# Patient Record
Sex: MALE | Race: AMERICAN INDIAN OR ALASKA NATIVE | NOT HISPANIC OR LATINO | URBAN - METROPOLITAN AREA
[De-identification: names, ages, dates, MRNs, and addresses within clinical notes are randomized per-mention and may not be internally consistent; named-entity substitution may affect disease eponyms.]

---

## 2020-06-28 ENCOUNTER — EMERGENCY (EMERGENCY)
Facility: HOSPITAL | Age: 60
LOS: 0 days | Discharge: HOME | End: 2020-06-28
Attending: EMERGENCY MEDICINE | Admitting: EMERGENCY MEDICINE
Payer: COMMERCIAL

## 2020-06-28 VITALS
TEMPERATURE: 98 F | RESPIRATION RATE: 18 BRPM | OXYGEN SATURATION: 96 % | HEART RATE: 96 BPM | SYSTOLIC BLOOD PRESSURE: 135 MMHG | DIASTOLIC BLOOD PRESSURE: 91 MMHG

## 2020-06-28 DIAGNOSIS — E78.5 HYPERLIPIDEMIA, UNSPECIFIED: ICD-10-CM

## 2020-06-28 DIAGNOSIS — Z88.0 ALLERGY STATUS TO PENICILLIN: ICD-10-CM

## 2020-06-28 DIAGNOSIS — M25.561 PAIN IN RIGHT KNEE: ICD-10-CM
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
## 2020-06-28 DIAGNOSIS — Z79.891 LONG TERM (CURRENT) USE OF OPIATE ANALGESIC: ICD-10-CM
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
## 2020-06-28 DIAGNOSIS — I10 ESSENTIAL (PRIMARY) HYPERTENSION: ICD-10-CM

## 2020-06-28 DIAGNOSIS — M25.511 PAIN IN RIGHT SHOULDER: ICD-10-CM
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
## 2020-06-28 PROCEDURE — 72170 X-RAY EXAM OF PELVIS: CPT | Mod: 26

## 2020-06-28 PROCEDURE — 73030 X-RAY EXAM OF SHOULDER: CPT | Mod: 26,RT

## 2020-06-28 PROCEDURE — 99284 EMERGENCY DEPT VISIT MOD MDM: CPT

## 2020-06-28 PROCEDURE — 73562 X-RAY EXAM OF KNEE 3: CPT | Mod: 26,RT

## 2020-06-28 RX ORDER — ACETAMINOPHEN 500 MG
975 TABLET ORAL ONCE
Refills: 0 | Status: COMPLETED | OUTPATIENT
Start: 2020-06-28 | End: 2020-06-28

## 2020-06-28 RX ORDER — METHOCARBAMOL 500 MG/1
2 TABLET, FILM COATED ORAL
Qty: 12 | Refills: 0
Start: 2020-06-28 | End: 2020-06-30

## 2020-06-28 RX ORDER — METHOCARBAMOL 500 MG/1
1500 TABLET, FILM COATED ORAL ONCE
Refills: 0 | Status: COMPLETED | OUTPATIENT
Start: 2020-06-28 | End: 2020-06-28

## 2020-06-28 RX ADMIN — METHOCARBAMOL 1500 MILLIGRAM(S): 500 TABLET, FILM COATED ORAL at 10:42

## 2020-06-28 RX ADMIN — Medication 975 MILLIGRAM(S): at 10:42

## 2020-06-28 NOTE — ED PROVIDER NOTE - PROGRESS NOTE DETAILS
Dienes- pain improved after meds. Sent rx for Robaxin. Xrs without acute fractures. Encouraged ortho f/u. Given ace wrap. Full DC instructions and precaution signs and symptoms discussed. Proper follow up ensured.  Medications administered and prescribed/OTC home meds discussed.  All questions and concerns from patient addressed.  Understanding of instructions verbalized.

## 2020-06-28 NOTE — ED PROVIDER NOTE - OBJECTIVE STATEMENT
58yo M with PMH of HTN and HLD presenting to ED with muscle pain and R shoulder and R knee pain since fall 2 1/2 weeks ago. Patient was seen by his PCP, had xrays of his feet that were unremarkable. Able to ambulate, has been ace wrapping R knee and taking Ibuprofen 800mg up to 2-3 times daily for muscular pain. States he is having a hard time raising his R arm completely although has good ROM otherwise. Reporting his R hip "clicks". Denies any f/c, HA, vision changes, midline spinal pain, abdominal pain, CP, SOB, paresthesias, or focal weakness. Nonsmoker.

## 2020-06-28 NOTE — ED PROVIDER NOTE - CARE PLAN
Principal Discharge DX:	Shoulder pain, right  Secondary Diagnosis:	Knee pain, right  Secondary Diagnosis:	Muscle pain

## 2020-06-28 NOTE — ED PROVIDER NOTE - NS ED ROS FT
Constitutional:  No fevers or chills.  Eyes:  No visual changes, eye pain, or discharge.  ENT:  No hearing changes. No sore throat.  Neck:  No neck pain or stiffness.  Cardiac:  No CP or edema.  Resp:  No cough or SOB.  GI:  No nausea, vomiting, diarrhea, or abdominal pain.  :  No dysuria, frequency, or hematuria.  MSK:  +R shoulder/R knee pain.  Neuro:  No headache, dizziness, or weakness.  Skin:  No skin rash.

## 2020-06-28 NOTE — ED PROVIDER NOTE - ATTENDING CONTRIBUTION TO CARE
59M PMH HTN HL, p/w R shoulder and R knee pain after trip and fall over dialysis machine 2 weeks ago. achy constant nonradiating worse w movement or lifting RUE, bending R knee. no cp, sob. no fever, cough. no numbness, weakness. no chi or loc. no ha, neck pain, bp. no abd pain, nvdc. seen by dr gama who did xr of feet which was normal and told  needs mri of knee/shoulder and ortho. pt requesting mri in ED and lab work to check for arthritis.     on exam, AFVSS, well bree nad, ncat, eomi, perrla, mmm, lctab, nontender chest, pelvis, no midline c/t/l spine ttp, rrr nl s1s2 no mrg, abd soft ntnd, aaox3, no focal deficits, no le edema or calf ttp, R shoulder and knee diffuse ttp, skin intact, no abrasions or edema, FROM, pain worse w rom, 5/5 motor, silt, 2+ radial pulse,     a/p; traumatic R shoulder/knee pain, will do XR, toradol, robaxin, re-eval.

## 2020-06-28 NOTE — ED PROVIDER NOTE - PATIENT PORTAL LINK FT
You can access the FollowMyHealth Patient Portal offered by [facility] by registering at the following website: [email or web address]. By joining [facility]’s FollowMyHealth portal, you will also be able to view your health information using other applications (apps) compatible with our system.

## 2020-06-28 NOTE — ED PROVIDER NOTE - NSFOLLOWUPINSTRUCTIONS_ED_ALL_ED_FT
Please follow-up with the orthopedic doctor as soon as possible.    Muscle Cramps and Spasms  Muscle cramps and spasms occur when a muscle or muscles tighten and you have no control over this tightening (involuntary muscle contraction). They are a common problem and can develop in any muscle. The most common place is in the calf muscles of the leg. Muscle cramps and muscle spasms are both involuntary muscle contractions, but there are some differences between the two:  Muscle cramps are painful. They come and go and may last for a few seconds or up to 15 minutes. Muscle cramps are often more forceful and last longer than muscle spasms.Muscle spasms may or may not be painful. They may also last just a few seconds or much longer.Certain medical conditions, such as diabetes or Parkinson's disease, can make it more likely to develop cramps or spasms. However, cramps or spasms are usually not caused by a serious underlying problem. Common causes include:  Doing more physical work or exercise than your body is ready for (overexertion).Overuse from repeating certain movements too many times.Remaining in a certain position for a long period of time.Improper preparation, form, or technique while playing a sport or doing an activity.Dehydration.Injury.Side effects of some medicines.Abnormally low levels of the salts and minerals in your blood (electrolytes), especially potassium and calcium. This could happen if you are taking water pills (diuretics) or if you are pregnant.In many cases, the cause of muscle cramps or spasms is not known.  Follow these instructions at home:  Managing pain and stiffness     Try massaging, stretching, and relaxing the affected muscle. Do this for several minutes at a time.If directed, apply heat to tight or tense muscles as often as told by your health care provider. Use the heat source that your health care provider recommends, such as a moist heat pack or a heating pad.  Place a towel between your skin and the heat source.Leave the heat on for 20–30 minutes.Remove the heat if your skin turns bright red. This is especially important if you are unable to feel pain, heat, or cold. You may have a greater risk of getting burned.If directed, put ice on the affected area. This may help if you are sore or have pain after a cramp or spasm.  Put ice in a plastic bag.Place a towel between your skin and the bag.Leave the ice on for 20 minutes, 2–3 times a day.Try taking hot showers or baths to help relax tight muscles.Eating and drinking     Drink enough fluid to keep your urine pale yellow. Staying well hydrated may help prevent cramps or spasms.Eat a healthy diet that includes plenty of nutrients to help your muscles function. A healthy diet includes fruits and vegetables, lean protein, whole grains, and low-fat or nonfat dairy products.General instructions     If you are having frequent cramps, avoid intense exercise for several days.Take over-the-counter and prescription medicines only as told by your health care provider.Pay attention to any changes in your symptoms.Keep all follow-up visits as told by your health care provider. This is important.Contact a health care provider if:  Your cramps or spasms get more severe or happen more often.Your cramps or spasms do not improve over time.Summary  Muscle cramps and spasms occur when a muscle or muscles tighten and you have no control over this tightening (involuntary muscle contraction).The most common place for cramps or spasms to occur is in the calf muscles of the leg.Massaging, stretching, and relaxing the affected muscle may relieve the cramp or spasm.Drink enough fluid to keep your urine pale yellow. Staying well hydrated may help prevent cramps or spasms.This information is not intended to replace advice given to you by your health care provider. Make sure you discuss any questions you have with your health care provider.    Shoulder Pain  Many things can cause shoulder pain, including:  An injury.Moving the shoulder in the same way again and again (overuse).Joint pain (arthritis).Pain can come from:  Swelling and irritation (inflammation) of any part of the shoulder.An injury to the shoulder joint.An injury to:  Tissues that connect muscle to bone (tendons).Tissues that connect bones to each other (ligaments).Bones.Follow these instructions at home:  Watch for changes in your symptoms. Let your doctor know about them. Follow these instructions to help with your pain.  If you have a sling:     Wear the sling as told by your doctor. Remove it only as told by your doctor.Loosen the sling if your fingers:  Tingle. Become numb. Turn cold and blue. Keep the sling clean.If the sling is not waterproof:  Do not let it get wet.Take the sling off when you shower or bathe.Managing pain, stiffness, and swelling        If told, put ice on the painful area:  Put ice in a plastic bag. Place a towel between your skin and the bag. Leave the ice on for 20 minutes, 2–3 times a day. Stop putting ice on if it does not help with the pain.Squeeze a soft ball or a foam pad as much as possible. This prevents swelling in the shoulder. It also helps to strengthen the arm.General instructions     Take over-the-counter and prescription medicines only as told by your doctor.Keep all follow-up visits as told by your doctor. This is important.Contact a doctor if:  Your pain gets worse.Medicine does not help your pain.You have new pain in your arm, hand, or fingers.Get help right away if:  Your arm, hand, or fingers:  Tingle.Are numb.Are swollen.Are painful.Turn white or blue.Summary  Shoulder pain can be caused by many things. These include injury, moving the shoulder in the same away again and again, and joint pain.Watch for changes in your symptoms. Let your doctor know about them.This condition may be treated with a sling, ice, and pain medicine.Contact your doctor if the pain gets worse or you have new pain. Get help right away if your arm, hand, or fingers tingle or get numb, swollen, or painful.Keep all follow-up visits as told by your doctor. This is important.    Knee Pain    WHAT YOU NEED TO KNOW:    What do I need to know about knee pain? Knee pain may start suddenly, or it may be a long-term problem. You may have pain on the side, front, or back of your knee. You may have knee stiffness and swelling. You may hear popping sounds or feel like your knee is giving way or locking up as you walk. You may feel pain when you sit, stand, walk, or climb up and down stairs.    What increases my risk for knee pain?     Obesity      A strain or tear in ligaments or tendons      A leg fracture or knee dislocation      Overuse of your knee      Osteoarthritis, rheumatoid arthritis, or gout      An infection, tumor, or cyst in your knee      Shoes that are not supportive, or training on a hard surface      Sports that involve jumping or pivoting on your knee    How is the cause of knee pain diagnosed? Your healthcare provider will examine your knee and ask about your symptoms. Tell your provider when the pain started and what you were doing at the time. Describe the pain, such as sharp, throbbing, or achy. Tell your provider about any knee injury or surgery you had. You may need any of the following:     MRI, CT, or ultrasound pictures may show an injury, fracture, or tumor.       Blood tests may be used to check the level of inflammation in your blood. The tests may also show signs of infection.      Arthroscopy is a procedure to look inside your knee joint with an arthroscope. An arthroscope is a flexible tube with a light and camera on the end. A knee arthroscopy is usually done to check for disease or damage inside your knee. These problems may be fixed during the procedure.    How is knee pain treated? Treatment will depend on the cause of your pain. You may need any of the following:     NSAIDs help decrease swelling and pain or fever. This medicine is available with or without a doctor's order. NSAIDs can cause stomach bleeding or kidney problems in certain people. If you take blood thinner medicine, always ask your healthcare provider if NSAIDs are safe for you. Always read the medicine label and follow directions.      Acetaminophen decreases pain and fever. It is available without a doctor's order. Ask how much to take and how often to take it. Follow directions. Read the labels of all other medicines you are using to see if they also contain acetaminophen, or ask your doctor or pharmacist. Acetaminophen can cause liver damage if not taken correctly. Do not use more than 4 grams (4,000 milligrams) total of acetaminophen in one day.       Prescription pain medicine may be given. Ask your healthcare provider how to take this medicine safely. Some prescription pain medicines contain acetaminophen. Do not take other medicines that contain acetaminophen without talking to your healthcare provider. Too much acetaminophen may cause liver damage. Prescription pain medicine may cause constipation. Ask your healthcare provider how to prevent or treat constipation.       Steroid injections may be given into your knee. Steroids reduce inflammation and pain.      Surgery may be used for some injuries, such as to repair a torn ACL.    What can I do to manage my symptoms?     Rest your knee so it can heal. Limit activities that increase your pain. Do low-impact exercises, such as walking or swimming.       Apply ice to help reduce swelling and pain. Use an ice pack, or put crushed ice in a plastic bag. Cover it with a towel before you apply it to your knee. Apply ice for 15 to 20 minutes every hour, or as directed.      Apply compression to help reduce swelling. Use a brace or bandage only as directed.      Elevate your knee to help decrease pain and swelling. Elevate your knee while you are sitting or lying down. Prop your leg on pillows to keep your knee above the level of your heart.      Prevent your knee from moving as directed. Your healthcare provider may put on a cast or splint. You may need to wear a leg brace to stabilize your knee. A leg brace can be adjusted to increase your range of motion as your knee heals.Hinged Knee Braces          What can I do to prevent knee pain?     Maintain a healthy weight. Extra weight increases your risk for knee pain. Ask your healthcare provider how much you should weigh. He or she can help you create a safe weight loss plan if you need to lose weight.      Exercise or train properly. Use the correct equipment for sports. Wear shoes that provide good support. Check your posture often as you exercise, play sports, or train for an event. This can help prevent stress and strain on your knees. Rest between sessions so you do not overwork your knees.    When should I seek immediate care?     Your pain is worse, even after treatment.       You cannot bend or straighten your leg completely.       The swelling around your knee does not go down even with treatment.      Your knee is painful and hot to the touch.     When should I contact my healthcare provider?     You have questions or concerns about your condition or care.

## 2020-06-28 NOTE — ED ADULT NURSE NOTE - NSIMPLEMENTINTERV_GEN_ALL_ED
Implemented All Universal Safety Interventions:  [place] to call system. Call bell, personal items and telephone within reach. Instruct patient to call for assistance. Room bathroom lighting operational. Non-slip footwear when patient is off stretcher. Physically safe environment: no spills, clutter or unnecessary equipment. Stretcher in lowest position, wheels locked, appropriate side rails in place.

## 2020-06-28 NOTE — ED PROVIDER NOTE - PHYSICAL EXAMINATION
PHYSICAL EXAM: I have reviewed current vital signs.  GENERAL: NAD, well-nourished; well-developed.  HEAD:  Normocephalic, atraumatic.  EYES: Conjunctiva and sclera clear.  ENT: MMM, no erythema/exudates.  NECK: Supple, no midline TTP, FROM.  CHEST/LUNG: Clear to auscultation bilaterally; no wheezes, rales, or rhonchi.  HEART: Regular rate and rhythm, normal S1 and S2; no murmurs, rubs, or gallops.  ABDOMEN: Soft, nontender, nondistended.  EXTREMITIES:  2+ peripheral pulses; R shoulder- no bony TTP, +mild reproducible muscular TTP, able to raise arm to about 60 degrees. R knee- mild TTP of medial aspect, no joint laxity. All other extremities nontender. No deformities.  MSK: No spinal midline TTP.  NEUROLOGY: A&O x 3. Motor 5/5. No focal neurological deficits.   SKIN: Warm and dry.

## 2020-06-28 NOTE — ED ADULT TRIAGE NOTE - CHIEF COMPLAINT QUOTE
Pt c/o R knee and R shoulder pain worsening x 2 weeks. Pt said he misstepped while walking when he injured his knee and now shoulder is affected.

## 2020-07-01 ENCOUNTER — APPOINTMENT (OUTPATIENT)
Dept: GERIATRICS | Facility: CLINIC | Age: 60
End: 2020-07-01
Payer: COMMERCIAL

## 2020-07-01 ENCOUNTER — OUTPATIENT (OUTPATIENT)
Dept: OUTPATIENT SERVICES | Facility: HOSPITAL | Age: 60
LOS: 1 days | Discharge: HOME | End: 2020-07-01
Payer: COMMERCIAL

## 2020-07-01 ENCOUNTER — OUTPATIENT (OUTPATIENT)
Dept: OUTPATIENT SERVICES | Facility: HOSPITAL | Age: 60
LOS: 1 days | Discharge: HOME | End: 2020-07-01

## 2020-07-01 VITALS
OXYGEN SATURATION: 93 % | TEMPERATURE: 98.5 F | WEIGHT: 184 LBS | BODY MASS INDEX: 24.92 KG/M2 | SYSTOLIC BLOOD PRESSURE: 127 MMHG | DIASTOLIC BLOOD PRESSURE: 81 MMHG | HEART RATE: 104 BPM | HEIGHT: 72 IN

## 2020-07-01 DIAGNOSIS — M25.571 PAIN IN RIGHT ANKLE AND JOINTS OF RIGHT FOOT: ICD-10-CM

## 2020-07-01 DIAGNOSIS — M54.5 LOW BACK PAIN: ICD-10-CM

## 2020-07-01 DIAGNOSIS — Z82.69 FAMILY HISTORY OF OTHER DISEASES OF THE MUSCULOSKELETAL SYSTEM AND CONNECTIVE TISSUE: ICD-10-CM

## 2020-07-01 DIAGNOSIS — Z86.79 PERSONAL HISTORY OF OTHER DISEASES OF THE CIRCULATORY SYSTEM: ICD-10-CM

## 2020-07-01 DIAGNOSIS — Z86.39 PERSONAL HISTORY OF OTHER ENDOCRINE, NUTRITIONAL AND METABOLIC DISEASE: ICD-10-CM

## 2020-07-01 DIAGNOSIS — M54.2 CERVICALGIA: ICD-10-CM

## 2020-07-01 PROCEDURE — 99203 OFFICE O/P NEW LOW 30 MIN: CPT | Mod: GC

## 2020-07-01 PROCEDURE — 73721 MRI JNT OF LWR EXTRE W/O DYE: CPT | Mod: 26,RT

## 2020-07-02 ENCOUNTER — OUTPATIENT (OUTPATIENT)
Dept: OUTPATIENT SERVICES | Facility: HOSPITAL | Age: 60
LOS: 1 days | Discharge: HOME | End: 2020-07-02
Payer: COMMERCIAL

## 2020-07-02 ENCOUNTER — RESULT REVIEW (OUTPATIENT)
Age: 60
End: 2020-07-02

## 2020-07-02 DIAGNOSIS — M54.2 CERVICALGIA: ICD-10-CM

## 2020-07-02 DIAGNOSIS — M54.5 LOW BACK PAIN: ICD-10-CM

## 2020-07-02 PROCEDURE — 72141 MRI NECK SPINE W/O DYE: CPT | Mod: 26

## 2020-07-06 ENCOUNTER — RESULT REVIEW (OUTPATIENT)
Age: 60
End: 2020-07-06

## 2020-07-06 ENCOUNTER — OUTPATIENT (OUTPATIENT)
Dept: OUTPATIENT SERVICES | Facility: HOSPITAL | Age: 60
LOS: 1 days | Discharge: HOME | End: 2020-07-06
Payer: COMMERCIAL

## 2020-07-06 DIAGNOSIS — M54.5 LOW BACK PAIN: ICD-10-CM

## 2020-07-06 PROCEDURE — 72148 MRI LUMBAR SPINE W/O DYE: CPT | Mod: 26

## 2020-07-26 ENCOUNTER — OUTPATIENT (OUTPATIENT)
Dept: OUTPATIENT SERVICES | Facility: HOSPITAL | Age: 60
LOS: 1 days | Discharge: HOME | End: 2020-07-26
Payer: COMMERCIAL

## 2020-07-26 DIAGNOSIS — R10.9 UNSPECIFIED ABDOMINAL PAIN: ICD-10-CM

## 2020-07-26 PROCEDURE — 74177 CT ABD & PELVIS W/CONTRAST: CPT | Mod: 26

## 2020-07-31 ENCOUNTER — APPOINTMENT (OUTPATIENT)
Dept: SURGERY | Facility: CLINIC | Age: 60
End: 2020-07-31
Payer: COMMERCIAL

## 2020-07-31 VITALS
SYSTOLIC BLOOD PRESSURE: 132 MMHG | WEIGHT: 180 LBS | HEART RATE: 89 BPM | DIASTOLIC BLOOD PRESSURE: 84 MMHG | TEMPERATURE: 97.7 F | BODY MASS INDEX: 24.38 KG/M2 | HEIGHT: 72 IN

## 2020-07-31 PROCEDURE — 99214 OFFICE O/P EST MOD 30 MIN: CPT

## 2020-07-31 NOTE — ASSESSMENT
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 Then he developed back and neck pain. On the MRI for the back pain a solid tumor was noticed in the abdomen. He then got a CT scan which confirmed a small bowel tumor. \par he has no symptoms. \par \par impression : GIST of small bowel .\par \par We explained in great detail the pathophysiology of the disease process. We amaris diagrams and discussed the workup for diagnosis and management.\par The various options were explained to the patient. The Risk , benefit and alternatives were discussed. We discussed recovery and possible complications.\par The Post operative care was explained to the patient. He was counselled on diet , exercise and wound care.\par We discussed the pathology and surgery with him.\par \par we will schedule him for lap resection of small bowel . \par \par We discussed multiple aspects of Enhanced Recovery After Surgery Protocols. We discussed we need to take multiple small steps to improve the outcome of the surgery and expedite the recovery. We discussed about preoperative preparation, intraoperative care and post operative recovery.\par \par \par We discussed the importance of close follow up. \par We informed that he needs to follow up in or\par We also informed that he can call us if anything changes or has any questions.\par

## 2020-07-31 NOTE — PHYSICAL EXAM
[JVD] : no jugular venous distention  [Purpura] : no purpura  [Respiratory Effort] : normal respiratory effort [Calm] : calm [Alert] : alert [de-identified] : Normal [de-identified] : Normal [de-identified] : Normal [de-identified] : Normal

## 2020-07-31 NOTE — HISTORY OF PRESENT ILLNESS
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 Then he developed back and neck pain. On the MRI for the back pain a solid tumor was noticed in the abdomen. He then got a CT scan which confirmed a small bowel tumor. \par he has no symptoms.

## 2020-08-14 ENCOUNTER — OUTPATIENT (OUTPATIENT)
Dept: OUTPATIENT SERVICES | Facility: HOSPITAL | Age: 60
LOS: 1 days | Discharge: HOME | End: 2020-08-14
Payer: COMMERCIAL

## 2020-08-14 ENCOUNTER — RESULT REVIEW (OUTPATIENT)
Age: 60
End: 2020-08-14

## 2020-08-14 VITALS
WEIGHT: 179.68 LBS | DIASTOLIC BLOOD PRESSURE: 76 MMHG | HEIGHT: 72 IN | SYSTOLIC BLOOD PRESSURE: 110 MMHG | RESPIRATION RATE: 16 BRPM | OXYGEN SATURATION: 95 % | HEART RATE: 104 BPM | TEMPERATURE: 96 F

## 2020-08-14 DIAGNOSIS — Z98.890 OTHER SPECIFIED POSTPROCEDURAL STATES: Chronic | ICD-10-CM

## 2020-08-14 DIAGNOSIS — Z01.818 ENCOUNTER FOR OTHER PREPROCEDURAL EXAMINATION: ICD-10-CM

## 2020-08-14 DIAGNOSIS — K63.89 OTHER SPECIFIED DISEASES OF INTESTINE: ICD-10-CM

## 2020-08-14 LAB
A1C WITH ESTIMATED AVERAGE GLUCOSE RESULT: 6.6 % — HIGH (ref 4–5.6)
ALBUMIN SERPL ELPH-MCNC: 4.3 G/DL — SIGNIFICANT CHANGE UP (ref 3.5–5.2)
ALP SERPL-CCNC: 133 U/L — HIGH (ref 30–115)
ALT FLD-CCNC: 43 U/L — HIGH (ref 0–41)
ANION GAP SERPL CALC-SCNC: 13 MMOL/L — SIGNIFICANT CHANGE UP (ref 7–14)
APTT BLD: 30.6 SEC — SIGNIFICANT CHANGE UP (ref 27–39.2)
AST SERPL-CCNC: 26 U/L — SIGNIFICANT CHANGE UP (ref 0–41)
BASOPHILS # BLD AUTO: 0.05 K/UL — SIGNIFICANT CHANGE UP (ref 0–0.2)
BASOPHILS NFR BLD AUTO: 0.4 % — SIGNIFICANT CHANGE UP (ref 0–1)
BILIRUB SERPL-MCNC: 0.3 MG/DL — SIGNIFICANT CHANGE UP (ref 0.2–1.2)
BLD GP AB SCN SERPL QL: SIGNIFICANT CHANGE UP
BUN SERPL-MCNC: 19 MG/DL — SIGNIFICANT CHANGE UP (ref 10–20)
CALCIUM SERPL-MCNC: 9.6 MG/DL — SIGNIFICANT CHANGE UP (ref 8.5–10.1)
CHLORIDE SERPL-SCNC: 100 MMOL/L — SIGNIFICANT CHANGE UP (ref 98–110)
CO2 SERPL-SCNC: 26 MMOL/L — SIGNIFICANT CHANGE UP (ref 17–32)
CREAT SERPL-MCNC: 1.1 MG/DL — SIGNIFICANT CHANGE UP (ref 0.7–1.5)
EOSINOPHIL # BLD AUTO: 0.35 K/UL — SIGNIFICANT CHANGE UP (ref 0–0.7)
EOSINOPHIL NFR BLD AUTO: 2.6 % — SIGNIFICANT CHANGE UP (ref 0–8)
ESTIMATED AVERAGE GLUCOSE: 143 MG/DL — HIGH (ref 68–114)
GLUCOSE SERPL-MCNC: 118 MG/DL — HIGH (ref 70–99)
HCT VFR BLD CALC: 45.1 % — SIGNIFICANT CHANGE UP (ref 42–52)
HGB BLD-MCNC: 14.8 G/DL — SIGNIFICANT CHANGE UP (ref 14–18)
IMM GRANULOCYTES NFR BLD AUTO: 0.4 % — HIGH (ref 0.1–0.3)
INR BLD: 1.12 RATIO — SIGNIFICANT CHANGE UP (ref 0.65–1.3)
LYMPHOCYTES # BLD AUTO: 1.77 K/UL — SIGNIFICANT CHANGE UP (ref 1.2–3.4)
LYMPHOCYTES # BLD AUTO: 13.1 % — LOW (ref 20.5–51.1)
MCHC RBC-ENTMCNC: 29.2 PG — SIGNIFICANT CHANGE UP (ref 27–31)
MCHC RBC-ENTMCNC: 32.8 G/DL — SIGNIFICANT CHANGE UP (ref 32–37)
MCV RBC AUTO: 89 FL — SIGNIFICANT CHANGE UP (ref 80–94)
MONOCYTES # BLD AUTO: 1.67 K/UL — HIGH (ref 0.1–0.6)
MONOCYTES NFR BLD AUTO: 12.3 % — HIGH (ref 1.7–9.3)
NEUTROPHILS # BLD AUTO: 9.63 K/UL — HIGH (ref 1.4–6.5)
NEUTROPHILS NFR BLD AUTO: 71.2 % — SIGNIFICANT CHANGE UP (ref 42.2–75.2)
NRBC # BLD: 0 /100 WBCS — SIGNIFICANT CHANGE UP (ref 0–0)
PLATELET # BLD AUTO: 291 K/UL — SIGNIFICANT CHANGE UP (ref 130–400)
POTASSIUM SERPL-MCNC: 4 MMOL/L — SIGNIFICANT CHANGE UP (ref 3.5–5)
POTASSIUM SERPL-SCNC: 4 MMOL/L — SIGNIFICANT CHANGE UP (ref 3.5–5)
PROT SERPL-MCNC: 7.6 G/DL — SIGNIFICANT CHANGE UP (ref 6–8)
PROTHROM AB SERPL-ACNC: 12.9 SEC — HIGH (ref 9.95–12.87)
RBC # BLD: 5.07 M/UL — SIGNIFICANT CHANGE UP (ref 4.7–6.1)
RBC # FLD: 15.4 % — HIGH (ref 11.5–14.5)
SODIUM SERPL-SCNC: 139 MMOL/L — SIGNIFICANT CHANGE UP (ref 135–146)
WBC # BLD: 13.53 K/UL — HIGH (ref 4.8–10.8)
WBC # FLD AUTO: 13.53 K/UL — HIGH (ref 4.8–10.8)

## 2020-08-14 PROCEDURE — 71046 X-RAY EXAM CHEST 2 VIEWS: CPT | Mod: 26

## 2020-08-14 PROCEDURE — 93010 ELECTROCARDIOGRAM REPORT: CPT

## 2020-08-14 NOTE — H&P PST ADULT - NSANTHOSAYNRD_GEN_A_CORE
No. EDVIN screening performed.  STOP BANG Legend: 0-2 = LOW Risk; 3-4 = INTERMEDIATE Risk; 5-8 = HIGH Risk

## 2020-08-14 NOTE — H&P PST ADULT - REASON FOR ADMISSION
Pt here for PAST for Laparoscopic Resectionof Small Bowel Tumor, possible open and any other indicated procedures on 8/21 under [place] by Dr Matute

## 2020-08-14 NOTE — H&P PST ADULT - HISTORY OF PRESENT ILLNESS
Pt states in June 2020 he sustained a right foot back and leg injury while pushing equipment at work. An MRI was done to evaluate his injury and an incidental 7.5cm small bowel tumor was seen. Pt denies any abdominal pain, weight changes or GI complaints. PATIENT CURRENTLY DENIES CHEST PAIN , SHORTNESS OF BREATH,  PALPITATIONS,  DYSURIA, OR UPPER RESPIRATORY INFECTION IN PAST 2 WEEKS. EXERCISE  TOLERANCE  1-2 FLIGHT OF STAIRS  WITHOUT SHORTNESS OF BREATH.  PAtient advised to maintain Quarantine Status from now until day of surgery.  Anesthesia Alert  NO--Difficult Airway  NO--History of neck surgery or radiation  NO--Limited ROM of neck  NO--History of Malignant hyperthermia  NO--Personal or family history of Pseudocholinesterase deficiency  NO--Prior Anesthesia Complication  NO--Latex Allergy  NO--Loose teeth  NO--History of Rheumatoid Arthritis  NO--EDVIN  NO--Other_____

## 2020-08-18 ENCOUNTER — LABORATORY RESULT (OUTPATIENT)
Age: 60
End: 2020-08-18

## 2020-08-18 ENCOUNTER — OUTPATIENT (OUTPATIENT)
Dept: OUTPATIENT SERVICES | Facility: HOSPITAL | Age: 60
LOS: 1 days | Discharge: HOME | End: 2020-08-18

## 2020-08-18 DIAGNOSIS — Z98.890 OTHER SPECIFIED POSTPROCEDURAL STATES: Chronic | ICD-10-CM

## 2020-08-18 DIAGNOSIS — Z11.59 ENCOUNTER FOR SCREENING FOR OTHER VIRAL DISEASES: ICD-10-CM

## 2020-08-18 PROBLEM — I10 ESSENTIAL (PRIMARY) HYPERTENSION: Chronic | Status: ACTIVE | Noted: 2020-06-28

## 2020-08-18 PROBLEM — E78.5 HYPERLIPIDEMIA, UNSPECIFIED: Chronic | Status: ACTIVE | Noted: 2020-08-14

## 2020-08-18 PROBLEM — M54.5 LOW BACK PAIN: Chronic | Status: ACTIVE | Noted: 2020-08-14

## 2020-08-21 ENCOUNTER — RESULT REVIEW (OUTPATIENT)
Age: 60
End: 2020-08-21

## 2020-08-21 ENCOUNTER — APPOINTMENT (OUTPATIENT)
Dept: SURGERY | Facility: HOSPITAL | Age: 60
End: 2020-08-21

## 2020-08-21 ENCOUNTER — INPATIENT (INPATIENT)
Facility: HOSPITAL | Age: 60
LOS: 3 days | Discharge: HOME | End: 2020-08-25
Attending: SURGERY | Admitting: SURGERY
Payer: COMMERCIAL

## 2020-08-21 VITALS
OXYGEN SATURATION: 98 % | HEIGHT: 72 IN | RESPIRATION RATE: 20 BRPM | SYSTOLIC BLOOD PRESSURE: 139 MMHG | TEMPERATURE: 98 F | DIASTOLIC BLOOD PRESSURE: 97 MMHG | HEART RATE: 83 BPM | WEIGHT: 177.03 LBS

## 2020-08-21 DIAGNOSIS — Z98.890 OTHER SPECIFIED POSTPROCEDURAL STATES: Chronic | ICD-10-CM

## 2020-08-21 DIAGNOSIS — K63.89 OTHER SPECIFIED DISEASES OF INTESTINE: ICD-10-CM

## 2020-08-21 LAB
ABO RH CONFIRMATION: SIGNIFICANT CHANGE UP
ANION GAP SERPL CALC-SCNC: 12 MMOL/L — SIGNIFICANT CHANGE UP (ref 7–14)
BASOPHILS # BLD AUTO: 0.01 K/UL — SIGNIFICANT CHANGE UP (ref 0–0.2)
BASOPHILS NFR BLD AUTO: 0.1 % — SIGNIFICANT CHANGE UP (ref 0–1)
BUN SERPL-MCNC: 19 MG/DL — SIGNIFICANT CHANGE UP (ref 10–20)
CALCIUM SERPL-MCNC: 9.2 MG/DL — SIGNIFICANT CHANGE UP (ref 8.5–10.1)
CHLORIDE SERPL-SCNC: 98 MMOL/L — SIGNIFICANT CHANGE UP (ref 98–110)
CO2 SERPL-SCNC: 24 MMOL/L — SIGNIFICANT CHANGE UP (ref 17–32)
CREAT SERPL-MCNC: 0.9 MG/DL — SIGNIFICANT CHANGE UP (ref 0.7–1.5)
EOSINOPHIL # BLD AUTO: 0 K/UL — SIGNIFICANT CHANGE UP (ref 0–0.7)
EOSINOPHIL NFR BLD AUTO: 0 % — SIGNIFICANT CHANGE UP (ref 0–8)
GLUCOSE SERPL-MCNC: 187 MG/DL — HIGH (ref 70–99)
HCT VFR BLD CALC: 41.8 % — LOW (ref 42–52)
HGB BLD-MCNC: 13.8 G/DL — LOW (ref 14–18)
IMM GRANULOCYTES NFR BLD AUTO: 0.4 % — HIGH (ref 0.1–0.3)
LYMPHOCYTES # BLD AUTO: 0.6 K/UL — LOW (ref 1.2–3.4)
LYMPHOCYTES # BLD AUTO: 4.9 % — LOW (ref 20.5–51.1)
MAGNESIUM SERPL-MCNC: 1.8 MG/DL — SIGNIFICANT CHANGE UP (ref 1.8–2.4)
MCHC RBC-ENTMCNC: 29.4 PG — SIGNIFICANT CHANGE UP (ref 27–31)
MCHC RBC-ENTMCNC: 33 G/DL — SIGNIFICANT CHANGE UP (ref 32–37)
MCV RBC AUTO: 88.9 FL — SIGNIFICANT CHANGE UP (ref 80–94)
MONOCYTES # BLD AUTO: 0.27 K/UL — SIGNIFICANT CHANGE UP (ref 0.1–0.6)
MONOCYTES NFR BLD AUTO: 2.2 % — SIGNIFICANT CHANGE UP (ref 1.7–9.3)
NEUTROPHILS # BLD AUTO: 11.28 K/UL — HIGH (ref 1.4–6.5)
NEUTROPHILS NFR BLD AUTO: 92.4 % — HIGH (ref 42.2–75.2)
NRBC # BLD: 0 /100 WBCS — SIGNIFICANT CHANGE UP (ref 0–0)
PHOSPHATE SERPL-MCNC: 4.5 MG/DL — SIGNIFICANT CHANGE UP (ref 2.1–4.9)
PLATELET # BLD AUTO: 308 K/UL — SIGNIFICANT CHANGE UP (ref 130–400)
POTASSIUM SERPL-MCNC: 4.3 MMOL/L — SIGNIFICANT CHANGE UP (ref 3.5–5)
POTASSIUM SERPL-SCNC: 4.3 MMOL/L — SIGNIFICANT CHANGE UP (ref 3.5–5)
RBC # BLD: 4.7 M/UL — SIGNIFICANT CHANGE UP (ref 4.7–6.1)
RBC # FLD: 14.6 % — HIGH (ref 11.5–14.5)
SODIUM SERPL-SCNC: 134 MMOL/L — LOW (ref 135–146)
WBC # BLD: 12.21 K/UL — HIGH (ref 4.8–10.8)
WBC # FLD AUTO: 12.21 K/UL — HIGH (ref 4.8–10.8)

## 2020-08-21 PROCEDURE — 88341 IMHCHEM/IMCYTCHM EA ADD ANTB: CPT | Mod: 26

## 2020-08-21 PROCEDURE — 44202 LAP ENTERECTOMY: CPT

## 2020-08-21 PROCEDURE — 88342 IMHCHEM/IMCYTCHM 1ST ANTB: CPT | Mod: 26

## 2020-08-21 PROCEDURE — 64488 TAP BLOCK BI INJECTION: CPT | Mod: XU,47

## 2020-08-21 PROCEDURE — 88309 TISSUE EXAM BY PATHOLOGIST: CPT | Mod: 26

## 2020-08-21 RX ORDER — SODIUM CHLORIDE 9 MG/ML
1000 INJECTION, SOLUTION INTRAVENOUS
Refills: 0 | Status: DISCONTINUED | OUTPATIENT
Start: 2020-08-21 | End: 2020-08-22

## 2020-08-21 RX ORDER — BUPIVACAINE 13.3 MG/ML
20 INJECTION, SUSPENSION, LIPOSOMAL INFILTRATION ONCE
Refills: 0 | Status: DISCONTINUED | OUTPATIENT
Start: 2020-08-21 | End: 2020-08-21

## 2020-08-21 RX ORDER — KETOROLAC TROMETHAMINE 30 MG/ML
15 SYRINGE (ML) INJECTION EVERY 8 HOURS
Refills: 0 | Status: DISCONTINUED | OUTPATIENT
Start: 2020-08-21 | End: 2020-08-25

## 2020-08-21 RX ORDER — ONDANSETRON 8 MG/1
4 TABLET, FILM COATED ORAL ONCE
Refills: 0 | Status: DISCONTINUED | OUTPATIENT
Start: 2020-08-21 | End: 2020-08-21

## 2020-08-21 RX ORDER — ONDANSETRON 8 MG/1
4 TABLET, FILM COATED ORAL EVERY 6 HOURS
Refills: 0 | Status: DISCONTINUED | OUTPATIENT
Start: 2020-08-21 | End: 2020-08-25

## 2020-08-21 RX ORDER — HYDROMORPHONE HYDROCHLORIDE 2 MG/ML
1 INJECTION INTRAMUSCULAR; INTRAVENOUS; SUBCUTANEOUS
Refills: 0 | Status: DISCONTINUED | OUTPATIENT
Start: 2020-08-21 | End: 2020-08-21

## 2020-08-21 RX ORDER — SODIUM CHLORIDE 9 MG/ML
1000 INJECTION, SOLUTION INTRAVENOUS
Refills: 0 | Status: DISCONTINUED | OUTPATIENT
Start: 2020-08-21 | End: 2020-08-21

## 2020-08-21 RX ORDER — LISINOPRIL 2.5 MG/1
40 TABLET ORAL DAILY
Refills: 0 | Status: DISCONTINUED | OUTPATIENT
Start: 2020-08-22 | End: 2020-08-25

## 2020-08-21 RX ORDER — ATORVASTATIN CALCIUM 80 MG/1
40 TABLET, FILM COATED ORAL AT BEDTIME
Refills: 0 | Status: DISCONTINUED | OUTPATIENT
Start: 2020-08-21 | End: 2020-08-25

## 2020-08-21 RX ORDER — HYDROMORPHONE HYDROCHLORIDE 2 MG/ML
0.25 INJECTION INTRAMUSCULAR; INTRAVENOUS; SUBCUTANEOUS EVERY 4 HOURS
Refills: 0 | Status: DISCONTINUED | OUTPATIENT
Start: 2020-08-21 | End: 2020-08-25

## 2020-08-21 RX ORDER — MEPERIDINE HYDROCHLORIDE 50 MG/ML
12.5 INJECTION INTRAMUSCULAR; INTRAVENOUS; SUBCUTANEOUS
Refills: 0 | Status: DISCONTINUED | OUTPATIENT
Start: 2020-08-21 | End: 2020-08-21

## 2020-08-21 RX ORDER — HYDROMORPHONE HYDROCHLORIDE 2 MG/ML
0.5 INJECTION INTRAMUSCULAR; INTRAVENOUS; SUBCUTANEOUS
Refills: 0 | Status: DISCONTINUED | OUTPATIENT
Start: 2020-08-21 | End: 2020-08-21

## 2020-08-21 RX ORDER — ACETAMINOPHEN 500 MG
1000 TABLET ORAL EVERY 6 HOURS
Refills: 0 | Status: DISCONTINUED | OUTPATIENT
Start: 2020-08-21 | End: 2020-08-25

## 2020-08-21 RX ORDER — HEPARIN SODIUM 5000 [USP'U]/ML
5000 INJECTION INTRAVENOUS; SUBCUTANEOUS EVERY 8 HOURS
Refills: 0 | Status: DISCONTINUED | OUTPATIENT
Start: 2020-08-21 | End: 2020-08-25

## 2020-08-21 RX ADMIN — HEPARIN SODIUM 5000 UNIT(S): 5000 INJECTION INTRAVENOUS; SUBCUTANEOUS at 21:23

## 2020-08-21 RX ADMIN — HYDROMORPHONE HYDROCHLORIDE 0.5 MILLIGRAM(S): 2 INJECTION INTRAMUSCULAR; INTRAVENOUS; SUBCUTANEOUS at 16:48

## 2020-08-21 RX ADMIN — SODIUM CHLORIDE 100 MILLILITER(S): 9 INJECTION, SOLUTION INTRAVENOUS at 16:16

## 2020-08-21 RX ADMIN — ONDANSETRON 4 MILLIGRAM(S): 8 TABLET, FILM COATED ORAL at 20:21

## 2020-08-21 RX ADMIN — HYDROMORPHONE HYDROCHLORIDE 1 MILLIGRAM(S): 2 INJECTION INTRAMUSCULAR; INTRAVENOUS; SUBCUTANEOUS at 16:16

## 2020-08-21 RX ADMIN — HYDROMORPHONE HYDROCHLORIDE 0.5 MILLIGRAM(S): 2 INJECTION INTRAMUSCULAR; INTRAVENOUS; SUBCUTANEOUS at 16:15

## 2020-08-21 RX ADMIN — Medication 15 MILLIGRAM(S): at 16:16

## 2020-08-21 RX ADMIN — HYDROMORPHONE HYDROCHLORIDE 1 MILLIGRAM(S): 2 INJECTION INTRAMUSCULAR; INTRAVENOUS; SUBCUTANEOUS at 15:54

## 2020-08-21 RX ADMIN — Medication 15 MILLIGRAM(S): at 15:55

## 2020-08-21 NOTE — CHART NOTE - NSCHARTNOTEFT_GEN_A_CORE
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, malignant    _x___  Patent Airway    __x__  Full return of protective reflexes    __x__  Full recovery from anesthesia / back to baseline status    Vitals:  T(C): 97.8 F  HR: 80  BP: 127/76  RR: 18  SpO2: 98%     Mental Status:  __x__ Awake   __x___ Alert   _____ Drowsy   _____ Sedated    Nausea/Vomiting:  __x__ NO  ______Yes,   See Post - Op Orders          Pain Scale (0-10):  _____    Treatment: ____ None    __x__ See Post - Op/PCA Orders    Post - Operative Fluids:   ____ Oral   __x__ See Post - Op Orders    Plan: Discharge:   ____Home       __x___Floor     _____Critical Care    _____  Other:_________________    Comments: uneventful anesthesia course no complications. Vitals stable. Pt transferred to PACU. Transfer to floor when criteria is met

## 2020-08-21 NOTE — CHART NOTE - NSCHARTNOTEFT_GEN_A_CORE
Post Operative Check    Patient is post op from a Hand-assisted laparoscopic excision of small bowel (61686)   and is     Vitals    T(C): 35.6 (08-21-20 @ 21:40), Max: 36.7 (08-21-20 @ 10:20)  HR: 95 (08-21-20 @ 21:40) (72 - 95)  BP: 114/71 (08-21-20 @ 21:40) (112/72 - 139/97)  RR: 18 (08-21-20 @ 21:40) (18 - 20)  SpO2: 95% (08-21-20 @ 18:10) (94% - 98%)  Wt(kg): --      08-21 @ 07:01  -  08-21 @ 23:04  --------------------------------------------------------  IN:    lactated ringers.: 300 mL  Total IN: 300 mL    OUT:  Total OUT: 0 mL    Total NET: 300 mL          Physical Exam  General: NAD AAOx3   Cards: RRR S1S2  Resp: CTAB  Abdomen:  :  Ext: NTBL    Labs  Labs:  CAPILLARY BLOOD GLUCOSE                              13.8   12.21 )-----------( 308      ( 21 Aug 2020 20:00 )             41.8       Auto Neutrophil %: 92.4 % (08-21-20 @ 20:00)  Auto Immature Granulocyte %: 0.4 % (08-21-20 @ 20:00)    08-21    134<L>  |  98  |  19  ----------------------------<  187<H>  4.3   |  24  |  0.9      Calcium, Total Serum: 9.2 mg/dL (08-21-20 @ 20:00)      LFTs:         Coags:                    Radiology:       Patient is a 59y old Male s/p   Plan:

## 2020-08-21 NOTE — BRIEF OPERATIVE NOTE - NSICDXBRIEFPREOP_GEN_ALL_CORE_FT
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

## 2020-08-21 NOTE — BRIEF OPERATIVE NOTE - OPERATION/FINDINGS
small bowel tumor. no other abnormalities noted. laparoscopic small bowel resection and side to side anastomosis performed. TAP block done. no apparent complications.

## 2020-08-21 NOTE — BRIEF OPERATIVE NOTE - NSICDXBRIEFPOSTOP_GEN_ALL_CORE_FT
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

## 2020-08-22 LAB
ANION GAP SERPL CALC-SCNC: 8 MMOL/L — SIGNIFICANT CHANGE UP (ref 7–14)
BASOPHILS # BLD AUTO: 0.03 K/UL — SIGNIFICANT CHANGE UP (ref 0–0.2)
BASOPHILS NFR BLD AUTO: 0.3 % — SIGNIFICANT CHANGE UP (ref 0–1)
BUN SERPL-MCNC: 14 MG/DL — SIGNIFICANT CHANGE UP (ref 10–20)
CALCIUM SERPL-MCNC: 9 MG/DL — SIGNIFICANT CHANGE UP (ref 8.5–10.1)
CHLORIDE SERPL-SCNC: 105 MMOL/L — SIGNIFICANT CHANGE UP (ref 98–110)
CO2 SERPL-SCNC: 29 MMOL/L — SIGNIFICANT CHANGE UP (ref 17–32)
CREAT SERPL-MCNC: 1 MG/DL — SIGNIFICANT CHANGE UP (ref 0.7–1.5)
EOSINOPHIL # BLD AUTO: 0.07 K/UL — SIGNIFICANT CHANGE UP (ref 0–0.7)
EOSINOPHIL NFR BLD AUTO: 0.6 % — SIGNIFICANT CHANGE UP (ref 0–8)
GLUCOSE SERPL-MCNC: 111 MG/DL — HIGH (ref 70–99)
HCT VFR BLD CALC: 35.4 % — LOW (ref 42–52)
HGB BLD-MCNC: 11.5 G/DL — LOW (ref 14–18)
IMM GRANULOCYTES NFR BLD AUTO: 0.5 % — HIGH (ref 0.1–0.3)
LYMPHOCYTES # BLD AUTO: 1.88 K/UL — SIGNIFICANT CHANGE UP (ref 1.2–3.4)
LYMPHOCYTES # BLD AUTO: 16.6 % — LOW (ref 20.5–51.1)
MAGNESIUM SERPL-MCNC: 2.1 MG/DL — SIGNIFICANT CHANGE UP (ref 1.8–2.4)
MCHC RBC-ENTMCNC: 29.3 PG — SIGNIFICANT CHANGE UP (ref 27–31)
MCHC RBC-ENTMCNC: 32.5 G/DL — SIGNIFICANT CHANGE UP (ref 32–37)
MCV RBC AUTO: 90.1 FL — SIGNIFICANT CHANGE UP (ref 80–94)
MONOCYTES # BLD AUTO: 1.28 K/UL — HIGH (ref 0.1–0.6)
MONOCYTES NFR BLD AUTO: 11.3 % — HIGH (ref 1.7–9.3)
NEUTROPHILS # BLD AUTO: 7.98 K/UL — HIGH (ref 1.4–6.5)
NEUTROPHILS NFR BLD AUTO: 70.7 % — SIGNIFICANT CHANGE UP (ref 42.2–75.2)
NRBC # BLD: 0 /100 WBCS — SIGNIFICANT CHANGE UP (ref 0–0)
PHOSPHATE SERPL-MCNC: 4 MG/DL — SIGNIFICANT CHANGE UP (ref 2.1–4.9)
PLATELET # BLD AUTO: 323 K/UL — SIGNIFICANT CHANGE UP (ref 130–400)
POTASSIUM SERPL-MCNC: 4 MMOL/L — SIGNIFICANT CHANGE UP (ref 3.5–5)
POTASSIUM SERPL-SCNC: 4 MMOL/L — SIGNIFICANT CHANGE UP (ref 3.5–5)
RBC # BLD: 3.93 M/UL — LOW (ref 4.7–6.1)
RBC # FLD: 14.7 % — HIGH (ref 11.5–14.5)
SODIUM SERPL-SCNC: 142 MMOL/L — SIGNIFICANT CHANGE UP (ref 135–146)
WBC # BLD: 11.3 K/UL — HIGH (ref 4.8–10.8)
WBC # FLD AUTO: 11.3 K/UL — HIGH (ref 4.8–10.8)

## 2020-08-22 PROCEDURE — 99024 POSTOP FOLLOW-UP VISIT: CPT

## 2020-08-22 RX ORDER — SODIUM CHLORIDE 9 MG/ML
1000 INJECTION, SOLUTION INTRAVENOUS
Refills: 0 | Status: DISCONTINUED | OUTPATIENT
Start: 2020-08-22 | End: 2020-08-23

## 2020-08-22 RX ORDER — TAMSULOSIN HYDROCHLORIDE 0.4 MG/1
0.8 CAPSULE ORAL AT BEDTIME
Refills: 0 | Status: DISCONTINUED | OUTPATIENT
Start: 2020-08-22 | End: 2020-08-25

## 2020-08-22 RX ORDER — MAGNESIUM SULFATE 500 MG/ML
2 VIAL (ML) INJECTION ONCE
Refills: 0 | Status: COMPLETED | OUTPATIENT
Start: 2020-08-22 | End: 2020-08-22

## 2020-08-22 RX ADMIN — Medication 50 GRAM(S): at 05:07

## 2020-08-22 RX ADMIN — Medication 15 MILLIGRAM(S): at 20:20

## 2020-08-22 RX ADMIN — SODIUM CHLORIDE 75 MILLILITER(S): 9 INJECTION, SOLUTION INTRAVENOUS at 12:35

## 2020-08-22 RX ADMIN — HYDROMORPHONE HYDROCHLORIDE 0.25 MILLIGRAM(S): 2 INJECTION INTRAMUSCULAR; INTRAVENOUS; SUBCUTANEOUS at 08:15

## 2020-08-22 RX ADMIN — Medication 1000 MILLIGRAM(S): at 06:16

## 2020-08-22 RX ADMIN — HYDROMORPHONE HYDROCHLORIDE 0.25 MILLIGRAM(S): 2 INJECTION INTRAMUSCULAR; INTRAVENOUS; SUBCUTANEOUS at 07:56

## 2020-08-22 RX ADMIN — HYDROMORPHONE HYDROCHLORIDE 0.25 MILLIGRAM(S): 2 INJECTION INTRAMUSCULAR; INTRAVENOUS; SUBCUTANEOUS at 12:36

## 2020-08-22 RX ADMIN — TAMSULOSIN HYDROCHLORIDE 0.8 MILLIGRAM(S): 0.4 CAPSULE ORAL at 22:10

## 2020-08-22 RX ADMIN — Medication 15 MILLIGRAM(S): at 20:35

## 2020-08-22 RX ADMIN — HYDROMORPHONE HYDROCHLORIDE 0.25 MILLIGRAM(S): 2 INJECTION INTRAMUSCULAR; INTRAVENOUS; SUBCUTANEOUS at 12:50

## 2020-08-22 RX ADMIN — LISINOPRIL 40 MILLIGRAM(S): 2.5 TABLET ORAL at 05:08

## 2020-08-22 RX ADMIN — HEPARIN SODIUM 5000 UNIT(S): 5000 INJECTION INTRAVENOUS; SUBCUTANEOUS at 13:30

## 2020-08-22 RX ADMIN — HEPARIN SODIUM 5000 UNIT(S): 5000 INJECTION INTRAVENOUS; SUBCUTANEOUS at 05:07

## 2020-08-22 RX ADMIN — Medication 15 MILLIGRAM(S): at 00:39

## 2020-08-22 NOTE — PHYSICAL THERAPY INITIAL EVALUATION ADULT - GENERAL OBSERVATIONS, REHAB EVAL
pt encountered supine in bed in NAD - agreeable to PT IE - put an abdominal binder on pt with RN Sohail - + IV and winston - pt is doing well ind in ambulation and stairs - DC PT

## 2020-08-22 NOTE — CONSULT NOTE ADULT - SUBJECTIVE AND OBJECTIVE BOX
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  for  smal  bowel  lesion  resiction  sp  pod  1  ptn  referred to acute  rehab  for eval and  tx  ptn  seen  and examed  at  bed  side  nad  aox3  c/o  r le  pain      PTN  REFERRED TO ACUTE  REHAB  FOR  EVAL AND  TX   PAST MEDICAL & SURGICAL HISTORY:  Hyperlipidemia  Lumbago  Hypertension  H/O lymph node biopsy: Neck by Dr Case      [facility] Course:    TODAY'S SUBJECTIVE & REVIEW OF SYMPTOMS:     Constitutional WNL   Cardio WNL   Resp WNL   GI WNL  Heme WNL  Endo WNL  Skin WNL  MSK WNL  Neuro WNL  Cognitive WNL  Psych WNL      MEDICATIONS  (STANDING):  acetaminophen   Tablet .. 1000 milliGRAM(s) Oral every 6 hours  atorvastatin 40 milliGRAM(s) Oral at bedtime  dextrose 5% + sodium chloride 0.45%. 1000 milliLiter(s) (75 mL/Hr) IV Continuous <Continuous>  heparin   Injectable 5000 Unit(s) SubCutaneous every 8 hours  lisinopril 40 milliGRAM(s) Oral daily  tamsulosin 0.8 milliGRAM(s) Oral at bedtime    MEDICATIONS  (PRN):  HYDROmorphone  Injectable 0.25 milliGRAM(s) IV Push every 4 hours PRN Severe Pain (7 - 10)  ketorolac   Injectable 15 milliGRAM(s) IV Push every 8 hours PRN Moderate Pain (4 - 6)  ondansetron Injectable 4 milliGRAM(s) IV Push every 6 hours PRN Nausea      FAMILY HISTORY:  Known health problems: none      Allergies    penicillin (Unknown)  shellfish (Pruritus)    Intolerances        SOCIAL HISTORY:    [  ] Etoh  [  ] Smoking  [  ] Substance abuse     Home Environment:  [  ] Home Alone  [xx  ] Lives with Family  [  ] Home Health Aid    Dwelling:  [  ] Apartment  [ x ] Private House  [  ] Adult Home  [  ] Skilled Nursing Facility      [  ] Short Term  [  ] Long Term  [x  ] Stairs       Elevator [  ]    FUNCTIONAL STATUS PTA: (Check all that apply)  Ambulation: [  x ]Independent    [  ] Dependent     [  ] Non-Ambulatory  Assistive Device: [  ] SA Cane  [  ]  Q Cane  [  ] Walker  [  ]  Wheelchair  ADL : [ x ] Independent  [  ]  Dependent       Vital Signs Last 24 Hrs  T(C): 36.7 (22 Aug 2020 13:25), Max: 36.7 (22 Aug 2020 09:50)  T(F): 98.1 (22 Aug 2020 13:25), Max: 98.1 (22 Aug 2020 13:25)  HR: 75 (22 Aug 2020 13:25) (72 - 95)  BP: 111/76 (22 Aug 2020 13:25) (105/66 - 128/81)  BP(mean): --  RR: 18 (22 Aug 2020 13:25) (18 - 18)  SpO2: 95% (21 Aug 2020 18:10) (94% - 98%)      PHYSICAL EXAM: Alert & Oriented X 3  GENERAL: NAD, well-groomed, well-developed  HEAD:  Atraumatic, Normocephalic  EYES: EOMI, PERRLA, conjunctiva and sclera clear  NECK: Supple, No JVD, Normal thyroid  CHEST/LUNG: Clear to percussion bilaterally; No rales, rhonchi, wheezing, or rubs  HEART: Regular rate and rhythm; No murmurs, rubs, or gallops  ABDOMEN: Soft, Nontender, Nondistended; Bowel sounds present  EXTREMITIES:  2+ Peripheral Pulses, No clubbing, cyanosis, or edema    NERVOUS SYSTEM:  Cranial Nerves 2-12 intact [ x ] Abnormal  [  ]  ROM: WFL all extremities [ x ]  Abnormal [  ]  Motor Strength: WFL all extremities  [ x ]  Abnormal [  ]  Sensation: intact to light touch [  x] Abnormal [  ]  Reflexes: Symmetric [ x ]  Abnormal [  ]    FUNCTIONAL STATUS:  Bed Mobility: Independent [  ]  Supervision [  ]  Needs Assistance [  ]  N/A [ x ]  Transfers: Independent [  ]  Supervision [  ]  Needs Assistance [  ]  N/A [x  ]   Ambulation: Independent [  ]  Supervision [  ]  Needs Assistance [  ]  N/A [x  ]  ADL: Independent [  ] Requires Assistance [  ] N/A [  x]  SEE PT/OT IE NOTES    LABS:                        13.8   12.21 )-----------( 308      ( 21 Aug 2020 20:00 )             41.8     08-21    134<L>  |  98  |  19  ----------------------------<  187<H>  4.3   |  24  |  0.9    Ca    9.2      21 Aug 2020 20:00  Phos  4.5     08-21  Mg     1.8     08-21            RADIOLOGY & ADDITIONAL STUDIES:< from: CT Abdomen and Pelvis w/ Oral Cont and w/ IV Cont (07.26.20 @ 12:09) >  MPRESSION:    A 7.0 x 6.2 x 7.7 cm heterogeneous mass in the mid abdomen contiguous with small bowel loops 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      < end of copied text >      Assesment: No significant past surgical history

## 2020-08-22 NOTE — PHYSICAL THERAPY INITIAL EVALUATION ADULT - PERTINENT HX OF CURRENT PROBLEM, REHAB EVAL
Pt here for PAST for Laparoscopic Resectionof Small Bowel Tumor, possible open and any other indicated procedures on 8/21 under GA by Dr JonesePt here for PAST for Laparoscopic Resectionof Small Bowel Tumor, possible open and any other indicated procedures on 8/21 under GA by Dr Matute

## 2020-08-22 NOTE — CONSULT NOTE ADULT - ASSESSMENT
IMPRESSION: Rehab of 59  y.o m  rehab  for  debility  lbp      PRECAUTIONS: [  ] Cardiac  [  ] Respiratory  [  ] Seizures [  ] Contact Isolation  [  ] Droplet Isolation  [ FALL ] Other    Weight Bearing Status:     RECOMMENDATION:    Out of Bed to Chair     DVT/Decubiti Prophylaxis    REHAB PLAN:     [  xx ] Bedside P/T 3-5 times a week   [   ]   Bedside O/T  2-3 times a week             [   ] No Rehab Therapy Indicated                   [   ]  Speech Therapy   Conditioning/ROM                                    ADL  Bed Mobility                                               Conditioning/ROM  Transfers                                                     Bed Mobility  Sitting /Standing Balance                         Transfers                                        Gait Training                                               Sitting/Standing Balance  Stair Training [   ]Applicable                    Home equipment Eval                                                                        Splinting  [   ] Only      GOALS:   ADL   [  x ]   Independent                    Transfers  [ x  ] Independent                          Ambulation  [  x ] Independent     [    ] With device                            [ x  ]  CG                                                         [ x ]  CG                                                                  [ x  ] CG                            [    ] Min A                                                   [   ] Min A                                                              [   ] Min  A          DISCHARGE PLAN:   [   ]  Good candidate for Intensive Rehabilitation/Hospital based-4A [facility]                                             Will tolerate 3hrs Intensive Rehab Daily                                       [    ]  Short Term Rehab in Skilled Nursing Facility                                       [  xx  ]  Home with Outpatient or VN services                                         [    ]  Possible Candidate for Intensive Hospital based Rehab

## 2020-08-22 NOTE — PROGRESS NOTE ADULT - ASSESSMENT
A/P:  JOSÉ LUIS AREVALO is a 59yMale HD2/POD1  from Hand-assisted laparoscopic excision of small bowel    He is currently doing well resting comfortably, pain controlled    Plan:   - fu pathology  - ADAT  - fu Coleman output   - OOB  - IS  - f/u vitals  - f/u labs & replete if necessary-- mg 1.8 2gm mag sulfate given.  - DVT PPX  - GI PPX  - Speak w/ SW/CM  - Begin dispo planning for D/C  - patient was explained plan, understood, and agreed

## 2020-08-22 NOTE — PROGRESS NOTE ADULT - SUBJECTIVE AND OBJECTIVE BOX
GENERAL SURGERY PROGRESS NOTE     JOSÉ LUIS AREVALO  [address] [place] [address] day :1d  POD:  Procedure: Hand-assisted laparoscopic excision of small bowel    Surgical Attending: Mary Alice Matute      24H events/hpi:  Patient seen & examined at bedside. In NAD & has no complaints. Had some nausea which resolved with zofran admin. Patient was bladder scanned on night shift and had 980 on BS. winston was inserted and 900cc of urine came out.  No other events. Pain is tolerable       T(F): 96.9 (08-22-20 @ 04:25), Max: 98.1 (08-21-20 @ 10:20)  HR: 92 (08-22-20 @ 04:25) (72 - 95)  BP: 105/66 (08-22-20 @ 04:25) (105/66 - 139/97)  ABP: --  ABP(mean): --  RR: 18 (08-22-20 @ 04:25) (18 - 20)  SpO2: 95% (08-21-20 @ 18:10) (94% - 98%)      08-21-20 @ 07:01  -  08-22-20 @ 04:48  --------------------------------------------------------  IN:    lactated ringers.: 300 mL  Total IN: 300 mL    OUT:    Indwelling Catheter - Urethral: 250 mL    Intermittent Catheterization - Urethral: 900 mL  Total OUT: 1150 mL    Total NET: -850 mL        DIET/FLUIDS: lactated ringers. 1000 milliLiter(s) IV Continuous <Continuous>  magnesium sulfate  IVPB 2 Gram(s) IV Intermittent once      I&O    08-21-20 @ 07:01  -  08-22-20 @ 04:48  --------------------------------------------------------  IN: 300 mL / OUT: 1150 mL / NET: -850 mL        URINE:    Indwelling Urethral Catheter:     Connect To:  Straight Drainage/[place]    Indication:  Urinary Retention / Obstruction (08-22-20 @ 02:14)    GI proph:    AC/ proph: heparin   Injectable 5000 Unit(s) SubCutaneous every 8 hours      PHYSICAL EXAM:  GENERAL: NAD, well-appearing.   CHEST/LUNG: Clear to auscultation bilaterally  HEART: Regular rate and rhythm  ABDOMEN: Soft, Nontender, Nondistended; incisions clean dry and intact. 4 laparoscopic incisions with 1 being about 3-4cm where the GIST was taken out .   EXTREMITIES:  No clubbing, cyanosis, or edema      LABS                   13.8   12.21 )-----------( 308      ( 21 Aug 2020 20:00 )             41.8       Auto Neutrophil %: 92.4 % (08-21-20 @ 20:00)  Auto Immature Granulocyte %: 0.4 % (08-21-20 @ 20:00)    08-21    134<L>  |  98  |  19  ----------------------------<  187<H>  4.3   |  24  |  0.9      Calcium, Total Serum: 9.2 mg/dL (08-21-20 @ 20:00)

## 2020-08-22 NOTE — PROGRESS NOTE ADULT - ATTENDING COMMENTS
pt examined  8/22  s/p small bowel resection  abdomen soft  incisional tenderness  vitals and labs reviewed    start clear liquid diet

## 2020-08-23 LAB
ANION GAP SERPL CALC-SCNC: 9 MMOL/L — SIGNIFICANT CHANGE UP (ref 7–14)
BASOPHILS # BLD AUTO: 0.07 K/UL — SIGNIFICANT CHANGE UP (ref 0–0.2)
BASOPHILS NFR BLD AUTO: 0.8 % — SIGNIFICANT CHANGE UP (ref 0–1)
BUN SERPL-MCNC: 10 MG/DL — SIGNIFICANT CHANGE UP (ref 10–20)
CALCIUM SERPL-MCNC: 9.1 MG/DL — SIGNIFICANT CHANGE UP (ref 8.5–10.1)
CHLORIDE SERPL-SCNC: 103 MMOL/L — SIGNIFICANT CHANGE UP (ref 98–110)
CO2 SERPL-SCNC: 29 MMOL/L — SIGNIFICANT CHANGE UP (ref 17–32)
CREAT SERPL-MCNC: 0.8 MG/DL — SIGNIFICANT CHANGE UP (ref 0.7–1.5)
EOSINOPHIL # BLD AUTO: 0.23 K/UL — SIGNIFICANT CHANGE UP (ref 0–0.7)
EOSINOPHIL NFR BLD AUTO: 2.6 % — SIGNIFICANT CHANGE UP (ref 0–8)
GLUCOSE SERPL-MCNC: 95 MG/DL — SIGNIFICANT CHANGE UP (ref 70–99)
HCT VFR BLD CALC: 38.6 % — LOW (ref 42–52)
HGB BLD-MCNC: 12.3 G/DL — LOW (ref 14–18)
IMM GRANULOCYTES NFR BLD AUTO: 0.4 % — HIGH (ref 0.1–0.3)
LYMPHOCYTES # BLD AUTO: 2.33 K/UL — SIGNIFICANT CHANGE UP (ref 1.2–3.4)
LYMPHOCYTES # BLD AUTO: 25.9 % — SIGNIFICANT CHANGE UP (ref 20.5–51.1)
MAGNESIUM SERPL-MCNC: 1.9 MG/DL — SIGNIFICANT CHANGE UP (ref 1.8–2.4)
MCHC RBC-ENTMCNC: 28.3 PG — SIGNIFICANT CHANGE UP (ref 27–31)
MCHC RBC-ENTMCNC: 31.9 G/DL — LOW (ref 32–37)
MCV RBC AUTO: 88.9 FL — SIGNIFICANT CHANGE UP (ref 80–94)
MONOCYTES # BLD AUTO: 1.05 K/UL — HIGH (ref 0.1–0.6)
MONOCYTES NFR BLD AUTO: 11.7 % — HIGH (ref 1.7–9.3)
NEUTROPHILS # BLD AUTO: 5.26 K/UL — SIGNIFICANT CHANGE UP (ref 1.4–6.5)
NEUTROPHILS NFR BLD AUTO: 58.6 % — SIGNIFICANT CHANGE UP (ref 42.2–75.2)
NRBC # BLD: 0 /100 WBCS — SIGNIFICANT CHANGE UP (ref 0–0)
PHOSPHATE SERPL-MCNC: 4.1 MG/DL — SIGNIFICANT CHANGE UP (ref 2.1–4.9)
PLATELET # BLD AUTO: 345 K/UL — SIGNIFICANT CHANGE UP (ref 130–400)
POTASSIUM SERPL-MCNC: 4.1 MMOL/L — SIGNIFICANT CHANGE UP (ref 3.5–5)
POTASSIUM SERPL-SCNC: 4.1 MMOL/L — SIGNIFICANT CHANGE UP (ref 3.5–5)
RBC # BLD: 4.34 M/UL — LOW (ref 4.7–6.1)
RBC # FLD: 15.1 % — HIGH (ref 11.5–14.5)
SODIUM SERPL-SCNC: 141 MMOL/L — SIGNIFICANT CHANGE UP (ref 135–146)
WBC # BLD: 8.98 K/UL — SIGNIFICANT CHANGE UP (ref 4.8–10.8)
WBC # FLD AUTO: 8.98 K/UL — SIGNIFICANT CHANGE UP (ref 4.8–10.8)

## 2020-08-23 PROCEDURE — 99024 POSTOP FOLLOW-UP VISIT: CPT

## 2020-08-23 RX ADMIN — Medication 1000 MILLIGRAM(S): at 12:30

## 2020-08-23 RX ADMIN — HEPARIN SODIUM 5000 UNIT(S): 5000 INJECTION INTRAVENOUS; SUBCUTANEOUS at 05:34

## 2020-08-23 RX ADMIN — Medication 15 MILLIGRAM(S): at 05:34

## 2020-08-23 RX ADMIN — HEPARIN SODIUM 5000 UNIT(S): 5000 INJECTION INTRAVENOUS; SUBCUTANEOUS at 21:40

## 2020-08-23 RX ADMIN — Medication 1000 MILLIGRAM(S): at 11:54

## 2020-08-23 RX ADMIN — HEPARIN SODIUM 5000 UNIT(S): 5000 INJECTION INTRAVENOUS; SUBCUTANEOUS at 13:32

## 2020-08-23 RX ADMIN — SODIUM CHLORIDE 75 MILLILITER(S): 9 INJECTION, SOLUTION INTRAVENOUS at 02:00

## 2020-08-23 RX ADMIN — TAMSULOSIN HYDROCHLORIDE 0.8 MILLIGRAM(S): 0.4 CAPSULE ORAL at 21:40

## 2020-08-23 RX ADMIN — Medication 15 MILLIGRAM(S): at 05:50

## 2020-08-23 RX ADMIN — Medication 1000 MILLIGRAM(S): at 17:00

## 2020-08-23 RX ADMIN — LISINOPRIL 40 MILLIGRAM(S): 2.5 TABLET ORAL at 05:32

## 2020-08-23 RX ADMIN — Medication 1000 MILLIGRAM(S): at 17:30

## 2020-08-23 NOTE — PROGRESS NOTE ADULT - SUBJECTIVE AND OBJECTIVE BOX
Progress Note: General Surgery  Patient: JOSÉ LUIS AREVALO , 59y (1960)Male   MRN: [id number]  Location: [address]  Visit: 08-21-20 Inpatient  Date: 08-23-20 @ 03:36    Procedure/Diagnosis: s/p laparoscopic small bowel resection for GIST    Events/ 24h: No acute events overnight. Pain controlled.    Vitals: T(F): 97.2 (08-23-20 @ 01:00), Max: 98.1 (08-22-20 @ 13:25)  HR: 78 (08-23-20 @ 01:00)  BP: 118/71 (08-23-20 @ 01:00) (102/58 - 118/71)  RR: 18 (08-23-20 @ 01:00)  SpO2: --    In:   08-21-20 @ 07:01  -  08-22-20 @ 07:00  --------------------------------------------------------  IN: 300 mL    08-22-20 @ 07:01  -  08-23-20 @ 03:36  --------------------------------------------------------  IN: 2000 mL      Out:   08-21-20 @ 07:01  -  08-22-20 @ 07:00  --------------------------------------------------------  OUT:    Indwelling Catheter - Urethral: 250 mL    Intermittent Catheterization - Urethral: 900 mL  Total OUT: 1150 mL      08-22-20 @ 07:01  -  08-23-20 @ 03:36  --------------------------------------------------------  OUT:    Indwelling Catheter - Urethral: 2900 mL  Total OUT: 2900 mL        Net:   08-21-20 @ 07:01  -  08-22-20 @ 07:00  --------------------------------------------------------  NET: -850 mL    08-22-20 @ 07:01  -  08-23-20 @ 03:36  --------------------------------------------------------  NET: -900 mL        Diet: Diet, Clear Liquid (08-22-20 @ 08:56)    IV Fluids: dextrose 5% + sodium chloride 0.45%. 1000 milliLiter(s) (75 mL/Hr) IV Continuous <Continuous>      Physical Examination:  General Appearance: NAD  HEENT: EOMI, sclera non-icteric.  Heart: RRR   Lungs: CTABL.  ABDOMEN: Soft, Nontender, Nondistended; incisions clean dry and intact. 4 laparoscopic incisions with 1 being about 3-4cm where the GIST was taken out   MSK/Extremities: Warm & well-perfused.   Skin: Warm, dry. No jaundice.       Medications: [Standing]  acetaminophen   Tablet .. 1000 milliGRAM(s) Oral every 6 hours  atorvastatin 40 milliGRAM(s) Oral at bedtime  dextrose 5% + sodium chloride 0.45%. 1000 milliLiter(s) (75 mL/Hr) IV Continuous <Continuous>  heparin   Injectable 5000 Unit(s) SubCutaneous every 8 hours  lisinopril 40 milliGRAM(s) Oral daily  tamsulosin 0.8 milliGRAM(s) Oral at bedtime    DVT Prophylaxis: heparin   Injectable 5000 Unit(s) SubCutaneous every 8 hours    GI Prophylaxis:   Antibiotics:   Anticoagulation:   Medications:[PRN]  HYDROmorphone  Injectable 0.25 milliGRAM(s) IV Push every 4 hours PRN  ketorolac   Injectable 15 milliGRAM(s) IV Push every 8 hours PRN  ondansetron Injectable 4 milliGRAM(s) IV Push every 6 hours PRN      Labs:                        11.5   11.30 )-----------( 323      ( 22 Aug 2020 21:57 )             35.4     08-22    142  |  105  |  14  ----------------------------<  111<H>  4.0   |  29  |  1.0    Ca    9.0      22 Aug 2020 21:57  Phos  4.0     08-22  Mg     2.1     08-22                Urine/Micro:        Imaging:     no new images      Date/Time: 08-23-20 @ 03:36

## 2020-08-23 NOTE — PROGRESS NOTE ADULT - ATTENDING COMMENTS
pt examined   s/p small bowel resection  abdomen soft  incisional tenderness  vitals and labs reviewed    tolerating clear liquid diet  advance to full liquid diet  awaiting return of bowel function

## 2020-08-23 NOTE — PROGRESS NOTE ADULT - ASSESSMENT
JOSÉ LUIS SMALLJONATHAN is a 59yMale  s/p laparoscopic small bowel resection for GIST    He is currently doing well resting comfortably, pain controlled    Plan:   - fu pathology  - Flomax given patient produced 800 in uop  - OOB  - IS  - f/u vitals  - f/u labs & replete if necessary  - DVT PPX  - GI PPX  - Speak w/ SW/CM  - Begin dispo planning for D/C

## 2020-08-24 LAB
ANION GAP SERPL CALC-SCNC: 12 MMOL/L — SIGNIFICANT CHANGE UP (ref 7–14)
BASOPHILS # BLD AUTO: 0.06 K/UL — SIGNIFICANT CHANGE UP (ref 0–0.2)
BASOPHILS NFR BLD AUTO: 0.6 % — SIGNIFICANT CHANGE UP (ref 0–1)
BUN SERPL-MCNC: 12 MG/DL — SIGNIFICANT CHANGE UP (ref 10–20)
CALCIUM SERPL-MCNC: 9 MG/DL — SIGNIFICANT CHANGE UP (ref 8.5–10.1)
CHLORIDE SERPL-SCNC: 101 MMOL/L — SIGNIFICANT CHANGE UP (ref 98–110)
CO2 SERPL-SCNC: 27 MMOL/L — SIGNIFICANT CHANGE UP (ref 17–32)
CREAT SERPL-MCNC: 1 MG/DL — SIGNIFICANT CHANGE UP (ref 0.7–1.5)
EOSINOPHIL # BLD AUTO: 0.29 K/UL — SIGNIFICANT CHANGE UP (ref 0–0.7)
EOSINOPHIL NFR BLD AUTO: 3 % — SIGNIFICANT CHANGE UP (ref 0–8)
GLUCOSE SERPL-MCNC: 108 MG/DL — HIGH (ref 70–99)
HCT VFR BLD CALC: 39.2 % — LOW (ref 42–52)
HGB BLD-MCNC: 12.5 G/DL — LOW (ref 14–18)
IMM GRANULOCYTES NFR BLD AUTO: 0.5 % — HIGH (ref 0.1–0.3)
LYMPHOCYTES # BLD AUTO: 1.97 K/UL — SIGNIFICANT CHANGE UP (ref 1.2–3.4)
LYMPHOCYTES # BLD AUTO: 20.5 % — SIGNIFICANT CHANGE UP (ref 20.5–51.1)
MAGNESIUM SERPL-MCNC: 2.1 MG/DL — SIGNIFICANT CHANGE UP (ref 1.8–2.4)
MCHC RBC-ENTMCNC: 28.8 PG — SIGNIFICANT CHANGE UP (ref 27–31)
MCHC RBC-ENTMCNC: 31.9 G/DL — LOW (ref 32–37)
MCV RBC AUTO: 90.3 FL — SIGNIFICANT CHANGE UP (ref 80–94)
MONOCYTES # BLD AUTO: 1.11 K/UL — HIGH (ref 0.1–0.6)
MONOCYTES NFR BLD AUTO: 11.5 % — HIGH (ref 1.7–9.3)
NEUTROPHILS # BLD AUTO: 6.14 K/UL — SIGNIFICANT CHANGE UP (ref 1.4–6.5)
NEUTROPHILS NFR BLD AUTO: 63.9 % — SIGNIFICANT CHANGE UP (ref 42.2–75.2)
NRBC # BLD: 0 /100 WBCS — SIGNIFICANT CHANGE UP (ref 0–0)
PHOSPHATE SERPL-MCNC: 4 MG/DL — SIGNIFICANT CHANGE UP (ref 2.1–4.9)
PLATELET # BLD AUTO: 378 K/UL — SIGNIFICANT CHANGE UP (ref 130–400)
POTASSIUM SERPL-MCNC: 3.8 MMOL/L — SIGNIFICANT CHANGE UP (ref 3.5–5)
POTASSIUM SERPL-SCNC: 3.8 MMOL/L — SIGNIFICANT CHANGE UP (ref 3.5–5)
RBC # BLD: 4.34 M/UL — LOW (ref 4.7–6.1)
RBC # FLD: 15.1 % — HIGH (ref 11.5–14.5)
SODIUM SERPL-SCNC: 140 MMOL/L — SIGNIFICANT CHANGE UP (ref 135–146)
WBC # BLD: 9.62 K/UL — SIGNIFICANT CHANGE UP (ref 4.8–10.8)
WBC # FLD AUTO: 9.62 K/UL — SIGNIFICANT CHANGE UP (ref 4.8–10.8)

## 2020-08-24 PROCEDURE — 99024 POSTOP FOLLOW-UP VISIT: CPT

## 2020-08-24 RX ADMIN — HEPARIN SODIUM 5000 UNIT(S): 5000 INJECTION INTRAVENOUS; SUBCUTANEOUS at 21:34

## 2020-08-24 RX ADMIN — TAMSULOSIN HYDROCHLORIDE 0.8 MILLIGRAM(S): 0.4 CAPSULE ORAL at 21:34

## 2020-08-24 RX ADMIN — Medication 15 MILLIGRAM(S): at 18:50

## 2020-08-24 RX ADMIN — HEPARIN SODIUM 5000 UNIT(S): 5000 INJECTION INTRAVENOUS; SUBCUTANEOUS at 13:06

## 2020-08-24 RX ADMIN — Medication 15 MILLIGRAM(S): at 18:29

## 2020-08-24 RX ADMIN — LISINOPRIL 40 MILLIGRAM(S): 2.5 TABLET ORAL at 05:48

## 2020-08-24 RX ADMIN — Medication 15 MILLIGRAM(S): at 03:10

## 2020-08-24 RX ADMIN — Medication 1000 MILLIGRAM(S): at 23:09

## 2020-08-24 RX ADMIN — Medication 15 MILLIGRAM(S): at 10:45

## 2020-08-24 RX ADMIN — Medication 15 MILLIGRAM(S): at 02:40

## 2020-08-24 RX ADMIN — HEPARIN SODIUM 5000 UNIT(S): 5000 INJECTION INTRAVENOUS; SUBCUTANEOUS at 05:48

## 2020-08-24 RX ADMIN — Medication 15 MILLIGRAM(S): at 11:00

## 2020-08-24 NOTE — PROGRESS NOTE ADULT - SUBJECTIVE AND OBJECTIVE BOX
GENERAL SURGERY PROGRESS NOTE     JOSÉ LUIS AREVALO  59y  Male  Hospital day :3d  POD:  Procedure: Hand-assisted laparoscopic excision of small bowel    OVERNIGHT EVENTS: passed TOV    T(F): 98.3 (08-23-20 @ 20:15), Max: 98.3 (08-23-20 @ 20:15)  HR: 78 (08-23-20 @ 20:15) (69 - 108)  BP: 130/77 (08-23-20 @ 20:15) (121/76 - 132/76)  RR: 18 (08-23-20 @ 20:15) (18 - 18)    URINE:   08-22-20 @ 07:01  -  08-23-20 @ 07:00  --------------------------------------------------------  OUT: 3850 mL       GI proph:    AC/ proph: heparin   Injectable 5000 Unit(s) SubCutaneous every 8 hours      PHYSICAL EXAM:  GENERAL: NAD, well-appearing  CHEST/LUNG: Clear to auscultation bilaterally  HEART: Regular rate and rhythm  ABDOMEN: Soft, periincisioinal tenderness, non distended    LABS  CAPILLARY BLOOD GLUCOSE                            12.3   8.98  )-----------( 345      ( 23 Aug 2020 22:20 )             38.6       Auto Neutrophil %: 58.6 % (08-23-20 @ 22:20)  Auto Immature Granulocyte %: 0.4 % (08-23-20 @ 22:20)    08-23    141  |  103  |  10  ----------------------------<  95  4.1   |  29  |  0.8      Calcium, Total Serum: 9.1 mg/dL (08-23-20 @ 22:20)

## 2020-08-24 NOTE — PROGRESS NOTE ADULT - ATTENDING COMMENTS
pt examined   s/p small bowel resection  abdomen soft  incisional tenderness  vitals and labs reviewed    tolerating clear liquid diet  advance to full liquid diet  had flatus  right inguinal pain  - likely msk   no cough impulse or swelling  awaiting of bowel function

## 2020-08-24 NOTE — PROGRESS NOTE ADULT - ASSESSMENT
Assessment:  59y Male s/p laparoscopic small bowel resection for GIST. Coleman removed overnight, patient voiding without issues.     Plan:  - strict I's and O's  - pain control  - hemodynamic monitoring  - likely advance to RD  - will sarah Matute

## 2020-08-25 ENCOUNTER — TRANSCRIPTION ENCOUNTER (OUTPATIENT)
Age: 60
End: 2020-08-25

## 2020-08-25 VITALS
RESPIRATION RATE: 20 BRPM | HEART RATE: 94 BPM | TEMPERATURE: 99 F | DIASTOLIC BLOOD PRESSURE: 74 MMHG | SYSTOLIC BLOOD PRESSURE: 122 MMHG

## 2020-08-25 PROCEDURE — 93970 EXTREMITY STUDY: CPT | Mod: 26

## 2020-08-25 PROCEDURE — 99024 POSTOP FOLLOW-UP VISIT: CPT

## 2020-08-25 RX ORDER — OXYCODONE HYDROCHLORIDE 5 MG/1
1 TABLET ORAL
Qty: 8 | Refills: 0
Start: 2020-08-25 | End: 2020-08-26

## 2020-08-25 RX ORDER — ACETAMINOPHEN 500 MG
2 TABLET ORAL
Qty: 0 | Refills: 0 | DISCHARGE
Start: 2020-08-25

## 2020-08-25 RX ADMIN — Medication 1000 MILLIGRAM(S): at 13:20

## 2020-08-25 RX ADMIN — Medication 1000 MILLIGRAM(S): at 11:25

## 2020-08-25 RX ADMIN — HEPARIN SODIUM 5000 UNIT(S): 5000 INJECTION INTRAVENOUS; SUBCUTANEOUS at 13:40

## 2020-08-25 RX ADMIN — HEPARIN SODIUM 5000 UNIT(S): 5000 INJECTION INTRAVENOUS; SUBCUTANEOUS at 05:05

## 2020-08-25 RX ADMIN — LISINOPRIL 40 MILLIGRAM(S): 2.5 TABLET ORAL at 05:05

## 2020-08-25 NOTE — DISCHARGE NOTE PROVIDER - CARE PROVIDER_API CALL
Mary Alice Matute  SURGERY  [address] [place] [address], [address] [place] [address]  Phone: [phone]  Fax: [phone]  Follow Up Time: 2 weeks

## 2020-08-25 NOTE — DISCHARGE NOTE NURSING/CASE MANAGEMENT/SOCIAL WORK - PATIENT PORTAL LINK FT
You can access the FollowMyHealth Patient Portal offered by [facility] by registering at the following website: [email or web address]. By joining [facility]’s FollowMyHealth portal, you will also be able to view your health information using other applications (apps) compatible with our system.

## 2020-08-25 NOTE — PROGRESS NOTE ADULT - SUBJECTIVE AND OBJECTIVE BOX
GENERAL SURGERY PROGRESS NOTE     JOSÉ LUIS AREVALO  59y  Male  Hospital day :4d  POD:  Procedure: Hand-assisted laparoscopic excision of small bowel    OVERNIGHT EVENTS: no acute events overnight     T(F): 97 (08-25-20 @ 04:49), Max: 98.7 (08-24-20 @ 12:56)  HR: 83 (08-25-20 @ 04:49) (83 - 100)  BP: 113/71 (08-25-20 @ 04:49) (113/71 - 133/77)  RR: 18 (08-25-20 @ 04:49) (18 - 18)     GI proph:    AC/ proph: heparin   Injectable 5000 Unit(s) SubCutaneous every 8 hours      PHYSICAL EXAM:  GENERAL: NAD, well-appearing  CHEST/LUNG: Clear to auscultation bilaterally  HEART: Regular rate and rhythm  ABDOMEN: Soft, Nontender, Nondistended;   EXTREMITIES:  No clubbing, cyanosis, or edema      LABS  Labs:  CAPILLARY BLOOD GLUCOSE                              12.5   9.62  )-----------( 378      ( 24 Aug 2020 21:20 )             39.2       Auto Neutrophil %: 63.9 % (08-24-20 @ 21:20)  Auto Immature Granulocyte %: 0.5 % (08-24-20 @ 21:20)    08-24    140  |  101  |  12  ----------------------------<  108<H>  3.8   |  27  |  1.0      Calcium, Total Serum: 9.0 mg/dL (08-24-20 @ 21:20)

## 2020-08-25 NOTE — PROGRESS NOTE ADULT - ATTENDING COMMENTS
pt examined   s/p small bowel resection  abdomen soft  incisional tenderness  vitals and labs reviewed    tolerating clear liquid diet  advance to full liquid diet  had flatus  right inguinal pain  - likely msk   now improving  no cough impulse or swelling  awaiting of bowel function

## 2020-08-25 NOTE — PROGRESS NOTE ADULT - ASSESSMENT
Assessment:  59y Male s/p laparoscopic small bowel resection for GIST. Coleman removed overnight, patient voiding without issues. Persistent groin pain. VD to rule out DVT.    Plan:  - discharge today

## 2020-08-25 NOTE — DISCHARGE NOTE PROVIDER - HOSPITAL COURSE
59M presented to hospital for elective laparoscopic small bowel resection for GIST without any intraoperative complications. Hospital course complicated by post operative urinary retention requiring winston catheter placement, started on flomax for 24 hours, winston was removed subsequently and patient passed trial of void. Patient has been stable otherwise, tolerated soft diet, ambulating, ready for discharge.

## 2020-08-25 NOTE — DISCHARGE NOTE PROVIDER - NSDCMRMEDTOKEN_GEN_ALL_CORE_FT
acetaminophen 500 mg oral tablet: 2 tab(s) orally every 6 hours  gabapentin:   ibuprofen 800 mg oral tablet:   lisinopril 40 mg oral tablet: 1 tab(s) orally once a day  oxyCODONE 5 mg oral tablet: 1 tab(s) orally every 6 hours, As Needed MDD:4 tabs   prednisoLONE: steroid shot to the back    rosuvastatin 10 mg oral tablet: 1 tab(s) orally once a day

## 2020-08-25 NOTE — DISCHARGE NOTE PROVIDER - NSDCCPTREATMENT_GEN_ALL_CORE_FT
PRINCIPAL PROCEDURE  Procedure: Hand-assisted laparoscopic excision of small bowel  Findings and Treatment: Continue regular diet.  Dressings : Remove outside dressing in 2 days, OK to shower normally. Leave steri-strips in place, they will fall off on their own in 1 week.  Pain : Take ibuprofen, tylenol around the clock (every 8 hours) for at least three days. Take oxycodone 5mg as needed for breakthrough pain. Please be aware, the medication can cause drowsiness, so reserve for night time use.   Activity : Please avoid heavy lifting (anything over 10 pounds) for at least 6 weeks.   Follow up : Call to schedule a follow up appointment in 2 weeks, [phone]

## 2020-08-27 LAB — SURGICAL PATHOLOGY STUDY: SIGNIFICANT CHANGE UP

## 2020-09-04 DIAGNOSIS — Z88.0 ALLERGY STATUS TO PENICILLIN: ICD-10-CM

## 2020-09-04 DIAGNOSIS — I10 ESSENTIAL (PRIMARY) HYPERTENSION: ICD-10-CM

## 2020-09-04 DIAGNOSIS — R53.81 OTHER MALAISE: ICD-10-CM

## 2020-09-04 DIAGNOSIS — M54.5 LOW BACK PAIN: ICD-10-CM

## 2020-09-04 DIAGNOSIS — R33.8 OTHER RETENTION OF URINE: ICD-10-CM

## 2020-09-04 DIAGNOSIS — C49.A3 GASTROINTESTINAL STROMAL TUMOR OF SMALL INTESTINE: ICD-10-CM

## 2020-09-04 DIAGNOSIS — E78.5 HYPERLIPIDEMIA, UNSPECIFIED: ICD-10-CM

## 2020-09-04 DIAGNOSIS — Z91.013 ALLERGY TO SEAFOOD: ICD-10-CM

## 2020-09-09 ENCOUNTER — APPOINTMENT (OUTPATIENT)
Dept: SURGERY | Facility: CLINIC | Age: 60
End: 2020-09-09
Payer: COMMERCIAL

## 2020-09-09 VITALS
WEIGHT: 176 LBS | HEIGHT: 72 IN | DIASTOLIC BLOOD PRESSURE: 80 MMHG | HEART RATE: 115 BPM | SYSTOLIC BLOOD PRESSURE: 130 MMHG | TEMPERATURE: 97.4 F | BODY MASS INDEX: 23.84 KG/M2

## 2020-09-09 PROCEDURE — 99024 POSTOP FOLLOW-UP VISIT: CPT

## 2020-09-10 NOTE — HISTORY OF PRESENT ILLNESS
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 Then he developed back and neck pain. On the MRI for the back pain a solid tumor was noticed in the abdomen. He then got a CT scan which confirmed a small bowel tumor. \par he has no symptoms.\par 9/9: He underwent a resection . He is recovering well. He has no pain and resumed usual activities.

## 2020-09-10 NOTE — ASSESSMENT
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 Then he developed back and neck pain. On the MRI for the back pain a solid tumor was noticed in the abdomen. He then got a CT scan which confirmed a small bowel tumor. \par he has no symptoms.\par 9/9: He underwent a resection . He is recovering well. He has no pain and resumed usual activities.  \par \par impression : GIST of small bowel .\par \par exam : Healed scar. no hernia\par \par We explained in great detail the pathophysiology of the disease process. We amaris diagrams and discussed the workup for diagnosis and management.\par The various options were explained to the patient. The Risk , benefit and alternatives were discussed. We discussed recovery and possible complications.\par The Post operative care was explained to the patient. He was counselled on diet , exercise and wound care.\par We discussed the pathology and surgery with him.\par \par we will schedule him for lap resection of small bowel . \par \par We discussed multiple aspects of Enhanced Recovery After Surgery Protocols. We discussed we need to take multiple small steps to improve the outcome of the surgery and expedite the recovery. We discussed about preoperative preparation, intraoperative care and post operative recovery.\par \par \par We discussed the importance of close follow up. \par We informed that he needs to follow up in or\par We also informed that he can call us if anything changes or has any questions.\par

## 2020-09-10 NOTE — PHYSICAL EXAM
[JVD] : no jugular venous distention  [Respiratory Effort] : normal respiratory effort [Purpura] : no purpura  [Alert] : alert [Calm] : calm [de-identified] : Normal [de-identified] : Normal [de-identified] : Normal [de-identified] : Healed scar. no hernia

## 2020-09-15 ENCOUNTER — INPATIENT (INPATIENT)
Facility: HOSPITAL | Age: 60
LOS: 3 days | Discharge: HOME | End: 2020-09-19
Attending: INTERNAL MEDICINE | Admitting: INTERNAL MEDICINE
Payer: COMMERCIAL

## 2020-09-15 VITALS
HEART RATE: 96 BPM | SYSTOLIC BLOOD PRESSURE: 121 MMHG | DIASTOLIC BLOOD PRESSURE: 79 MMHG | WEIGHT: 175.93 LBS | RESPIRATION RATE: 18 BRPM | TEMPERATURE: 98 F | OXYGEN SATURATION: 97 % | HEIGHT: 72 IN

## 2020-09-15 DIAGNOSIS — Z98.890 OTHER SPECIFIED POSTPROCEDURAL STATES: Chronic | ICD-10-CM

## 2020-09-15 DIAGNOSIS — M50.20 OTHER CERVICAL DISC DISPLACEMENT, UNSPECIFIED CERVICAL REGION: ICD-10-CM

## 2020-09-15 LAB
ALBUMIN SERPL ELPH-MCNC: 3.7 G/DL — SIGNIFICANT CHANGE UP (ref 3.5–5.2)
ALP SERPL-CCNC: 164 U/L — HIGH (ref 30–115)
ALT FLD-CCNC: 64 U/L — HIGH (ref 0–41)
ANION GAP SERPL CALC-SCNC: 12 MMOL/L — SIGNIFICANT CHANGE UP (ref 7–14)
APPEARANCE UR: CLEAR — SIGNIFICANT CHANGE UP
AST SERPL-CCNC: 42 U/L — HIGH (ref 0–41)
BASOPHILS # BLD AUTO: 0.07 K/UL — SIGNIFICANT CHANGE UP (ref 0–0.2)
BASOPHILS NFR BLD AUTO: 0.7 % — SIGNIFICANT CHANGE UP (ref 0–1)
BILIRUB SERPL-MCNC: <0.2 MG/DL — SIGNIFICANT CHANGE UP (ref 0.2–1.2)
BILIRUB UR-MCNC: NEGATIVE — SIGNIFICANT CHANGE UP
BUN SERPL-MCNC: 14 MG/DL — SIGNIFICANT CHANGE UP (ref 10–20)
CALCIUM SERPL-MCNC: 9.4 MG/DL — SIGNIFICANT CHANGE UP (ref 8.5–10.1)
CHLORIDE SERPL-SCNC: 102 MMOL/L — SIGNIFICANT CHANGE UP (ref 98–110)
CO2 SERPL-SCNC: 23 MMOL/L — SIGNIFICANT CHANGE UP (ref 17–32)
COLOR SPEC: SIGNIFICANT CHANGE UP
CREAT SERPL-MCNC: 0.9 MG/DL — SIGNIFICANT CHANGE UP (ref 0.7–1.5)
DIFF PNL FLD: NEGATIVE — SIGNIFICANT CHANGE UP
EOSINOPHIL # BLD AUTO: 0.5 K/UL — SIGNIFICANT CHANGE UP (ref 0–0.7)
EOSINOPHIL NFR BLD AUTO: 5.1 % — SIGNIFICANT CHANGE UP (ref 0–8)
GLUCOSE SERPL-MCNC: 100 MG/DL — HIGH (ref 70–99)
GLUCOSE UR QL: NEGATIVE — SIGNIFICANT CHANGE UP
HCT VFR BLD CALC: 39.4 % — LOW (ref 42–52)
HGB BLD-MCNC: 12.9 G/DL — LOW (ref 14–18)
IMM GRANULOCYTES NFR BLD AUTO: 0.6 % — HIGH (ref 0.1–0.3)
KETONES UR-MCNC: NEGATIVE — SIGNIFICANT CHANGE UP
LACTATE SERPL-SCNC: 1.1 MMOL/L — SIGNIFICANT CHANGE UP (ref 0.7–2)
LEUKOCYTE ESTERASE UR-ACNC: NEGATIVE — SIGNIFICANT CHANGE UP
LIDOCAIN IGE QN: 34 U/L — SIGNIFICANT CHANGE UP (ref 7–60)
LYMPHOCYTES # BLD AUTO: 1.97 K/UL — SIGNIFICANT CHANGE UP (ref 1.2–3.4)
LYMPHOCYTES # BLD AUTO: 20.2 % — LOW (ref 20.5–51.1)
MCHC RBC-ENTMCNC: 29.2 PG — SIGNIFICANT CHANGE UP (ref 27–31)
MCHC RBC-ENTMCNC: 32.7 G/DL — SIGNIFICANT CHANGE UP (ref 32–37)
MCV RBC AUTO: 89.1 FL — SIGNIFICANT CHANGE UP (ref 80–94)
MONOCYTES # BLD AUTO: 1.43 K/UL — HIGH (ref 0.1–0.6)
MONOCYTES NFR BLD AUTO: 14.7 % — HIGH (ref 1.7–9.3)
NEUTROPHILS # BLD AUTO: 5.73 K/UL — SIGNIFICANT CHANGE UP (ref 1.4–6.5)
NEUTROPHILS NFR BLD AUTO: 58.7 % — SIGNIFICANT CHANGE UP (ref 42.2–75.2)
NITRITE UR-MCNC: NEGATIVE — SIGNIFICANT CHANGE UP
NRBC # BLD: 0 /100 WBCS — SIGNIFICANT CHANGE UP (ref 0–0)
PH UR: 7 — SIGNIFICANT CHANGE UP (ref 5–8)
PLATELET # BLD AUTO: 412 K/UL — HIGH (ref 130–400)
POTASSIUM SERPL-MCNC: 4.4 MMOL/L — SIGNIFICANT CHANGE UP (ref 3.5–5)
POTASSIUM SERPL-SCNC: 4.4 MMOL/L — SIGNIFICANT CHANGE UP (ref 3.5–5)
PROT SERPL-MCNC: 7.2 G/DL — SIGNIFICANT CHANGE UP (ref 6–8)
PROT UR-MCNC: NEGATIVE — SIGNIFICANT CHANGE UP
RBC # BLD: 4.42 M/UL — LOW (ref 4.7–6.1)
RBC # FLD: 15.2 % — HIGH (ref 11.5–14.5)
SODIUM SERPL-SCNC: 137 MMOL/L — SIGNIFICANT CHANGE UP (ref 135–146)
SP GR SPEC: 1.01 — SIGNIFICANT CHANGE UP (ref 1.01–1.03)
UROBILINOGEN FLD QL: SIGNIFICANT CHANGE UP
WBC # BLD: 9.76 K/UL — SIGNIFICANT CHANGE UP (ref 4.8–10.8)
WBC # FLD AUTO: 9.76 K/UL — SIGNIFICANT CHANGE UP (ref 4.8–10.8)

## 2020-09-15 PROCEDURE — 93010 ELECTROCARDIOGRAM REPORT: CPT

## 2020-09-15 PROCEDURE — 93971 EXTREMITY STUDY: CPT | Mod: 26

## 2020-09-15 PROCEDURE — 71275 CT ANGIOGRAPHY CHEST: CPT | Mod: 26

## 2020-09-15 PROCEDURE — 99285 EMERGENCY DEPT VISIT HI MDM: CPT

## 2020-09-15 RX ORDER — OXYCODONE AND ACETAMINOPHEN 5; 325 MG/1; MG/1
1 TABLET ORAL ONCE
Refills: 0 | Status: DISCONTINUED | OUTPATIENT
Start: 2020-09-15 | End: 2020-09-15

## 2020-09-15 RX ORDER — MORPHINE SULFATE 50 MG/1
4 CAPSULE, EXTENDED RELEASE ORAL ONCE
Refills: 0 | Status: DISCONTINUED | OUTPATIENT
Start: 2020-09-15 | End: 2020-09-15

## 2020-09-15 RX ORDER — METHOCARBAMOL 500 MG/1
1500 TABLET, FILM COATED ORAL ONCE
Refills: 0 | Status: COMPLETED | OUTPATIENT
Start: 2020-09-15 | End: 2020-09-15

## 2020-09-15 RX ORDER — DEXAMETHASONE 0.5 MG/5ML
10 ELIXIR ORAL ONCE
Refills: 0 | Status: COMPLETED | OUTPATIENT
Start: 2020-09-15 | End: 2020-09-15

## 2020-09-15 RX ORDER — KETOROLAC TROMETHAMINE 30 MG/ML
30 SYRINGE (ML) INJECTION ONCE
Refills: 0 | Status: DISCONTINUED | OUTPATIENT
Start: 2020-09-15 | End: 2020-09-15

## 2020-09-15 RX ADMIN — OXYCODONE AND ACETAMINOPHEN 1 TABLET(S): 5; 325 TABLET ORAL at 21:10

## 2020-09-15 RX ADMIN — Medication 30 MILLIGRAM(S): at 21:10

## 2020-09-15 RX ADMIN — MORPHINE SULFATE 4 MILLIGRAM(S): 50 CAPSULE, EXTENDED RELEASE ORAL at 17:30

## 2020-09-15 RX ADMIN — Medication 102 MILLIGRAM(S): at 22:59

## 2020-09-15 RX ADMIN — METHOCARBAMOL 1500 MILLIGRAM(S): 500 TABLET, FILM COATED ORAL at 21:10

## 2020-09-15 NOTE — CONSULT NOTE ADULT - ATTENDING COMMENTS
Pt seen on morning rounds with PA. agree with above plan. Pt with pain at right shoulder recc ortho eval. No acute neurosurgical intervention at this time. Pending MRI spine survey. Further recommendations to follow.

## 2020-09-15 NOTE — ED PROVIDER NOTE - NS ED ROS FT
Eyes:  No visual changes, eye pain or discharge.  ENMT:  No hearing changes, pain, discharge or infections. No neck pain or stiffness.  Cardiac:  No chest pain, SOB or edema. No chest pain with exertion.  Respiratory:  No cough or respiratory distress. No hemoptysis.  GI:  No nausea, vomiting, diarrhea or abdominal pain.  :  +hematuria, no dysuria, no urinary urgency or frequency  MSK:  +R/L sided lower back pain; R sided pain radiating into R leg and arm  Neuro:  No headache or weakness.  No LOC.  Skin:  No skin rash.

## 2020-09-15 NOTE — ED ADULT TRIAGE NOTE - CHIEF COMPLAINT QUOTE
pt coming in with back pain and hematuria. pt had abdominal surgery on august 21st, to remove mass. pt has had back pain prior to sx. pt has been seeing neurosx for the back pain

## 2020-09-15 NOTE — CONSULT NOTE ADULT - PROBLEM SELECTOR RECOMMENDATION 9
Give Decadron 10 mg IV x 1 now then Decadron 4 mg IV q 6 hrs  GI prophylaxis  Obtain MRI of Cervical-Thoracic and Lumbar/sacral spine w/o gadolinium  Will follow

## 2020-09-15 NOTE — ED PROVIDER NOTE - ATTENDING CONTRIBUTION TO CARE
59yoM with h/o HTN, HLD, herniated discs, presents with arm and leg pain. Reports h/o neck pain radiating to his R shoulder and arm, and low back pain radiating to his R leg, x 3 months. Worsening over time, severe, and causing worsening difficulty walking. 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 Associated feeling of swelling to the b/l arm/legs. Denies abdominal pain, CP, SOB, vomiting, diarrhea, constipation, hematuria, urinary or recal retention/incontinence, extremity numbness or actual weakness, and all other symptoms. On exam, afebrile, hemodynamically stable, saturating well, NAD, well appearing, intermittently clutching his R shoulder in pain, head NCAT, EOMI grossly, anicteric, MMM, no JVD, RRR, nml S1/S2, no m/r/g, lungs CTAB, no w/r/r, abd soft, NT, ND, nml BS, no rebound or guarding, AAO, CN's 3-12 grossly intact, pain with R shoulder and R hip ROM, no gross deformity/discoloration, nml symmetric distal warmth/color/sensation/cap refill, 2+ symm radial and DP pulses. Low suspicion for cord compression and no objective findings for this. Character low suspicion for dissection and no murmur or pulse asymmetry. Abdomen benign with low suspicion for acute process. Had some hematuria but this resolved and nml urination with low suspicion for pyelo or stone, and no e/o UTI on UA. Character could be c/w disc herniation. Pt difficulty controlling his pain at home, has been on gabapentin, oxycodone, ibuprofen without improvement. Follows with NSGY outpt and sent to ED as well. 59yoM with h/o HTN, HLD, herniated discs, presents with arm and leg pain. Reports h/o neck pain radiating to his R shoulder and arm, and low back pain radiating to his R leg, x 3 months. Worsening over time, severe, and causing worsening difficulty walking. 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 Associated feeling of swelling to the b/l arm/legs. Denies abdominal pain, CP, SOB, vomiting, diarrhea, constipation, hematuria, urinary or recal retention/incontinence, extremity numbness or actual weakness, and all other symptoms. On exam, afebrile, hemodynamically stable, saturating well, NAD, well appearing, intermittently clutching his R shoulder in pain, head NCAT, EOMI grossly, anicteric, MMM, no JVD, RRR, nml S1/S2, no m/r/g, lungs CTAB, no w/r/r, abd soft, NT, ND, nml BS, no rebound or guarding, AAO, CN's 3-12 grossly intact, pain with R shoulder and R hip ROM, no gross deformity/discoloration, nml symmetric distal warmth/color/sensation/cap refill, 2+ symm radial and DP pulses. Able to walk though with difficulty. Low suspicion for cord compression and no objective findings for this. Character low suspicion for dissection and no murmur or pulse asymmetry. Abdomen benign with low suspicion for acute process. Had some hematuria but this resolved and nml urination with low suspicion for pyelo or stone, and no e/o UTI on UA. Character could be c/w disc herniation. Pt difficulty controlling his pain at home, has been on gabapentin, oxycodone, ibuprofen without improvement. Follows with NSGY outpt and sent to ED as well. NSGY recommends decadron and admission for MRI. Patient well appearing, hemodynamically stable. Admitted to internal medicine for further monitoring, w/u, and care.

## 2020-09-15 NOTE — ED PROVIDER NOTE - MUSCULOSKELETAL MINIMAL EXAM
+TTP of R lower and L lower back, mild midline tenderness of lower back, negative SLR, sensation intact in b/l lower extremities, flexion/extension at b/l hips and knees not limited

## 2020-09-15 NOTE — ED PROVIDER NOTE - OBJECTIVE STATEMENT
60 y/o M with pmh of hypertension, lumbago, herniated disc, hyperlipidemia, abd. surgery (on 8/21 to remove abd. mass), presenting for worsening lower back pain x2 weeks. Pain is located in R and L lumbar area, worse on R side, described as sharp. Notes that the pain on the R side radiates into R leg and up through R arm into fingers. Has been seeing neurosurgery for pain mgmt. Previously was taking gapapentin, not currently taking. Advil has not been helping for the back pain. Also reports hematuria since Saturday- bright red blood, no clots, no dysuria. 58 y/o M with pmh of hypertension, lumbago, herniated disc, hyperlipidemia, abd. surgery (on 8/21 to remove abd. mass), presenting for worsening lower back pain x2 weeks. Pain is located in R and L lumbar area, worse on R side, described as sharp. Notes that the pain on the R side radiates into R leg and up through R arm into fingers. Has been seeing neurosurgery for pain mgmt. Previously was taking gabapentin, not currently taking. Advil has not been helping for the back pain. Also reports hematuria since Saturday- bright red blood, no clots, no dysuria.

## 2020-09-15 NOTE — ED PROVIDER NOTE - CLINICAL SUMMARY MEDICAL DECISION MAKING FREE TEXT BOX
59yoM with h/o HTN, HLD, herniated discs, presents with arm and leg pain. Reports h/o neck pain radiating to his R shoulder and arm, and low back pain radiating to his R leg, x 3 months. Worsening over time, severe, and causing worsening difficulty walking. 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 Associated feeling of swelling to the b/l arm/legs. Denies abdominal pain, CP, SOB, vomiting, diarrhea, constipation, hematuria, urinary or recal retention/incontinence, extremity numbness or actual weakness, and all other symptoms. On exam, afebrile, hemodynamically stable, saturating well, NAD, well appearing, intermittently clutching his R shoulder in pain, head NCAT, EOMI grossly, anicteric, MMM, no JVD, RRR, nml S1/S2, no m/r/g, lungs CTAB, no w/r/r, abd soft, NT, ND, nml BS, no rebound or guarding, AAO, CN's 3-12 grossly intact, pain with R shoulder and R hip ROM, no gross deformity/discoloration, nml symmetric distal warmth/color/sensation/cap refill, 2+ symm radial and DP pulses. Able to walk though with difficulty. Low suspicion for cord compression and no objective findings for this. Character low suspicion for dissection and no murmur or pulse asymmetry. Abdomen benign with low suspicion for acute process. Had some hematuria but this resolved and nml urination with low suspicion for pyelo or stone, and no e/o UTI on UA. Character could be c/w disc herniation. Pt difficulty controlling his pain at home, has been on gabapentin, oxycodone, ibuprofen without improvement. Follows with NSGY outpt and sent to ED as well. NSGY recommends decadron and admission for MRI. Patient well appearing, hemodynamically stable. Admitted to internal medicine for further monitoring, w/u, and care.

## 2020-09-15 NOTE — CONSULT NOTE ADULT - ASSESSMENT
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

## 2020-09-15 NOTE — ED PROVIDER NOTE - CARE PLAN
Principal Discharge DX:	Intractable back pain  Secondary Diagnosis:	Nerve root compression  Secondary Diagnosis:	Herniated disc, cervical  Secondary Diagnosis:	Ambulatory dysfunction

## 2020-09-15 NOTE — CONSULT NOTE ADULT - SUBJECTIVE AND OBJECTIVE BOX
60 y/o M with pmh of hypertension, lumbago, herniated disc, hyperlipidemia, abd. surgery (on  to remove abd. mass), presenting for worsening lower back pain x2 weeks. Pain is located in R and L lumbar area, worse on R side, described as sharp. Notes that the pain on the R side radiates into R leg and up through R arm into fingers. Has been seeing neurosurgery for pain mgmt. Previously was taking gabapentin, not currently taking. Advil has not been helping for the back pain.    Allergies    penicillin (Unknown)  shellfish (Pruritus)        Vital Signs Last 24 Hrs  T(C): 36.7 (15 Sep 2020 21:36), Max: 36.9 (15 Sep 2020 15:38)  T(F): 98.1 (15 Sep 2020 21:36), Max: 98.4 (15 Sep 2020 15:38)  HR: 82 (15 Sep 2020 21:36) (82 - 96)  BP: 140/88 (15 Sep 2020 21:36) (121/79 - 140/88)  BP(mean): --  RR: 17 (15 Sep 2020 21:36) (17 - 18)  SpO2: 96% (15 Sep 2020 21:36) (96% - 97%)    PHYSICAL EXAM:    GENERAL: NAD, well-groomed, well-developed  HEAD:  Atraumatic, Normocephalic  EYES: EOMI, PERRLA, conjunctiva and sclera clear  NERVOUS SYSTEM:  Awake  alert oriented to self, place situation   Fund of knowledge, recent and remote memory are intact   Mood; normal affect   CN II-XII intact PERRRL, EOMI, no ptosis, no Nystagmus, normal shoulder shrug   Face symmetrical tongue is midline speech is clear and fluent  Motor: Right UE 4/5 motor on bicep and deltoid,  slightly weaker than left. Sensory intact. Left UE wnl  Right LE 4.5 motor quad, tenderness third web space of right foot w/o erythema or fluctuance  Gait is not tested      EXTREMITIES:  2+ Peripheral Pulses, No clubbing, cyanosis, or edema      LABS:                        12.9   9.76  )-----------( 412      ( 15 Sep 2020 17:27 )             39.4     09-15    137  |  102  |  14  ----------------------------<  100<H>  4.4   |  23  |  0.9    Ca    9.4      15 Sep 2020 17:27    TPro  7.2  /  Alb  3.7  /  TBili  <0.2  /  DBili  x   /  AST  42<H>  /  ALT  64<H>  /  AlkPhos  164<H>  09-15      Urinalysis Basic - ( 15 Sep 2020 18:45 )    Color: Light Yellow / Appearance: Clear / S.012 / pH: x  Gluc: x / Ketone: Negative  / Bili: Negative / Urobili: <2 mg/dL   Blood: x / Protein: Negative / Nitrite: Negative   Leuk Esterase: Negative / RBC: x / WBC x   Sq Epi: x / Non Sq Epi: x / Bacteria: x        RADIOLOGY & ADDITIONAL TESTS:  < from: MR Lumbar Spine No Cont (20 @ 13:52) >    EXAM:  MR SPINE LUMBAR            PROCEDURE DATE:  2020            INTERPRETATION:  Clinical History / Reason for exam: Low back pain    TECHNIQUE: MRI lumbar spine without contrast. Multiplanar multi sequential MRI of the lumbar spine was performed without intravenous contrast on a 1.5 Sara magnet.    COMPARISON: None available    FINDINGS:    The lumbar vertebral bodies maintain normal height. Alignment demonstrates trace retrolisthesis L2-3 and L5-S1.    Bone marrow signal demonstrates multilevel T1 and T2 hyperintense (Modic type II) endplate degenerative changes.    The conus medullaris terminates at the L1-2 level and is normal in signal and morphology.    There is L5-S1 disc desiccation with mild degenerative loss of disc space height.    At T12-L1, L1-2 and L2-3 there is no significant disc herniation, spinal canal or foraminal stenosis.    At L3-4 there is trace disc bulging and facet hypertrophy with mild right foraminal stenosis. There is mild flattening of the exitingright L3 nerve root.    At L4-5 there is a small disc bulge and facet hypertrophy with mild foraminal narrowing.    At L5-S1 there is a small disc bulge with a superimposed small disc herniation in the right lateral recess with impingement of the descending right S1 nerve root. There is facet hypertrophy with mild foraminal narrowing.    There is heterogeneous masslike soft tissue structure within the mid-abdomen measuring about 6.5 cm. This is incompletely imaged/evaluated.    IMPRESSION:    1.L5-S1 small disc herniation in the right lateral recess with impingement of the descending right S1 nerve root.    2.  L3-4 small disc bulge and facet hypertrophy with mild right foraminal narrowing and mild compression of the exiting right L3 nerve root.    3.  Additional mild degenerative changes as described.    4.  Mass-like heterogeneous structure within the mid abdomen, incompletely imaged/evaluated on this study. Although this can reflect loops of small bowel, recommend a dedicated CT or MRI of the abdomen with contrast to exclude an underlying mass.    < end of copied text >  < from: MR Cervical Spine No Cont (20 @ 14:49) >    EXAM:  MR SPINE CERVICAL            PROCEDURE DATE:  2020            INTERPRETATION:  Clinical History / Reason for exam: Neck pain and back pain.    TECHNIQUE: Sagittal T1, T2, STIR and axial T1, T2 and gradient echo sequences of the cervical spine obtained on the 1.2 Sara open magnet.    Comparison studies: None    FINDINGS:    There is straightening of the normal cervical curvature.    There are degenerative changes with mild loss of disc space height and signal at each cervical level.    At C3-C4 there is mild bulging.    At C4 C5 5 there is mild bulging.    At C5-6 there is mild bulging and facet hypertrophy.    Bulging and degenerative changes contribute to mild canal stenosis with loss of the ventral subarachnoid space. There is moderate stenosis at C5-6 with the posterior hypertrophy contributing to loss of the ventral and dorsal subarachnoid spaces and there is mild foraminal narrowing.    Spinal cord signal is normal. The remaining levels are normal. The structures at the foramen magnum are unremarkable. The marrow signal is unremarkable.    Soft tissues of the neck are unremarkable.    Possible T4-T5 left foraminal disc herniation. If there is concern consider a dedicated T-spine study.    IMPRESSION:    1.  Mild canal narrowing on a degenerative basis, more pronounced at C5-6    2.  C5-6 mild foraminal narrowing    3.  Possible T4-T5 left foraminal disc herniation. If there is concern consider a dedicated T-spine study.              JACK BELTRAN M.D., ATTENDING RADIOLOGIST  This document has been electronically signed. 2020 11:33PM        < end of copied text >  < from: MR Cervical Spine No Cont (20 @ 14:49) >    EXAM:  MR SPINE CERVICAL            PROCEDURE DATE:  2020            INTERPRETATION:  Clinical History / Reason for exam: Neck pain and back pain.    TECHNIQUE: Sagittal T1, T2, STIR and axial T1, T2 and gradient echo sequences of the cervical spine obtained on the 1.2 Sraa open magnet.    Comparison studies: None    FINDINGS:    There is straightening of the normal cervical curvature.    There are degenerative changes with mild loss of disc space height and signal at each cervical level.    At C3-C4 there is mild bulging.    At C4 C5 5 there is mild bulging.    At C5-6 there is mild bulging and facet hypertrophy.    Bulging and degenerative changes contribute to mild canal stenosis with loss of the ventral subarachnoid space. There is moderate stenosis at C5-6 with the posterior hypertrophy contributing to loss of the ventral and dorsal subarachnoid spaces and there is mild foraminal narrowing.    Spinal cord signal is normal. The remaining levels are normal. The structures at the foramen magnum are unremarkable. The marrow signal is unremarkable.    Soft tissues of the neck are unremarkable.    Possible T4-T5 left foraminal disc herniation. If there is concern consider a dedicated T-spine study.    IMPRESSION:    1.  Mild canal narrowing on a degenerative basis, more pronounced at C5-6    2.  C5-6 mild foraminal narrowing    3.  Possible T4-T5 left foraminal disc herniation. If there is concern consider a dedicated T-spine study.              JACK BELTRAN M.D., ATTENDING RADIOLOGIST  This document has been electronically signed. 2020 11:33PM        < end of copied text >

## 2020-09-16 LAB
A1C WITH ESTIMATED AVERAGE GLUCOSE RESULT: 6.9 % — HIGH (ref 4–5.6)
ALBUMIN SERPL ELPH-MCNC: 4.1 G/DL — SIGNIFICANT CHANGE UP (ref 3.5–5.2)
ALP SERPL-CCNC: 190 U/L — HIGH (ref 30–115)
ALT FLD-CCNC: 69 U/L — HIGH (ref 0–41)
ANION GAP SERPL CALC-SCNC: 13 MMOL/L — SIGNIFICANT CHANGE UP (ref 7–14)
APTT BLD: 32.8 SEC — SIGNIFICANT CHANGE UP (ref 27–39.2)
AST SERPL-CCNC: 32 U/L — SIGNIFICANT CHANGE UP (ref 0–41)
BASOPHILS # BLD AUTO: 0.04 K/UL — SIGNIFICANT CHANGE UP (ref 0–0.2)
BASOPHILS NFR BLD AUTO: 0.4 % — SIGNIFICANT CHANGE UP (ref 0–1)
BILIRUB SERPL-MCNC: 0.2 MG/DL — SIGNIFICANT CHANGE UP (ref 0.2–1.2)
BUN SERPL-MCNC: 16 MG/DL — SIGNIFICANT CHANGE UP (ref 10–20)
CALCIUM SERPL-MCNC: 10.2 MG/DL — HIGH (ref 8.5–10.1)
CHLORIDE SERPL-SCNC: 98 MMOL/L — SIGNIFICANT CHANGE UP (ref 98–110)
CO2 SERPL-SCNC: 25 MMOL/L — SIGNIFICANT CHANGE UP (ref 17–32)
CREAT SERPL-MCNC: 1 MG/DL — SIGNIFICANT CHANGE UP (ref 0.7–1.5)
CULTURE RESULTS: NO GROWTH — SIGNIFICANT CHANGE UP
EOSINOPHIL # BLD AUTO: 0.01 K/UL — SIGNIFICANT CHANGE UP (ref 0–0.7)
EOSINOPHIL NFR BLD AUTO: 0.1 % — SIGNIFICANT CHANGE UP (ref 0–8)
ERYTHROCYTE [SEDIMENTATION RATE] IN BLOOD: 12 MM/HR — HIGH (ref 0–10)
ESTIMATED AVERAGE GLUCOSE: 151 MG/DL — HIGH (ref 68–114)
GLUCOSE SERPL-MCNC: 310 MG/DL — HIGH (ref 70–99)
HCT VFR BLD CALC: 43.2 % — SIGNIFICANT CHANGE UP (ref 42–52)
HCV AB S/CO SERPL IA: 0.04 COI — SIGNIFICANT CHANGE UP
HCV AB SERPL-IMP: SIGNIFICANT CHANGE UP
HGB BLD-MCNC: 13.8 G/DL — LOW (ref 14–18)
IMM GRANULOCYTES NFR BLD AUTO: 1 % — HIGH (ref 0.1–0.3)
INR BLD: 1.17 RATIO — SIGNIFICANT CHANGE UP (ref 0.65–1.3)
LYMPHOCYTES # BLD AUTO: 1.1 K/UL — LOW (ref 1.2–3.4)
LYMPHOCYTES # BLD AUTO: 12.3 % — LOW (ref 20.5–51.1)
MAGNESIUM SERPL-MCNC: 2.1 MG/DL — SIGNIFICANT CHANGE UP (ref 1.8–2.4)
MCHC RBC-ENTMCNC: 28.5 PG — SIGNIFICANT CHANGE UP (ref 27–31)
MCHC RBC-ENTMCNC: 31.9 G/DL — LOW (ref 32–37)
MCV RBC AUTO: 89.3 FL — SIGNIFICANT CHANGE UP (ref 80–94)
MONOCYTES # BLD AUTO: 0.12 K/UL — SIGNIFICANT CHANGE UP (ref 0.1–0.6)
MONOCYTES NFR BLD AUTO: 1.3 % — LOW (ref 1.7–9.3)
NEUTROPHILS # BLD AUTO: 7.58 K/UL — HIGH (ref 1.4–6.5)
NEUTROPHILS NFR BLD AUTO: 84.9 % — HIGH (ref 42.2–75.2)
NRBC # BLD: 0 /100 WBCS — SIGNIFICANT CHANGE UP (ref 0–0)
PLATELET # BLD AUTO: 461 K/UL — HIGH (ref 130–400)
POTASSIUM SERPL-MCNC: 5 MMOL/L — SIGNIFICANT CHANGE UP (ref 3.5–5)
POTASSIUM SERPL-SCNC: 5 MMOL/L — SIGNIFICANT CHANGE UP (ref 3.5–5)
PROT SERPL-MCNC: 8 G/DL — SIGNIFICANT CHANGE UP (ref 6–8)
PROTHROM AB SERPL-ACNC: 13.4 SEC — HIGH (ref 9.95–12.87)
RBC # BLD: 4.84 M/UL — SIGNIFICANT CHANGE UP (ref 4.7–6.1)
RBC # FLD: 14.8 % — HIGH (ref 11.5–14.5)
SARS-COV-2 RNA SPEC QL NAA+PROBE: SIGNIFICANT CHANGE UP
SODIUM SERPL-SCNC: 136 MMOL/L — SIGNIFICANT CHANGE UP (ref 135–146)
SPECIMEN SOURCE: SIGNIFICANT CHANGE UP
WBC # BLD: 8.94 K/UL — SIGNIFICANT CHANGE UP (ref 4.8–10.8)
WBC # FLD AUTO: 8.94 K/UL — SIGNIFICANT CHANGE UP (ref 4.8–10.8)

## 2020-09-16 PROCEDURE — 72148 MRI LUMBAR SPINE W/O DYE: CPT | Mod: 26

## 2020-09-16 PROCEDURE — 72146 MRI CHEST SPINE W/O DYE: CPT | Mod: 26

## 2020-09-16 PROCEDURE — 99222 1ST HOSP IP/OBS MODERATE 55: CPT

## 2020-09-16 PROCEDURE — 72141 MRI NECK SPINE W/O DYE: CPT | Mod: 26

## 2020-09-16 RX ORDER — ROSUVASTATIN CALCIUM 5 MG/1
1 TABLET ORAL
Qty: 0 | Refills: 0 | DISCHARGE

## 2020-09-16 RX ORDER — INFLUENZA VIRUS VACCINE 15; 15; 15; 15 UG/.5ML; UG/.5ML; UG/.5ML; UG/.5ML
0.5 SUSPENSION INTRAMUSCULAR ONCE
Refills: 0 | Status: DISCONTINUED | OUTPATIENT
Start: 2020-09-16 | End: 2020-09-19

## 2020-09-16 RX ORDER — IBUPROFEN 200 MG
0 TABLET ORAL
Qty: 0 | Refills: 0 | DISCHARGE

## 2020-09-16 RX ORDER — GABAPENTIN 400 MG/1
0 CAPSULE ORAL
Qty: 0 | Refills: 0 | DISCHARGE

## 2020-09-16 RX ORDER — CHLORHEXIDINE GLUCONATE 213 G/1000ML
1 SOLUTION TOPICAL DAILY
Refills: 0 | Status: ACTIVE | OUTPATIENT
Start: 2020-09-16 | End: 2021-08-15

## 2020-09-16 RX ORDER — GABAPENTIN 400 MG/1
300 CAPSULE ORAL THREE TIMES A DAY
Refills: 0 | Status: DISCONTINUED | OUTPATIENT
Start: 2020-09-16 | End: 2020-09-19

## 2020-09-16 RX ORDER — PANTOPRAZOLE SODIUM 20 MG/1
40 TABLET, DELAYED RELEASE ORAL
Refills: 0 | Status: DISCONTINUED | OUTPATIENT
Start: 2020-09-16 | End: 2020-09-19

## 2020-09-16 RX ORDER — PREDNISOLONE 5 MG
0 TABLET ORAL
Qty: 0 | Refills: 0 | DISCHARGE

## 2020-09-16 RX ORDER — DEXAMETHASONE 0.5 MG/5ML
4 ELIXIR ORAL EVERY 6 HOURS
Refills: 0 | Status: DISCONTINUED | OUTPATIENT
Start: 2020-09-16 | End: 2020-09-19

## 2020-09-16 RX ORDER — OXYCODONE AND ACETAMINOPHEN 5; 325 MG/1; MG/1
1 TABLET ORAL EVERY 4 HOURS
Refills: 0 | Status: DISCONTINUED | OUTPATIENT
Start: 2020-09-16 | End: 2020-09-19

## 2020-09-16 RX ORDER — ENOXAPARIN SODIUM 100 MG/ML
40 INJECTION SUBCUTANEOUS AT BEDTIME
Refills: 0 | Status: DISCONTINUED | OUTPATIENT
Start: 2020-09-16 | End: 2020-09-19

## 2020-09-16 RX ORDER — LISINOPRIL 2.5 MG/1
40 TABLET ORAL DAILY
Refills: 0 | Status: DISCONTINUED | OUTPATIENT
Start: 2020-09-16 | End: 2020-09-19

## 2020-09-16 RX ADMIN — OXYCODONE AND ACETAMINOPHEN 1 TABLET(S): 5; 325 TABLET ORAL at 19:02

## 2020-09-16 RX ADMIN — OXYCODONE AND ACETAMINOPHEN 1 TABLET(S): 5; 325 TABLET ORAL at 15:27

## 2020-09-16 RX ADMIN — OXYCODONE AND ACETAMINOPHEN 1 TABLET(S): 5; 325 TABLET ORAL at 10:27

## 2020-09-16 RX ADMIN — OXYCODONE AND ACETAMINOPHEN 1 TABLET(S): 5; 325 TABLET ORAL at 11:08

## 2020-09-16 RX ADMIN — LISINOPRIL 40 MILLIGRAM(S): 2.5 TABLET ORAL at 05:12

## 2020-09-16 RX ADMIN — Medication 4 MILLIGRAM(S): at 11:31

## 2020-09-16 RX ADMIN — Medication 4 MILLIGRAM(S): at 18:33

## 2020-09-16 RX ADMIN — GABAPENTIN 300 MILLIGRAM(S): 400 CAPSULE ORAL at 13:12

## 2020-09-16 RX ADMIN — Medication 4 MILLIGRAM(S): at 23:32

## 2020-09-16 RX ADMIN — Medication 4 MILLIGRAM(S): at 05:11

## 2020-09-16 RX ADMIN — GABAPENTIN 300 MILLIGRAM(S): 400 CAPSULE ORAL at 05:11

## 2020-09-16 RX ADMIN — PANTOPRAZOLE SODIUM 40 MILLIGRAM(S): 20 TABLET, DELAYED RELEASE ORAL at 08:01

## 2020-09-16 RX ADMIN — OXYCODONE AND ACETAMINOPHEN 1 TABLET(S): 5; 325 TABLET ORAL at 14:50

## 2020-09-16 RX ADMIN — ENOXAPARIN SODIUM 40 MILLIGRAM(S): 100 INJECTION SUBCUTANEOUS at 21:04

## 2020-09-16 RX ADMIN — OXYCODONE AND ACETAMINOPHEN 1 TABLET(S): 5; 325 TABLET ORAL at 18:32

## 2020-09-16 RX ADMIN — GABAPENTIN 300 MILLIGRAM(S): 400 CAPSULE ORAL at 21:04

## 2020-09-16 RX ADMIN — OXYCODONE AND ACETAMINOPHEN 1 TABLET(S): 5; 325 TABLET ORAL at 23:30

## 2020-09-16 NOTE — H&P ADULT - ATTENDING COMMENTS
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 Reports h/o neck pain radiating to his R shoulder and arm, and low back pain radiating to his R leg, x 3 months. Worsening over time, severe, and causing worsening difficulty walking.    Pt seen and examined in ER- comfortable at rest- with RUE and RUE complaints    Chart reviewed- agree with above    neuro     NS    rehab    MRI    OOB    DVT proph

## 2020-09-16 NOTE — H&P ADULT - HISTORY OF PRESENT ILLNESS
58 yo M with 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 Reports h/o neck pain radiating to his R shoulder and arm, and low back pain radiating to his R leg, x 3 months. Worsening over time, severe, and causing worsening difficulty walking. MRI lumbar 7/6 showed "L5-S1 small disc herniation and "L3-4 small disc bulge and facet hypertrophy with mild right foraminal narrowing and mild compression. 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 Associated feeling of swelling to the b/l arm/legs. Denies abdominal pain, CP, SOB, vomiting, diarrhea, constipation, urinary or recal retention/incontinence, extremity numbness or actual weakness, and all other symptoms. Able to walk though with difficulty. Low suspicion for cord compression and no objective findings for this. Had some hematuria but this resolved and nml urination with low suspicion for pyelo or stone, and no e/o UTI on UA. Pt difficulty controlling his pain at home, has been on gabapentin, oxycodone, ibuprofen without improvement. Follows with NSGY outpt and sent to ED as well. NSGY recommends decadron and admission for MRI.     Vital Signs Last 24 Hrs  T(F): 96.2 (15 Sep 2020 23:45), Max: 98.4 (15 Sep 2020 15:38)  HR: 81 (15 Sep 2020 23:45) (81 - 96)  BP: 146/86 (15 Sep 2020 23:45) (121/79 - 146/86)  RR: 18 (15 Sep 2020 23:45) (17 - 18)  SpO2: 98% (15 Sep 2020 23:45) (96% - 98%)    On exam, afebrile, hemodynamically stable, saturating well, NAD, well appearing, intermittently clutching his R shoulder in pain, pain with R shoulder and R hip ROM, no gross deformity/discoloration. A Character low suspicion for dissection and no murmur or pulse asymmetry. Abdomen benign with low suspicion for acute process. Character could be c/w disc herniation. Admitted to internal medicine for further monitoring, w/u, and care. 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 Reports h/o neck pain radiating to his R shoulder and arm, and low back pain radiating to his R leg, x 3 months. Worsening over time, severe, and causing worsening difficulty walking. MRI lumbar 7/6 showed "L5-S1 small disc herniation and "L3-4 small disc bulge and facet hypertrophy with mild right foraminal narrowing and mild compression. 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 Pain is associated with swelling of b/l fingers and foot with pain in MCP joints which improved after IV steroid in ED. Denies abdominal pain, CP, SOB, vomiting, diarrhea, constipation, urinary or recal retention/incontinence, extremity numbness or actual weakness, and all other symptoms. Able to walk though with difficulty. Low suspicion for cord compression and no objective findings for this. Had some hematuria but this resolved and nml urination with low suspicion for pyelo or stone, and no e/o UTI on UA. Pt difficulty controlling his pain at home, has been on gabapentin, oxycodone, ibuprofen without improvement. Follows with NSGY outpt and sent to ED as well. NSGY recommends decadron and admission for MRI.     Vital Signs Last 24 Hrs  T(F): 96.2 (15 Sep 2020 23:45), Max: 98.4 (15 Sep 2020 15:38)  HR: 81 (15 Sep 2020 23:45) (81 - 96)  BP: 146/86 (15 Sep 2020 23:45) (121/79 - 146/86)  RR: 18 (15 Sep 2020 23:45) (17 - 18)  SpO2: 98% (15 Sep 2020 23:45) (96% - 98%)    On exam, afebrile, hemodynamically stable, saturating well, NAD, well appearing, intermittently clutching his R shoulder in pain, pain with R shoulder and R hip ROM, no gross deformity/discoloration. A Character low suspicion for dissection and no murmur or pulse asymmetry. Abdomen benign with low suspicion for acute process. Character could be c/w disc herniation. Admitted to internal medicine for further monitoring, w/u, and care.

## 2020-09-16 NOTE — H&P ADULT - NSHPLABSRESULTS_GEN_ALL_CORE
LABS:                        12.9   9.76  )-----------( 412      ( 15 Sep 2020 17:27 )             39.4     09-15    137  |  102  |  14  ----------------------------<  100<H>  4.4   |  23  |  0.9    Ca    9.4      15 Sep 2020 17:27    TPro  7.2  /  Alb  3.7  /  TBili  <0.2  /  DBili  x   /  AST  42<H>  /  ALT  64<H>  /  AlkPhos  164<H>  09-15      Urinalysis Basic - ( 15 Sep 2020 18:45 )    Color: Light Yellow / Appearance: Clear / S.012 / pH: x  Gluc: x / Ketone: Negative  / Bili: Negative / Urobili: <2 mg/dL   Blood: x / Protein: Negative / Nitrite: Negative   Leuk Esterase: Negative / RBC: x / WBC x   Sq Epi: x / Non Sq Epi: x / Bacteria: x        Lactate, Blood: 1.1 mmol/L (09-15-20 @ 17:27)      Cultures:      RADIOLOGY:  < from: CT Angio Chest Dissection Protocol (09.15.20 @ 19:58) >        IMPRESSION:    1. Normal caliber of the thoracoabdominal aorta without evidence for dissection or intramural hematoma.    2. Interstitial lung disease. No honeycombing.    3. Interval postsurgical changes within the small bowel.    < end of copied text >

## 2020-09-16 NOTE — H&P ADULT - ASSESSMENT
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 Follows with NSGY outpt and sent to ED as well. NSGY recommends decadron and admission for MRI.    # Right UE and LE weakness with radicular symptoms - characteristics are consistent with Herniated discs  - Low suspicion for dissection and no murmur or pulse asymmetry. Abdomen benign with low suspicion for acute process. Had some hematuria but this resolved and nml urination with low suspicion for pyelo or stone, and no e/o UTI on UA.  - Hx of multilevel spine disease  - MRI lumbar 7/6 showed "L5-S1 small disc herniation in the right lateral recess with impingement of the descending right S1 nerve root" and "L3-4 small disc bulge and facet hypertrophy with mild right foraminal narrowing and mild compression of the exiting right L3 nerve root."  - MRI cervical 7/2 showed "Mild canal narrowing on a degenerative basis, more pronounced at C5-6," "C5-6 mild foraminal narrowing," and "Possible T4-T5 left foraminal disc herniation."  - s/p Decadron 10 mg IV x 1, from now Decadron 4 mg IV q 6 hrs  - MRI of Cervical-Thoracic and Lumbar/sacral spine w/o gadolinium    # 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 Follows with NSGY outpt and sent to ED as well. NSGY recommends decadron and admission for MRI.    # Intractable right UE and LE pain and weakness with radicular symptoms - characteristics are consistent with Herniated discs  - Low suspicion for dissection and no murmur or pulse asymmetry. Abdomen benign with low suspicion for acute process. Had some hematuria but this resolved and nml urination with low suspicion for pyelo or stone, and no e/o UTI on UA.  - Hx of multilevel spine disease  - MRI lumbar 7/6 showed "L5-S1 small disc herniation in the right lateral recess with impingement of the descending right S1 nerve root" and "L3-4 small disc bulge and facet hypertrophy with mild right foraminal narrowing and mild compression of the exiting right L3 nerve root."  - MRI cervical 7/2 showed "Mild canal narrowing on a degenerative basis, more pronounced at C5-6," "C5-6 mild foraminal narrowing," and "Possible T4-T5 left foraminal disc herniation."  - s/p Decadron 10 mg IV x 1, from now Decadron 4 mg IV q 6 hrs  - MRI of Cervical-Thoracic and Lumbar/sacral spine w/o gadolinium  - Pain is associated with swelling of b/l fingers and foot with pain in MCP joints which improved after IV steroid in ED  - Follow RA, Anti CCP, ESR, CRP, Lyme and Scl 70, TSH, Vitamin B12, Anti Nayana and Ro B    # H/o of hematuria - now resolved  - Monitor more eopisodes    # Hx of GIST tumor removed from Small bowel  - Resected    # H/o HTN  -Monitor BP  -Continue home meds    # H/o HLD  - follow lipid profile in am   - Stopped statins by Dr. Hassan as a concern of muscle weakness    Diet: DASH  Activity: IAT  DVT Prophylaxis: Lovenox   GI Prophylaxis: Protonix  CHG Ordered  Code Status: FULL  Disposition: pending neurosurgery workup

## 2020-09-17 LAB
ANA TITR SER: NEGATIVE — SIGNIFICANT CHANGE UP
CCP IGG SERPL-ACNC: >250 UNITS — HIGH
CRP SERPL-MCNC: 6.44 MG/DL — HIGH (ref 0–0.4)
DSDNA AB FLD-ACNC: <0.2 AI — SIGNIFICANT CHANGE UP
ENA SCL70 AB SER-ACNC: <0.2 AI — SIGNIFICANT CHANGE UP
ENA SS-A AB FLD IA-ACNC: <0.2 AI — SIGNIFICANT CHANGE UP
RF+CCP IGG SER-IMP: ABNORMAL
RHEUMATOID FACT SERPL-ACNC: 115 IU/ML — HIGH (ref 0–13)
TSH SERPL-MCNC: 0.99 UIU/ML — SIGNIFICANT CHANGE UP (ref 0.27–4.2)
VIT B12 SERPL-MCNC: 974 PG/ML — SIGNIFICANT CHANGE UP (ref 232–1245)

## 2020-09-17 PROCEDURE — 73030 X-RAY EXAM OF SHOULDER: CPT | Mod: 26,RT

## 2020-09-17 PROCEDURE — 99232 SBSQ HOSP IP/OBS MODERATE 35: CPT

## 2020-09-17 RX ORDER — SENNA PLUS 8.6 MG/1
2 TABLET ORAL AT BEDTIME
Refills: 0 | Status: DISCONTINUED | OUTPATIENT
Start: 2020-09-17 | End: 2020-09-19

## 2020-09-17 RX ADMIN — LISINOPRIL 40 MILLIGRAM(S): 2.5 TABLET ORAL at 06:07

## 2020-09-17 RX ADMIN — SENNA PLUS 2 TABLET(S): 8.6 TABLET ORAL at 22:41

## 2020-09-17 RX ADMIN — Medication 4 MILLIGRAM(S): at 06:08

## 2020-09-17 RX ADMIN — GABAPENTIN 300 MILLIGRAM(S): 400 CAPSULE ORAL at 22:12

## 2020-09-17 RX ADMIN — OXYCODONE AND ACETAMINOPHEN 1 TABLET(S): 5; 325 TABLET ORAL at 00:00

## 2020-09-17 RX ADMIN — GABAPENTIN 300 MILLIGRAM(S): 400 CAPSULE ORAL at 13:42

## 2020-09-17 RX ADMIN — Medication 4 MILLIGRAM(S): at 17:13

## 2020-09-17 RX ADMIN — Medication 4 MILLIGRAM(S): at 23:49

## 2020-09-17 RX ADMIN — Medication 4 MILLIGRAM(S): at 12:02

## 2020-09-17 RX ADMIN — GABAPENTIN 300 MILLIGRAM(S): 400 CAPSULE ORAL at 06:08

## 2020-09-17 RX ADMIN — OXYCODONE AND ACETAMINOPHEN 1 TABLET(S): 5; 325 TABLET ORAL at 06:36

## 2020-09-17 RX ADMIN — OXYCODONE AND ACETAMINOPHEN 1 TABLET(S): 5; 325 TABLET ORAL at 06:06

## 2020-09-17 RX ADMIN — ENOXAPARIN SODIUM 40 MILLIGRAM(S): 100 INJECTION SUBCUTANEOUS at 22:12

## 2020-09-17 NOTE — PROGRESS NOTE ADULT - ASSESSMENT
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 Follows with NSGY outpt and sent to ED as well. NSGY recommends decadron and admission for MRI.    # Intractable right UE and LE pain and weakness with radicular symptoms  - Low suspicion for dissection and no murmur or pulse asymmetry  - Hx of multilevel spine disease  - MRI lumbar (7/6): "L5-S1 small disc herniation in the right lateral recess with impingement of the descending right S1 nerve root" and "L3-4 small disc bulge and facet hypertrophy with mild right foraminal narrowing and mild compression of the exiting right L3 nerve root."  - MRI cervical (7/2): "Mild canal narrowing on a degenerative basis, more pronounced at C5-6," "C5-6 mild foraminal narrowing," and "Possible T4-T5 left foraminal disc herniation."  - S/p Decadron 10 mg IV x 1, from now Decadron 4 mg IV q 6 hrs  - MRI of Cervical-Thoracic and Lumbar/sacral spine w/o gadolinium    Joint Pain  - Pain is associated with swelling of b/l fingers and foot with pain in MCP joints which improved after IV steroid in ED  - Follow  (elevated), Anti  (strongly positive), CRP 6.4 (elevated), ESR pending  - F/u Lyme and Scl 70, TSH, Vitamin B12, Anti Nayana and Ro B    H/o of hematuria - now resolved  - Monitor more episodes    Hx of GIST tumor removed from Small bowel  - Resected    H/o HTN  - Monitor BP  - Continue home meds    H/o HLD  - Stopped statins by Dr. 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 Follows with NSGY outpt and sent to ED as well. NSGY recommends decadron and admission for MRI.    Intractable right UE and LE pain and weakness with radicular symptoms  - Low suspicion for dissection and no murmur or pulse asymmetry  - Hx of multilevel spine disease  - MRI lumbar (7/6): "L5-S1 small disc herniation in the right lateral recess with impingement of the descending right S1 nerve root" and "L3-4 small disc bulge and facet hypertrophy with mild right foraminal narrowing and mild compression of the exiting right L3 nerve root."  - MRI cervical (7/2): "Mild canal narrowing on a degenerative basis, more pronounced at C5-6," "C5-6 mild foraminal narrowing," and "Possible T4-T5 left foraminal disc herniation."  - S/p Decadron 10 mg IV x 1, from now Decadron 4 mg IV q 6 hrs  - MRI of Cervical-Thoracic and Lumbar/sacral spine (9/16): Cervical: C5-6 moderate stenosis, stable since prior exam; Thoracic: unremarkable; Lumbar: L5-S1 disc herniation decreased in size compared to previous exam. L3-4 stable degenerative change with mild compression of L3 root.  - Neurosurgery consulted: no acute surgical intervention, outpatient follow-up.     Joint Pain  - Pain is associated with swelling of b/l fingers and foot with pain in MCP joints which improved after IV steroid in ED  - Patient also with right shoulder pain. Orthopedic Surgery consulted  -  (elevated), Anti  (strongly positive), CRP 6.4 (elevated), ESR pending  - F/u Lyme and Scl 70, TSH, Vitamin B12, Anti Nayana and Ro B    H/o of hematuria - now resolved  - Monitor more episodes    Hx of GIST tumor removed from Small bowel  - Resected    H/o HTN  - Monitor BP  - Continue home meds    H/o HLD  - Stopped statins by Dr. Hassan as a concern of muscle weakness    Misc  Diet: DASH  Activity: IAT  DVT Prophylaxis: Lovenox   GI Prophylaxis: Protonix  CHG Ordered  Code Status: FULL  Disposition: pending neurosurgery workup

## 2020-09-17 NOTE — PROGRESS NOTE ADULT - SUBJECTIVE AND OBJECTIVE BOX
Patient was seen and examined. Spoke with HO. Chart reviewed.  No events overnight. In good spirits  Vital Signs Last 24 Hrs  T(F): 96.5 (17 Sep 2020 06:00), Max: 98.1 (16 Sep 2020 10:21)  HR: 84 (17 Sep 2020 06:00) (84 - 101)  BP: 132/71 (17 Sep 2020 06:00) (115/79 - 132/71)  SpO2: 97% (16 Sep 2020 13:56) (97% - 98%)  MEDICATIONS  (STANDING):  chlorhexidine 4% Liquid 1 Application(s) Topical <User Schedule>  dexAMETHasone  Injectable 4 milliGRAM(s) IV Push every 6 hours  enoxaparin Injectable 40 milliGRAM(s) SubCutaneous at bedtime  gabapentin 300 milliGRAM(s) Oral three times a day  influenza   Vaccine 0.5 milliLiter(s) IntraMuscular once  lisinopril 40 milliGRAM(s) Oral daily  pantoprazole    Tablet 40 milliGRAM(s) Oral before breakfast    MEDICATIONS  (PRN):  oxycodone    5 mG/acetaminophen 325 mG 1 Tablet(s) Oral every 4 hours PRN Moderate Pain (4 - 6)    Labs:                        12.5   24.34 )-----------( 410      ( 17 Sep 2020 07:33 )             38.0                         13.8   8.94  )-----------( 461      ( 16 Sep 2020 06:03 )             43.2     17 Sep 2020 07:33    136    |  102    |  19     ----------------------------<  326    4.6     |  21     |  1.0    16 Sep 2020 06:03    136    |  98     |  16     ----------------------------<  310    5.0     |  25     |  1.0      Ca    9.7        17 Sep 2020 07:33  Ca    10.2       16 Sep 2020 06:03  Mg     1.9       17 Sep 2020 07:33  Mg     2.1       16 Sep 2020 06:03    TPro  6.9    /  Alb  3.7    /  TBili  0.4    /  DBili  x      /  AST  13     /  ALT  44     /  AlkPhos  156    17 Sep 2020 07:33  TPro  8.0    /  Alb  4.1    /  TBili  0.2    /  DBili  x      /  AST  32     /  ALT  69     /  AlkPhos  190    16 Sep 2020 06:03    PT/INR - ( 16 Sep 2020 06:03 )   PT: 13.40 sec;   INR: 1.17 ratio         PTT - ( 16 Sep 2020 06:03 )  PTT:32.8 sec  Urinalysis Basic - ( 15 Sep 2020 18:45 )    Color: Light Yellow / Appearance: Clear / S.012 / pH: x  Gluc: x / Ketone: Negative  / Bili: Negative / Urobili: <2 mg/dL   Blood: x / Protein: Negative / Nitrite: Negative   Leuk Esterase: Negative / RBC: x / WBC x   Sq Epi: x / Non Sq Epi: x / Bacteria: x        Culture - Urine (collected 15 Sep 2020 18:45)  Source: .Urine Clean Catch (Midstream)  Final Report (16 Sep 2020 20:40):    No growth      General: comfortable, NAD  Head:  Normocephalic, atraumatic  ENT:  Mucosa moist, no ulcerations  Neck:  Supple, no JVD,   Skin: no breakdowns (as per RN)  Resp: CTA B/L  CV: RRR, S1S2,   GI: Soft, NT, bowel sounds      MRI reviewed- results on chart      A/P:  58 yo M with h/o GIST (resected on ), HTN, HLD, herniated discs, presents with intractable R arm and leg pain. Reports h/o neck pain radiating to his R shoulder and arm, and low back pain radiating to his R leg, x 3 months. Worsening over time, severe, and causing worsening difficulty walking.    leukocytosis likely due to steroids- monitor trend (no fever or signs/complaints of infection)    neurology eval- likely EMG as outpt    NS f/u today to review MRI     rehab eval    OOB    DVT prophylaxis  Decubitus prevention- all measures as per RN protocol  Please call or text me with any questions or updates

## 2020-09-17 NOTE — PHYSICAL THERAPY INITIAL EVALUATION ADULT - GENERAL OBSERVATIONS, REHAB EVAL
11:00-11:30. Pt encountered semifowlers in bed in NAD. +Abd binder when OOB for comfort. Pt agreeable to PT IE. PT IE Complete. Pt educated on seated/supine therex. Pt educated to continue ambulation with [facility] staff. No deficits identified justifying acute PT tx. PT to d/c pt at this time.

## 2020-09-17 NOTE — PROGRESS NOTE ADULT - SUBJECTIVE AND OBJECTIVE BOX
Patient is a 59y old Male who presents with a chief complaint of Back pain (17 Sep 2020 11:51)    No acute events overnight. Patient still mentions upper extremity weakness. Denies chest pain, SOB, N/V/D,    PAST MEDICAL & SURGICAL HISTORY  Hyperlipidemia    Lumbago    Hypertension    H/O lymph node biopsy  Neck by Dr Case        PHYSICAL EXAM  Vital Signs Last 24 Hrs  T(C): 35.9 (17 Sep 2020 12:15), Max: 36.6 (16 Sep 2020 20:09)  T(F): 96.7 (17 Sep 2020 12:15), Max: 97.8 (16 Sep 2020 20:09)  HR: 97 (17 Sep 2020 12:15) (84 - 101)  BP: 118/81 (17 Sep 2020 12:15) (115/79 - 132/71)  BP(mean): --  RR: 18 (17 Sep 2020 12:15) (18 - 18)  SpO2: 97% (16 Sep 2020 13:56) (97% - 97%)    I&O's Summary    16 Sep 2020 07:01  -  17 Sep 2020 07:00  --------------------------------------------------------  IN: 0 mL / OUT: 1 mL / NET: -1 mL      GENERAL: No acute distress, well-developed  HEAD:  Atraumatic, Normocephalic  ENT: PERRL, conjunctiva and sclera clear, neck supple, no JVD, moist mucosa, posterior oropharynx clear  CHEST/LUNG: Clear to auscultation bilaterally; No wheeze, equal breath sounds bilaterally, respirations nonlabored  HEART: Regular rate and rhythm; No murmurs, rubs, or gallops  ABDOMEN: Soft, nontender, nondistended; Bowel sounds present, no organomegaly  BACK: no spinal tenderness, no CVA tenderness  EXTREMITIES:  Decreased range of motion at right shoulder with active and passive movement  PSYCH: Nl behavior, nl affect  NEUROLOGY: AAOx3, non-focal, moves all extremities spontaneously  SKIN: Normal color, No rashes or lesions      LABS                        12.5   24.34 )-----------( 410      ( 17 Sep 2020 07:33 )             38.0     Hemoglobin: 12.5 g/dL ( @ 07:33)  Hemoglobin: 13.8 g/dL ( @ 06:03)  Hemoglobin: 12.9 g/dL (09-15 @ 17:27)    CBC Full  -  ( 17 Sep 2020 07:33 )  WBC Count : 24.34 K/uL  RBC Count : 4.32 M/uL  Hemoglobin : 12.5 g/dL  Hematocrit : 38.0 %  Platelet Count - Automated : 410 K/uL  Mean Cell Volume : 88.0 fL  Mean Cell Hemoglobin : 28.9 pg  Mean Cell Hemoglobin Concentration : 32.9 g/dL  Auto Neutrophil # : 21.77 K/uL  Auto Lymphocyte # : 1.41 K/uL  Auto Monocyte # : 0.91 K/uL  Auto Eosinophil # : 0.00 K/uL  Auto Basophil # : 0.03 K/uL  Auto Neutrophil % : 89.5 %  Auto Lymphocyte % : 5.8 %  Auto Monocyte % : 3.7 %  Auto Eosinophil % : 0.0 %  Auto Basophil % : 0.1 %        136  |  102  |  19  ----------------------------<  326<H>  4.6   |  21  |  1.0    Ca    9.7      17 Sep 2020 07:33  Mg     1.9         TPro  6.9  /  Alb  3.7  /  TBili  0.4  /  DBili  x   /  AST  13  /  ALT  44<H>  /  AlkPhos  156<H>      Creatinine Trend: 1.0<--, 1.0<--, 0.9<--, 1.0<--, 0.8<--, 1.0<--  LIVER FUNCTIONS - ( 17 Sep 2020 07:33 )  Alb: 3.7 g/dL / Pro: 6.9 g/dL / ALK PHOS: 156 U/L / ALT: 44 U/L / AST: 13 U/L / GGT: x           PT/INR - ( 16 Sep 2020 06:03 )   PT: 13.40 sec;   INR: 1.17 ratio         PTT - ( 16 Sep 2020 06:03 )  PTT:32.8 sec              Urinalysis Basic - ( 15 Sep 2020 18:45 )    Color: Light Yellow / Appearance: Clear / S.012 / pH: x  Gluc: x / Ketone: Negative  / Bili: Negative / Urobili: <2 mg/dL   Blood: x / Protein: Negative / Nitrite: Negative   Leuk Esterase: Negative / RBC: x / WBC x   Sq Epi: x / Non Sq Epi: x / Bacteria: x    MICROBIOLOGY    Culture - Urine (collected 15 Sep 2020 18:45)  Source: .Urine Clean Catch (Midstream)  Final Report (16 Sep 2020 20:40):    No growth      IMAGING    MRI cervical/thoracic/lumbar spine ()  Cervical: C5-6 moderate stenosis, stable since prior exam  Thoracic: unremarkable  Lumbar: L5-S1 disc herniation decreased in size compared to previous exam. L3-4 stable degenerative change with mild compression of L3 root.

## 2020-09-17 NOTE — PROGRESS NOTE ADULT - SUBJECTIVE AND OBJECTIVE BOX
Subjective: pt with pain x3mo after twisting ankle at work. started in right foot radiates up to right arm, decreased ROm at shoulder. [facility] with pain management. MRI C-T and L spine obtained due to diffuse pain. moderate left c5-6 disc herniation, L5-S1 degenerative disc changes, no sig cord compression.     T(C): 35.8 (09-17-20 @ 06:00), Max: 36.6 (09-16-20 @ 20:09)  HR: 84 (09-17-20 @ 06:00) (84 - 101)  BP: 132/71 (09-17-20 @ 06:00) (115/79 - 132/71)  RR: 18 (09-17-20 @ 06:00) (18 - 18)  SpO2: 97% (09-16-20 @ 13:56) (97% - 97%)  Wt(kg): --    Exam:  Pt appears very comfortable in room this morning, texting on phone withotu difficult, MAEW except pain limited at right shoulder. normoreflexive.     CBC Full  -  ( 17 Sep 2020 07:33 )  WBC Count : 24.34 K/uL  RBC Count : 4.32 M/uL  Hemoglobin : 12.5 g/dL  Hematocrit : 38.0 %  Platelet Count - Automated : 410 K/uL  Mean Cell Volume : 88.0 fL  Mean Cell Hemoglobin : 28.9 pg  Mean Cell Hemoglobin Concentration : 32.9 g/dL  Auto Neutrophil # : 21.77 K/uL  Auto Lymphocyte # : 1.41 K/uL  Auto Monocyte # : 0.91 K/uL  Auto Eosinophil # : 0.00 K/uL  Auto Basophil # : 0.03 K/uL  Auto Neutrophil % : 89.5 %  Auto Lymphocyte % : 5.8 %  Auto Monocyte % : 3.7 %  Auto Eosinophil % : 0.0 %  Auto Basophil % : 0.1 %    09-17    136  |  102  |  19  ----------------------------<  326<H>  4.6   |  21  |  1.0    Ca    9.7      17 Sep 2020 07:33  Mg     1.9     09-17    TPro  6.9  /  Alb  3.7  /  TBili  0.4  /  DBili  x   /  AST  13  /  ALT  44<H>  /  AlkPhos  156<H>  09-17    PT/INR - ( 16 Sep 2020 06:03 )   PT: 13.40 sec;   INR: 1.17 ratio         PTT - ( 16 Sep 2020 06:03 )  PTT:32.8 sec          Assessment/Plan: chronic pain after workplace injury. mild cervical and lumbar degenerative changes on MRI.  1. Ortho consult for right shoulder  2. no neurosurgical intervention.  3. recc PT, pain management evaluation  4. Pt complains of multiple joint pain and swelling. May benefit from rheumatology consult as outpt  5. Can f/u with own pain management once discharge  6. reconsult as needed.

## 2020-09-18 LAB
ALBUMIN SERPL ELPH-MCNC: 3.5 G/DL — SIGNIFICANT CHANGE UP (ref 3.5–5.2)
ALP SERPL-CCNC: 161 U/L — HIGH (ref 30–115)
ALT FLD-CCNC: 56 U/L — HIGH (ref 0–41)
ANION GAP SERPL CALC-SCNC: 11 MMOL/L — SIGNIFICANT CHANGE UP (ref 7–14)
AST SERPL-CCNC: 20 U/L — SIGNIFICANT CHANGE UP (ref 0–41)
B BURGDOR C6 AB SER-ACNC: NEGATIVE — SIGNIFICANT CHANGE UP
B BURGDOR IGG+IGM SER-ACNC: 0.35 INDEX — SIGNIFICANT CHANGE UP (ref 0.01–0.89)
BASOPHILS # BLD AUTO: 0.04 K/UL — SIGNIFICANT CHANGE UP (ref 0–0.2)
BASOPHILS NFR BLD AUTO: 0.2 % — SIGNIFICANT CHANGE UP (ref 0–1)
BILIRUB SERPL-MCNC: <0.2 MG/DL — SIGNIFICANT CHANGE UP (ref 0.2–1.2)
BUN SERPL-MCNC: 22 MG/DL — HIGH (ref 10–20)
CALCIUM SERPL-MCNC: 9.5 MG/DL — SIGNIFICANT CHANGE UP (ref 8.5–10.1)
CHLORIDE SERPL-SCNC: 99 MMOL/L — SIGNIFICANT CHANGE UP (ref 98–110)
CO2 SERPL-SCNC: 27 MMOL/L — SIGNIFICANT CHANGE UP (ref 17–32)
CREAT SERPL-MCNC: 0.9 MG/DL — SIGNIFICANT CHANGE UP (ref 0.7–1.5)
EOSINOPHIL # BLD AUTO: 0 K/UL — SIGNIFICANT CHANGE UP (ref 0–0.7)
EOSINOPHIL NFR BLD AUTO: 0 % — SIGNIFICANT CHANGE UP (ref 0–8)
GLUCOSE SERPL-MCNC: 249 MG/DL — HIGH (ref 70–99)
HCT VFR BLD CALC: 37.4 % — LOW (ref 42–52)
HGB BLD-MCNC: 12.5 G/DL — LOW (ref 14–18)
IMM GRANULOCYTES NFR BLD AUTO: 2.2 % — HIGH (ref 0.1–0.3)
LYMPHOCYTES # BLD AUTO: 1.56 K/UL — SIGNIFICANT CHANGE UP (ref 1.2–3.4)
LYMPHOCYTES # BLD AUTO: 6.5 % — LOW (ref 20.5–51.1)
MCHC RBC-ENTMCNC: 29.3 PG — SIGNIFICANT CHANGE UP (ref 27–31)
MCHC RBC-ENTMCNC: 33.4 G/DL — SIGNIFICANT CHANGE UP (ref 32–37)
MCV RBC AUTO: 87.8 FL — SIGNIFICANT CHANGE UP (ref 80–94)
MONOCYTES # BLD AUTO: 0.82 K/UL — HIGH (ref 0.1–0.6)
MONOCYTES NFR BLD AUTO: 3.4 % — SIGNIFICANT CHANGE UP (ref 1.7–9.3)
NEUTROPHILS # BLD AUTO: 21.08 K/UL — HIGH (ref 1.4–6.5)
NEUTROPHILS NFR BLD AUTO: 87.7 % — HIGH (ref 42.2–75.2)
NRBC # BLD: 0 /100 WBCS — SIGNIFICANT CHANGE UP (ref 0–0)
PLATELET # BLD AUTO: 433 K/UL — HIGH (ref 130–400)
POTASSIUM SERPL-MCNC: 4.6 MMOL/L — SIGNIFICANT CHANGE UP (ref 3.5–5)
POTASSIUM SERPL-SCNC: 4.6 MMOL/L — SIGNIFICANT CHANGE UP (ref 3.5–5)
PROT SERPL-MCNC: 6.7 G/DL — SIGNIFICANT CHANGE UP (ref 6–8)
RBC # BLD: 4.26 M/UL — LOW (ref 4.7–6.1)
RBC # FLD: 15 % — HIGH (ref 11.5–14.5)
SODIUM SERPL-SCNC: 137 MMOL/L — SIGNIFICANT CHANGE UP (ref 135–146)
WBC # BLD: 24.03 K/UL — HIGH (ref 4.8–10.8)
WBC # FLD AUTO: 24.03 K/UL — HIGH (ref 4.8–10.8)

## 2020-09-18 PROCEDURE — 99255 IP/OBS CONSLTJ NEW/EST HI 80: CPT

## 2020-09-18 PROCEDURE — 73221 MRI JOINT UPR EXTREM W/O DYE: CPT | Mod: 26,RT

## 2020-09-18 RX ORDER — METOCLOPRAMIDE HCL 10 MG
5 TABLET ORAL ONCE
Refills: 0 | Status: COMPLETED | OUTPATIENT
Start: 2020-09-18 | End: 2020-09-18

## 2020-09-18 RX ADMIN — Medication 4 MILLIGRAM(S): at 12:08

## 2020-09-18 RX ADMIN — Medication 4 MILLIGRAM(S): at 05:53

## 2020-09-18 RX ADMIN — PANTOPRAZOLE SODIUM 40 MILLIGRAM(S): 20 TABLET, DELAYED RELEASE ORAL at 05:55

## 2020-09-18 RX ADMIN — SENNA PLUS 2 TABLET(S): 8.6 TABLET ORAL at 21:56

## 2020-09-18 RX ADMIN — ENOXAPARIN SODIUM 40 MILLIGRAM(S): 100 INJECTION SUBCUTANEOUS at 21:58

## 2020-09-18 RX ADMIN — GABAPENTIN 300 MILLIGRAM(S): 400 CAPSULE ORAL at 05:53

## 2020-09-18 RX ADMIN — GABAPENTIN 300 MILLIGRAM(S): 400 CAPSULE ORAL at 13:20

## 2020-09-18 RX ADMIN — Medication 5 MILLIGRAM(S): at 18:17

## 2020-09-18 RX ADMIN — Medication 4 MILLIGRAM(S): at 23:54

## 2020-09-18 RX ADMIN — Medication 4 MILLIGRAM(S): at 17:35

## 2020-09-18 RX ADMIN — LISINOPRIL 40 MILLIGRAM(S): 2.5 TABLET ORAL at 05:53

## 2020-09-18 NOTE — CDI QUERY NOTE - [id number]_GEN_ALL_CORE_HH
______________________________________________________________________________________________________________________________________      59 M,  back pain Diagnosis:  Herniated Disc, cervical,  Nerve root compression, ambulatory dysfunction  Clinical Indicators:  Rh Factor=115, CCP Antibody IgG factor=strong positive >250  Rheumatologist consult: New dx of Rheumatoid arthritis, New dx of DM, h/o Herniated discs  Treatment;  Prednisone, dexamethasone    Based on your clinical judgment and the clinical indicators, please indicate the diagnosis that best reflects the clinical picture.    [ ] Rheumatoid Arthritis   [ ] Other, please specify and document  [ ] Unable to clinically determine     Thank you.

## 2020-09-18 NOTE — CONSULT NOTE ADULT - SUBJECTIVE AND OBJECTIVE BOX
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 Reports h/o neck pain radiating to his R shoulder and arm, and low back pain radiating to his R leg, x 3 months. Worsening over time, severe, and causing worsening difficulty walking. MRI lumbar 7/6 showed "L5-S1 small disc herniation and "L3-4 small disc bulge and facet hypertrophy with mild right foraminal narrowing and mild compression. 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 Pain is associated with swelling of b/l fingers and foot with pain in MCP joints which improved after IV steroid in ED. Denies abdominal pain, CP, SOB, vomiting, diarrhea, constipation, urinary or recal retention/incontinence, extremity numbness or actual weakness, and all other symptoms. Able to walk though with difficulty. Low suspicion for cord compression and no objective findings for this. Had some hematuria but this resolved and nml urination with low suspicion for pyelo or stone, and no e/o UTI on UA. Pt difficulty controlling his pain at home, has been on gabapentin, oxycodone, ibuprofen without improvement. Follows with NSGY outpt and sent to ED as well. NSGY recommends decadron and admission for MRI.    On exam, afebrile, hemodynamically stable, saturating well, NAD, well appearing, intermittently clutching his R shoulder in pain, pain with R shoulder and R hip ROM, no gross deformity/discoloration. A Character low suspicion for dissection and no murmur or pulse asymmetry. Abdomen benign with low suspicion for acute process. Character could be c/w disc herniation. Admitted to internal medicine for further monitoring, w/u, and care. (16 Sep 2020 00:44)    Rheum workup was sent by primary team on admission given h/o Bilateral hand pain. CCP and Rheumatoid factor came back positive and Rheumatology has been consulted for the same. Cervical and Lumbar MRI showed mild-moderate degenerate changes, seen by Neurosurgery- no intervention planned.     PAST MEDICAL & SURGICAL HISTORY:  Hyperlipidemia    Lumbago    Hypertension    H/O lymph node biopsy  Neck by Dr Case      FAMILY HISTORY:  Known health problems: none      MEDICATIONS  (STANDING):  chlorhexidine 4% Liquid 1 Application(s) Topical <User Schedule>  dexAMETHasone  Injectable 4 milliGRAM(s) IV Push every 6 hours  enoxaparin Injectable 40 milliGRAM(s) SubCutaneous at bedtime  gabapentin 300 milliGRAM(s) Oral three times a day  influenza   Vaccine 0.5 milliLiter(s) IntraMuscular once  lisinopril 40 milliGRAM(s) Oral daily  pantoprazole    Tablet 40 milliGRAM(s) Oral before breakfast  senna 2 Tablet(s) Oral at bedtime    MEDICATIONS  (PRN):  oxycodone    5 mG/acetaminophen 325 mG 1 Tablet(s) Oral every 4 hours PRN Moderate Pain (4 - 6)    Allergies    penicillin (Unknown)  shellfish (Pruritus)    Intolerances      REVIEW OF SYSTEMS  see HPI        PHYSICAL EXAM:  GENERAL: NAD, well-developed  HEAD:  Atraumatic, Normocephalic  EYES: EOMI, PERRLA, conjunctiva and sclera clear  NECK: Supple, No JVD  CHEST/LUNG: Clear to auscultation bilaterally; No wheeze  HEART: Regular rate and rhythm; No murmurs, rubs, or gallops  ABDOMEN: Soft, Nontender, Nondistended; Bowel sounds present  EXTREMITIES:  2+ Peripheral Pulses, No clubbing, cyanosis, or edema  PSYCH: AAOx3  MSK: +TTP of R lower and L lower back, mild midline tenderness of lower back, negative SLR, sensation intact in b/l lower extremities, flexion/extension at b/l hips and knees not limited  NEUROLOGY: non-focal  SKIN: No rashes or lesions        CBC Full  -  ( 18 Sep 2020 07:19 )  WBC Count : 24.03 K/uL  RBC Count : 4.26 M/uL  Hemoglobin : 12.5 g/dL  Hematocrit : 37.4 %  Platelet Count - Automated : 433 K/uL  Mean Cell Volume : 87.8 fL  Mean Cell Hemoglobin : 29.3 pg  Mean Cell Hemoglobin Concentration : 33.4 g/dL  Auto Neutrophil # : 21.08 K/uL  Auto Lymphocyte # : 1.56 K/uL  Auto Monocyte # : 0.82 K/uL  Auto Eosinophil # : 0.00 K/uL  Auto Basophil # : 0.04 K/uL  Auto Neutrophil % : 87.7 %  Auto Lymphocyte % : 6.5 %  Auto Monocyte % : 3.4 %  Auto Eosinophil % : 0.0 %  Auto Basophil % : 0.2 %    LIVER FUNCTIONS - ( 18 Sep 2020 07:19 )  Alb: 3.5 g/dL / Pro: 6.7 g/dL / ALK PHOS: 161 U/L / ALT: 56 U/L / AST: 20 U/L / GGT: x           09-18    137  |  99  |  22<H>  ----------------------------<  249<H>  4.6   |  27  |  0.9    Ca    9.5      18 Sep 2020 07:19  Mg     1.9     09-17    TPro  6.7  /  Alb  3.5  /  TBili  <0.2  /  DBili  x   /  AST  20  /  ALT  56<H>  /  AlkPhos  161<H>  09-18    Cyclic Citrullinated Peptide AB (09.16.20 @ 06:03)    CCP Antibody IgG Interpretation: Strong Positive Method: EIA      Rheumatoid Factor Quant, Serum or Plasma in AM (09.16.20 @ 06:03)    Rheumatoid Factor Quant, Serum or Plasma: 115 IU/mL    < from: Xray Shoulder 2 Views, Right (09.17.20 @ 16:28) >  FINDINGS/  IMPRESSION:    No acute displaced fracture or dislocation.    Mild degenerative changes of the acromioclavicular joint.    < end of copied text >        < from: MR Lumbar Spine No Cont (09.16.20 @ 16:45) >  IMPRESSION:  Since the previous lumbar spine MRI dated 7/6/2020:    1.  L5-S1 trace disc herniation in the right lateral recess, slightly decreased in size since the prior study with decreased mass effect upon the descending right S1 nerve root.    2.  L3-4 stable degenerative change with mild right foraminal narrowing and mild compression of the exiting right L3 nerve root.    < end of copied text >              < from: MR Thoracic Spine No Cont (09.16.20 @ 16:20) >    IMPRESSION:    Unremarkable thoracic spine MRI.    < end of copied text >      < from: MR Cervical Spine No Cont (09.16.20 @ 16:02) >  IMPRESSION:    1.  C5-6 moderate spinalstenosis due to disc bulging and ligamentum flavum infolding, stable since the prior exam.. Stable mild spinal cord abutment without cord deformity or signal abnormality. Stable moderate foraminal narrowing.    2.  Stable additional mild degenerativechanges as described.    < end of copied text >

## 2020-09-18 NOTE — PROGRESS NOTE ADULT - ASSESSMENT
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 Follows with NSGY outpt and sent to ED as well. NSGY recommends decadron and admission for MRI.    Intractable right UE and LE pain and weakness with radicular symptoms  - Low suspicion for dissection and no murmur or pulse asymmetry  - Hx of multilevel spine disease  - MRI lumbar (7/6): "L5-S1 small disc herniation in the right lateral recess with impingement of the descending right S1 nerve root" and "L3-4 small disc bulge and facet hypertrophy with mild right foraminal narrowing and mild compression of the exiting right L3 nerve root."  - MRI cervical (7/2): "Mild canal narrowing on a degenerative basis, more pronounced at C5-6," "C5-6 mild foraminal narrowing," and "Possible T4-T5 left foraminal disc herniation."  - S/p Decadron 10 mg IV x 1, from now Decadron 4 mg IV q 6 hrs  - MRI of Cervical-Thoracic and Lumbar/sacral spine (9/16): Cervical: C5-6 moderate stenosis, stable since prior exam; Thoracic: unremarkable; Lumbar: L5-S1 disc herniation decreased in size compared to previous exam. L3-4 stable degenerative change with mild compression of L3 root.  - Neurosurgery consulted: no acute surgical intervention, outpatient follow-up.     Joint Pain  - Pain is associated with swelling of b/l fingers and foot with pain in MCP joints which improved after IV steroid in ED  - Patient also with right shoulder pain. Orthopedic Surgery consulted  - Ortho Consulted: rec R shoulder xray and MRI  - Xray (9/17): negative for acute fracture  - F/u MRI  -  (elevated), Anti  (strongly positive), CRP 6.4 (elevated), ESR pending  - Rheumatology consulted: rec xray bilateral hands and feet. Can be done outpatient with follow-up in 1wk.    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 Hassan as a concern of muscle weakness    Misc  Diet: DASH  Activity: IAT  DVT Prophylaxis: Lovenox   GI Prophylaxis: Protonix  CHG Ordered  Code Status: FULL  Disposition: pending ortho and rheum eval

## 2020-09-18 NOTE — PROGRESS NOTE ADULT - SUBJECTIVE AND OBJECTIVE BOX
SUBJECTIVE:    Patient is a 59y old Male who presents with a chief complaint of Back pain (17 Sep 2020 13:37)    Currently admitted to medicine with the primary diagnosis of Intractable back pain       Today is hospital day 3d.   feeling well     PAST MEDICAL & SURGICAL HISTORY  Hyperlipidemia    Lumbago    Hypertension    H/O lymph node biopsy  Neck by Dr Case      SOCIAL HISTORY:  Negative for smoking/alcohol/drug use.     ALLERGIES:  penicillin (Unknown)  shellfish (Pruritus)    MEDICATIONS:  STANDING MEDICATIONS  chlorhexidine 4% Liquid 1 Application(s) Topical <User Schedule>  dexAMETHasone  Injectable 4 milliGRAM(s) IV Push every 6 hours  enoxaparin Injectable 40 milliGRAM(s) SubCutaneous at bedtime  gabapentin 300 milliGRAM(s) Oral three times a day  influenza   Vaccine 0.5 milliLiter(s) IntraMuscular once  lisinopril 40 milliGRAM(s) Oral daily  pantoprazole    Tablet 40 milliGRAM(s) Oral before breakfast  senna 2 Tablet(s) Oral at bedtime    PRN MEDICATIONS  oxycodone    5 mG/acetaminophen 325 mG 1 Tablet(s) Oral every 4 hours PRN    VITALS:   T(F): 97.7  HR: 75  BP: 121/80  RR: 18  SpO2: --    LABS:                        12.5   24.03 )-----------( 433      ( 18 Sep 2020 07:19 )             37.4     09-17    136  |  102  |  19  ----------------------------<  326<H>  4.6   |  21  |  1.0    Ca    9.7      17 Sep 2020 07:33  Mg     1.9     09-17    TPro  6.9  /  Alb  3.7  /  TBili  0.4  /  DBili  x   /  AST  13  /  ALT  44<H>  /  AlkPhos  156<H>  09-17      Rheumatoid Factor Quant, Serum or Plasma: 115 IU/mL (09.16.20 @ 06:03)         Culture - Urine (collected 15 Sep 2020 18:45)  Source: .Urine Clean Catch (Midstream)  Final Report (16 Sep 2020 20:40):    No growth          RADIOLOGY:    PHYSICAL EXAM:  GEN: No acute distress  LUNGS: Clear to auscultation bilaterally   HEART: Regular  ABD: Soft, non-tender, non-distended.  EXT: NC/NC/NE/2+PP/CARREON/Skin Intact. decrease ROM right shoulder   NEURO: AAOX3    Intravenous access:   NG tube:   Coleman Catheter:

## 2020-09-18 NOTE — CONSULT NOTE ADULT - ASSESSMENT
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 Reports h/o neck pain radiating to his R shoulder and arm, and low back pain radiating to his R leg, x 3 months. Pt seen by Neurosx- MRI showed mild moderate degenerative disease in cervical and lumbar region- no intervention planned. Rheum workup sent by primary team came +ve for CCP and RF.     IMPRESSION:  New dx of Rheumatoid arthritis  New dx of DM  h/o Herniated discs  H/o HTN, HLD, GIST.     PLAN:  - Agree with steroid therapy now  - Will discuss with attending- outpt regimen and follow up.     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 Reports h/o neck pain radiating to his R shoulder and arm, and low back pain radiating to his R leg, x 3 months. Pt seen by Neurosx- MRI showed mild moderate degenerative disease in cervical and lumbar region- no intervention planned. Rheum workup sent by primary team came +ve for CCP and RF.     IMPRESSION:  New dx of Rheumatoid arthritis  New dx of DM  h/o Herniated discs  H/o HTN, HLD, GIST.     PLAN:  - From rheum standpoint- not in acute RA flare,  doesnt need steroids. If Neurosx recommends prednisone- then can do prednisone course as per neurosx.   - please check Xray hands and feet  - Check HepB core antigen, HepB Core IgM, HepB surface antigen and antibody, Hep C serology  - F/u in 1 week in office.   - Discussed with attending. 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 Reports h/o neck pain radiating to his R shoulder and arm, and low back pain radiating to his R leg, x 3 months. Pt seen by Neurosx- MRI showed mild moderate degenerative disease in cervical and lumbar region- no intervention planned. Rheum workup sent by primary team came +ve for CCP and RF.     IMPRESSION:  New dx of Rheumatoid arthritis  New dx of DM  h/o Herniated discs  H/o HTN, HLD, GIST.     PLAN:  - From rheum standpoint- not in acute RA flare,  doesnt need steroids. If Neurosx recommends prednisone- then can do prednisone course as per neurosx.   - please check Xray hands and feet  - Check HepB core Total, HepB Core IgM, HepB surface antigen and antibody, Hep C serology  - F/u in 1 week in office.   - Discussed with attending.

## 2020-09-18 NOTE — PROGRESS NOTE ADULT - ASSESSMENT
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 Reports h/o neck pain radiating to his R shoulder and arm, and low back pain radiating to his R leg, x 3 months. Worsening over time, severe, and causing worsening difficulty walking.      right shoulder pain /decrease ROM    positive     MRI of spines no significant changes from prior imaging    increase WBC secondary to steroid    BS high secondary to steroid        plan    Rheumatology consult    continue prednisone     discharge on po prednisone    discharge today after seen by rheumatology    spoke w resident     discharge today   d/w wife

## 2020-09-18 NOTE — PROGRESS NOTE ADULT - SUBJECTIVE AND OBJECTIVE BOX
Patient is a 59y old Male who presents with a chief complaint of Back pain (18 Sep 2020 08:45)    No acute events overnight. Patient has no complaints this morning. Denies chest pain, SOB, N/V/D.    PAST MEDICAL & SURGICAL HISTORY  Hyperlipidemia    Lumbago    Hypertension    H/O lymph node biopsy  Neck by Dr Case        PHYSICAL EXAM  Vital Signs Last 24 Hrs  T(C): 35.7 (18 Sep 2020 14:00), Max: 36.5 (18 Sep 2020 06:02)  T(F): 96.2 (18 Sep 2020 14:00), Max: 97.7 (18 Sep 2020 06:02)  HR: 82 (18 Sep 2020 14:00) (75 - 104)  BP: 136/92 (18 Sep 2020 14:00) (121/80 - 136/92)  BP(mean): --  RR: 17 (18 Sep 2020 14:00) (17 - 18)  SpO2: 94% (18 Sep 2020 14:00) (94% - 94%)    I&O's Summary    GENERAL: No acute distress, well-developed  HEAD:  Atraumatic, Normocephalic  ENT: PERRL, conjunctiva and sclera clear, neck supple, no JVD, moist mucosa, posterior oropharynx clear  CHEST/LUNG: Clear to auscultation bilaterally; No wheeze, equal breath sounds bilaterally, respirations nonlabored  HEART: Regular rate and rhythm; No murmurs, rubs, or gallops  ABDOMEN: Soft, nontender, nondistended; Bowel sounds present, no organomegaly  BACK: no spinal tenderness, no CVA tenderness  EXTREMITIES:  Decreased range of motion at right shoulder with active and passive movement  PSYCH: Nl behavior, nl affect  NEUROLOGY: AAOx3, non-focal, moves all extremities spontaneously      LABS                        12.5   24.03 )-----------( 433      ( 18 Sep 2020 07:19 )             37.4     Hemoglobin: 12.5 g/dL (09-18 @ 07:19)  Hemoglobin: 12.5 g/dL (09-17 @ 07:33)  Hemoglobin: 13.8 g/dL (09-16 @ 06:03)  Hemoglobin: 12.9 g/dL (09-15 @ 17:27)    CBC Full  -  ( 18 Sep 2020 07:19 )  WBC Count : 24.03 K/uL  RBC Count : 4.26 M/uL  Hemoglobin : 12.5 g/dL  Hematocrit : 37.4 %  Platelet Count - Automated : 433 K/uL  Mean Cell Volume : 87.8 fL  Mean Cell Hemoglobin : 29.3 pg  Mean Cell Hemoglobin Concentration : 33.4 g/dL  Auto Neutrophil # : 21.08 K/uL  Auto Lymphocyte # : 1.56 K/uL  Auto Monocyte # : 0.82 K/uL  Auto Eosinophil # : 0.00 K/uL  Auto Basophil # : 0.04 K/uL  Auto Neutrophil % : 87.7 %  Auto Lymphocyte % : 6.5 %  Auto Monocyte % : 3.4 %  Auto Eosinophil % : 0.0 %  Auto Basophil % : 0.2 %    09-18    137  |  99  |  22<H>  ----------------------------<  249<H>  4.6   |  27  |  0.9    Ca    9.5      18 Sep 2020 07:19  Mg     1.9     09-17    TPro  6.7  /  Alb  3.5  /  TBili  <0.2  /  DBili  x   /  AST  20  /  ALT  56<H>  /  AlkPhos  161<H>  09-18    Creatinine Trend: 0.9<--, 1.0<--, 1.0<--, 0.9<--, 1.0<--, 0.8<--  LIVER FUNCTIONS - ( 18 Sep 2020 07:19 )  Alb: 3.5 g/dL / Pro: 6.7 g/dL / ALK PHOS: 161 U/L / ALT: 56 U/L / AST: 20 U/L / GGT: x             MICROBIOLOGY    Culture - Urine (collected 15 Sep 2020 18:45)  Source: .Urine Clean Catch (Midstream)  Final Report (16 Sep 2020 20:40):    No growth      IMAGING    Xray Shoulder 2 Views, Right (09.17.20 @ 16:28)  IMPRESSION:  No acute displaced fracture or dislocation.  Mild degenerative changes of the acromioclavicular joint.

## 2020-09-19 ENCOUNTER — TRANSCRIPTION ENCOUNTER (OUTPATIENT)
Age: 60
End: 2020-09-19

## 2020-09-19 VITALS
TEMPERATURE: 97 F | RESPIRATION RATE: 18 BRPM | DIASTOLIC BLOOD PRESSURE: 86 MMHG | HEART RATE: 88 BPM | SYSTOLIC BLOOD PRESSURE: 135 MMHG

## 2020-09-19 LAB
ALBUMIN SERPL ELPH-MCNC: 3.6 G/DL — SIGNIFICANT CHANGE UP (ref 3.5–5.2)
ALP SERPL-CCNC: 163 U/L — HIGH (ref 30–115)
ALT FLD-CCNC: 52 U/L — HIGH (ref 0–41)
ANION GAP SERPL CALC-SCNC: 12 MMOL/L — SIGNIFICANT CHANGE UP (ref 7–14)
AST SERPL-CCNC: 17 U/L — SIGNIFICANT CHANGE UP (ref 0–41)
BASOPHILS # BLD AUTO: 0.08 K/UL — SIGNIFICANT CHANGE UP (ref 0–0.2)
BASOPHILS NFR BLD AUTO: 0.3 % — SIGNIFICANT CHANGE UP (ref 0–1)
BILIRUB SERPL-MCNC: 0.2 MG/DL — SIGNIFICANT CHANGE UP (ref 0.2–1.2)
BUN SERPL-MCNC: 25 MG/DL — HIGH (ref 10–20)
CALCIUM SERPL-MCNC: 9.3 MG/DL — SIGNIFICANT CHANGE UP (ref 8.5–10.1)
CHLORIDE SERPL-SCNC: 98 MMOL/L — SIGNIFICANT CHANGE UP (ref 98–110)
CO2 SERPL-SCNC: 28 MMOL/L — SIGNIFICANT CHANGE UP (ref 17–32)
CREAT SERPL-MCNC: 0.9 MG/DL — SIGNIFICANT CHANGE UP (ref 0.7–1.5)
EOSINOPHIL # BLD AUTO: 0 K/UL — SIGNIFICANT CHANGE UP (ref 0–0.7)
EOSINOPHIL NFR BLD AUTO: 0 % — SIGNIFICANT CHANGE UP (ref 0–8)
GLUCOSE SERPL-MCNC: 314 MG/DL — HIGH (ref 70–99)
HCT VFR BLD CALC: 41 % — LOW (ref 42–52)
HGB BLD-MCNC: 13.5 G/DL — LOW (ref 14–18)
IMM GRANULOCYTES NFR BLD AUTO: 4.4 % — HIGH (ref 0.1–0.3)
LYMPHOCYTES # BLD AUTO: 2.05 K/UL — SIGNIFICANT CHANGE UP (ref 1.2–3.4)
LYMPHOCYTES # BLD AUTO: 8.7 % — LOW (ref 20.5–51.1)
MCHC RBC-ENTMCNC: 29.2 PG — SIGNIFICANT CHANGE UP (ref 27–31)
MCHC RBC-ENTMCNC: 32.9 G/DL — SIGNIFICANT CHANGE UP (ref 32–37)
MCV RBC AUTO: 88.6 FL — SIGNIFICANT CHANGE UP (ref 80–94)
MONOCYTES # BLD AUTO: 0.82 K/UL — HIGH (ref 0.1–0.6)
MONOCYTES NFR BLD AUTO: 3.5 % — SIGNIFICANT CHANGE UP (ref 1.7–9.3)
NEUTROPHILS # BLD AUTO: 19.63 K/UL — HIGH (ref 1.4–6.5)
NEUTROPHILS NFR BLD AUTO: 83.1 % — HIGH (ref 42.2–75.2)
NRBC # BLD: 0 /100 WBCS — SIGNIFICANT CHANGE UP (ref 0–0)
PLATELET # BLD AUTO: 441 K/UL — HIGH (ref 130–400)
POTASSIUM SERPL-MCNC: 4.4 MMOL/L — SIGNIFICANT CHANGE UP (ref 3.5–5)
POTASSIUM SERPL-SCNC: 4.4 MMOL/L — SIGNIFICANT CHANGE UP (ref 3.5–5)
PROT SERPL-MCNC: 6.9 G/DL — SIGNIFICANT CHANGE UP (ref 6–8)
RBC # BLD: 4.63 M/UL — LOW (ref 4.7–6.1)
RBC # FLD: 15 % — HIGH (ref 11.5–14.5)
SODIUM SERPL-SCNC: 138 MMOL/L — SIGNIFICANT CHANGE UP (ref 135–146)
WBC # BLD: 23.62 K/UL — HIGH (ref 4.8–10.8)
WBC # FLD AUTO: 23.62 K/UL — HIGH (ref 4.8–10.8)

## 2020-09-19 PROCEDURE — 73630 X-RAY EXAM OF FOOT: CPT | Mod: 26,50

## 2020-09-19 PROCEDURE — 73130 X-RAY EXAM OF HAND: CPT | Mod: 26,50

## 2020-09-19 RX ORDER — METOCLOPRAMIDE HCL 10 MG
10 TABLET ORAL ONCE
Refills: 0 | Status: COMPLETED | OUTPATIENT
Start: 2020-09-19 | End: 2020-09-19

## 2020-09-19 RX ORDER — METFORMIN HYDROCHLORIDE 850 MG/1
1 TABLET ORAL
Qty: 60 | Refills: 0
Start: 2020-09-19 | End: 2020-10-18

## 2020-09-19 RX ADMIN — LISINOPRIL 40 MILLIGRAM(S): 2.5 TABLET ORAL at 05:52

## 2020-09-19 RX ADMIN — GABAPENTIN 300 MILLIGRAM(S): 400 CAPSULE ORAL at 05:52

## 2020-09-19 RX ADMIN — Medication 4 MILLIGRAM(S): at 05:52

## 2020-09-19 RX ADMIN — Medication 10 MILLIGRAM(S): at 07:11

## 2020-09-19 RX ADMIN — GABAPENTIN 300 MILLIGRAM(S): 400 CAPSULE ORAL at 14:15

## 2020-09-19 NOTE — DISCHARGE NOTE PROVIDER - NSDCMRMEDTOKEN_GEN_ALL_CORE_FT
gabapentin enacarbil 300 mg oral tablet, extended release: 1 tab(s) orally 3 times a day  lisinopril 40 mg oral tablet: 1 tab(s) orally once a day  oxyCODONE 5 mg oral tablet: 1 tab(s) orally every 6 hours, As Needed MDD:4 tabs    gabapentin enacarbil 300 mg oral tablet, extended release: 1 tab(s) orally 3 times a day  lisinopril 40 mg oral tablet: 1 tab(s) orally once a day  metFORMIN 500 mg oral tablet: 1 tab(s) orally 2 times a day   oxyCODONE 5 mg oral tablet: 1 tab(s) orally every 6 hours, As Needed MDD:4 tabs

## 2020-09-19 NOTE — DISCHARGE NOTE NURSING/CASE MANAGEMENT/SOCIAL WORK - PATIENT PORTAL LINK FT
You can access the FollowMyHealth Patient Portal offered by [facility] by registering at the following website: [email or web address]. By joining [facility]’s FollowMyHealth portal, you will also be able to view your health information using other applications (apps) compatible with our system.

## 2020-09-19 NOTE — CHART NOTE - NSCHARTNOTEFT_GEN_A_CORE
<<<RESIDENT DISCHARGE NOTE>>>     JOSÉ LUIS AREVALO  MRN-[id number]    VITAL SIGNS:  T(F): 96.7 (09-19-20 @ 06:54), Max: 96.7 (09-19-20 @ 06:54)  HR: 68 (09-19-20 @ 06:54)  BP: 127/66 (09-19-20 @ 06:54)  SpO2: 94% (09-18-20 @ 14:00)      PHYSICAL EXAMINATION:  GENERAL: No acute distress, well-developed  HEAD:  Atraumatic, Normocephalic  ENT: PERRL, conjunctiva and sclera clear, neck supple, no JVD, moist mucosa, posterior oropharynx clear  CHEST/LUNG: Clear to auscultation bilaterally; No wheeze, equal breath sounds bilaterally, respirations nonlabored  HEART: Regular rate and rhythm; No murmurs, rubs, or gallops  ABDOMEN: Soft, nontender, nondistended; Bowel sounds present, no organomegaly  BACK: no spinal tenderness, no CVA tenderness  EXTREMITIES:  Decreased range of motion at right shoulder with active and passive movement  PSYCH: Nl behavior, nl affect  NEUROLOGY: AAOx3, non-focal, moves all extremities spontaneously    TEST RESULTS:                        13.5   23.62 )-----------( 441      ( 19 Sep 2020 08:22 )             41.0       09-19    138  |  98  |  25<H>  ----------------------------<  314<H>  4.4   |  28  |  0.9    Ca    9.3      19 Sep 2020 08:22    TPro  6.9  /  Alb  3.6  /  TBili  0.2  /  DBili  x   /  AST  17  /  ALT  52<H>  /  AlkPhos  163<H>  09-19      FINAL DISCHARGE INTERVIEW:  Resident(s) Present: (Name: Jose Freed MD), RN Present: (Name: Olamide)    DISCHARGE MEDICATION RECONCILIATION  reviewed with Attending (Name: Kristy)    DISPOSITION:   [X] Home,    [  ] Home with Visiting Nursing Services,   [    ]  SNF/ NH,    [   ] Acute Rehab (4A),   [   ] Other (Specify:_________)

## 2020-09-19 NOTE — DISCHARGE NOTE PROVIDER - HOSPITAL COURSE
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 Reports h/o neck pain radiating to his R shoulder and arm, and low back pain radiating to his R leg, x 3 months. Worsening over time, severe, and causing worsening difficulty walking. MRI lumbar 7/6 showed "L5-S1 small disc herniation and "L3-4 small disc bulge and facet hypertrophy with mild right foraminal narrowing and mild compression. 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 Pain is associated with swelling of b/l fingers and foot with pain in MCP joints which improved after IV steroid in ED. Denies abdominal pain, CP, SOB, vomiting, diarrhea, constipation, urinary or recal retention/incontinence, extremity numbness or actual weakness, and all other symptoms. Able to walk though with difficulty. Low suspicion for cord compression and no objective findings for this. Had some hematuria but this resolved and nml urination with low suspicion for pyelo or stone, and no e/o UTI on UA. Pt difficulty controlling his pain at home, has been on gabapentin, oxycodone, ibuprofen without improvement. Follows with NSGY outpt and sent to ED as well. NSGY recommends decadron and admission for MRI.    MRI performed on 9/16 revealed Cervical: C5-6 moderate stenosis, stable since prior exam; Thoracic: unremarkable; Lumbar: L5-S1 disc herniation decreased in size compared to previous exam. L3-4 stable degenerative change with mild compression of L3 root. Neurosurgery was consulted, recommended no acute surgical intervention, outpatient follow-up.     MRI of right shoulder on 9/18 revealed partial thickness tears of supraspinatous and infraspinatous muscles, as well as SLAP tear. Ortho consulted, recommend outpatient follow up.    RF and CCP found elevated. Rheumatology consulted for new diagnosis of Rheumatoid Arthritis. Recommend outpatient follow up.    Pt stable for discharge, will follow up as stated above.

## 2020-09-19 NOTE — DISCHARGE NOTE PROVIDER - CARE PROVIDER_API CALL
Eloina Kiran (DO)  Internal Medicine  [address], [address]  [place] [address]  Phone: [phone]  Fax: [phone]  Follow Up Time: 1 week    Taya Paul)  Surgical Physicians  [address] [place] [address], [address]  [place] [address]  Phone: [phone]  Fax: [phone]  Follow Up Time: 2 weeks    Arjun Davis  ORTHOPAEDIC SURGERY  [address] [place] [address]  Phone: [phone]  Fax: [phone]  Follow Up Time: 2 weeks

## 2020-09-19 NOTE — DISCHARGE NOTE PROVIDER - CARE PROVIDERS DIRECT ADDRESSES
,DirectAddress_Unknown,[email or web address]

## 2020-09-19 NOTE — DISCHARGE NOTE PROVIDER - NSDCFUADDINST_GEN_ALL_CORE_FT
Please schedule appointments to follow up with Neurology, Rheumatology, and Orthopedic Surgery.    Please take the full course of steroids that were sent to your pharmacy

## 2020-09-19 NOTE — DISCHARGE NOTE PROVIDER - PROVIDER TOKENS
PROVIDER:[TOKEN:[[id number]],FOLLOWUP:[1 week]],PROVIDER:[TOKEN:[[id number]],FOLLOWUP:[2 weeks]],PROVIDER:[TOKEN:[[id number]],FOLLOWUP:[2 weeks]]

## 2020-09-19 NOTE — DISCHARGE NOTE PROVIDER - NSDCCPCAREPLAN_GEN_ALL_CORE_FT
PRINCIPAL DISCHARGE DIAGNOSIS  Diagnosis: Radiculopathy, cervical region  Assessment and Plan of Treatment:       SECONDARY DISCHARGE DIAGNOSES  Diagnosis: Rotator cuff dysfunction, right  Assessment and Plan of Treatment:     Diagnosis: Rheumatoid arthritis  Assessment and Plan of Treatment:      PRINCIPAL DISCHARGE DIAGNOSIS  Diagnosis: Radiculopathy, cervical region  Assessment and Plan of Treatment: Radiculopathy refers to pain due to compression of nerve roots in the spinal canal. Your pain was likely due to this type of compression. You had an MRI performed which showed compression in your cervical and lumbar spine. Neurosurgery was consulted and they recommended no acute surgical intervention. Please schedule an appointment to follow-up with them as an outpatient.  Your radiculopathy was treated with steroids, which help to reduce the inflammation. A Medrol steroid pack was sent to your pharmacy. Please  this medication and complete the full course      SECONDARY DISCHARGE DIAGNOSES  Diagnosis: Rotator cuff dysfunction, right  Assessment and Plan of Treatment: The rotator cuff involves several muscles in the shoulder. An MRI of your right shoulder was performed to evaluate your pain. The MRI showed partial tears in two of the muscles invloved in the rotator cuff; the supraspinatous muscle and the infraspinatous muscle. Orthopedic Surgery was consulted and they recommended no acute surgical intervention. Please schedule an appointment to follow-up with them as an outpatient.    Diagnosis: Rheumatoid arthritis  Assessment and Plan of Treatment: Your joint pain was evaluated and labs returned positive for two markers that are consistent with Rheumatoid Arthritis.  Rheumatoid Arthritis is an autoimmune condition that involves inflammation of the joint spaces. Rheumatology was consulted and they recommended no acute intervention. Please schedule an appointment to follow-up with them as an outpatient.

## 2020-09-21 LAB
HBV CORE AB SER-ACNC: SIGNIFICANT CHANGE UP
HBV CORE IGM SER-ACNC: SIGNIFICANT CHANGE UP
HBV SURFACE AB SER-ACNC: 17.7 MIU/ML — SIGNIFICANT CHANGE UP
HBV SURFACE AG SER-ACNC: SIGNIFICANT CHANGE UP
HCV AB S/CO SERPL IA: 0.04 COI — SIGNIFICANT CHANGE UP
HCV AB SERPL-IMP: SIGNIFICANT CHANGE UP

## 2020-09-23 ENCOUNTER — APPOINTMENT (OUTPATIENT)
Dept: HEMATOLOGY ONCOLOGY | Facility: CLINIC | Age: 60
End: 2020-09-23
Payer: COMMERCIAL

## 2020-09-23 VITALS
RESPIRATION RATE: 14 BRPM | DIASTOLIC BLOOD PRESSURE: 60 MMHG | HEIGHT: 72 IN | BODY MASS INDEX: 23.84 KG/M2 | WEIGHT: 176 LBS | SYSTOLIC BLOOD PRESSURE: 116 MMHG | HEART RATE: 101 BPM | TEMPERATURE: 97.1 F

## 2020-09-23 DIAGNOSIS — Z87.39 PERSONAL HISTORY OF OTHER DISEASES OF THE MUSCULOSKELETAL SYSTEM AND CONNECTIVE TISSUE: ICD-10-CM

## 2020-09-23 DIAGNOSIS — K63.89 OTHER SPECIFIED DISEASES OF INTESTINE: ICD-10-CM

## 2020-09-23 DIAGNOSIS — C49.A3 GASTROINTESTINAL STROMAL TUMOR OF SMALL INTESTINE: ICD-10-CM

## 2020-09-23 PROCEDURE — 99244 OFF/OP CNSLTJ NEW/EST MOD 40: CPT

## 2020-09-24 ENCOUNTER — APPOINTMENT (OUTPATIENT)
Dept: RHEUMATOLOGY | Facility: CLINIC | Age: 60
End: 2020-09-24
Payer: COMMERCIAL

## 2020-09-24 DIAGNOSIS — E78.5 HYPERLIPIDEMIA, UNSPECIFIED: ICD-10-CM

## 2020-09-24 DIAGNOSIS — M45.2 ANKYLOSING SPONDYLITIS OF CERVICAL REGION: ICD-10-CM

## 2020-09-24 DIAGNOSIS — I10 ESSENTIAL (PRIMARY) HYPERTENSION: ICD-10-CM

## 2020-09-24 DIAGNOSIS — E11.9 TYPE 2 DIABETES MELLITUS WITHOUT COMPLICATIONS: ICD-10-CM

## 2020-09-24 DIAGNOSIS — M45.6 ANKYLOSING SPONDYLITIS LUMBAR REGION: ICD-10-CM

## 2020-09-24 DIAGNOSIS — M75.111 INCOMPLETE ROTATOR CUFF TEAR OR RUPTURE OF RIGHT SHOULDER, NOT SPECIFIED AS TRAUMATIC: ICD-10-CM

## 2020-09-24 DIAGNOSIS — M51.27 OTHER INTERVERTEBRAL DISC DISPLACEMENT, LUMBOSACRAL REGION: ICD-10-CM

## 2020-09-24 DIAGNOSIS — D72.829 ELEVATED WHITE BLOOD CELL COUNT, UNSPECIFIED: ICD-10-CM

## 2020-09-24 DIAGNOSIS — M50.122 CERVICAL DISC DISORDER AT C5-C6 LEVEL WITH RADICULOPATHY: ICD-10-CM

## 2020-09-24 DIAGNOSIS — M54.5 LOW BACK PAIN: ICD-10-CM

## 2020-09-24 DIAGNOSIS — T38.0X5A ADVERSE EFFECT OF GLUCOCORTICOIDS AND SYNTHETIC ANALOGUES, INITIAL ENCOUNTER: ICD-10-CM

## 2020-09-24 DIAGNOSIS — R31.9 HEMATURIA, UNSPECIFIED: ICD-10-CM

## 2020-09-24 DIAGNOSIS — M51.24 OTHER INTERVERTEBRAL DISC DISPLACEMENT, THORACIC REGION: ICD-10-CM

## 2020-09-24 PROBLEM — K63.89 SMALL BOWEL MASS: Status: ACTIVE | Noted: 2020-07-31

## 2020-09-24 PROBLEM — C49.A3 MALIGNANT GASTROINTESTINAL STROMAL TUMOR (GIST) OF SMALL INTESTINE: Status: ACTIVE | Noted: 2020-09-24

## 2020-09-24 PROCEDURE — 99205 OFFICE O/P NEW HI 60 MIN: CPT

## 2020-09-24 NOTE — CONSULT LETTER
[Dear  ___] : Dear  [unfilled], [Courtesy Letter:] : I had the pleasure of seeing your patient, [unfilled], in my office today. [Please see my note below.] : Please see my note below. [Consult Closing:] : Thank you very much for allowing me to participate in the care of this patient.  If you have any questions, please do not hesitate to contact me. [Sincerely,] : Sincerely, [FreeTextEntry3] : Edwar

## 2020-09-24 NOTE — PHYSICAL EXAM
[General Appearance - Alert] : alert [General Appearance - In No Acute Distress] : in no acute distress [Sclera] : the sclera and conjunctiva were normal [PERRL With Normal Accommodation] : pupils were equal in size, round, and reactive to light [Extraocular Movements] : extraocular movements were intact [Outer Ear] : the ears and nose were normal in appearance [Oropharynx] : the oropharynx was normal [Neck Appearance] : the appearance of the neck was normal [Neck Cervical Mass (___cm)] : no neck mass was observed [Jugular Venous Distention Increased] : there was no jugular-venous distention [Thyroid Diffuse Enlargement] : the thyroid was not enlarged [Thyroid Nodule] : there were no palpable thyroid nodules [Auscultation Breath Sounds / Voice Sounds] : lungs were clear to auscultation bilaterally [Heart Rate And Rhythm] : heart rate was normal and rhythm regular [Heart Sounds] : normal S1 and S2 [Heart Sounds Gallop] : no gallops [Murmurs] : no murmurs [Heart Sounds Pericardial Friction Rub] : no pericardial rub [Full Pulse] : the pedal pulses are present [Edema] : there was no peripheral edema [Bowel Sounds] : normal bowel sounds [Abdomen Soft] : soft [Abdomen Tenderness] : non-tender [Abdomen Mass (___ Cm)] : no abdominal mass palpated [] : no rash [Skin Lesions] : no skin lesions [Sensation] : the sensory exam was normal to light touch and pinprick [Motor Exam] : the motor exam was normal [Affect] : the affect was normal [Mood] : the mood was normal [FreeTextEntry1] : Right hand with mild tenderness in 2nd MCP and PIP. Difficulty with opposition of thumb with 2nd finger on right hand. Active ROM unable to fully touch thumb and 2nd finger but able to with passive ROM. No obvious signs of synovitis on exam.

## 2020-09-24 NOTE — ASSESSMENT
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 \par -will check Molecular profile if has mutation sensitive to Imatinib will start it for at least 3 years\par -we went over side effects in details \par -we went over benefit in detail \par -given his family history of GISTS will have him see genetics.\par \par RA and ILD seen on CTA\par -has  visit with  Dr. Kiran tomorrow \par \par Will call with results of sequencing  and go from there

## 2020-09-24 NOTE — ASSESSMENT
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 RA seropositive (CCP and RF)\par \par - Symptomatically improved on steroids\par - ESR 12 CRP 6.4. Hand and foot xrays show OA\par -CTA dissection during hospitalization suggested ILD\par -Recently had GIST tumor removed 8/2020 (solid organ tumor) with consideration for Imatinib pending molecular profile. \par -Plan today is to stop medrol dose pack and start prednisone taper: 20 mg x 7 days, 15 mg x 7 days, 10 mg x 7 days, 5 mg x 7 days then stop. Discussed disease modifying treatment. He has ILD therefore will avoid MTX, leflunomide, TNF-inhibitors. Sulfasalazine, Actemra have been reported in literature to induce inflammatory pneumonitis. Patient with solid organ tumor a reasonable option for treatment could be Rituximab. Evidence is uncertain on immunosuppression in setting of malignancy, however most recs agree with caution and withholding/ re-evaluating RA treatment in this setting. Other option down the line could be abatacept, hydroxychloroquine. Will consider low dose prednisone while on chemo \par -Imatinib has been studied in RA in open label trials and with case reports suggesting improvement in patients with RA\par -Will discuss with Dr. Beauchamp\par \par 2. ILD\par \par -Asymptomatic. Will refer to pulmonary for formal evaluation\par \par 3. GIST tumor\par \par -S/p resection and following with Oncology\par \par 4. HTN/HLD\par \par -As per PCP\par \par 5. Cervical spinal stenosis/lumbar disc herniation\par \par -Follow up with neurosurgery and PCP

## 2020-09-24 NOTE — HISTORY OF PRESENT ILLNESS
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 As you know it was incidently found on his MR that was being done for pain. You took him to surgery on 8/21/20. He recovered well. \par \par He was recently in ED and was found to have RA started on prednisone.  Joint pains are better now.  He had CTA dissection done. \par \par [facility] [place]:\par 9/15/20: CTA dissection:  IMPRESSION:\par \par 1. Normal caliber of the thoracoabdominal aorta without evidence for dissection or intramural hematoma.\par \par 2. Interstitial lung disease. No honeycombing.\par \par 3. Interval postsurgical changes within the small bowel.\par \par 7/28/20 CT abdomen: IMPRESSION:\par \par A 7.0 x 6.2 x 7.7 cm heterogeneous mass in the mid abdomen contiguous with small bowel loops most likely represents a small bowel gastrointestinal stromal tumor (GIST).\par \par 7/6/20 MR L spine:  Mass-like heterogeneous structure within the mid abdomen, incompletely imaged/evaluated on this study. Although this can reflect loops of small bowel, recommend a dedicated CT or MRI of the abdomen with contrast to exclude an underlying mass.\par \par \par Path: \par Addendum\par Immunohistochemical studies were performed at [facility], [address] [place] [address], [facility] (see NOTE).  The results are as follows:\par (block 1E)\par \par Immunohistochemical stains have been performed using antibodies\par to the following mismatch repair proteins:\par \par hMLH1 (clone [id number])    Positive\par \par hMSH2 (clone [id number])   Positive\par \par hMSH6 (clone [id number])          Positive\par \par PMS2 (clone [id number])      Positive\par \par Normal nuclear expression of all proteins is seen in tumor cell\par nuclei.  There is no evidence of mismatch repair deficiency by\par immunohistochemical evaluation.\par \par Note: This is a somatic test being performed to evaluate the\par tumor phenotype.  Mismatch repair deficient tumors may respond\par differently to specific chemotherapeutic agents compared to\par tumors that are not mismatch repair deficient.\par \par Comment:  All controls show appropriate reactivity.\par \par NOTE: This test was developed and its performance characteristics\par determined by [facility],\par [address] [place] [address], [place]  [address] It has not been cleared or\par approved by the U.S.  Food and Drug Administration.  The FDA has\par determined that such clearance or approval is not necessary.\par This test is used for clinical purposes.  It should not be\par regarded as investigational or for research.  The laboratory is\par regulated under the Clinical Laboratory Improvement Amendments of\par 1988 (CLIA) as\par qualified to perform high complexity clinical testing.\par \par Verified by: Chris Arevalo M.D.\par (Electronic Signature)\par Reported on: 09/02/20 12:47 EDT, [address] [place] [address],[facility] [address]\par Phone: [phone]   Fax: [phone]\par _________________________________________________________________\par \par \par \par \par \par \par \par JOSÉ LUIS AREVALO                      4\par \par \par \par Surgical Final Report\par \par \par \par \par Final Diagnosis\par Small bowel tumor, resectopm:\par - Gastrointestinal stromal tumor ( GIST),  low grade, measuring 8\par cm in greatest dimesion and involving muscular propria of the\par intestine.\par -  All surgical margins of resection (including bilateral small\par bowel resection margins and mesentrial margin), free of the\par tumor.\par -  No clear-cut  histological evidence of lymphovascular invasion\par identified.\par -  The intestinal mucosa showing hyperemia without significant\par pathologic change.\par -  See synoptic summary.\par -The pathology staging: pT3, pNx, pMx\par \par Note: This case was reviewed in intradepartmental peer review and\par the diagnosis represents a consensus opinion.\par \par Immunohistochemical studies were performed on block 1E at [facility], [address] [place] [address],\par [place], [place], [address] (see NOTE). The results are as\par follows:\par \par                Positive\par Vimentin           Positive\par S100 protein      Negative\par SMA                  Negative\par Desmin              Negative\par CD34                 Negative\par Pancytokeratin  Negative\par JACK-1           Negative\par \par \par Immunoprofile support the diagnosis.\par Note:  All controls show appropriate reactivity.\par \par Verified by: Chris Arevalo M.D.\par (Electronic Signature)\par Reported on: 08/27/20 15:39 EDT, [address] [place] [address],\[place] [address]\par Phone: [phone]   Fax: [phone]\par _________________________________________________________________\par \par Comment\par _Case report to tumor registry.\par \par Synoptic Summary\par SURGICAL PATHOLOGY CANCER CASE SUMMARY\par \par \par \par \par \par THYKUDAM, JOSÉ LUIS                      4\par \par \par \par Surgical Final Report\par \par \par \par \par \par GASTROINTESTINAL STROMAL TUMOR: RESECTION\par \par PROCEDURE: Resection\par TUMOR SITE: Small intestine\par TUMOR SIZE: Greatest dimension: 8 cm\par TUMOR FOCALITY: Unifocal\par HISTOLOGIC TYPE: Gastrointestinal stromal tumor, spindle cell\par type\par MITOTIC RATE: <5/5 mm2\par NECROSIS: Present, focal 8%\par HISTOLOGIC GRADE: G1: Low grade; mitotic rate <5/5 mm2\par RISK ASSESSMENT: Moderate risk\par MARGINS: Uninvolved by GIST\par Distance of tumor from closest margin: 5 cm\par Margin: Small intestine\par REGIONAL LYMPH NODES: No lymph nodes submitted or found\par PATHOLOGIC STAGE CLASSIFICATION\par PRIMARY TUMOR: pT3: Tumor more than 5 cm but not more than\par 10 cm\par PRERESECTION TREATMENT: No known preresection therapy\par TREATMENT EFFECT: No known presurgical therapy\par \par Specimen(s) Submitted\par Small bowel tumor\par

## 2020-09-24 NOTE — HISTORY OF PRESENT ILLNESS
[FreeTextEntry1] : 59 year old male with a past medical history of HTN, HLD, cervical spinal stenosis, lumbar herniated disc, small bowl GIST, s/p resection 8/21/20, seropositive, non-erosive RA here for post hospital follow up.\par \par Patient reports his symptoms started 6/16 at work. 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 He was seen in the ED 6/28 where XR of the knee was normal. XR of the right shoulder suggested AC OA (mild), and x-ray of the pelvis suggested degenerative changes in his lumbar spine and bilateral hips. He was sent home with motrin.\par \par MRI right ankle 7/1:   "Mild tenosynovitis of the posterior tibialis, flexor digitorum longus, peroneus brevis, and peroneus longus. Mild Achilles tendinosis. Edema in the sinus tarsi (and small erosion at sinus tarsi roof) may be seen with sinus tarsi syndrome, correlate with physical exam for lateral hindfoot impingement/instability. Diffuse edema throughout the musculature, likely due to to neuropathy. Prior sprain of the anterior distal syndesmotic ligament, deep fibers of the deltoid ligament, and spring ligament."\par \par MRI cervical spine 7/2:  "Mild canal narrowing on a degenerative basis, more pronounced at C5-6. C5-6 mild foraminal narrowing. Possible T4-T5 left foraminal disc herniation. If there is concern consider a dedicated T-spine study."\par \par Repeat MRI C spine 9/16: "C5-6 moderate spinal stenosis due to disc bulging and ligamentum flavum infolding, stable since the prior exam.. Stable mild spinal cord abutment without cord deformity or signal abnormality. Stable moderate foraminal narrowing. Stable additional mild degenerative changes as described."\par \par \par \par MRI lumbar spine 7/6: "L5-S1 small disc herniation in the right lateral recess with impingement of the descending right S1 nerve root. L3-4 small disc bulge and facet hypertrophy with mild right foraminal narrowing and mild compression of the exiting right L3 nerve root.  Additional mild degenerative changes as described.Mass-like heterogeneous structure within the mid abdomen, incompletely imaged/evaluated on this study. Although this can reflect loops of small bowel, recommend a dedicated CT or MRI of the abdomen with contrast to exclude an underlying mass."\par \par Repeat MRI lumbar spine 9/16:  since prior study: L5-S1 trace disc herniation in the right lateral recess, slightly decreased in size since the prior study with decreased mass effect upon the descending right S1 nerve root. L3-4 stable degenerative change with mild right foraminal narrowing and mild compression of the exiting right L3 nerve root.\par \par MRI T spine 9/16: normal\par \par Repeat XR shoulder 9/17: AC OA\par \par MRI right shoulder 9/18: "Partial-thickness tears of the supraspinatus and infraspinatus tendons as above. Synovitis in the axillary pouch and rotator interval indicative of adhesive capsulitis, correlate with patient range of motion. SLAP tear extending into the posterior superior labrum. Small subacromial/subdeltoid bursitis."\par \par Xray of hands and feet 9/19: suggested OA without erosions\par \par CT Abdomen pelvis 7/26: "A 7.0 x 6.2 x 7.7 cm heterogeneous mass in the mid abdomen contiguous with small bowel loops most likely represents a small bowel gastrointestinal stromal tumor (GIST)."\par \par CTA dissection 9/17:  "Normal caliber of the thoracoabdominal aorta without evidence for dissection or intramural hematoma.Interstitial lung disease. No honeycombing. Interval postsurgical changes within the small bowel."\par \par Pathology from OR 8/21 confirmed GIST\par Serologies include +CCP >250,  with negative lyme, SSA, SSB, ED, Scleroderma. \par Hepatitis B and C serologies negative\par ESR 12 CRP 6\par Patient was admitted 9/15-9/[facility]at [facility] for intractable neck pain radiating to right shoulder/arm right arm and low back pain radiating to right leg. Neurosurgery advised steroids and no surgical intervention. Imaging and labs as above. Started on metformin inpatient due to elevated blood sugars from steroids\par \par Today's visit: \par Patient with wife. Reports significant improvement in joint pain, swelling and AM stiffness since being started on steroids in the hospital. He reports being on his 3rd day of medrol dose pack that he was discharged on. Still has pains in multiple MCP and PIP's bilaterally with difficulty flexing right first finger. Reports pains in his right forearm from elbow to wrist area that started with his injury 6/16 that feels like shooting pains. Also reports top of his right mid foot is painful as well.  Denies skin rashes, eye inflammation, dyspnea or cough. \par \par Denies ETOH, drugs, tobacco\par No FH autoimmune disease\par \par \par

## 2020-09-29 ENCOUNTER — OUTPATIENT (OUTPATIENT)
Dept: OUTPATIENT SERVICES | Facility: HOSPITAL | Age: 60
LOS: 1 days | Discharge: HOME | End: 2020-09-29

## 2020-09-29 DIAGNOSIS — Z98.890 OTHER SPECIFIED POSTPROCEDURAL STATES: Chronic | ICD-10-CM

## 2020-09-29 DIAGNOSIS — Z31.5 ENCOUNTER FOR PROCREATIVE GENETIC COUNSELING: ICD-10-CM

## 2020-09-30 ENCOUNTER — APPOINTMENT (OUTPATIENT)
Dept: HEMATOLOGY ONCOLOGY | Facility: CLINIC | Age: 60
End: 2020-09-30

## 2020-10-01 NOTE — H&P PST ADULT - NS PRO FEM  PAP SMEARS 3YRS
“Patient's name, , procedure and correct site were confirmed during the [facility] Timeout.”
not applicable (Male)

## 2020-10-08 ENCOUNTER — APPOINTMENT (OUTPATIENT)
Dept: RHEUMATOLOGY | Facility: CLINIC | Age: 60
End: 2020-10-08
Payer: COMMERCIAL

## 2020-10-08 VITALS
TEMPERATURE: 96.4 F | SYSTOLIC BLOOD PRESSURE: 140 MMHG | HEIGHT: 72 IN | OXYGEN SATURATION: 96 % | WEIGHT: 177 LBS | HEART RATE: 95 BPM | BODY MASS INDEX: 23.98 KG/M2 | DIASTOLIC BLOOD PRESSURE: 80 MMHG

## 2020-10-08 PROCEDURE — 99214 OFFICE O/P EST MOD 30 MIN: CPT

## 2020-10-08 NOTE — ASSESSMENT
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 RA seropositive (CCP and RF)\par \par - Symptomatically improved on steroids\par - ESR 12 CRP 6.4. Hand and foot xrays show OA\par -CTA dissection during hospitalization suggested ILD\par -Recently had GIST tumor removed 8/2020 (solid organ tumor) with consideration for Imatinib pending molecular profile however he refuses treatment and wants to follow up with imaging and blood work with Oncology in 6 months.\par -Continue steroids. He will call me if his symptoms return have his taper is completed. Suspect due to active cancer he may need 5mg-10mg to help alleviate his symptoms. \par -Discussed disease modifying treatment. He has ILD therefore will avoid MTX, leflunomide, TNF-inhibitors. Sulfasalazine, Actemra have been reported in literature to induce inflammatory pneumonitis. Patient with solid organ tumor a reasonable option for treatment could be Rituximab. Evidence is uncertain on immunosuppression in setting of malignancy, however most recs agree with caution and withholding/ re-evaluating RA treatment in this setting. Other option down the line could be abatacept, hydroxychloroquine. \par -Imatinib has been studied in RA in open label trials and with case reports suggesting improvement in patients with RA\par -Will discuss with Dr. Beauchamp\par -f/u 6 weeks\par \par 2. ILD\par \par -Asymptomatic. Needs to see pulmonary for formal evaluation and will schedule appointment soon\par \par 3. GIST tumor\par \par -S/p resection and following with Oncology\par \par 4. HTN/HLD\par \par -As per PCP\par \par 5. Cervical spinal stenosis/lumbar disc herniation\par \par -Follow up with neurosurgery and PCP\par \par 6. Right shoulder pain\par \par -MRI suggested partial rotator cuff tear, subacromial bursits and going for PT\par \par 7. Right foot pain \par \par -Went to PT, saw ortho, used boot. Still with pains on top of foot. Also saw neuro for this. Xray suggested arthritis. Had steroid injection in past without much relief. Less likely related to RA. Could be tendonitis vs. OA vs. RA. Suggested ultrasound to rule out active hyperemia but he wishes to defer for now

## 2020-10-08 NOTE — HISTORY OF PRESENT ILLNESS
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 He was seen in the ED 6/28 where XR of the knee was normal. XR of the right shoulder suggested AC OA (mild), and x-ray of the pelvis suggested degenerative changes in his lumbar spine and bilateral hips. He was sent home with motrin.\par \par MRI right ankle 7/1:   "Mild tenosynovitis of the posterior tibialis, flexor digitorum longus, peroneus brevis, and peroneus longus. Mild Achilles tendinosis. Edema in the sinus tarsi (and small erosion at sinus tarsi roof) may be seen with sinus tarsi syndrome, correlate with physical exam for lateral hindfoot impingement/instability. Diffuse edema throughout the musculature, likely due to to neuropathy. Prior sprain of the anterior distal syndesmotic ligament, deep fibers of the deltoid ligament, and spring ligament."\par \par MRI cervical spine 7/2:  "Mild canal narrowing on a degenerative basis, more pronounced at C5-6. C5-6 mild foraminal narrowing. Possible T4-T5 left foraminal disc herniation. If there is concern consider a dedicated T-spine study."\par \par Repeat MRI C spine 9/16: "C5-6 moderate spinal stenosis due to disc bulging and ligamentum flavum infolding, stable since the prior exam.. Stable mild spinal cord abutment without cord deformity or signal abnormality. Stable moderate foraminal narrowing. Stable additional mild degenerative changes as described."\par \par MRI lumbar spine 7/6: "L5-S1 small disc herniation in the right lateral recess with impingement of the descending right S1 nerve root. L3-4 small disc bulge and facet hypertrophy with mild right foraminal narrowing and mild compression of the exiting right L3 nerve root.  Additional mild degenerative changes as described.Mass-like heterogeneous structure within the mid abdomen, incompletely imaged/evaluated on this study. Although this can reflect loops of small bowel, recommend a dedicated CT or MRI of the abdomen with contrast to exclude an underlying mass."\par \par Repeat MRI lumbar spine 9/16:  since prior study: L5-S1 trace disc herniation in the right lateral recess, slightly decreased in size since the prior study with decreased mass effect upon the descending right S1 nerve root. L3-4 stable degenerative change with mild right foraminal narrowing and mild compression of the exiting right L3 nerve root.\par \par MRI T spine 9/16: normal. Repeat XR shoulder 9/17: AC OA\par \par MRI right shoulder 9/18: "Partial-thickness tears of the supraspinatus and infraspinatus tendons as above. Synovitis in the axillary pouch and rotator interval indicative of adhesive capsulitis, correlate with patient range of motion. SLAP tear extending into the posterior superior labrum. Small subacromial/subdeltoid bursitis."\par \par Xray of hands and feet 9/19: suggested OA without erosions\par \par CT Abdomen pelvis 7/26: "A 7.0 x 6.2 x 7.7 cm heterogeneous mass in the mid abdomen contiguous with small bowel loops most likely represents a small bowel gastrointestinal stromal tumor (GIST)."\par \par CTA dissection 9/17:  "Normal caliber of the thoracoabdominal aorta without evidence for dissection or intramural hematoma.Interstitial lung disease. No honeycombing. Interval postsurgical changes within the small bowel."\par \par Pathology from OR 8/21 confirmed GIST\par Serologies include +CCP >250,  with negative lyme, SSA, SSB, ED, Scleroderma. \par Hepatitis B and C serologies negative\par ESR 12 CRP 6\par Patient was admitted 9/15-9/19at [facility] for intractable neck pain radiating to right shoulder/arm right arm and low back pain radiating to right leg. Neurosurgery advised steroids and no surgical intervention. Imaging and labs as above. Started on metformin inpatient due to elevated blood sugars from steroids\par \par Today's visit: \par He reports right foot pain on top of foot and right shoulder pain. He went to see ortho for both. He wore a boot and completed PT for the right foot and pains did not improve much. He is starting PT for the right shoulder today and has 14 sessions coming up. Having pains with shoulder but improved and range of motion has improved. Right 2nd finger PIP improved pain and swelling with prednisone. \par

## 2020-10-24 ENCOUNTER — OUTPATIENT (OUTPATIENT)
Dept: OUTPATIENT SERVICES | Facility: HOSPITAL | Age: 60
LOS: 1 days | Discharge: HOME | End: 2020-10-24

## 2020-10-24 DIAGNOSIS — Z11.59 ENCOUNTER FOR SCREENING FOR OTHER VIRAL DISEASES: ICD-10-CM

## 2020-10-24 DIAGNOSIS — Z98.890 OTHER SPECIFIED POSTPROCEDURAL STATES: Chronic | ICD-10-CM

## 2020-10-29 RX ORDER — PREDNISONE 20 MG/1
20 TABLET ORAL DAILY
Qty: 10 | Refills: 0 | Status: DISCONTINUED | COMMUNITY
Start: 2020-07-01 | End: 2020-10-29

## 2020-11-03 ENCOUNTER — LABORATORY RESULT (OUTPATIENT)
Age: 60
End: 2020-11-03

## 2020-11-03 ENCOUNTER — OUTPATIENT (OUTPATIENT)
Dept: OUTPATIENT SERVICES | Facility: HOSPITAL | Age: 60
LOS: 1 days | Discharge: HOME | End: 2020-11-03

## 2020-11-03 ENCOUNTER — APPOINTMENT (OUTPATIENT)
Dept: HEMATOLOGY ONCOLOGY | Facility: CLINIC | Age: 60
End: 2020-11-03

## 2020-11-03 DIAGNOSIS — C49.A3 GASTROINTESTINAL STROMAL TUMOR OF SMALL INTESTINE: ICD-10-CM

## 2020-11-03 DIAGNOSIS — Z98.890 OTHER SPECIFIED POSTPROCEDURAL STATES: Chronic | ICD-10-CM

## 2020-11-03 LAB
HCT VFR BLD CALC: 45.9 %
HGB BLD-MCNC: 14.8 G/DL
MCHC RBC-ENTMCNC: 29 PG
MCHC RBC-ENTMCNC: 32.2 G/DL
MCV RBC AUTO: 89.8 FL
PLATELET # BLD AUTO: 277 K/UL
PMV BLD: 9.6 FL
RBC # BLD: 5.11 M/UL
RBC # FLD: 15.9 %
WBC # FLD AUTO: 13.64 K/UL

## 2020-11-04 ENCOUNTER — INPATIENT (INPATIENT)
Facility: HOSPITAL | Age: 60
LOS: 16 days | Discharge: HOME HEALTH | End: 2020-11-21
Attending: INTERNAL MEDICINE | Admitting: INTERNAL MEDICINE
Payer: COMMERCIAL

## 2020-11-04 VITALS
TEMPERATURE: 98 F | OXYGEN SATURATION: 93 % | RESPIRATION RATE: 24 BRPM | SYSTOLIC BLOOD PRESSURE: 124 MMHG | DIASTOLIC BLOOD PRESSURE: 92 MMHG | HEART RATE: 119 BPM

## 2020-11-04 DIAGNOSIS — Z98.890 OTHER SPECIFIED POSTPROCEDURAL STATES: Chronic | ICD-10-CM

## 2020-11-04 LAB
ALBUMIN SERPL ELPH-MCNC: 3.6 G/DL — SIGNIFICANT CHANGE UP (ref 3.5–5.2)
ALP SERPL-CCNC: 99 U/L — SIGNIFICANT CHANGE UP (ref 30–115)
ALT FLD-CCNC: 41 U/L — SIGNIFICANT CHANGE UP (ref 0–41)
ANION GAP SERPL CALC-SCNC: 11 MMOL/L — SIGNIFICANT CHANGE UP (ref 7–14)
AST SERPL-CCNC: 23 U/L — SIGNIFICANT CHANGE UP (ref 0–41)
BASOPHILS # BLD AUTO: 0.02 K/UL — SIGNIFICANT CHANGE UP (ref 0–0.2)
BASOPHILS NFR BLD AUTO: 0.2 % — SIGNIFICANT CHANGE UP (ref 0–1)
BILIRUB SERPL-MCNC: 0.3 MG/DL — SIGNIFICANT CHANGE UP (ref 0.2–1.2)
BUN SERPL-MCNC: 13 MG/DL — SIGNIFICANT CHANGE UP (ref 10–20)
CALCIUM SERPL-MCNC: 9 MG/DL — SIGNIFICANT CHANGE UP (ref 8.5–10.1)
CHLORIDE SERPL-SCNC: 99 MMOL/L — SIGNIFICANT CHANGE UP (ref 98–110)
CO2 SERPL-SCNC: 26 MMOL/L — SIGNIFICANT CHANGE UP (ref 17–32)
CREAT SERPL-MCNC: 0.8 MG/DL — SIGNIFICANT CHANGE UP (ref 0.7–1.5)
D DIMER BLD IA.RAPID-MCNC: 287 NG/ML DDU — HIGH (ref 0–230)
EOSINOPHIL # BLD AUTO: 0.01 K/UL — SIGNIFICANT CHANGE UP (ref 0–0.7)
EOSINOPHIL NFR BLD AUTO: 0.1 % — SIGNIFICANT CHANGE UP (ref 0–8)
GLUCOSE SERPL-MCNC: 200 MG/DL — HIGH (ref 70–99)
HCT VFR BLD CALC: 43.9 % — SIGNIFICANT CHANGE UP (ref 42–52)
HGB BLD-MCNC: 14.1 G/DL — SIGNIFICANT CHANGE UP (ref 14–18)
IMM GRANULOCYTES NFR BLD AUTO: 1.2 % — HIGH (ref 0.1–0.3)
LYMPHOCYTES # BLD AUTO: 1.18 K/UL — LOW (ref 1.2–3.4)
LYMPHOCYTES # BLD AUTO: 10.2 % — LOW (ref 20.5–51.1)
MCHC RBC-ENTMCNC: 29 PG — SIGNIFICANT CHANGE UP (ref 27–31)
MCHC RBC-ENTMCNC: 32.1 G/DL — SIGNIFICANT CHANGE UP (ref 32–37)
MCV RBC AUTO: 90.3 FL — SIGNIFICANT CHANGE UP (ref 80–94)
MONOCYTES # BLD AUTO: 1.13 K/UL — HIGH (ref 0.1–0.6)
MONOCYTES NFR BLD AUTO: 9.8 % — HIGH (ref 1.7–9.3)
NEUTROPHILS # BLD AUTO: 9.04 K/UL — HIGH (ref 1.4–6.5)
NEUTROPHILS NFR BLD AUTO: 78.5 % — HIGH (ref 42.2–75.2)
NRBC # BLD: 0 /100 WBCS — SIGNIFICANT CHANGE UP (ref 0–0)
PLATELET # BLD AUTO: 294 K/UL — SIGNIFICANT CHANGE UP (ref 130–400)
POTASSIUM SERPL-MCNC: 4.3 MMOL/L — SIGNIFICANT CHANGE UP (ref 3.5–5)
POTASSIUM SERPL-SCNC: 4.3 MMOL/L — SIGNIFICANT CHANGE UP (ref 3.5–5)
PROT SERPL-MCNC: 6.4 G/DL — SIGNIFICANT CHANGE UP (ref 6–8)
RBC # BLD: 4.86 M/UL — SIGNIFICANT CHANGE UP (ref 4.7–6.1)
RBC # FLD: 15.8 % — HIGH (ref 11.5–14.5)
SODIUM SERPL-SCNC: 136 MMOL/L — SIGNIFICANT CHANGE UP (ref 135–146)
TROPONIN T SERPL-MCNC: <0.01 NG/ML — SIGNIFICANT CHANGE UP
WBC # BLD: 11.52 K/UL — HIGH (ref 4.8–10.8)
WBC # FLD AUTO: 11.52 K/UL — HIGH (ref 4.8–10.8)

## 2020-11-04 PROCEDURE — 93010 ELECTROCARDIOGRAM REPORT: CPT

## 2020-11-04 PROCEDURE — 99285 EMERGENCY DEPT VISIT HI MDM: CPT

## 2020-11-04 NOTE — ED ADULT NURSE NOTE - OBJECTIVE STATEMENT
Pt C/o chest pain and SOB that started yesterday. Pts family member who lives with him tested + covid 2 weeks ago. Pt denies fever but has chest tightness.

## 2020-11-04 NOTE — ED ADULT TRIAGE NOTE - CHIEF COMPLAINT QUOTE
C/o chest pain and SOB that started today. Pts family member who lives with him tested + covid 2 weeks ago.

## 2020-11-05 LAB
ALBUMIN SERPL ELPH-MCNC: 3.5 G/DL — SIGNIFICANT CHANGE UP (ref 3.5–5.2)
ALP SERPL-CCNC: 99 U/L — SIGNIFICANT CHANGE UP (ref 30–115)
ALT FLD-CCNC: 38 U/L — SIGNIFICANT CHANGE UP (ref 0–41)
ANION GAP SERPL CALC-SCNC: 11 MMOL/L — SIGNIFICANT CHANGE UP (ref 7–14)
AST SERPL-CCNC: 19 U/L — SIGNIFICANT CHANGE UP (ref 0–41)
BASOPHILS # BLD AUTO: 0.02 K/UL — SIGNIFICANT CHANGE UP (ref 0–0.2)
BASOPHILS NFR BLD AUTO: 0.2 % — SIGNIFICANT CHANGE UP (ref 0–1)
BILIRUB SERPL-MCNC: 0.4 MG/DL — SIGNIFICANT CHANGE UP (ref 0.2–1.2)
BLD GP AB SCN SERPL QL: SIGNIFICANT CHANGE UP
BUN SERPL-MCNC: 11 MG/DL — SIGNIFICANT CHANGE UP (ref 10–20)
CALCIUM SERPL-MCNC: 8.9 MG/DL — SIGNIFICANT CHANGE UP (ref 8.5–10.1)
CHLORIDE SERPL-SCNC: 101 MMOL/L — SIGNIFICANT CHANGE UP (ref 98–110)
CO2 SERPL-SCNC: 25 MMOL/L — SIGNIFICANT CHANGE UP (ref 17–32)
CREAT SERPL-MCNC: 0.8 MG/DL — SIGNIFICANT CHANGE UP (ref 0.7–1.5)
CRP SERPL-MCNC: 10.7 MG/DL — HIGH (ref 0–0.4)
EOSINOPHIL # BLD AUTO: 0.03 K/UL — SIGNIFICANT CHANGE UP (ref 0–0.7)
EOSINOPHIL NFR BLD AUTO: 0.3 % — SIGNIFICANT CHANGE UP (ref 0–8)
FERRITIN SERPL-MCNC: 1079 NG/ML — HIGH (ref 30–400)
GLUCOSE BLDC GLUCOMTR-MCNC: 101 MG/DL — HIGH (ref 70–99)
GLUCOSE BLDC GLUCOMTR-MCNC: 209 MG/DL — HIGH (ref 70–99)
GLUCOSE BLDC GLUCOMTR-MCNC: 219 MG/DL — HIGH (ref 70–99)
GLUCOSE BLDC GLUCOMTR-MCNC: 282 MG/DL — HIGH (ref 70–99)
GLUCOSE SERPL-MCNC: 136 MG/DL — HIGH (ref 70–99)
HCT VFR BLD CALC: 44.2 % — SIGNIFICANT CHANGE UP (ref 42–52)
HGB BLD-MCNC: 14.3 G/DL — SIGNIFICANT CHANGE UP (ref 14–18)
IMM GRANULOCYTES NFR BLD AUTO: 1.4 % — HIGH (ref 0.1–0.3)
LDH SERPL L TO P-CCNC: 280 U/L — HIGH (ref 50–242)
LYMPHOCYTES # BLD AUTO: 1.54 K/UL — SIGNIFICANT CHANGE UP (ref 1.2–3.4)
LYMPHOCYTES # BLD AUTO: 16.3 % — LOW (ref 20.5–51.1)
MAGNESIUM SERPL-MCNC: 2 MG/DL — SIGNIFICANT CHANGE UP (ref 1.8–2.4)
MCHC RBC-ENTMCNC: 29.1 PG — SIGNIFICANT CHANGE UP (ref 27–31)
MCHC RBC-ENTMCNC: 32.4 G/DL — SIGNIFICANT CHANGE UP (ref 32–37)
MCV RBC AUTO: 90 FL — SIGNIFICANT CHANGE UP (ref 80–94)
MONOCYTES # BLD AUTO: 0.78 K/UL — HIGH (ref 0.1–0.6)
MONOCYTES NFR BLD AUTO: 8.3 % — SIGNIFICANT CHANGE UP (ref 1.7–9.3)
NEUTROPHILS # BLD AUTO: 6.92 K/UL — HIGH (ref 1.4–6.5)
NEUTROPHILS NFR BLD AUTO: 73.5 % — SIGNIFICANT CHANGE UP (ref 42.2–75.2)
NRBC # BLD: 0 /100 WBCS — SIGNIFICANT CHANGE UP (ref 0–0)
PHOSPHATE SERPL-MCNC: 4.1 MG/DL — SIGNIFICANT CHANGE UP (ref 2.1–4.9)
PLATELET # BLD AUTO: 293 K/UL — SIGNIFICANT CHANGE UP (ref 130–400)
POTASSIUM SERPL-MCNC: 4.2 MMOL/L — SIGNIFICANT CHANGE UP (ref 3.5–5)
POTASSIUM SERPL-SCNC: 4.2 MMOL/L — SIGNIFICANT CHANGE UP (ref 3.5–5)
PROCALCITONIN SERPL-MCNC: 0.09 NG/ML — SIGNIFICANT CHANGE UP (ref 0.02–0.1)
PROT SERPL-MCNC: 6.1 G/DL — SIGNIFICANT CHANGE UP (ref 6–8)
RAPID RVP RESULT: DETECTED
RBC # BLD: 4.91 M/UL — SIGNIFICANT CHANGE UP (ref 4.7–6.1)
RBC # FLD: 15.8 % — HIGH (ref 11.5–14.5)
SARS-COV-2 RNA SPEC QL NAA+PROBE: DETECTED
SODIUM SERPL-SCNC: 137 MMOL/L — SIGNIFICANT CHANGE UP (ref 135–146)
WBC # BLD: 9.42 K/UL — SIGNIFICANT CHANGE UP (ref 4.8–10.8)
WBC # FLD AUTO: 9.42 K/UL — SIGNIFICANT CHANGE UP (ref 4.8–10.8)

## 2020-11-05 PROCEDURE — 71045 X-RAY EXAM CHEST 1 VIEW: CPT | Mod: 26

## 2020-11-05 PROCEDURE — 71275 CT ANGIOGRAPHY CHEST: CPT | Mod: 26

## 2020-11-05 RX ORDER — DEXAMETHASONE 0.5 MG/5ML
6 ELIXIR ORAL DAILY
Refills: 0 | Status: DISCONTINUED | OUTPATIENT
Start: 2020-11-05 | End: 2020-11-10

## 2020-11-05 RX ORDER — LISINOPRIL 2.5 MG/1
40 TABLET ORAL DAILY
Refills: 0 | Status: DISCONTINUED | OUTPATIENT
Start: 2020-11-05 | End: 2020-11-09

## 2020-11-05 RX ORDER — GABAPENTIN 400 MG/1
300 CAPSULE ORAL THREE TIMES A DAY
Refills: 0 | Status: DISCONTINUED | OUTPATIENT
Start: 2020-11-05 | End: 2020-11-06

## 2020-11-05 RX ORDER — INFLUENZA VIRUS VACCINE 15; 15; 15; 15 UG/.5ML; UG/.5ML; UG/.5ML; UG/.5ML
0.5 SUSPENSION INTRAMUSCULAR ONCE
Refills: 0 | Status: COMPLETED | OUTPATIENT
Start: 2020-11-05 | End: 2020-11-20

## 2020-11-05 RX ORDER — REMDESIVIR 5 MG/ML
100 INJECTION INTRAVENOUS EVERY 24 HOURS
Refills: 0 | Status: COMPLETED | OUTPATIENT
Start: 2020-11-06 | End: 2020-11-09

## 2020-11-05 RX ORDER — REMDESIVIR 5 MG/ML
INJECTION INTRAVENOUS
Refills: 0 | Status: COMPLETED | OUTPATIENT
Start: 2020-11-05 | End: 2020-11-09

## 2020-11-05 RX ORDER — PANTOPRAZOLE SODIUM 20 MG/1
40 TABLET, DELAYED RELEASE ORAL
Refills: 0 | Status: DISCONTINUED | OUTPATIENT
Start: 2020-11-05 | End: 2020-11-21

## 2020-11-05 RX ORDER — ENOXAPARIN SODIUM 100 MG/ML
40 INJECTION SUBCUTANEOUS DAILY
Refills: 0 | Status: DISCONTINUED | OUTPATIENT
Start: 2020-11-05 | End: 2020-11-09

## 2020-11-05 RX ORDER — REMDESIVIR 5 MG/ML
200 INJECTION INTRAVENOUS EVERY 24 HOURS
Refills: 0 | Status: COMPLETED | OUTPATIENT
Start: 2020-11-05 | End: 2020-11-05

## 2020-11-05 RX ADMIN — ENOXAPARIN SODIUM 40 MILLIGRAM(S): 100 INJECTION SUBCUTANEOUS at 12:06

## 2020-11-05 RX ADMIN — REMDESIVIR 500 MILLIGRAM(S): 5 INJECTION INTRAVENOUS at 12:06

## 2020-11-05 RX ADMIN — Medication 6 MILLIGRAM(S): at 06:37

## 2020-11-05 RX ADMIN — PANTOPRAZOLE SODIUM 40 MILLIGRAM(S): 20 TABLET, DELAYED RELEASE ORAL at 06:35

## 2020-11-05 RX ADMIN — GABAPENTIN 300 MILLIGRAM(S): 400 CAPSULE ORAL at 06:35

## 2020-11-05 RX ADMIN — Medication 1200 MILLIGRAM(S): at 17:20

## 2020-11-05 RX ADMIN — Medication 1200 MILLIGRAM(S): at 06:36

## 2020-11-05 RX ADMIN — LISINOPRIL 40 MILLIGRAM(S): 2.5 TABLET ORAL at 06:35

## 2020-11-05 NOTE — H&P ADULT - ASSESSMENT
#COVID-19 Pneumonia   - Currently 97% on 4L NC  - Monitor O2 Sat. Keep above 94%  - CT Angio Chest PE Protocol w/ IV Cont was performed which showed worsening scattered patchy bilateral ground glass opacities, peripherally dominant, compatible with infectious/inflammatory etiology. No pulmonary embolus.  - Follow inflammatory markers (ESR, CRP, D-dimer, ferritin, LDH)          #Misc  - DVT Prophylaxis: Lovenox  - Diet: DASH/TLC, Carb consistent   - GI Prophylaxis: Pantoprazole   - Activity: AAT  - Code Status: Full Code    Dispo: Still acute   #Suspected COVID-19 Pneumonia   - Currently 97% on 4L NC  - Monitor O2 Sat. Keep above 94%  - CT Angio Chest PE Protocol w/ IV Cont was performed which showed worsening scattered patchy bilateral ground glass opacities, peripherally dominant, compatible with infectious/inflammatory etiology. No pulmonary embolus.  - Follow inflammatory markers (ESR, CRP, D-dimer, ferritin, LDH)  - Will start Dexamethasone 6mg PO QD  - Follow ID consult for possible Remdesivir and plasma           #Misc  - DVT Prophylaxis: Lovenox  - Diet: DASH/TLC, Carb consistent   - GI Prophylaxis: Pantoprazole   - Activity: AAT  - Code Status: Full Code    Dispo: Still acute 58 YO M with PMHx of GIST (s/p resection), DM type II (on metformin), RA (on prednisone), HTN, HLD, herniated disc, presented to the ED with c/c of cough and SOB x 3 days.    #Suspected COVID-19 Pneumonia   - Currently 97% on 4L NC  - Follow COVID-19 PCR  - Monitor O2 Sat. Keep above 94%  - CT Angio Chest PE Protocol w/ IV Cont was performed which showed worsening scattered patchy bilateral ground glass opacities, peripherally dominant, compatible with infectious/inflammatory etiology. No pulmonary embolus.  - Follow inflammatory markers (ESR, CRP, D-dimer, ferritin, LDH)  - Will start Dexamethasone 6mg PO QD  - Follow ID consult for possible Remdesivir and plasma     #RA  - Will hold prednisone for now since patient on dexamethasone     #HTN  - Continue Lisinopril 40 mg PO QD    #DM type II  - On metformin at home. Will hold for now  - Monitor FS. Start insulin if FS > 180. Keep between 140 - 180    #Misc  - DVT Prophylaxis: Lovenox  - Diet: DASH/TLC, Carb consistent   - GI Prophylaxis: Pantoprazole   - Activity: AAT  - Code Status: Full Code    Dispo: Still acute

## 2020-11-05 NOTE — ED PROVIDER NOTE - CARE PLAN
Principal Discharge DX:	COVID-19   Principal Discharge DX:	COVID-19  Secondary Diagnosis:	Dyspnea due to COVID-19

## 2020-11-05 NOTE — H&P ADULT - HISTORY OF PRESENT ILLNESS
58 YO M with PMHx of DM type II (on metformin), RA (on prednisone), HTN presented to the ED with c/c of cough and SOB x 3 days. Patient reports that his daughter tested positive for COVID-19 10 days ago. He started experiencing SOB 3 days ago and got tested. Patient tested positive for COVID-19 and isolated himself at home. Today his SOB got worse so he decided to come to the ED  Patient denies smoking, ETOH use, drug use, fevers, chills, headache, dizziness, chest pain, palpitations, abdominal pain. N/V/D/C, anosmia.  In the ED vitals were sig for SpO2 of 93% on RA and HR of 119 bpm. Patient was placed on 4L NC.  D-dimer was sightly elevated at 287. CT Angio Chest PE Protocol w/ IV Cont was performed which showed Worsening scattered patchy bilateral groundglass opacities, peripherally dominant, compatible with infectious/inflammatory etiology. No pulmonary embolus.       58 YO M with PMHx of GIST (s/p resection), DM type II (on metformin), RA (on prednisone), HTN, HLD, herniated disc, presented to the ED with c/c of cough and SOB x 3 days. Patient reports that his daughter tested positive for COVID-19 10 days ago. He started experiencing SOB 3 days ago and got tested. Patient tested positive for COVID-19 and isolated himself at home. Today his SOB got worse so he decided to come to the ED  Patient denies smoking, ETOH use, drug use, fevers, chills, headache, dizziness, chest pain, palpitations, abdominal pain. N/V/D/C, anosmia.  In the ED vitals were sig for SpO2 of 93% on RA and HR of 119 bpm. Patient was placed on 4L NC.  D-dimer was sightly elevated at 287. CT Angio Chest PE Protocol w/ IV Cont was performed which showed Worsening scattered patchy bilateral groundglass opacities, peripherally dominant, compatible with infectious/inflammatory etiology. No pulmonary embolus.       58 YO M with PMHx of GIST (s/p resection), DM type II (on metformin), RA (on prednisone), HTN, HLD, herniated disc, presented to the ED with c/c of cough and SOB x 3 days. Patient reports that his daughter tested positive for COVID-19 10 days ago. He started experiencing SOB 3 days ago and isolated himself at home. Today his SOB got worse so he decided to come to the ED. Patient also admits to loss of sense of smell and taste   Patient denies smoking, ETOH use, drug use, fevers, chills, headache, dizziness, chest pain, palpitations, abdominal pain. N/V/D/C.  In the ED vitals were sig for SpO2 of 93% on RA and HR of 119 bpm. Patient was placed on 4L NC.  D-dimer was sightly elevated at 287. CT Angio Chest PE Protocol w/ IV Cont was performed which showed Worsening scattered patchy bilateral ground glass opacities, peripherally dominant, compatible with infectious/inflammatory etiology. No pulmonary embolus.

## 2020-11-05 NOTE — H&P ADULT - NSHPPHYSICALEXAM_GEN_ALL_CORE
CONSTITUTIONAL: No acute distress, well-developed, well-groomed, AAOx3  HEAD: Atraumatic, normocephalic  EYES: EOM intact, PERRLA, conjunctiva and sclera clear  ENT: Supple, no masses, no thyromegaly, no bruits, no JVD; moist mucous membranes  PULMONARY: Clear to auscultation bilaterally; no wheezes, rales, or rhonchi  CARDIOVASCULAR: Regular rate and rhythm; no murmurs, rubs, or gallops  GASTROINTESTINAL: Soft, non-tender, non-distended; bowel sounds present  MUSCULOSKELETAL: 2+ peripheral pulses; no clubbing, no cyanosis, no edema  NEUROLOGY: non-focal  SKIN: No rashes or lesions; warm and dry CONSTITUTIONAL: No acute distress, well-developed, well-groomed, AAOx3  HEAD: Atraumatic, normocephalic  EYES: EOM intact, PERRLA, conjunctiva and sclera clear  ENT: Supple, no masses, no thyromegaly, no bruits, no JVD; moist mucous membranes  PULMONARY: Crackles b/l   CARDIOVASCULAR: Regular rate and rhythm; no murmurs, rubs, or gallops  GASTROINTESTINAL: Soft, non-tender, non-distended; bowel sounds present  MUSCULOSKELETAL: 2+ peripheral pulses; no clubbing, no cyanosis, no edema  NEUROLOGY: non-focal  SKIN: No rashes or lesions; warm and dry

## 2020-11-05 NOTE — ED PROVIDER NOTE - PHYSICAL EXAMINATION
CONSTITUTIONAL: Well-appearing; well-nourished; in no apparent distress.   CARDIOVASCULAR: Normal S1, S2; no murmurs, rubs, or gallops.   RESPIRATORY: Normal chest excursion with respiration; breath sounds clear and equal bilaterally; no wheezes, rhonchi, or rales.  GI/: non-distended; non-tender; no palpable organomegaly.   NEURO/PSYCH: A & O x 4; grossly unremarkable. mood and manner are appropriate. Grooming and personal hygiene are appropriate. No apparent thoughts of harm to self or others. CONSTITUTIONAL: Well-appearing; well-nourished; in no apparent distress.   CARDIOVASCULAR: Normal S1, S2; no murmurs, rubs, or gallops.   RESPIRATORY: decreased air entry b/l, crackles. normal chest excursion with respiration  GI/: non-distended; non-tender; no palpable organomegaly.   NEURO/PSYCH: A & O x 4; grossly unremarkable. mood and manner are appropriate. Grooming and personal hygiene are appropriate. No apparent thoughts of harm to self or others.

## 2020-11-05 NOTE — ED ADULT NURSE REASSESSMENT NOTE - NS ED NURSE REASSESS COMMENT FT1
handoff to day shift nurse Jocelyn ,pt AO x  4 , no SOB , denies any pain , awaiting  fror transport for medicine admission , report was scalled already

## 2020-11-05 NOTE — H&P ADULT - ATTENDING COMMENTS
60 YO M with PMHx of GIST (s/p resection), DM type II (on metformin), RA (on prednisone), HTN, HLD, herniated disc, admitted with Covid pneumonia    Pt seen and examined- comfortable on O2 VNC    Spoke with RN    Chart revewed- agree with above    steroids    O2     fluids    ID / pulm eval to consider remdesivir and plasma    DVT proph/ antocoagulation    monitor inflammatory markers

## 2020-11-05 NOTE — ED PROVIDER NOTE - OBJECTIVE STATEMENT
pt with pmhx DM, RA, HTN on metformin and daily prednisone presents to ED c/o cough and SOB for 3 days. daughter tested positive for covid19 10 days ago. denies smoking/etoh/drug use. took tylenol pta. Denies fever/chill/HA/dizziness/chest pain/palpitation/abd pain/n/v/d/ black stool/bloody stool/urinary sxs

## 2020-11-05 NOTE — H&P ADULT - NSHPREVIEWOFSYSTEMS_GEN_ALL_CORE
CONSTITUTIONAL: No weakness, fevers or chills; No headaches  EYES: No visual changes, eye pain, or discharge  ENT: No vertigo; No ear pain or change in hearing; No sore throat or difficulty swallowing  NECK: No pain or stiffness  RESPIRATORY: No cough, wheezing, or hemoptysis; No shortness of breath  CARDIOVASCULAR: No chest pain or palpitations  GASTROINTESTINAL: No abdominal or epigastric pain; No nausea, vomiting, or hematemesis; No diarrhea or constipation; No melena or hematochezia  GENITOURINARY: No dysuria, frequency or hematuria  MUSCULOSKELETAL: No joint pain, no muscle pain, no weakness  NEUROLOGICAL: No numbness or weakness  SKIN: No itching or rashes CONSTITUTIONAL: No weakness, fevers or chills; No headaches  EYES: No visual changes, eye pain, or discharge  ENT: No vertigo; No ear pain or change in hearing; No sore throat or difficulty swallowing  NECK: No pain or stiffness  RESPIRATORY: Cough, Shortness of breath  CARDIOVASCULAR: No chest pain or palpitations  GASTROINTESTINAL: No abdominal or epigastric pain; No nausea, vomiting, or hematemesis; No diarrhea or constipation; No melena or hematochezia  GENITOURINARY: No dysuria, frequency or hematuria  MUSCULOSKELETAL: No joint pain, no muscle pain, no weakness  NEUROLOGICAL: No numbness or weakness  SKIN: No itching or rashes

## 2020-11-05 NOTE — CHART NOTE - NSCHARTNOTEFT_GEN_A_CORE
Patient sen and examined.   Resting comfortably on 2L nasal canula  Reports some improvement in his breathing. reports some difficulty expectorating phlegm     Focused Physical exam:   Heart: no murmurs appreciated  Lungs:  air entry bilaterally, no wheezing, no crackles  Abdomen: soft, non-tender, non-distended  LE: no edema  Skin: no rashes or bites                          14.3   9.42  )-----------( 293      ( 2020 04:56 )             44.2     11-    137  |  101  |  11  ----------------------------<  136<H>  4.2   |  25  |  0.8    Ca    8.9      2020 04:56  Phos  4.1     11-  Mg     2.0         TPro  6.1  /  Alb  3.5  /  TBili  0.4  /  DBili  x   /  AST  19  /  ALT  38  /  AlkPhos  99      ASSESSMENT + PLAN     58 YO M with PMHx of GIST (s/p resection), DM type II (on metformin), RA (on prednisone), HTN, HLD, herniated disc, presented to the ED with c/c of cough and SOB x 3 days.  CT Angio Chest PE Protocol w/ IV Cont was performed which showed worsening scattered patchy bilateral ground glass opacities, peripherally dominant, compatible with infectious/inflammatory etiology. No pulmonary embolus    #COVID-19 Pneumonia   Moderate disease, no signs of CRS  LDH elevated=280  - Currently 96% on 2L NC  - Monitor O2 Sat. Keep above 94%  - follow up inflammatory markers (procal, crp, ferritin)   - will start remdesivir. Monitor GFR, if <30 then stop remdesivir. Monitor ALT, if >5xULN then dc  - on dexamethasone (day 1)  - Low suspicion for concomitant bacterial PNA so will hold AN-B    #RA  - Will hold prednisone for now since patient on dexamethasone     #HTN  - Continue Lisinopril 40 mg PO QD    #DM type II  - On metformin at home. Will hold for now  - Monitor FS. Start insulin if FS > 180. Keep between 140 - 180    #Misc  - DVT Prophylaxis: Lovenox  - Diet: DASH/TLC, Carb consistent   - GI Prophylaxis: Pantoprazole   - Activity: AAT  - Code Status: Full Code

## 2020-11-05 NOTE — ED PROVIDER NOTE - ATTENDING CONTRIBUTION TO CARE
58 yo male PMH RA, HTN, HLD, lumbago, positive family contact for COVID  c/o worsening cough and SOB for 3 days.  No hemoptysis, N.V/D/abdominal pain. no leg pain or swelling.  Well-appearing well-nourished, NAD, head AT/NC, PERRL, pink conjunctivae, nml phonation supple neck without midline spine ttp, no acute respiratory distress, b/l dry inspiratory crackles  b/l, equal air entry, speaking full sentences,  RRR, well-perfused extremities, distal pulses intact, abdomen soft, NT/ND, BS present in all quadrants, no midline spine or CVA ttp, no leg edema or unilateral calf swelling, A&Ox3, no focal neuro deficits, nml mood and affect.  Imp; most likely COVID.  Will check labs, CXR, give supplemental oxygen, admit.

## 2020-11-05 NOTE — ED PROVIDER NOTE - CLINICAL SUMMARY MEDICAL DECISION MAKING FREE TEXT BOX
Patient with SOB due to COVID, CT chest negative for PE, pt comfortable on supple mental oxygen at 2 L, admit.

## 2020-11-05 NOTE — H&P ADULT - NSHPLABSRESULTS_GEN_ALL_CORE
VITAL SIGNS: Last 24 Hours  T(C): 37.2 (04 Nov 2020 21:36), Max: 37.2 (04 Nov 2020 21:36)  T(F): 98.9 (04 Nov 2020 21:36), Max: 98.9 (04 Nov 2020 21:36)  HR: 108 (04 Nov 2020 21:36) (108 - 119)  BP: 118/89 (04 Nov 2020 21:36) (118/89 - 124/92)  BP(mean): --  RR: 22 (04 Nov 2020 21:36) (22 - 24)  SpO2: 96% (04 Nov 2020 21:36) (93% - 96%)    LABS:                        14.1   11.52 )-----------( 294      ( 04 Nov 2020 22:35 )             43.9     11-04    136  |  99  |  13  ----------------------------<  200<H>  4.3   |  26  |  0.8    Ca    9.0      04 Nov 2020 22:35    TPro  6.4  /  Alb  3.6  /  TBili  0.3  /  DBili  x   /  AST  23  /  ALT  41  /  AlkPhos  99  11-04          Troponin T, Serum: <0.01 ng/mL (11-04-20 @ 22:35)      CARDIAC MARKERS ( 04 Nov 2020 22:35 )  x     / <0.01 ng/mL / x     / x     / x          RADIOLOGY: VITAL SIGNS: Last 24 Hours  T(C): 37.2 (04 Nov 2020 21:36), Max: 37.2 (04 Nov 2020 21:36)  T(F): 98.9 (04 Nov 2020 21:36), Max: 98.9 (04 Nov 2020 21:36)  HR: 108 (04 Nov 2020 21:36) (108 - 119)  BP: 118/89 (04 Nov 2020 21:36) (118/89 - 124/92)  BP(mean): --  RR: 22 (04 Nov 2020 21:36) (22 - 24)  SpO2: 96% (04 Nov 2020 21:36) (93% - 96%)    LABS:                        14.1   11.52 )-----------( 294      ( 04 Nov 2020 22:35 )             43.9     11-04    136  |  99  |  13  ----------------------------<  200<H>  4.3   |  26  |  0.8    Ca    9.0      04 Nov 2020 22:35    TPro  6.4  /  Alb  3.6  /  TBili  0.3  /  DBili  x   /  AST  23  /  ALT  41  /  AlkPhos  99  11-04          Troponin T, Serum: <0.01 ng/mL (11-04-20 @ 22:35)      CARDIAC MARKERS ( 04 Nov 2020 22:35 )  x     / <0.01 ng/mL / x     / x     / x          RADIOLOGY:  < from: CT Angio Chest PE Protocol w/ IV Cont (11.05.20 @ 01:26) >  1.  Worsening scattered patchy bilateral groundglass opacities, peripherally dominant, compatible with infectious/inflammatory etiology. Please correlate with viral pneumonia.  2.  No pulmonary embolus.  < end of copied text >

## 2020-11-05 NOTE — ED PROVIDER NOTE - NS ED ROS FT
Constitutional: no fever, chills, no recent weight loss, change in appetite   Eyes: no redness/discharge/pain/vision changes  ENT: no rhinorrhea/ear pain/sore throat  Cardiac: No chest pain, edema.  Respiratory: No respiratory distress  GI: No nausea, vomiting, diarrhea or abdominal pain.  : No dysuria, frequency, urgency or hematuria  MS: no pain to back or extremities, no loss of ROM, no weakness  Neuro: No headache or weakness. No LOC.  Skin: No skin rash.  Except as documented in the HPI, all other systems are negative.

## 2020-11-05 NOTE — ED ADULT NURSE REASSESSMENT NOTE - NS ED NURSE REASSESS COMMENT FT1
Assumed care from previous nurse Yohana c/o increased SOB and cough x 3 days . Pt placed on COVID isolation , pt for medicine admit , awaiting for bed , no respiratory distress noted , no accessory muscles used , no grimaced face noted

## 2020-11-06 LAB
ALBUMIN SERPL ELPH-MCNC: 3.3 G/DL — LOW (ref 3.5–5.2)
ALP SERPL-CCNC: 96 U/L — SIGNIFICANT CHANGE UP (ref 30–115)
ALT FLD-CCNC: 36 U/L — SIGNIFICANT CHANGE UP (ref 0–41)
ANION GAP SERPL CALC-SCNC: 12 MMOL/L — SIGNIFICANT CHANGE UP (ref 7–14)
AST SERPL-CCNC: 18 U/L — SIGNIFICANT CHANGE UP (ref 0–41)
BILIRUB SERPL-MCNC: 0.3 MG/DL — SIGNIFICANT CHANGE UP (ref 0.2–1.2)
BLD GP AB SCN SERPL QL: SIGNIFICANT CHANGE UP
BUN SERPL-MCNC: 17 MG/DL — SIGNIFICANT CHANGE UP (ref 10–20)
CALCIUM SERPL-MCNC: 8.9 MG/DL — SIGNIFICANT CHANGE UP (ref 8.5–10.1)
CHLORIDE SERPL-SCNC: 102 MMOL/L — SIGNIFICANT CHANGE UP (ref 98–110)
CO2 SERPL-SCNC: 26 MMOL/L — SIGNIFICANT CHANGE UP (ref 17–32)
CREAT SERPL-MCNC: 0.8 MG/DL — SIGNIFICANT CHANGE UP (ref 0.7–1.5)
D DIMER BLD IA.RAPID-MCNC: 317 NG/ML DDU — HIGH (ref 0–230)
GLUCOSE BLDC GLUCOMTR-MCNC: 140 MG/DL — HIGH (ref 70–99)
GLUCOSE BLDC GLUCOMTR-MCNC: 194 MG/DL — HIGH (ref 70–99)
GLUCOSE BLDC GLUCOMTR-MCNC: 238 MG/DL — HIGH (ref 70–99)
GLUCOSE SERPL-MCNC: 130 MG/DL — HIGH (ref 70–99)
HCT VFR BLD CALC: 43 % — SIGNIFICANT CHANGE UP (ref 42–52)
HGB BLD-MCNC: 14.2 G/DL — SIGNIFICANT CHANGE UP (ref 14–18)
MCHC RBC-ENTMCNC: 29.6 PG — SIGNIFICANT CHANGE UP (ref 27–31)
MCHC RBC-ENTMCNC: 33 G/DL — SIGNIFICANT CHANGE UP (ref 32–37)
MCV RBC AUTO: 89.6 FL — SIGNIFICANT CHANGE UP (ref 80–94)
NRBC # BLD: 0 /100 WBCS — SIGNIFICANT CHANGE UP (ref 0–0)
PLATELET # BLD AUTO: 329 K/UL — SIGNIFICANT CHANGE UP (ref 130–400)
POTASSIUM SERPL-MCNC: 4.5 MMOL/L — SIGNIFICANT CHANGE UP (ref 3.5–5)
POTASSIUM SERPL-SCNC: 4.5 MMOL/L — SIGNIFICANT CHANGE UP (ref 3.5–5)
PROT SERPL-MCNC: 6 G/DL — SIGNIFICANT CHANGE UP (ref 6–8)
RBC # BLD: 4.8 M/UL — SIGNIFICANT CHANGE UP (ref 4.7–6.1)
RBC # FLD: 15.7 % — HIGH (ref 11.5–14.5)
SODIUM SERPL-SCNC: 140 MMOL/L — SIGNIFICANT CHANGE UP (ref 135–146)
WBC # BLD: 14.38 K/UL — HIGH (ref 4.8–10.8)
WBC # FLD AUTO: 14.38 K/UL — HIGH (ref 4.8–10.8)

## 2020-11-06 RX ORDER — SODIUM CHLORIDE 9 MG/ML
1000 INJECTION, SOLUTION INTRAVENOUS
Refills: 0 | Status: DISCONTINUED | OUTPATIENT
Start: 2020-11-06 | End: 2020-11-21

## 2020-11-06 RX ORDER — DEXTROSE 50 % IN WATER 50 %
25 SYRINGE (ML) INTRAVENOUS ONCE
Refills: 0 | Status: DISCONTINUED | OUTPATIENT
Start: 2020-11-06 | End: 2020-11-21

## 2020-11-06 RX ORDER — GUAIFENESIN/DEXTROMETHORPHAN 600MG-30MG
10 TABLET, EXTENDED RELEASE 12 HR ORAL EVERY 6 HOURS
Refills: 0 | Status: DISCONTINUED | OUTPATIENT
Start: 2020-11-06 | End: 2020-11-21

## 2020-11-06 RX ORDER — DEXTROSE 50 % IN WATER 50 %
15 SYRINGE (ML) INTRAVENOUS ONCE
Refills: 0 | Status: DISCONTINUED | OUTPATIENT
Start: 2020-11-06 | End: 2020-11-21

## 2020-11-06 RX ORDER — INSULIN LISPRO 100/ML
5 VIAL (ML) SUBCUTANEOUS
Refills: 0 | Status: DISCONTINUED | OUTPATIENT
Start: 2020-11-06 | End: 2020-11-19

## 2020-11-06 RX ORDER — INSULIN GLARGINE 100 [IU]/ML
16 INJECTION, SOLUTION SUBCUTANEOUS AT BEDTIME
Refills: 0 | Status: DISCONTINUED | OUTPATIENT
Start: 2020-11-06 | End: 2020-11-19

## 2020-11-06 RX ORDER — INSULIN LISPRO 100/ML
VIAL (ML) SUBCUTANEOUS
Refills: 0 | Status: DISCONTINUED | OUTPATIENT
Start: 2020-11-06 | End: 2020-11-21

## 2020-11-06 RX ORDER — DEXTROSE 50 % IN WATER 50 %
12.5 SYRINGE (ML) INTRAVENOUS ONCE
Refills: 0 | Status: DISCONTINUED | OUTPATIENT
Start: 2020-11-06 | End: 2020-11-21

## 2020-11-06 RX ORDER — GLUCAGON INJECTION, SOLUTION 0.5 MG/.1ML
1 INJECTION, SOLUTION SUBCUTANEOUS ONCE
Refills: 0 | Status: DISCONTINUED | OUTPATIENT
Start: 2020-11-06 | End: 2020-11-21

## 2020-11-06 RX ADMIN — Medication 2: at 11:50

## 2020-11-06 RX ADMIN — REMDESIVIR 500 MILLIGRAM(S): 5 INJECTION INTRAVENOUS at 11:50

## 2020-11-06 RX ADMIN — Medication 1200 MILLIGRAM(S): at 17:56

## 2020-11-06 RX ADMIN — Medication 1200 MILLIGRAM(S): at 05:26

## 2020-11-06 RX ADMIN — ENOXAPARIN SODIUM 40 MILLIGRAM(S): 100 INJECTION SUBCUTANEOUS at 11:50

## 2020-11-06 RX ADMIN — Medication 5 UNIT(S): at 11:50

## 2020-11-06 RX ADMIN — INSULIN GLARGINE 16 UNIT(S): 100 INJECTION, SOLUTION SUBCUTANEOUS at 21:53

## 2020-11-06 RX ADMIN — LISINOPRIL 40 MILLIGRAM(S): 2.5 TABLET ORAL at 05:26

## 2020-11-06 RX ADMIN — Medication 6 MILLIGRAM(S): at 05:25

## 2020-11-06 RX ADMIN — PANTOPRAZOLE SODIUM 40 MILLIGRAM(S): 20 TABLET, DELAYED RELEASE ORAL at 05:26

## 2020-11-06 NOTE — CONSULT NOTE ADULT - SUBJECTIVE AND OBJECTIVE BOX
JOSÉ LUIS AREVALO  59y, Male  Allergy: penicillin (Unknown)  shellfish (Pruritus)      All historical available data reviewed.    HPI:  60 YO M with PMHx of GIST (s/p resection), DM type II (on metformin), RA (on prednisone), HTN, HLD, herniated disc, presented to the ED with c/c of cough and SOB x 3 days. Patient reports that his daughter tested positive for COVID-19 10 days ago. He started experiencing SOB 3 days ago and isolated himself at home. Today his SOB got worse so he decided to come to the ED. Patient also admits to loss of sense of smell and taste   Patient denies smoking, ETOH use, drug use, fevers, chills, headache, dizziness, chest pain, palpitations, abdominal pain. N/V/D/C.  In the ED vitals were sig for SpO2 of 93% on RA and HR of 119 bpm. Patient was placed on 4L NC.  D-dimer was sightly elevated at 287. CT Angio Chest PE Protocol w/ IV Cont was performed which showed Worsening scattered patchy bilateral ground glass opacities, peripherally dominant, compatible with infectious/inflammatory etiology. No pulmonary embolus.       (05 Nov 2020 02:00)    ID called for COVID-19      FAMILY HISTORY:  Known health problems: none      PAST MEDICAL & SURGICAL HISTORY:  Hyperlipidemia    Lumbago    Hypertension    H/O lymph node biopsy  Neck by Dr Case          VITALS:  T(F): 97.8, Max: 98.8 (11-06-20 @ 05:00)  HR: 82  BP: 117/68  RR: 20Vital Signs Last 24 Hrs  T(C): 36.6 (06 Nov 2020 08:44), Max: 37.1 (06 Nov 2020 05:00)  T(F): 97.8 (06 Nov 2020 08:44), Max: 98.8 (06 Nov 2020 05:00)  HR: 82 (06 Nov 2020 08:44) (76 - 90)  BP: 117/68 (06 Nov 2020 08:44) (113/74 - 145/76)  BP(mean): --  RR: 20 (06 Nov 2020 08:44) (18 - 20)  SpO2: 95% (06 Nov 2020 08:44) (83% - 96%)    TESTS & MEASUREMENTS:                        14.2   14.38 )-----------( 329      ( 06 Nov 2020 07:27 )             43.0     11-06    140  |  102  |  17  ----------------------------<  130<H>  4.5   |  26  |  0.8    Ca    8.9      06 Nov 2020 07:27  Phos  4.1     11-05  Mg     2.0     11-05    TPro  6.0  /  Alb  3.3<L>  /  TBili  0.3  /  DBili  x   /  AST  18  /  ALT  36  /  AlkPhos  96  11-06    LIVER FUNCTIONS - ( 06 Nov 2020 07:27 )  Alb: 3.3 g/dL / Pro: 6.0 g/dL / ALK PHOS: 96 U/L / ALT: 36 U/L / AST: 18 U/L / GGT: x                   RADIOLOGY & ADDITIONAL TESTS:  Personal review of radiological diagnostics performed  Echo and EKG results noted when applicable.     MEDICATIONS:  dexAMETHasone     Tablet 6 milliGRAM(s) Oral daily  enoxaparin Injectable 40 milliGRAM(s) SubCutaneous daily  gabapentin 300 milliGRAM(s) Oral three times a day  guaiFENesin ER 1200 milliGRAM(s) Oral every 12 hours  influenza   Vaccine 0.5 milliLiter(s) IntraMuscular once  lisinopril 40 milliGRAM(s) Oral daily  pantoprazole    Tablet 40 milliGRAM(s) Oral before breakfast  remdesivir  IVPB   IV Intermittent   remdesivir  IVPB 100 milliGRAM(s) IV Intermittent every 24 hours      ANTIBIOTICS:  remdesivir  IVPB   IV Intermittent   remdesivir  IVPB 100 milliGRAM(s) IV Intermittent every 24 hours

## 2020-11-06 NOTE — CHART NOTE - NSCHARTNOTEFT_GEN_A_CORE
ICU Transfer Note    Transfer from: 3A    Transfer to: (  ) Medicine    (  ) Telemetry     (  ) RCU       ( X ) CEU    (  ) VENT                        (  ) Palliative    (  ) Stroke Unit    (  ) MICU    (  ) CCU    Signout given to:     ----------------------------------------------------------------------------------------------------------  HPI / ICU COURSE:    HPI:  58 YO M with PMHx of GIST (s/p resection), DM type II (on metformin), RA (on prednisone), HTN, HLD, herniated disc, presented to the ED with c/c of cough and SOB x 3 days. Patient reports that his daughter tested positive for COVID-19 10 days ago. He started experiencing SOB 3 days ago and isolated himself at home. Today his SOB got worse so he decided to come to the ED. Patient also admits to loss of sense of smell and taste   Patient denies smoking, ETOH use, drug use, fevers, chills, headache, dizziness, chest pain, palpitations, abdominal pain. N/V/D/C.  In the ED vitals were sig for SpO2 of 93% on RA and HR of 119 bpm. Patient was placed on 4L NC.  D-dimer was sightly elevated at 287. CT Angio Chest PE Protocol w/ IV Cont was performed which showed Worsening scattered patchy bilateral ground glass opacities, peripherally dominant, compatible with infectious/inflammatory etiology. No pulmonary embolus.          Pressors during ICU course:  Antibiotics:  Lines:  [place]:  --------------------------------------------------------------------------------------------------------  PMH/PSH:  PAST MEDICAL & SURGICAL HISTORY:  Hyperlipidemia    Lumbago    Hypertension    H/O lymph node biopsy  Neck by Dr Case        -------------------------------------------------------------------------------------------------------  PHYSICAL EXAM:  General: NAD  HEENT: Atraumatic  Respiratory: CTAB  Cardiac: RRR  Abdomen: soft, non-tender, non-distended  Extremities: warm and well-perfused  Neuro: A+Ox4. No FND    Vital Signs Last 24 Hrs  T(C): 36.7 (06 Nov 2020 18:25), Max: 37.1 (06 Nov 2020 05:00)  T(F): 98 (06 Nov 2020 18:25), Max: 98.8 (06 Nov 2020 05:00)  HR: 88 (06 Nov 2020 18:25) (73 - 90)  BP: 105/61 (06 Nov 2020 18:25) (105/61 - 145/76)  BP(mean): --  RR: 20 (06 Nov 2020 18:25) (18 - 20)  SpO2: 96% (06 Nov 2020 18:25) (83% - 98%)    I&O's Summary      --------------------------------------------------------------------------------------------------------  LABS:   CARDIAC MARKERS ( 04 Nov 2020 22:35 )  x     / <0.01 ng/mL / x     / x     / x                                  14.2   14.38 )-----------( 329      ( 06 Nov 2020 07:27 )             43.0       11-06    140  |  102  |  17  ----------------------------<  130<H>  4.5   |  26  |  0.8    Ca    8.9      06 Nov 2020 07:27  Phos  4.1     11-05  Mg     2.0     11-05    TPro  6.0  /  Alb  3.3<L>  /  TBili  0.3  /  DBili  x   /  AST  18  /  ALT  36  /  AlkPhos  96  11-06                  Specimen Source:   Method Type:   Gram Stain:   Culture Results:   Bacteria:       -------------------------------------------------------------------------------------------------  RADIOLOGY:      ---------------------------------------------------------------------------------------------------  ASSESSMENT & PLAN:       FOR FOLLOW UP:  [ ]   [ ]   [ ] ICU Transfer Note    Transfer from: 3A    Transfer to: (  ) Medicine    (  ) Telemetry     (  ) RCU       ( X ) CEU    (  ) VENT                        (  ) Palliative    (  ) Stroke Unit    (  ) MICU    (  ) CCU    Signout given to:     ----------------------------------------------------------------------------------------------------------  HPI / ICU COURSE:    HPI:  58 YO M with PMHx of GIST (s/p resection), DM type II (on metformin), RA (on prednisone), HTN, HLD, herniated disc, presented to the ED with c/c of cough and SOB x 3 days. Patient reports that his daughter tested positive for COVID-19 10 days ago. He started experiencing SOB 3 days ago and isolated himself at home. Today his SOB got worse so he decided to come to the ED. Patient also admits to loss of sense of smell and taste   Patient denies smoking, ETOH use, drug use, fevers, chills, headache, dizziness, chest pain, palpitations, abdominal pain. N/V/D/C.  In the ED vitals were sig for SpO2 of 93% on RA and HR of 119 bpm. Patient was placed on 4L NC.  D-dimer was sightly elevated at 287. CT Angio Chest PE Protocol w/ IV Cont was performed which showed Worsening scattered patchy bilateral ground glass opacities, peripherally dominant, compatible with infectious/inflammatory etiology. No pulmonary embolus.    As per ID, patient is in the acute phase and started on redemsevir, dexamethasone, and given 2U of convalescent plasma. Patient developed hypoxia and desaturated to 88% on 8L NC and was placed on nonrebreather 10L. Patient upgraded to CEU.    --------------------------------------------------------------------------------------------------------  PMH/PSH:  PAST MEDICAL & SURGICAL HISTORY:  Hyperlipidemia    Lumbago    Hypertension    H/O lymph node biopsy  Neck by Dr Case        -------------------------------------------------------------------------------------------------------  PHYSICAL EXAM:  General: NAD  HEENT: Atraumatic  Respiratory: CTAB  Cardiac: RRR  Abdomen: soft, non-tender, non-distended  Extremities: warm and well-perfused  Neuro: A+Ox4. No FND    Vital Signs Last 24 Hrs  T(C): 36.7 (06 Nov 2020 18:25), Max: 37.1 (06 Nov 2020 05:00)  T(F): 98 (06 Nov 2020 18:25), Max: 98.8 (06 Nov 2020 05:00)  HR: 88 (06 Nov 2020 18:25) (73 - 90)  BP: 105/61 (06 Nov 2020 18:25) (105/61 - 145/76)  BP(mean): --  RR: 20 (06 Nov 2020 18:25) (18 - 20)  SpO2: 96% (06 Nov 2020 18:25) (83% - 98%)    I&O's Summary      --------------------------------------------------------------------------------------------------------  LABS:   CARDIAC MARKERS ( 04 Nov 2020 22:35 )  x     / <0.01 ng/mL / x     / x     / x                                  14.2   14.38 )-----------( 329      ( 06 Nov 2020 07:27 )             43.0       11-06    140  |  102  |  17  ----------------------------<  130<H>  4.5   |  26  |  0.8    Ca    8.9      06 Nov 2020 07:27  Phos  4.1     11-05  Mg     2.0     11-05    TPro  6.0  /  Alb  3.3<L>  /  TBili  0.3  /  DBili  x   /  AST  18  /  ALT  36  /  AlkPhos  96  11-06                  Specimen Source:   Method Type:   Gram Stain:   Culture Results:   Bacteria:       -------------------------------------------------------------------------------------------------  RADIOLOGY:      ---------------------------------------------------------------------------------------------------  ASSESSMENT & PLAN:     58 YO M with PMHx of GIST (s/p resection), DM type II (on metformin), RA (on prednisone), HTN, HLD, herniated disc, presented to the ED with c/c of cough and SOB x 3 days.    #Suspected COVID-19 Pneumonia   - Follow COVID-19 PCR  - Monitor O2 Sat. Keep above 94%  - CT Angio Chest PE Protocol w/ IV Cont was performed which showed worsening scattered patchy bilateral ground glass opacities, peripherally dominant, compatible with infectious/inflammatory etiology. No pulmonary embolus.  - Follow inflammatory markers (ESR, CRP, D-dimer, ferritin, LDH)  - Will start Dexamethasone 6mg PO Qd, Remdesivir and plasma     #RA  - Will hold prednisone for now since patient on dexamethasone     #HTN  - Continue Lisinopril 40 mg PO QD    #DM type II  - On metformin at home. Will hold for now  - Monitor FS. Start insulin if FS > 180. Keep between 140 - 180    #Misc  - DVT Prophylaxis: Lovenox  - Diet: DASH/TLC, Carb consistent   - GI Prophylaxis: Pantoprazole   - Activity: AAT  - Code Status: Full Code    Dispo: Still acute ICU Transfer Note    Transfer from: 3A    Transfer to: (  ) Medicine    (  ) Telemetry     (  ) RCU       ( X ) CEU    (  ) VENT                        (  ) Palliative    (  ) Stroke Unit    (  ) MICU    (  ) CCU    Signout given to: Dr. Palma    ----------------------------------------------------------------------------------------------------------  HPI / ICU COURSE:    HPI:  60 YO M with PMHx of GIST (s/p resection), DM type II (on metformin), RA (on prednisone), HTN, HLD, herniated disc, presented to the ED with c/c of cough and SOB x 3 days. Patient reports that his daughter tested positive for COVID-19 10 days ago. He started experiencing SOB 3 days ago and isolated himself at home. Today his SOB got worse so he decided to come to the ED. Patient also admits to loss of sense of smell and taste   Patient denies smoking, ETOH use, drug use, fevers, chills, headache, dizziness, chest pain, palpitations, abdominal pain. N/V/D/C.  In the ED vitals were sig for SpO2 of 93% on RA and HR of 119 bpm. Patient was placed on 4L NC.  D-dimer was sightly elevated at 287. CT Angio Chest PE Protocol w/ IV Cont was performed which showed Worsening scattered patchy bilateral ground glass opacities, peripherally dominant, compatible with infectious/inflammatory etiology. No pulmonary embolus.    As per ID, patient is in the acute phase and started on redemsevir, dexamethasone, and given 2U of convalescent plasma. Patient developed hypoxia and desaturated to 88% on 8L NC and was placed on nonrebreather 10L. Patient upgraded to CEU.    --------------------------------------------------------------------------------------------------------  PMH/PSH:  PAST MEDICAL & SURGICAL HISTORY:  Hyperlipidemia    Lumbago    Hypertension    H/O lymph node biopsy  Neck by Dr Case        -------------------------------------------------------------------------------------------------------  PHYSICAL EXAM:  General: NAD  HEENT: Atraumatic  Respiratory: CTAB  Cardiac: RRR  Abdomen: soft, non-tender, non-distended  Extremities: warm and well-perfused  Neuro: A+Ox4. No FND    Vital Signs Last 24 Hrs  T(C): 36.7 (06 Nov 2020 18:25), Max: 37.1 (06 Nov 2020 05:00)  T(F): 98 (06 Nov 2020 18:25), Max: 98.8 (06 Nov 2020 05:00)  HR: 88 (06 Nov 2020 18:25) (73 - 90)  BP: 105/61 (06 Nov 2020 18:25) (105/61 - 145/76)  BP(mean): --  RR: 20 (06 Nov 2020 18:25) (18 - 20)  SpO2: 96% (06 Nov 2020 18:25) (83% - 98%)    I&O's Summary      --------------------------------------------------------------------------------------------------------  LABS:   CARDIAC MARKERS ( 04 Nov 2020 22:35 )  x     / <0.01 ng/mL / x     / x     / x                                  14.2   14.38 )-----------( 329      ( 06 Nov 2020 07:27 )             43.0       11-06    140  |  102  |  17  ----------------------------<  130<H>  4.5   |  26  |  0.8    Ca    8.9      06 Nov 2020 07:27  Phos  4.1     11-05  Mg     2.0     11-05    TPro  6.0  /  Alb  3.3<L>  /  TBili  0.3  /  DBili  x   /  AST  18  /  ALT  36  /  AlkPhos  96  11-06                  Specimen Source:   Method Type:   Gram Stain:   Culture Results:   Bacteria:       -------------------------------------------------------------------------------------------------  RADIOLOGY:      ---------------------------------------------------------------------------------------------------  ASSESSMENT & PLAN:     60 YO M with PMHx of GIST (s/p resection), DM type II (on metformin), RA (on prednisone), HTN, HLD, herniated disc, presented to the ED with c/c of cough and SOB x 3 days.    #Suspected COVID-19 Pneumonia   - Follow COVID-19 PCR  - Monitor O2 Sat. Keep above 94%  - CT Angio Chest PE Protocol w/ IV Cont was performed which showed worsening scattered patchy bilateral ground glass opacities, peripherally dominant, compatible with infectious/inflammatory etiology. No pulmonary embolus.  - Follow inflammatory markers (ESR, CRP, D-dimer, ferritin, LDH)  - Will start Dexamethasone 6mg PO Qd, Remdesivir and plasma     #RA  - Will hold prednisone for now since patient on dexamethasone     #HTN  - Continue Lisinopril 40 mg PO QD    #DM type II  - On metformin at home. Will hold for now  - Monitor FS. Start insulin if FS > 180. Keep between 140 - 180    #Misc  - DVT Prophylaxis: Lovenox  - Diet: DASH/TLC, Carb consistent   - GI Prophylaxis: Pantoprazole   - Activity: AAT  - Code Status: Full Code    Dispo: Still acute

## 2020-11-06 NOTE — CONSULT NOTE ADULT - SUBJECTIVE AND OBJECTIVE BOX
Patient is a 59y old  Male who presents with a chief complaint of Suspected COVID-19 Pneumonia (06 Nov 2020 09:07)      HPI:  58 YO M with PMHx of GIST (s/p resection), DM type II (on metformin), RA (on prednisone), HTN, HLD, herniated disc, presented to the ED with c/c of cough and SOB x 3 days. Patient reports that his daughter tested positive for COVID-19 10 days ago. He started experiencing SOB 3 days ago and isolated himself at home. Today his SOB got worse so he decided to come to the ED. Patient also admits to loss of sense of smell and taste   Patient denies smoking, ETOH use, drug use, fevers, chills, headache, dizziness, chest pain, palpitations, abdominal pain. N/V/D/C.  In the ED vitals were sig for SpO2 of 93% on RA and HR of 119 bpm. Patient was placed on 4L NC.  D-dimer was sightly elevated at 287. CT Angio Chest PE Protocol w/ IV Cont was performed which showed Worsening scattered patchy bilateral ground glass opacities, peripherally dominant, compatible with infectious/inflammatory etiology. No pulmonary embolus.       (05 Nov 2020 02:00)      PAST MEDICAL & SURGICAL HISTORY:  Hyperlipidemia    Lumbago    Hypertension    H/O lymph node biopsy  Neck by Dr Case          FAMILY HISTORY:  Known health problems: none    .  No cardiovascular or pulmonary family history     REVIEW OF SYSTEMS:    All ROS are negative except per HPI     Allergies    penicillin (Unknown)  shellfish (Pruritus)    Intolerances          PHYSICAL EXAM  Vital Signs Last 24 Hrs  T(C): 36.2 (06 Nov 2020 13:09), Max: 37.1 (06 Nov 2020 05:00)  T(F): 97.2 (06 Nov 2020 13:09), Max: 98.8 (06 Nov 2020 05:00)  HR: 74 (06 Nov 2020 13:09) (74 - 90)  BP: 113/76 (06 Nov 2020 13:09) (113/74 - 145/76)  BP(mean): --  RR: 20 (06 Nov 2020 13:09) (18 - 20)  SpO2: 98% (06 Nov 2020 13:09) (83% - 98%)    CONSTITUTIONAL:  Well nourished laying in bed in NAD    ENT:   Airway patent,   No thrush  on NRB    EYES:   Clear bilaterally,   pupils equal,     CARDIAC:   Normal rate,   regular rhythm.    No edema      RESPIRATORY:   No Wheezing  Normal Chest expansion  Not tachypneic,  No use of accessory muscles  Diffuse crackles    GASTROINTESTINAL:  Abdomen soft,   non-tender,   no guarding,     MUSCULOSKELETAL:   range of motion is not limited,  no clubbing, cyanosis    NEUROLOGICAL:   Alert and oriented   no motor deficits.    SKIN:   Skin normal color for race,   No evidence of rash.    PSYCHIATRIC:   normal mood and affect.   no apparent risk to self or others.      LABS:                          14.2   14.38 )-----------( 329      ( 06 Nov 2020 07:27 )             43.0                                               11-06    140  |  102  |  17  ----------------------------<  130<H>  4.5   |  26  |  0.8    Ca    8.9      06 Nov 2020 07:27  Phos  4.1     11-05  Mg     2.0     11-05    TPro  6.0  /  Alb  3.3<L>  /  TBili  0.3  /  DBili  x   /  AST  18  /  ALT  36  /  AlkPhos  96  11-06                                                 CARDIAC MARKERS ( 04 Nov 2020 22:35 )  x     / <0.01 ng/mL / x     / x     / x                                                LIVER FUNCTIONS - ( 06 Nov 2020 07:27 )  Alb: 3.3 g/dL / Pro: 6.0 g/dL / ALK PHOS: 96 U/L / ALT: 36 U/L / AST: 18 U/L / GGT: x                                                                                                MEDICATIONS  (STANDING):  dexAMETHasone     Tablet 6 milliGRAM(s) Oral daily  dextrose 5%. 1000 milliLiter(s) (50 mL/Hr) IV Continuous <Continuous>  dextrose 50% Injectable 12.5 Gram(s) IV Push once  dextrose 50% Injectable 25 Gram(s) IV Push once  dextrose 50% Injectable 25 Gram(s) IV Push once  enoxaparin Injectable 40 milliGRAM(s) SubCutaneous daily  guaiFENesin ER 1200 milliGRAM(s) Oral every 12 hours  influenza   Vaccine 0.5 milliLiter(s) IntraMuscular once  insulin glargine Injectable (LANTUS) 16 Unit(s) SubCutaneous at bedtime  insulin lispro (ADMELOG) corrective regimen sliding scale   SubCutaneous three times a day before meals  insulin lispro Injectable (ADMELOG) 5 Unit(s) SubCutaneous three times a day before meals  lisinopril 40 milliGRAM(s) Oral daily  pantoprazole    Tablet 40 milliGRAM(s) Oral before breakfast  remdesivir  IVPB   IV Intermittent   remdesivir  IVPB 100 milliGRAM(s) IV Intermittent every 24 hours    MEDICATIONS  (PRN):  dextrose 40% Gel 15 Gram(s) Oral once PRN Blood Glucose LESS THAN 70 milliGRAM(s)/deciliter  glucagon  Injectable 1 milliGRAM(s) IntraMuscular once PRN Glucose LESS THAN 70 milligrams/deciliter      X-Rays reviewed:    CXR interpreted by me: Patient is a 59y old  Male who presents with a chief complaint of Suspected COVID-19 Pneumonia (06 Nov 2020 09:07)      HPI:  58 YO M with PMHx of GIST (s/p resection), DM type II (on metformin), RA (on prednisone), HTN, HLD, herniated disc, presented to the ED with c/c of cough and SOB x 3 days. Patient reports that his daughter tested positive for COVID-19 10 days ago. He started experiencing SOB 3 days ago and isolated himself at home. Today his SOB got worse so he decided to come to the ED. Patient also admits to loss of sense of smell and taste   Patient denies smoking, ETOH use, drug use, fevers, chills, headache, dizziness, chest pain, palpitations, abdominal pain. N/V/D/C.  In the ED vitals were sig for SpO2 of 93% on RA and HR of 119 bpm. Patient was placed on 4L NC.  D-dimer was sightly elevated at 287. CT Angio Chest PE Protocol w/ IV Cont was performed which showed Worsening scattered patchy bilateral ground glass opacities, peripherally dominant, compatible with infectious/inflammatory etiology. No pulmonary embolus.       (05 Nov 2020 02:00)      PAST MEDICAL & SURGICAL HISTORY:  Hyperlipidemia    Lumbago    Hypertension    H/O lymph node biopsy  Neck by Dr Case          FAMILY HISTORY:  Known health problems: none    .  No cardiovascular or pulmonary family history     REVIEW OF SYSTEMS:    All ROS are negative except per HPI     Allergies    penicillin (Unknown)  shellfish (Pruritus)    Intolerances          PHYSICAL EXAM  Vital Signs Last 24 Hrs  T(C): 36.2 (06 Nov 2020 13:09), Max: 37.1 (06 Nov 2020 05:00)  T(F): 97.2 (06 Nov 2020 13:09), Max: 98.8 (06 Nov 2020 05:00)  HR: 74 (06 Nov 2020 13:09) (74 - 90)  BP: 113/76 (06 Nov 2020 13:09) (113/74 - 145/76)  BP(mean): --  RR: 20 (06 Nov 2020 13:09) (18 - 20)  SpO2: 98% (06 Nov 2020 13:09) (83% - 98%)    CONSTITUTIONAL:  Well nourished laying in bed in NAD    ENT:   Airway patent,   No thrush  on NRB    EYES:   Clear bilaterally,   pupils equal,     CARDIAC:   Normal rate,   regular rhythm.    No edema      RESPIRATORY:   No Wheezing  Normal Chest expansion  Not tachypneic,  No use of accessory muscles  Diffuse crackles    GASTROINTESTINAL:  Abdomen soft,   non-tender,   no guarding,     MUSCULOSKELETAL:   range of motion is not limited,  no clubbing, cyanosis    NEUROLOGICAL:   Alert and oriented   no motor deficits.    SKIN:   Skin normal color for race,   No evidence of rash.    PSYCHIATRIC:   normal mood and affect.   no apparent risk to self or others.      LABS:                          14.2   14.38 )-----------( 329      ( 06 Nov 2020 07:27 )             43.0                                               11-06    140  |  102  |  17  ----------------------------<  130<H>  4.5   |  26  |  0.8    Ca    8.9      06 Nov 2020 07:27  Phos  4.1     11-05  Mg     2.0     11-05    TPro  6.0  /  Alb  3.3<L>  /  TBili  0.3  /  DBili  x   /  AST  18  /  ALT  36  /  AlkPhos  96  11-06                                                 CARDIAC MARKERS ( 04 Nov 2020 22:35 )  x     / <0.01 ng/mL / x     / x     / x                                                LIVER FUNCTIONS - ( 06 Nov 2020 07:27 )  Alb: 3.3 g/dL / Pro: 6.0 g/dL / ALK PHOS: 96 U/L / ALT: 36 U/L / AST: 18 U/L / GGT: x                                                                                                MEDICATIONS  (STANDING):  dexAMETHasone     Tablet 6 milliGRAM(s) Oral daily  dextrose 5%. 1000 milliLiter(s) (50 mL/Hr) IV Continuous <Continuous>  dextrose 50% Injectable 12.5 Gram(s) IV Push once  dextrose 50% Injectable 25 Gram(s) IV Push once  dextrose 50% Injectable 25 Gram(s) IV Push once  enoxaparin Injectable 40 milliGRAM(s) SubCutaneous daily  guaiFENesin ER 1200 milliGRAM(s) Oral every 12 hours  influenza   Vaccine 0.5 milliLiter(s) IntraMuscular once  insulin glargine Injectable (LANTUS) 16 Unit(s) SubCutaneous at bedtime  insulin lispro (ADMELOG) corrective regimen sliding scale   SubCutaneous three times a day before meals  insulin lispro Injectable (ADMELOG) 5 Unit(s) SubCutaneous three times a day before meals  lisinopril 40 milliGRAM(s) Oral daily  pantoprazole    Tablet 40 milliGRAM(s) Oral before breakfast  remdesivir  IVPB   IV Intermittent   remdesivir  IVPB 100 milliGRAM(s) IV Intermittent every 24 hours    MEDICATIONS  (PRN):  dextrose 40% Gel 15 Gram(s) Oral once PRN Blood Glucose LESS THAN 70 milliGRAM(s)/deciliter  glucagon  Injectable 1 milliGRAM(s) IntraMuscular once PRN Glucose LESS THAN 70 milligrams/deciliter      < from: CT Angio Chest PE Protocol w/ IV Cont (11.05.20 @ 01:26) >  1.  Worsening scattered patchy bilateral groundglass opacities, peripherally dominant, compatible with infectious/inflammatory etiology. Please correlate with viral pneumonia.    2.  No pulmonary embolus.    < end of copied text >

## 2020-11-06 NOTE — PROGRESS NOTE ADULT - SUBJECTIVE AND OBJECTIVE BOX
HPI:  58 y/o M with PMHx of GIST (s/p resection), DM type II (on metformin), RA (on prednisone), HTN, HLD, herniated disc, presented to the ED with c/c of cough and SOB x 3 days. Patient reports that his daughter tested positive for COVID-19 10 days ago. He started experiencing SOB 3 days ago and isolated himself at home. Today his SOB got worse so he decided to come to the ED. Patient also admits to loss of sense of smell and taste   Patient denies smoking, ETOH use, drug use, fevers, chills, headache, dizziness, chest pain, palpitations, abdominal pain. N/V/D/C.  In the ED vitals were sig for SpO2 of 93% on RA and HR of 119 bpm. Patient was placed on 4L NC.  D-dimer was sightly elevated at 287. CT Angio Chest PE Protocol w/ IV Cont was performed which showed Worsening scattered patchy bilateral ground glass opacities, peripherally dominant, compatible with infectious/inflammatory etiology. No pulmonary embolus.    Currently admitted to medicine with the primary diagnosis of COVID-19. Today is hospital day 1.     INTERVAL HPI / OVERNIGHT EVENTS:  Patient was examined and seen at bedside. This morning he is resting in bed and repots continued shortness of breath on NC 5L.    Updates:   - Patient placed on non-rebreather 10L and is now sat'ing 98%  - ID recc's: started on RDV on 11/5, complete on 11/10, c/w Dexamethasone, complete on 11/15, start on Plasma 2 units over 2 hours  - No Toci recc'd given early phase of infection (symptom onset 3 days ago)     ROS: Otherwise unremarkable     PAST MEDICAL & SURGICAL HISTORY  Hyperlipidemia  Lumbago  Hypertension  H/O lymph node biopsy  Neck by Dr Case      ALLERGIES  penicillin (Unknown)  shellfish (Pruritus)    MEDICATIONS  STANDING MEDICATIONS  dexAMETHasone     Tablet 6 milliGRAM(s) Oral daily  dextrose 5%. 1000 milliLiter(s) IV Continuous <Continuous>  dextrose 50% Injectable 12.5 Gram(s) IV Push once  dextrose 50% Injectable 25 Gram(s) IV Push once  dextrose 50% Injectable 25 Gram(s) IV Push once  enoxaparin Injectable 40 milliGRAM(s) SubCutaneous daily  guaiFENesin ER 1200 milliGRAM(s) Oral every 12 hours  influenza   Vaccine 0.5 milliLiter(s) IntraMuscular once  insulin glargine Injectable (LANTUS) 16 Unit(s) SubCutaneous at bedtime  insulin lispro (ADMELOG) corrective regimen sliding scale   SubCutaneous three times a day before meals  insulin lispro Injectable (ADMELOG) 5 Unit(s) SubCutaneous three times a day before meals  lisinopril 40 milliGRAM(s) Oral daily  pantoprazole    Tablet 40 milliGRAM(s) Oral before breakfast  remdesivir  IVPB   IV Intermittent   remdesivir  IVPB 100 milliGRAM(s) IV Intermittent every 24 hours    PRN MEDICATIONS  dextrose 40% Gel 15 Gram(s) Oral once PRN  glucagon  Injectable 1 milliGRAM(s) IntraMuscular once PRN    VITALS:  T(F): 97.2  HR: 74  BP: 113/76  RR: 20  SpO2: 98%    PHYSICAL EXAM  GEN: NAD, Resting comfortably in bed  PULM: Clear to auscultation bilaterally, No wheezes  CVS: Regular rate and rhythm, S1-S2, no murmurs  ABD: Soft, non-tender, non-distended, no guarding  EXT: No edema  NEURO: A&Ox3, no focal deficits    LABS                        14.2   14.38 )-----------( 329      ( 06 Nov 2020 07:27 )             43.0     11-06    140  |  102  |  17  ----------------------------<  130<H>  4.5   |  26  |  0.8    Ca    8.9      06 Nov 2020 07:27  Phos  4.1     11-05  Mg     2.0     11-05    TPro  6.0  /  Alb  3.3<L>  /  TBili  0.3  /  DBili  x   /  AST  18  /  ALT  36  /  AlkPhos  96  11-06        CARDIAC MARKERS ( 04 Nov 2020 22:35 )  x     / <0.01 ng/mL / x     / x     / x          RADIOLOGY

## 2020-11-06 NOTE — PROGRESS NOTE ADULT - SUBJECTIVE AND OBJECTIVE BOX
Patient was seen and examined. Spoke with RN and HO. Chart reviewed.  Feeling a bit worse overnight- was on 3L but now on 5L with 89%sat.  Vital Signs Last 24 Hrs  T(F): 98.8 (06 Nov 2020 05:00), Max: 98.8 (06 Nov 2020 05:00)  HR: 90 (06 Nov 2020 05:00) (76 - 90)  BP: 132/75 (06 Nov 2020 05:00) (113/74 - 145/76)  SpO2: 93% (06 Nov 2020 05:00) (92% - 96%)  MEDICATIONS  (STANDING):  dexAMETHasone     Tablet 6 milliGRAM(s) Oral daily  enoxaparin Injectable 40 milliGRAM(s) SubCutaneous daily  gabapentin 300 milliGRAM(s) Oral three times a day  guaiFENesin ER 1200 milliGRAM(s) Oral every 12 hours  influenza   Vaccine 0.5 milliLiter(s) IntraMuscular once  lisinopril 40 milliGRAM(s) Oral daily  pantoprazole    Tablet 40 milliGRAM(s) Oral before breakfast  remdesivir  IVPB   IV Intermittent   remdesivir  IVPB 100 milliGRAM(s) IV Intermittent every 24 hours    MEDICATIONS  (PRN):    Labs:                        14.3   9.42  )-----------( 293      ( 05 Nov 2020 04:56 )             44.2                         14.1   11.52 )-----------( 294      ( 04 Nov 2020 22:35 )             43.9     05 Nov 2020 04:56    137    |  101    |  11     ----------------------------<  136    4.2     |  25     |  0.8    04 Nov 2020 22:35    136    |  99     |  13     ----------------------------<  200    4.3     |  26     |  0.8      Ca    8.9        05 Nov 2020 04:56  Ca    9.0        04 Nov 2020 22:35  Phos  4.1       05 Nov 2020 04:56  Mg     2.0       05 Nov 2020 04:56    TPro  6.1    /  Alb  3.5    /  TBili  0.4    /  DBili  x      /  AST  19     /  ALT  38     /  AlkPhos  99     05 Nov 2020 04:56  TPro  6.4    /  Alb  3.6    /  TBili  0.3    /  DBili  x      /  AST  23     /  ALT  41     /  AlkPhos  99     04 Nov 2020 22:35      a bit anxious  exam overall unchanged      A/P:  58 YO M with PMHx of GIST (s/p resection), DM type II (on metformin), RA (on prednisone), HTN, HLD, herniated disc, admitted with Covid pneumonia    pulm/ICU eval- Dr Pelletier    ID eval this am- Dr Crowder aware    IV dexamathasone    IV remdesivir    O2 as needed- increase this am    lovenox    monitor inflammatory markers     ICU transfer if any change in status    DVT prophylaxis  Decubitus prevention- all measures as per RN protocol  Please call or text me with any questions or updates

## 2020-11-06 NOTE — CONSULT NOTE ADULT - ASSESSMENT
58 YO M with PMHx of GIST (s/p resection), DM type II (on metformin), RA (on prednisone), HTN, HLD, herniated disc, admitted with Covid pneumonia    IMPRESSION;  COVID-19 with severe illness with hypoxemia ( 95 % NRB ) and CXR with opacities b/l  Early in the viral replicative phase  no evidence of cytokine release although markers slightly elevated  Ferritin 1079  CRP 10.70    ddimers 317    RECOMMENDATIONS;  Procalcitonin  Day 1 11/6-10 : Single  mg IV loading dose  Day 2-5 : single  mg IV once daily maintenance dose  Daily CBC,CMP.  Dexamethasone 6 mg iv q24h for 10 days 11/6-16  ( or until discharge ) or equivalent glucocorticoid dose may be substituted. Equivalent total dosis of alternative steroids are Methylprednisolone 32 mg and prednisone 40 mg q24h.  Monitor for side effects: hyperglycemia, neurological ( agitation/confusion), adrenal suppression, bacterial and fungal infections  Type and screen.  Convalescent plasma 2 units. Each unit over 2h  Adverse events to watch out for generally within 4 hours of completion of infusion  TACO: transfusion associated circulatory overload  TRALI :Transfusion related acute lung injury  Severe allergic transfusion reactions  Will evaluate for Enrollment  in the monoclonal antibodies vs interleukin investigational study protocol.   Anticoagulation as per team

## 2020-11-07 LAB
GLUCOSE BLDC GLUCOMTR-MCNC: 121 MG/DL — HIGH (ref 70–99)
GLUCOSE BLDC GLUCOMTR-MCNC: 183 MG/DL — HIGH (ref 70–99)
GLUCOSE BLDC GLUCOMTR-MCNC: 190 MG/DL — HIGH (ref 70–99)
GLUCOSE BLDC GLUCOMTR-MCNC: 212 MG/DL — HIGH (ref 70–99)

## 2020-11-07 PROCEDURE — 99233 SBSQ HOSP IP/OBS HIGH 50: CPT

## 2020-11-07 PROCEDURE — 99232 SBSQ HOSP IP/OBS MODERATE 35: CPT

## 2020-11-07 PROCEDURE — 71045 X-RAY EXAM CHEST 1 VIEW: CPT | Mod: 26

## 2020-11-07 RX ORDER — TOCILIZUMAB 20 MG/ML
640 INJECTION, SOLUTION, CONCENTRATE INTRAVENOUS ONCE
Refills: 0 | Status: COMPLETED | OUTPATIENT
Start: 2020-11-07 | End: 2020-11-07

## 2020-11-07 RX ADMIN — Medication 5 UNIT(S): at 12:19

## 2020-11-07 RX ADMIN — PANTOPRAZOLE SODIUM 40 MILLIGRAM(S): 20 TABLET, DELAYED RELEASE ORAL at 07:47

## 2020-11-07 RX ADMIN — Medication 1200 MILLIGRAM(S): at 17:06

## 2020-11-07 RX ADMIN — ENOXAPARIN SODIUM 40 MILLIGRAM(S): 100 INJECTION SUBCUTANEOUS at 11:35

## 2020-11-07 RX ADMIN — REMDESIVIR 500 MILLIGRAM(S): 5 INJECTION INTRAVENOUS at 13:20

## 2020-11-07 RX ADMIN — Medication 6 MILLIGRAM(S): at 06:00

## 2020-11-07 RX ADMIN — Medication 1: at 12:18

## 2020-11-07 RX ADMIN — Medication 2: at 17:03

## 2020-11-07 RX ADMIN — INSULIN GLARGINE 16 UNIT(S): 100 INJECTION, SOLUTION SUBCUTANEOUS at 22:47

## 2020-11-07 RX ADMIN — LISINOPRIL 40 MILLIGRAM(S): 2.5 TABLET ORAL at 06:00

## 2020-11-07 RX ADMIN — Medication 1200 MILLIGRAM(S): at 06:00

## 2020-11-07 RX ADMIN — Medication 5 UNIT(S): at 07:47

## 2020-11-07 RX ADMIN — TOCILIZUMAB 100 MILLIGRAM(S): 20 INJECTION, SOLUTION, CONCENTRATE INTRAVENOUS at 21:33

## 2020-11-07 RX ADMIN — Medication 5 UNIT(S): at 17:03

## 2020-11-07 NOTE — PROGRESS NOTE ADULT - ASSESSMENT
58 YO M with PMHx of GIST (s/p resection), DM type II (on metformin), RA (on prednisone), HTN, HLD, herniated disc, admitted with Covid pneumonia    IMPRESSION;  COVID-19 with severe illness with hypoxemia ( 95 % NRB ) and CXR with opacities b/l  Early in the viral replicative phase  no evidence of cytokine release although markers slightly elevated  Ferritin 1079  CRP 10.70    ddimers 317  - s/p Plasma 11/6    RECOMMENDATIONS;  Procalcitonin  Day 1 11/6-10 : Single  mg IV loading dose  Day 2-5 : single  mg IV once daily maintenance dose  Daily CBC,CMP.  Dexamethasone 6 mg iv q24h for 10 days 11/6-16  ( or until discharge ) or equivalent glucocorticoid dose may be substituted. Equivalent total dosis of alternative steroids are Methylprednisolone 32 mg and prednisone 40 mg q24h.  Monitor for side effects: hyperglycemia, neurological ( agitation/confusion), adrenal suppression, bacterial and fungal infections  Type and screen.  Convalescent plasma 2 units. Each unit over 2h  Adverse events to watch out for generally within 4 hours of completion of infusion  TACO: transfusion associated circulatory overload  TRALI :Transfusion related acute lung injury  Severe allergic transfusion reactions  Will evaluate for Enrollment  in the monoclonal antibodies vs interleukin investigational study protocol.   Anticoagulation as per team    This is a preliminary incomplete pended note, all final recommendations to follow after interview and examination of the patient.     58 YO M with PMHx of GIST (s/p resection), DM type II (on metformin), RA (on prednisone), HTN, HLD, herniated disc, admitted with Covid pneumonia    IMPRESSION;  COVID-19 with severe illness with hypoxemia ( 95 % NRB ) and CXR with opacities b/l  Early in the viral replicative phase  no evidence of cytokine release although markers slightly elevated  Ferritin 1079  CRP 10.70    ddimers 317  - s/p Plasma 11/6    RECOMMENDATIONS;  Day 1 11/6-10 : Single  mg IV loading dose  Day 2-5 : single  mg IV once daily maintenance dose  Dexamethasone 6 mg iv q24h for 10 days 11/6-16  ( or until discharge ) or equivalent glucocorticoid dose may be substituted. Equivalent total dosis of alternative steroids are Methylprednisolone 32 mg and prednisone 40 mg q24h.  Monitor for side effects: hyperglycemia, neurological ( agitation/confusion), adrenal suppression, bacterial and fungal infections  Given rapid increase in O2 requirements and elevated ferritin, can give Toci 8 mg/kg x 1  Will evaluate for Enrollment  in the monoclonal antibodies vs interleukin investigational study protocol.   Anticoagulation as per team

## 2020-11-07 NOTE — PROGRESS NOTE ADULT - SUBJECTIVE AND OBJECTIVE BOX
OVERNIGHT EVENTS: events noted, afebrile, ID reviewed    Vital Signs Last 24 Hrs  T(C): 36.4 (07 Nov 2020 00:04), Max: 36.7 (06 Nov 2020 18:06)  T(F): 97.5 (07 Nov 2020 00:04), Max: 98.1 (06 Nov 2020 18:06)  HR: 65 (07 Nov 2020 00:04) (65 - 88)  BP: 132/89 (07 Nov 2020 00:04) (105/61 - 132/89)  RR: 20 (07 Nov 2020 00:04) (20 - 20)  SpO2: 98% (07 Nov 2020 02:33) (83% - 98%)    PHYSICAL EXAMINATION:    GENERAL: The patient is awake and alert in no apparent distress.     HEENT: Head is normocephalic and atraumatic. Extraocular muscles are intact. Mucous membranes are moist.    NECK: Supple.    LUNGS: bl crackles    HEART: Regular rate and rhythm without murmur.    ABDOMEN: Soft, nontender, and nondistended.      EXTREMITIES: Without any cyanosis, clubbing, rash, lesions or edema.    NEUROLOGIC: Grossly intact.    SKIN: No ulceration or induration present.      LABS:                        14.2   14.38 )-----------( 329      ( 06 Nov 2020 07:27 )             43.0     11-06    140  |  102  |  17  ----------------------------<  130<H>  4.5   |  26  |  0.8    Ca    8.9      06 Nov 2020 07:27    TPro  6.0  /  Alb  3.3<L>  /  TBili  0.3  /  DBili  x   /  AST  18  /  ALT  36  /  AlkPhos  96  11-06              D-Dimer Assay, Quantitative: 317 ng/mL DDU (11-06-20 @ 07:27)        Procalcitonin, Serum: 0.09 ng/mL (11-04-20 @ 22:35)          MICROBIOLOGY:      MEDICATIONS  (STANDING):  dexAMETHasone     Tablet 6 milliGRAM(s) Oral daily  dextrose 5%. 1000 milliLiter(s) (50 mL/Hr) IV Continuous <Continuous>  dextrose 50% Injectable 12.5 Gram(s) IV Push once  dextrose 50% Injectable 25 Gram(s) IV Push once  dextrose 50% Injectable 25 Gram(s) IV Push once  enoxaparin Injectable 40 milliGRAM(s) SubCutaneous daily  guaiFENesin ER 1200 milliGRAM(s) Oral every 12 hours  influenza   Vaccine 0.5 milliLiter(s) IntraMuscular once  insulin glargine Injectable (LANTUS) 16 Unit(s) SubCutaneous at bedtime  insulin lispro (ADMELOG) corrective regimen sliding scale   SubCutaneous three times a day before meals  insulin lispro Injectable (ADMELOG) 5 Unit(s) SubCutaneous three times a day before meals  lisinopril 40 milliGRAM(s) Oral daily  pantoprazole    Tablet 40 milliGRAM(s) Oral before breakfast  remdesivir  IVPB   IV Intermittent   remdesivir  IVPB 100 milliGRAM(s) IV Intermittent every 24 hours    MEDICATIONS  (PRN):  dextrose 40% Gel 15 Gram(s) Oral once PRN Blood Glucose LESS THAN 70 milliGRAM(s)/deciliter  glucagon  Injectable 1 milliGRAM(s) IntraMuscular once PRN Glucose LESS THAN 70 milligrams/deciliter  guaifenesin/dextromethorphan  Syrup 10 milliLiter(s) Oral every 6 hours PRN Cough      RADIOLOGY & ADDITIONAL STUDIES:         OVERNIGHT EVENTS: events noted, afebrile, ID reviewed, 100% NRM    Vital Signs Last 24 Hrs  T(C): 36.4 (07 Nov 2020 00:04), Max: 36.7 (06 Nov 2020 18:06)  T(F): 97.5 (07 Nov 2020 00:04), Max: 98.1 (06 Nov 2020 18:06)  HR: 65 (07 Nov 2020 00:04) (65 - 88)  BP: 132/89 (07 Nov 2020 00:04) (105/61 - 132/89)  RR: 20 (07 Nov 2020 00:04) (20 - 20)  SpO2: 98% (07 Nov 2020 02:33) (83% - 98%)    PHYSICAL EXAMINATION:    GENERAL: The patient is awake and alert in no apparent distress.     HEENT: Head is normocephalic and atraumatic. Extraocular muscles are intact. Mucous membranes are moist.    NECK: Supple.    LUNGS: bl crackles    HEART: Regular rate and rhythm without murmur.    ABDOMEN: Soft, nontender, and nondistended.      EXTREMITIES: Without any cyanosis, clubbing, rash, lesions or edema.    NEUROLOGIC: Grossly intact.    SKIN: No ulceration or induration present.      LABS:                        14.2   14.38 )-----------( 329      ( 06 Nov 2020 07:27 )             43.0     11-06    140  |  102  |  17  ----------------------------<  130<H>  4.5   |  26  |  0.8    Ca    8.9      06 Nov 2020 07:27    TPro  6.0  /  Alb  3.3<L>  /  TBili  0.3  /  DBili  x   /  AST  18  /  ALT  36  /  AlkPhos  96  11-06              D-Dimer Assay, Quantitative: 317 ng/mL DDU (11-06-20 @ 07:27)        Procalcitonin, Serum: 0.09 ng/mL (11-04-20 @ 22:35)          MICROBIOLOGY:      MEDICATIONS  (STANDING):  dexAMETHasone     Tablet 6 milliGRAM(s) Oral daily  dextrose 5%. 1000 milliLiter(s) (50 mL/Hr) IV Continuous <Continuous>  dextrose 50% Injectable 12.5 Gram(s) IV Push once  dextrose 50% Injectable 25 Gram(s) IV Push once  dextrose 50% Injectable 25 Gram(s) IV Push once  enoxaparin Injectable 40 milliGRAM(s) SubCutaneous daily  guaiFENesin ER 1200 milliGRAM(s) Oral every 12 hours  influenza   Vaccine 0.5 milliLiter(s) IntraMuscular once  insulin glargine Injectable (LANTUS) 16 Unit(s) SubCutaneous at bedtime  insulin lispro (ADMELOG) corrective regimen sliding scale   SubCutaneous three times a day before meals  insulin lispro Injectable (ADMELOG) 5 Unit(s) SubCutaneous three times a day before meals  lisinopril 40 milliGRAM(s) Oral daily  pantoprazole    Tablet 40 milliGRAM(s) Oral before breakfast  remdesivir  IVPB   IV Intermittent   remdesivir  IVPB 100 milliGRAM(s) IV Intermittent every 24 hours    MEDICATIONS  (PRN):  dextrose 40% Gel 15 Gram(s) Oral once PRN Blood Glucose LESS THAN 70 milliGRAM(s)/deciliter  glucagon  Injectable 1 milliGRAM(s) IntraMuscular once PRN Glucose LESS THAN 70 milligrams/deciliter  guaifenesin/dextromethorphan  Syrup 10 milliLiter(s) Oral every 6 hours PRN Cough      RADIOLOGY & ADDITIONAL STUDIES:

## 2020-11-07 NOTE — PROGRESS NOTE ADULT - ASSESSMENT
IMPRESSION:    Acute hypoxemic resp failure 2/2 covid pna  doubt superimposed PNA nl procal  HO ILD  HO RA on pred    PLAN:    CNS: avoid sedatives    HEENT: Oral care    PULMONARY:  HOB @ 45 degrees.  Aspiration precautions. Wean oxygen as tolerated, Goal Pox >92%.  CXR    CARDIOVASCULAR: Keep I=O, goal Map >65    GI: GI prophylaxis.  Feeding.  Bowel regimen     RENAL:  Follow up lytes.  Correct as needed    INFECTIOUS DISEASE: Follow up cultures. Monitor procal. Dexamethaxone 6mg iv q24h, remdesivir, plamsa 2 units today per ID    HEMATOLOGICAL:  DVT prophylaxis.     ENDOCRINE:  Follow up FS.  Insulin protocol if needed.    MUSCULOSKELETAL: OOBTC    Dispo: Stepdown Unit    poor prongosis     IMPRESSION:    Acute hypoxemic resp failure 2/2 covid pna  doubt superimposed PNA nl procal  HO ILD  HO RA on pred    PLAN:    CNS: avoid sedatives    HEENT: Oral care    PULMONARY:  HOB @ 45 degrees.  Aspiration precautions. Wean oxygen as tolerated, Goal Pox >92%.  CXR BIPAP alternate with HHFNC    CARDIOVASCULAR: Keep I=O, goal Map >65    GI: GI prophylaxis.  Feeding.  Bowel regimen     RENAL:  Follow up lytes.  Correct as needed    INFECTIOUS DISEASE: Follow up cultures. Monitor procal. Dexamethaxone 6mg iv q24h, remdesivir, plamsa 2 units today per ID    HEMATOLOGICAL:  DVT prophylaxis.     ENDOCRINE:  Follow up FS.  Insulin protocol if needed.    MUSCULOSKELETAL: OOBTC    Dispo: Stepdown Unit    poor proGNOSIS

## 2020-11-07 NOTE — PROGRESS NOTE ADULT - SUBJECTIVE AND OBJECTIVE BOX
SUBJECTIVE:    Patient is a 59y old Male who presents with a chief complaint of Suspected COVID-19 Pneumonia (07 Nov 2020 06:02)      HPI:  58 YO M with PMHx of GIST (s/p resection), DM type II (on metformin), RA (on prednisone), HTN, HLD, herniated disc, presented to the ED with c/c of cough and SOB x 3 days. Patient reports that his daughter tested positive for COVID-19 10 days ago. He started experiencing SOB 3 days ago and isolated himself at home. Today his SOB got worse so he decided to come to the ED. Patient also admits to loss of sense of smell and taste   Patient denies smoking, ETOH use, drug use, fevers, chills, headache, dizziness, chest pain, palpitations, abdominal pain. N/V/D/C.  In the ED vitals were sig for SpO2 of 93% on RA and HR of 119 bpm. Patient was placed on 4L NC.  D-dimer was sightly elevated at 287. CT Angio Chest PE Protocol w/ IV Cont was performed which showed Worsening scattered patchy bilateral ground glass opacities, peripherally dominant, compatible with infectious/inflammatory etiology. No pulmonary embolus.       (05 Nov 2020 02:00)      Currently admitted to medicine with the primary diagnosis of COVID-19         Besides the pertinent positives and negatives described above, the ROS was within normal limits.    PAST MEDICAL & SURGICAL HISTORY  Hyperlipidemia    Lumbago    Hypertension    H/O lymph node biopsy  Neck by Dr Case      SOCIAL HISTORY:    ALLERGIES:  penicillin (Unknown)  shellfish (Pruritus)    MEDICATIONS:  STANDING MEDICATIONS  dexAMETHasone     Tablet 6 milliGRAM(s) Oral daily  dextrose 5%. 1000 milliLiter(s) IV Continuous <Continuous>  dextrose 50% Injectable 12.5 Gram(s) IV Push once  dextrose 50% Injectable 25 Gram(s) IV Push once  dextrose 50% Injectable 25 Gram(s) IV Push once  enoxaparin Injectable 40 milliGRAM(s) SubCutaneous daily  guaiFENesin ER 1200 milliGRAM(s) Oral every 12 hours  influenza   Vaccine 0.5 milliLiter(s) IntraMuscular once  insulin glargine Injectable (LANTUS) 16 Unit(s) SubCutaneous at bedtime  insulin lispro (ADMELOG) corrective regimen sliding scale   SubCutaneous three times a day before meals  insulin lispro Injectable (ADMELOG) 5 Unit(s) SubCutaneous three times a day before meals  lisinopril 40 milliGRAM(s) Oral daily  pantoprazole    Tablet 40 milliGRAM(s) Oral before breakfast  remdesivir  IVPB   IV Intermittent   remdesivir  IVPB 100 milliGRAM(s) IV Intermittent every 24 hours    PRN MEDICATIONS  dextrose 40% Gel 15 Gram(s) Oral once PRN  glucagon  Injectable 1 milliGRAM(s) IntraMuscular once PRN  guaifenesin/dextromethorphan  Syrup 10 milliLiter(s) Oral every 6 hours PRN    VITALS:   T(F): 97  HR: 90  BP: 136/90  RR: 20  SpO2: 99%    LABS:                        14.2   14.38 )-----------( 329      ( 06 Nov 2020 07:27 )             43.0     11-06    140  |  102  |  17  ----------------------------<  130<H>  4.5   |  26  |  0.8    Ca    8.9      06 Nov 2020 07:27    TPro  6.0  /  Alb  3.3<L>  /  TBili  0.3  /  DBili  x   /  AST  18  /  ALT  36  /  AlkPhos  96  11-06                  RADIOLOGY:    PHYSICAL EXAM:  GEN: No acute distress  LUNGS: Clear to auscultation bilaterally   HEART: Regular  ABD: Soft, non-tender, non-distended.  EXT: NC/NC/NE/2+PP/CARREON/Skin Intact.   NEURO: AAOX3    Intravenous access:   NG tube:   Coleman Catheter:        Hospital day 2. Overnight patient desaturated and upgraded to CEU on BiPAP. This am, patient saturating well and weaned onto HFNC. Per patient, no acute distress at this time. Denies discomfort/ SOB on HFNC. Ptn understands plan to monitor his oxygenation on HFNC and agrees with plan.    PAST MEDICAL & SURGICAL HISTORY  Hyperlipidemia    Lumbago    Hypertension    H/O lymph node biopsy  Neck by Dr Case    ALLERGIES:  penicillin (Unknown)  shellfish (Pruritus)    MEDICATIONS:  STANDING MEDICATIONS  dexAMETHasone     Tablet 6 milliGRAM(s) Oral daily  dextrose 5%. 1000 milliLiter(s) IV Continuous <Continuous>  dextrose 50% Injectable 12.5 Gram(s) IV Push once  dextrose 50% Injectable 25 Gram(s) IV Push once  dextrose 50% Injectable 25 Gram(s) IV Push once  enoxaparin Injectable 40 milliGRAM(s) SubCutaneous daily  guaiFENesin ER 1200 milliGRAM(s) Oral every 12 hours  influenza   Vaccine 0.5 milliLiter(s) IntraMuscular once  insulin glargine Injectable (LANTUS) 16 Unit(s) SubCutaneous at bedtime  insulin lispro (ADMELOG) corrective regimen sliding scale   SubCutaneous three times a day before meals  insulin lispro Injectable (ADMELOG) 5 Unit(s) SubCutaneous three times a day before meals  lisinopril 40 milliGRAM(s) Oral daily  pantoprazole    Tablet 40 milliGRAM(s) Oral before breakfast  remdesivir  IVPB   IV Intermittent   remdesivir  IVPB 100 milliGRAM(s) IV Intermittent every 24 hours    PRN MEDICATIONS  dextrose 40% Gel 15 Gram(s) Oral once PRN  glucagon  Injectable 1 milliGRAM(s) IntraMuscular once PRN  guaifenesin/dextromethorphan  Syrup 10 milliLiter(s) Oral every 6 hours PRN    VITALS:   T(F): 97  HR: 90  BP: 136/90  RR: 20  SpO2: 99%    LABS:                        14.2   14.38 )-----------( 329      ( 06 Nov 2020 07:27 )             43.0     11-06    140  |  102  |  17  ----------------------------<  130<H>  4.5   |  26  |  0.8    Ca    8.9      06 Nov 2020 07:27    TPro  6.0  /  Alb  3.3<L>  /  TBili  0.3  /  DBili  x   /  AST  18  /  ALT  36  /  AlkPhos  96  11-06    RADIOLOGY:    PHYSICAL EXAM:  GEN: No acute distress on HFNC 50L 80%  LUNGS: Decreased breath sounds bilaterally  HEART: Regular  ABD: Soft, non-tender, non-distended.  EXT: NC/NC/NE/2+PP/CARREON/Skin Intact.   NEURO: AAOX3

## 2020-11-07 NOTE — PROGRESS NOTE ADULT - SUBJECTIVE AND OBJECTIVE BOX
JOSÉ LUIS AREVALO  59y, Male  Allergy: penicillin (Unknown)  shellfish (Pruritus)      LOS  2d    CHIEF COMPLAINT: Suspected COVID-19 Pneumonia (07 Nov 2020 07:31)      INTERVAL EVENTS/HPI  - No acute events overnight  - T(F): , Max: 98.1 (11-06-20 @ 18:06)  - previously on 3L, now on non-rebreather   - Denies any worsening symptoms  - Tolerating medication  - WBC Count: 14.38 (11-06-20 @ 07:27)  WBC Count: 9.42 (11-05-20 @ 04:56)     - Creatinine, Serum: 0.8 (11-06-20 @ 07:27)       ROS  General: Denies rigors, nightsweats  HEENT: Denies headache, rhinorrhea, sore throat, eye pain  CV: Denies CP, palpitations  PULM: Denies wheezing, hemoptysis  GI: Denies hematemesis, hematochezia, melena  : Denies discharge, hematuria  MSK: Denies arthralgias, myalgias  SKIN: Denies rash, lesions  NEURO: Denies paresthesias, weakness  PSYCH: Denies depression, anxiety    VITALS:  T(F): 97, Max: 98.1 (11-06-20 @ 18:06)  HR: 90  BP: 136/90  RR: 20Vital Signs Last 24 Hrs  T(C): 36.1 (07 Nov 2020 06:20), Max: 36.7 (06 Nov 2020 18:06)  T(F): 97 (07 Nov 2020 06:20), Max: 98.1 (06 Nov 2020 18:06)  HR: 90 (07 Nov 2020 06:20) (65 - 90)  BP: 136/90 (07 Nov 2020 06:20) (105/61 - 136/90)  BP(mean): --  RR: 20 (07 Nov 2020 08:36) (20 - 20)  SpO2: 95% (07 Nov 2020 08:36) (95% - 99%)    PHYSICAL EXAM:  Gen: NAD, resting in bed  HEENT: Normocephalic, atraumatic  Neck: supple, no lymphadenopathy  CV: Regular rate & regular rhythm  Lungs: decreased BS at bases, no fremitus  Abdomen: Soft, BS present  Ext: Warm, well perfused  Neuro: non focal, awake  Skin: no rash, no erythema  Lines: no phlebitis    FH: Non-contributory  Social Hx: Non-contributory    TESTS & MEASUREMENTS:                        14.2   14.38 )-----------( 329      ( 06 Nov 2020 07:27 )             43.0     11-06    140  |  102  |  17  ----------------------------<  130<H>  4.5   |  26  |  0.8    Ca    8.9      06 Nov 2020 07:27    TPro  6.0  /  Alb  3.3<L>  /  TBili  0.3  /  DBili  x   /  AST  18  /  ALT  36  /  AlkPhos  96  11-06      LIVER FUNCTIONS - ( 06 Nov 2020 07:27 )  Alb: 3.3 g/dL / Pro: 6.0 g/dL / ALK PHOS: 96 U/L / ALT: 36 U/L / AST: 18 U/L / GGT: x                     INFECTIOUS DISEASES TESTING  Rapid RVP Result: Detected (11-05-20 @ 02:00)  Procalcitonin, Serum: 0.09 (11-04-20 @ 22:35)  COVID-19 PCR: NotDetec (09-15-20 @ 18:40)      INFLAMMATORY MARKERS  C-Reactive Protein, Serum: 10.70 mg/dL (11-04-20 @ 22:35)  C-Reactive Protein, Serum: 6.44 mg/dL (09-16-20 @ 06:03)  Sedimentation Rate, Erythrocyte: 12 mm/Hr (09-16-20 @ 06:03)      RADIOLOGY & ADDITIONAL TESTS:  I have personally reviewed the last available Chest xray  CXR      CT  CT Angio Chest PE Protocol w/ IV Cont:   EXAM:  CT ANGIO CHEST PE PROTOCOL IC            PROCEDURE DATE:  11/05/2020            INTERPRETATION:  CLINICAL STATEMENT: Shortness of breath. Elevated d-dimer. Pulmonary embolism protocol.    TECHNIQUE: Multislice helical sections were obtained from the thoracic inlet to the lung bases during rapid administration of 6 2 5 cc Optiray 320 intravenous contrast using a CTA protocol. Thin sections were reconstructed through the pulmonary vasculature. Sagittal and coronal reformatted images were acquired, as well as MIP reconstructed images.    COMPARISON: CTA chest 9/15/2020.      FINDINGS:    PULMONARY EMBOLUS: No central or segmental pulmonary embolus.    LUNGS/PLEURA/AIRWAYS: Central airways are patent. Worsened scattered bilateral groundglass opacities, with peripheral dominant, compatible with infectious/inflammatory etiology.  Multiple bilateral calcified granulomas. No pleural effusion or pneumothorax.    MEDIASTINUM/THORACIC NODES: No enlarged thoracic lymph nodes.    HEART/GREAT VESSELS: Heart size is within normal limits. No pericardial effusion. The ascending aorta and main pulmonary artery are normal in caliber.    VISUALIZED UPPER ABDOMEN: Unremarkable.    BONES/SOFT TISSUES: Degenerative changes of the spine. Chronic deformity of the anterolateral fourth rib.      IMPRESSION:    1.  Worsening scattered patchy bilateral groundglass opacities, peripherally dominant, compatible with infectious/inflammatory etiology. Please correlate with viral pneumonia.    2.  No pulmonary embolus.          Dr. Waqar Cristobal discussed preliminary findings with MAGALI REED on 11/5/2020 1:52 AM with readback.            WAQAR CRISTOBAL M.D., RESIDENT RADIOLOGIST  This document has been electronically signed.  GAVINO HEREDIA MD; Attending Radiologist  This document has been electronically signed. Nov 5 2020  2:01AM (11-05-20 @ 01:26)      CARDIOLOGY TESTING  12 Lead ECG:   Ventricular Rate 122 BPM    Atrial Rate 122 BPM    P-R Interval 138 ms    QRS Duration 80 ms    Q-T Interval 320 ms    QTC Calculation(Bazett) 456 ms    P Axis 47 degrees    R Axis -3 degrees    T Axis 11 degrees    Diagnosis Line Sinus tachycardia  Minimal voltage criteria for LVH, may be normal variant ( R in aVL )  Borderline ECG    Confirmed by Pardeep Griffin ([id number]) on 11/4/2020 10:48:33 PM (11-04-20 @ 21:17)      MEDICATIONS  dexAMETHasone     Tablet 6 Oral daily  dextrose 5%. 1000 IV Continuous <Continuous>  dextrose 50% Injectable 12.5 IV Push once  dextrose 50% Injectable 25 IV Push once  dextrose 50% Injectable 25 IV Push once  enoxaparin Injectable 40 SubCutaneous daily  guaiFENesin ER 1200 Oral every 12 hours  influenza   Vaccine 0.5 IntraMuscular once  insulin glargine Injectable (LANTUS) 16 SubCutaneous at bedtime  insulin lispro (ADMELOG) corrective regimen sliding scale  SubCutaneous three times a day before meals  insulin lispro Injectable (ADMELOG) 5 SubCutaneous three times a day before meals  lisinopril 40 Oral daily  pantoprazole    Tablet 40 Oral before breakfast  remdesivir  IVPB  IV Intermittent   remdesivir  IVPB 100 IV Intermittent every 24 hours      WEIGHT  Weight (kg): 80.739 (11-05-20 @ 04:21)      ANTIBIOTICS:  remdesivir  IVPB   IV Intermittent   remdesivir  IVPB 100 milliGRAM(s) IV Intermittent every 24 hours      All available historical records have been reviewed       JOSÉ LUIS AREVALO  59y, Male  Allergy: penicillin (Unknown)  shellfish (Pruritus)      LOS  2d    CHIEF COMPLAINT: Suspected COVID-19 Pneumonia (07 Nov 2020 07:31)      INTERVAL EVENTS/HPI  - No acute events overnight  - T(F): , Max: 98.1 (11-06-20 @ 18:06)  - previously on 3L, now on non-rebreather/HF NC  - Denies any worsening symptoms  - Tolerating medication  - WBC Count: 14.38 (11-06-20 @ 07:27)  WBC Count: 9.42 (11-05-20 @ 04:56)     - Creatinine, Serum: 0.8 (11-06-20 @ 07:27)       ROS  General: Denies rigors, nightsweats  HEENT: Denies headache, rhinorrhea, sore throat, eye pain  CV: Denies CP, palpitations  PULM: Denies wheezing, hemoptysis  GI: Denies hematemesis, hematochezia, melena  : Denies discharge, hematuria  MSK: Denies arthralgias, myalgias  SKIN: Denies rash, lesions  NEURO: Denies paresthesias, weakness  PSYCH: Denies depression, anxiety    VITALS:  T(F): 97, Max: 98.1 (11-06-20 @ 18:06)  HR: 90  BP: 136/90  RR: 20Vital Signs Last 24 Hrs  T(C): 36.1 (07 Nov 2020 06:20), Max: 36.7 (06 Nov 2020 18:06)  T(F): 97 (07 Nov 2020 06:20), Max: 98.1 (06 Nov 2020 18:06)  HR: 90 (07 Nov 2020 06:20) (65 - 90)  BP: 136/90 (07 Nov 2020 06:20) (105/61 - 136/90)  BP(mean): --  RR: 20 (07 Nov 2020 08:36) (20 - 20)  SpO2: 95% (07 Nov 2020 08:36) (95% - 99%)    PHYSICAL EXAM:  Gen: NAD, resting in bed  HEENT: Normocephalic, atraumatic  Neck: supple, no lymphadenopathy  CV: Regular rate & regular rhythm  Lungs: decreased BS at bases, no fremitus  Abdomen: Soft, BS present  Ext: Warm, well perfused  Neuro: non focal, awake  Skin: no rash, no erythema  Lines: no phlebitis    FH: Non-contributory  Social Hx: Non-contributory    TESTS & MEASUREMENTS:                        14.2   14.38 )-----------( 329      ( 06 Nov 2020 07:27 )             43.0     11-06    140  |  102  |  17  ----------------------------<  130<H>  4.5   |  26  |  0.8    Ca    8.9      06 Nov 2020 07:27    TPro  6.0  /  Alb  3.3<L>  /  TBili  0.3  /  DBili  x   /  AST  18  /  ALT  36  /  AlkPhos  96  11-06      LIVER FUNCTIONS - ( 06 Nov 2020 07:27 )  Alb: 3.3 g/dL / Pro: 6.0 g/dL / ALK PHOS: 96 U/L / ALT: 36 U/L / AST: 18 U/L / GGT: x                     INFECTIOUS DISEASES TESTING  Rapid RVP Result: Detected (11-05-20 @ 02:00)  Procalcitonin, Serum: 0.09 (11-04-20 @ 22:35)  COVID-19 PCR: NotDetec (09-15-20 @ 18:40)      INFLAMMATORY MARKERS  C-Reactive Protein, Serum: 10.70 mg/dL (11-04-20 @ 22:35)  C-Reactive Protein, Serum: 6.44 mg/dL (09-16-20 @ 06:03)  Sedimentation Rate, Erythrocyte: 12 mm/Hr (09-16-20 @ 06:03)      RADIOLOGY & ADDITIONAL TESTS:  I have personally reviewed the last available Chest xray  CXR      CT  CT Angio Chest PE Protocol w/ IV Cont:   EXAM:  CT ANGIO CHEST PE PROTOCOL IC            PROCEDURE DATE:  11/05/2020            INTERPRETATION:  CLINICAL STATEMENT: Shortness of breath. Elevated d-dimer. Pulmonary embolism protocol.    TECHNIQUE: Multislice helical sections were obtained from the thoracic inlet to the lung bases during rapid administration of 6 2 5 cc Optiray 320 intravenous contrast using a CTA protocol. Thin sections were reconstructed through the pulmonary vasculature. Sagittal and coronal reformatted images were acquired, as well as MIP reconstructed images.    COMPARISON: CTA chest 9/15/2020.      FINDINGS:    PULMONARY EMBOLUS: No central or segmental pulmonary embolus.    LUNGS/PLEURA/AIRWAYS: Central airways are patent. Worsened scattered bilateral groundglass opacities, with peripheral dominant, compatible with infectious/inflammatory etiology.  Multiple bilateral calcified granulomas. No pleural effusion or pneumothorax.    MEDIASTINUM/THORACIC NODES: No enlarged thoracic lymph nodes.    HEART/GREAT VESSELS: Heart size is within normal limits. No pericardial effusion. The ascending aorta and main pulmonary artery are normal in caliber.    VISUALIZED UPPER ABDOMEN: Unremarkable.    BONES/SOFT TISSUES: Degenerative changes of the spine. Chronic deformity of the anterolateral fourth rib.      IMPRESSION:    1.  Worsening scattered patchy bilateral groundglass opacities, peripherally dominant, compatible with infectious/inflammatory etiology. Please correlate with viral pneumonia.    2.  No pulmonary embolus.          Dr. Waqar Cristobal discussed preliminary findings with MAGALI REED on 11/5/2020 1:52 AM with readback.            WAQAR CRISTOBAL M.D., RESIDENT RADIOLOGIST  This document has been electronically signed.  GAVINO HEREDIA MD; Attending Radiologist  This document has been electronically signed. Nov 5 2020  2:01AM (11-05-20 @ 01:26)      CARDIOLOGY TESTING  12 Lead ECG:   Ventricular Rate 122 BPM    Atrial Rate 122 BPM    P-R Interval 138 ms    QRS Duration 80 ms    Q-T Interval 320 ms    QTC Calculation(Bazett) 456 ms    P Axis 47 degrees    R Axis -3 degrees    T Axis 11 degrees    Diagnosis Line Sinus tachycardia  Minimal voltage criteria for LVH, may be normal variant ( R in aVL )  Borderline ECG    Confirmed by Pardeep Griffin ([id number]) on 11/4/2020 10:48:33 PM (11-04-20 @ 21:17)      MEDICATIONS  dexAMETHasone     Tablet 6 Oral daily  dextrose 5%. 1000 IV Continuous <Continuous>  dextrose 50% Injectable 12.5 IV Push once  dextrose 50% Injectable 25 IV Push once  dextrose 50% Injectable 25 IV Push once  enoxaparin Injectable 40 SubCutaneous daily  guaiFENesin ER 1200 Oral every 12 hours  influenza   Vaccine 0.5 IntraMuscular once  insulin glargine Injectable (LANTUS) 16 SubCutaneous at bedtime  insulin lispro (ADMELOG) corrective regimen sliding scale  SubCutaneous three times a day before meals  insulin lispro Injectable (ADMELOG) 5 SubCutaneous three times a day before meals  lisinopril 40 Oral daily  pantoprazole    Tablet 40 Oral before breakfast  remdesivir  IVPB  IV Intermittent   remdesivir  IVPB 100 IV Intermittent every 24 hours      WEIGHT  Weight (kg): 80.739 (11-05-20 @ 04:21)      ANTIBIOTICS:  remdesivir  IVPB   IV Intermittent   remdesivir  IVPB 100 milliGRAM(s) IV Intermittent every 24 hours      All available historical records have been reviewed

## 2020-11-07 NOTE — PROGRESS NOTE ADULT - ASSESSMENT
60 YO M with PMHx of GIST (s/p resection), DM type II (on metformin), RA (on prednisone), HTN, HLD, herniated disc, presented to the ED with c/c of cough and SOB x 3 days admitted to medicine for treatment of Covid pneumonia    #Suspected COVID-19 Pneumonia   - Follow COVID-19 PCR  - Monitor O2 Sat on HFNC (currently 50L 80%) Keep above 94%  - CT Angio Chest PE Protocol w/ IV Cont was performed which showed worsening scattered patchy bilateral ground glass opacities, peripherally dominant, compatible with infectious/inflammatory etiology. No pulmonary embolus.  - Follow inflammatory markers (ESR, CRP, D-dimer, ferritin, LDH).  -s/p 2 units Plasma y'day  - c/w Dexamethasone 6mg PO Qd, Remdesivir   - f/u ID recommendations regarding Toci today  f/u am CXR    #RA  - not in acute flair  - Will hold prednisone for now since patient on dexamethasone     #HTN  - Continue Lisinopril 40 mg PO QD    #DM type II  - On metformin at home. Will hold for now  - Monitor FS. Start insulin if FS > 180. Keep between 140 - 180  - Start Lispro once patient resumes meals (if HFNC trial successful)    #Misc  - DVT Prophylaxis: Lovenox  - Diet: DASH/TLC, Carb consistent   - GI Prophylaxis: Pantoprazole   - Activity: AAT  - Code Status: Full Code    Dispo: Still acute.

## 2020-11-08 LAB
ALBUMIN SERPL ELPH-MCNC: 3.2 G/DL — LOW (ref 3.5–5.2)
ALP SERPL-CCNC: 96 U/L — SIGNIFICANT CHANGE UP (ref 30–115)
ALT FLD-CCNC: 32 U/L — SIGNIFICANT CHANGE UP (ref 0–41)
ANION GAP SERPL CALC-SCNC: 11 MMOL/L — SIGNIFICANT CHANGE UP (ref 7–14)
AST SERPL-CCNC: 17 U/L — SIGNIFICANT CHANGE UP (ref 0–41)
BILIRUB SERPL-MCNC: 0.4 MG/DL — SIGNIFICANT CHANGE UP (ref 0.2–1.2)
BUN SERPL-MCNC: 19 MG/DL — SIGNIFICANT CHANGE UP (ref 10–20)
CALCIUM SERPL-MCNC: 9 MG/DL — SIGNIFICANT CHANGE UP (ref 8.5–10.1)
CHLORIDE SERPL-SCNC: 101 MMOL/L — SIGNIFICANT CHANGE UP (ref 98–110)
CO2 SERPL-SCNC: 27 MMOL/L — SIGNIFICANT CHANGE UP (ref 17–32)
CREAT SERPL-MCNC: 0.8 MG/DL — SIGNIFICANT CHANGE UP (ref 0.7–1.5)
D DIMER BLD IA.RAPID-MCNC: 484 NG/ML DDU — HIGH (ref 0–230)
GLUCOSE BLDC GLUCOMTR-MCNC: 125 MG/DL — HIGH (ref 70–99)
GLUCOSE BLDC GLUCOMTR-MCNC: 236 MG/DL — HIGH (ref 70–99)
GLUCOSE BLDC GLUCOMTR-MCNC: 255 MG/DL — HIGH (ref 70–99)
GLUCOSE BLDC GLUCOMTR-MCNC: 259 MG/DL — HIGH (ref 70–99)
GLUCOSE SERPL-MCNC: 117 MG/DL — HIGH (ref 70–99)
HCT VFR BLD CALC: 41.3 % — LOW (ref 42–52)
HGB BLD-MCNC: 13.1 G/DL — LOW (ref 14–18)
MAGNESIUM SERPL-MCNC: 1.9 MG/DL — SIGNIFICANT CHANGE UP (ref 1.8–2.4)
MCHC RBC-ENTMCNC: 28.6 PG — SIGNIFICANT CHANGE UP (ref 27–31)
MCHC RBC-ENTMCNC: 31.7 G/DL — LOW (ref 32–37)
MCV RBC AUTO: 90.2 FL — SIGNIFICANT CHANGE UP (ref 80–94)
NRBC # BLD: 0 /100 WBCS — SIGNIFICANT CHANGE UP (ref 0–0)
PLATELET # BLD AUTO: 334 K/UL — SIGNIFICANT CHANGE UP (ref 130–400)
POTASSIUM SERPL-MCNC: 4 MMOL/L — SIGNIFICANT CHANGE UP (ref 3.5–5)
POTASSIUM SERPL-SCNC: 4 MMOL/L — SIGNIFICANT CHANGE UP (ref 3.5–5)
PROT SERPL-MCNC: 5.8 G/DL — LOW (ref 6–8)
RBC # BLD: 4.58 M/UL — LOW (ref 4.7–6.1)
RBC # FLD: 15.5 % — HIGH (ref 11.5–14.5)
SODIUM SERPL-SCNC: 139 MMOL/L — SIGNIFICANT CHANGE UP (ref 135–146)
WBC # BLD: 8.67 K/UL — SIGNIFICANT CHANGE UP (ref 4.8–10.8)
WBC # FLD AUTO: 8.67 K/UL — SIGNIFICANT CHANGE UP (ref 4.8–10.8)

## 2020-11-08 PROCEDURE — 99232 SBSQ HOSP IP/OBS MODERATE 35: CPT

## 2020-11-08 RX ADMIN — Medication 1200 MILLIGRAM(S): at 17:14

## 2020-11-08 RX ADMIN — Medication 3: at 12:05

## 2020-11-08 RX ADMIN — REMDESIVIR 500 MILLIGRAM(S): 5 INJECTION INTRAVENOUS at 12:55

## 2020-11-08 RX ADMIN — Medication 3: at 17:13

## 2020-11-08 RX ADMIN — Medication 5 UNIT(S): at 17:13

## 2020-11-08 RX ADMIN — Medication 5 UNIT(S): at 12:05

## 2020-11-08 RX ADMIN — Medication 1200 MILLIGRAM(S): at 05:22

## 2020-11-08 RX ADMIN — ENOXAPARIN SODIUM 40 MILLIGRAM(S): 100 INJECTION SUBCUTANEOUS at 12:05

## 2020-11-08 RX ADMIN — INSULIN GLARGINE 16 UNIT(S): 100 INJECTION, SOLUTION SUBCUTANEOUS at 21:20

## 2020-11-08 RX ADMIN — LISINOPRIL 40 MILLIGRAM(S): 2.5 TABLET ORAL at 05:23

## 2020-11-08 RX ADMIN — Medication 6 MILLIGRAM(S): at 05:22

## 2020-11-08 RX ADMIN — Medication 5 UNIT(S): at 08:23

## 2020-11-08 RX ADMIN — PANTOPRAZOLE SODIUM 40 MILLIGRAM(S): 20 TABLET, DELAYED RELEASE ORAL at 08:24

## 2020-11-08 NOTE — PROGRESS NOTE ADULT - SUBJECTIVE AND OBJECTIVE BOX
Patient was seen and examined. Spoke with RN. Chart reviewed.  No events overnight. Feels well at rest, but dyspnea on mild exertion  Vital Signs Last 24 Hrs  T(F): 97.8 (08 Nov 2020 05:59), Max: 98.1 (07 Nov 2020 20:00)  HR: 77 (08 Nov 2020 05:59) (70 - 92)  BP: 124/74 (08 Nov 2020 05:59) (114/68 - 133/79)  SpO2: 97% (08 Nov 2020 01:38) (95% - 97%)  MEDICATIONS  (STANDING):  dexAMETHasone     Tablet 6 milliGRAM(s) Oral daily  dextrose 5%. 1000 milliLiter(s) (50 mL/Hr) IV Continuous <Continuous>  dextrose 50% Injectable 12.5 Gram(s) IV Push once  dextrose 50% Injectable 25 Gram(s) IV Push once  dextrose 50% Injectable 25 Gram(s) IV Push once  enoxaparin Injectable 40 milliGRAM(s) SubCutaneous daily  guaiFENesin ER 1200 milliGRAM(s) Oral every 12 hours  influenza   Vaccine 0.5 milliLiter(s) IntraMuscular once  insulin glargine Injectable (LANTUS) 16 Unit(s) SubCutaneous at bedtime  insulin lispro (ADMELOG) corrective regimen sliding scale   SubCutaneous three times a day before meals  insulin lispro Injectable (ADMELOG) 5 Unit(s) SubCutaneous three times a day before meals  lisinopril 40 milliGRAM(s) Oral daily  pantoprazole    Tablet 40 milliGRAM(s) Oral before breakfast  remdesivir  IVPB   IV Intermittent   remdesivir  IVPB 100 milliGRAM(s) IV Intermittent every 24 hours    MEDICATIONS  (PRN):  dextrose 40% Gel 15 Gram(s) Oral once PRN Blood Glucose LESS THAN 70 milliGRAM(s)/deciliter  glucagon  Injectable 1 milliGRAM(s) IntraMuscular once PRN Glucose LESS THAN 70 milligrams/deciliter  guaifenesin/dextromethorphan  Syrup 10 milliLiter(s) Oral every 6 hours PRN Cough        exam unchanged, smiling, in good spirits  on high flow O2      A/P:  60 YO M with PMHx of GIST (s/p resection), DM type II (on metformin), RA (on prednisone), HTN, HLD, herniated disc, admitted with Covid pneumonia    pulm/ICU f/u- Dr Pelletier    ID f/u- Dr Crowder     IV dexamathasone    IV remdesivir    plasma given 11/6    O2 as needed- on high flow    lovenox- full dose    monitor inflammatory markers     ICU transfer if any change in status  DVT prophylaxis  Decubitus prevention- all measures as per RN protocol  Please call or text me with any questions or updates

## 2020-11-08 NOTE — PROGRESS NOTE ADULT - ASSESSMENT
IMPRESSION:    Acute hypoxemic resp failure 2/2 covid pna  doubt superimposed PNA nl procal  HO ILD?  HO RA on pred    PLAN:    CNS: avoid sedatives    HEENT: Oral care    PULMONARY:  HOB @ 45 degrees.  Aspiration precautions. Wean oxygen as tolerated, Goal Pox >92%.  CXR BIPAP alternate with HHFNC    CARDIOVASCULAR: Keep I=O, goal Map >65 po lasix    GI: GI prophylaxis.  Feeding.  Bowel regimen     RENAL:  Follow up lytes.  Correct as needed    INFECTIOUS DISEASE: Follow up cultures. Monitor procal. Dexamethaxone 6mg iv q24h, remdesivir, f/up infl markers    HEMATOLOGICAL:  DVT prophylaxis. LOVENOX 40 Q 12H    ENDOCRINE:  Follow up FS.  Insulin protocol if needed.    MUSCULOSKELETAL: OOBTC    Dispo: Stepdown Unit    poor proGNOSIS

## 2020-11-08 NOTE — PROGRESS NOTE ADULT - SUBJECTIVE AND OBJECTIVE BOX
JOSÉ LUIS AREVALO 59y Male  MRN#: [id number]   Hospital Day: 3d    SUBJECTIVE  Patient is a 59y old Male who presents with a chief complaint of Suspected COVID-19 Pneumonia (08 Nov 2020 07:44)  Currently admitted to medicine with the primary diagnosis of COVID-19      INTERVAL HPI AND OVERNIGHT EVENTS:  Patient was examined and seen at bedside. This morning he is resting comfortably in bed and reports no issues or overnight events.    REVIEW OF SYMPTOMS:  CONSTITUTIONAL: No weakness, fevers or chills; No headaches  EYES: No visual changes, eye pain, or discharge  ENT: No vertigo; No ear pain or change in hearing; No sore throat or difficulty swallowing  NECK: No pain or stiffness  RESPIRATORY: No cough, wheezing, or hemoptysis; No shortness of breath  CARDIOVASCULAR: No chest pain or palpitations  GASTROINTESTINAL: No abdominal or epigastric pain; No nausea, vomiting, or hematemesis; No diarrhea or constipation; No melena or hematochezia  GENITOURINARY: No dysuria, frequency or hematuria  MUSCULOSKELETAL: No joint pain, no muscle pain, no weakness  NEUROLOGICAL: No numbness or weakness  SKIN: No itching or rashes    OBJECTIVE  PAST MEDICAL & SURGICAL HISTORY  Hyperlipidemia    Lumbago    Hypertension    H/O lymph node biopsy  Neck by Dr Case      ALLERGIES:  penicillin (Unknown)  shellfish (Pruritus)    MEDICATIONS:  STANDING MEDICATIONS  dexAMETHasone     Tablet 6 milliGRAM(s) Oral daily  dextrose 5%. 1000 milliLiter(s) IV Continuous <Continuous>  dextrose 50% Injectable 12.5 Gram(s) IV Push once  dextrose 50% Injectable 25 Gram(s) IV Push once  dextrose 50% Injectable 25 Gram(s) IV Push once  enoxaparin Injectable 40 milliGRAM(s) SubCutaneous daily  guaiFENesin ER 1200 milliGRAM(s) Oral every 12 hours  influenza   Vaccine 0.5 milliLiter(s) IntraMuscular once  insulin glargine Injectable (LANTUS) 16 Unit(s) SubCutaneous at bedtime  insulin lispro (ADMELOG) corrective regimen sliding scale   SubCutaneous three times a day before meals  insulin lispro Injectable (ADMELOG) 5 Unit(s) SubCutaneous three times a day before meals  lisinopril 40 milliGRAM(s) Oral daily  pantoprazole    Tablet 40 milliGRAM(s) Oral before breakfast  remdesivir  IVPB 100 milliGRAM(s) IV Intermittent every 24 hours  remdesivir  IVPB   IV Intermittent     PRN MEDICATIONS  dextrose 40% Gel 15 Gram(s) Oral once PRN  glucagon  Injectable 1 milliGRAM(s) IntraMuscular once PRN  guaifenesin/dextromethorphan  Syrup 10 milliLiter(s) Oral every 6 hours PRN      VITAL SIGNS: Last 24 Hours  T(C): 36.4 (08 Nov 2020 08:43), Max: 36.7 (07 Nov 2020 20:00)  T(F): 97.5 (08 Nov 2020 08:43), Max: 98.1 (07 Nov 2020 20:00)  HR: 70 (08 Nov 2020 10:05) (70 - 95)  BP: 145/86 (08 Nov 2020 08:43) (119/76 - 145/86)  BP(mean): 93 (07 Nov 2020 20:00) (93 - 96)  RR: 20 (08 Nov 2020 10:05) (19 - 20)  SpO2: 93% (08 Nov 2020 10:05) (93% - 97%)    LABS:                        13.1   8.67  )-----------( 334      ( 08 Nov 2020 07:40 )             41.3     11-08    139  |  101  |  19  ----------------------------<  117<H>  4.0   |  27  |  0.8    Ca    9.0      08 Nov 2020 07:40  Mg     1.9     11-08    TPro  5.8<L>  /  Alb  3.2<L>  /  TBili  0.4  /  DBili  x   /  AST  17  /  ALT  32  /  AlkPhos  96  11-08                  RADIOLOGY:      PHYSICAL EXAM:  GEN: No acute distress on HFNC 50L 80%  LUNGS: Decreased breath sounds bilaterally  HEART: Regular  ABD: Soft, non-tender, non-distended.  EXT: NC/NC/NE/2+PP/CARREON/Skin Intact.   NEURO: AAOX3    ASSESSMENT & PLAN  58 YO M with PMHx of GIST (s/p resection), DM type II (on metformin), RA (on prednisone), HTN, HLD, herniated disc, presented to the ED with c/c of cough and SOB x 3 days admitted to medicine for treatment of Covid pneumonia    #Suspected COVID-19 Pneumonia   - Follow COVID-19 PCR  - CT Angio Chest PE Protocol w/ IV Cont was performed which showed worsening scattered patchy bilateral ground glass opacities, peripherally dominant, compatible with infectious/inflammatory etiology. No pulmonary embolus.  - CXR 11/7 showed worsening BL opacities  - Follow inflammatory markers (ESR, CRP, D-dimer, ferritin, LDH).  - s/p 2 units Plasma 11/6  - c/w Dexamethasone 6mg PO Qd  - c/w Remdesivir (11/5-11/9)  - s/p toci 11/7  - Alternate between BiPAP and HFNC    #RA  - not in acute flair  - holding prednisone since on dexamethasone    #HTN  - c/w Lisinopril 40 mg PO QD    #DM type II  - On metformin at home. Will hold for now  - Monitor FS. Start insulin if FS > 180. Keep between 140 - 180  - Start Lispro once patient off BiPAP long enough to eat    #Misc  - DVT Prophylaxis: Lovenox  - Diet: DASH/TLC, Carb consistent   - GI Prophylaxis: Pantoprazole   - Activity: AAT  - Code Status: Full Code    PAST MEDICAL & SURGICAL HISTORY:  Hyperlipidemia    Lumbago    Hypertension    H/O lymph node biopsy  Neck by Dr Case        #Misc  - DVT Prophylaxis:  - GI Prophylaxis:  - Diet:  - Activity:  - IV Fluids:  - Code Status:    Dispo:

## 2020-11-08 NOTE — PROGRESS NOTE ADULT - SUBJECTIVE AND OBJECTIVE BOX
OVERNIGHT EVENTS: events noted, afebrile, still requiring high FIO2    Vital Signs Last 24 Hrs  T(C): 36.6 (08 Nov 2020 05:59), Max: 36.7 (07 Nov 2020 20:00)  T(F): 97.8 (08 Nov 2020 05:59), Max: 98.1 (07 Nov 2020 20:00)  HR: 77 (08 Nov 2020 05:59) (70 - 92)  BP: 124/74 (08 Nov 2020 05:59) (114/68 - 133/79)  BP(mean): 93 (07 Nov 2020 20:00) (86 - 96)  RR: 20 (08 Nov 2020 05:59) (19 - 20)  SpO2: 97% (08 Nov 2020 01:38) (95% - 97%)    PHYSICAL EXAMINATION:    GENERAL: The patient is awake and alert in no apparent distress.     HEENT: Head is normocephalic and atraumatic. Extraocular muscles are intact. Mucous membranes are moist.    NECK: Supple.    LUNGS: bl crackles    HEART: Regular rate and rhythm without murmur.    ABDOMEN: Soft, nontender, and nondistended.      EXTREMITIES: Without any cyanosis, clubbing, rash, lesions or edema.    NEUROLOGIC: Grossly intact.    SKIN: No ulceration or induration present.      LABS:                        14.2   14.38 )-----------( 329      ( 06 Nov 2020 07:27 )             43.0     11-06    140  |  102  |  17  ----------------------------<  130<H>  4.5   |  26  |  0.8    Ca    8.9      06 Nov 2020 07:27    TPro  6.0  /  Alb  3.3<L>  /  TBili  0.3  /  DBili  x   /  AST  18  /  ALT  36  /  AlkPhos  96  11-06                            MICROBIOLOGY:      MEDICATIONS  (STANDING):  dexAMETHasone     Tablet 6 milliGRAM(s) Oral daily  dextrose 5%. 1000 milliLiter(s) (50 mL/Hr) IV Continuous <Continuous>  dextrose 50% Injectable 12.5 Gram(s) IV Push once  dextrose 50% Injectable 25 Gram(s) IV Push once  dextrose 50% Injectable 25 Gram(s) IV Push once  enoxaparin Injectable 40 milliGRAM(s) SubCutaneous daily  guaiFENesin ER 1200 milliGRAM(s) Oral every 12 hours  influenza   Vaccine 0.5 milliLiter(s) IntraMuscular once  insulin glargine Injectable (LANTUS) 16 Unit(s) SubCutaneous at bedtime  insulin lispro (ADMELOG) corrective regimen sliding scale   SubCutaneous three times a day before meals  insulin lispro Injectable (ADMELOG) 5 Unit(s) SubCutaneous three times a day before meals  lisinopril 40 milliGRAM(s) Oral daily  pantoprazole    Tablet 40 milliGRAM(s) Oral before breakfast  remdesivir  IVPB   IV Intermittent   remdesivir  IVPB 100 milliGRAM(s) IV Intermittent every 24 hours    MEDICATIONS  (PRN):  dextrose 40% Gel 15 Gram(s) Oral once PRN Blood Glucose LESS THAN 70 milliGRAM(s)/deciliter  glucagon  Injectable 1 milliGRAM(s) IntraMuscular once PRN Glucose LESS THAN 70 milligrams/deciliter  guaifenesin/dextromethorphan  Syrup 10 milliLiter(s) Oral every 6 hours PRN Cough      RADIOLOGY & ADDITIONAL STUDIES:       OVERNIGHT EVENTS: events noted, afebrile, still requiring high FIO2 80%, HHFNC    Vital Signs Last 24 Hrs  T(C): 36.6 (08 Nov 2020 05:59), Max: 36.7 (07 Nov 2020 20:00)  T(F): 97.8 (08 Nov 2020 05:59), Max: 98.1 (07 Nov 2020 20:00)  HR: 77 (08 Nov 2020 05:59) (70 - 92)  BP: 124/74 (08 Nov 2020 05:59) (114/68 - 133/79)  BP(mean): 93 (07 Nov 2020 20:00) (86 - 96)  RR: 20 (08 Nov 2020 05:59) (19 - 20)  SpO2: 97% (08 Nov 2020 01:38) (95% - 97%)    PHYSICAL EXAMINATION:    GENERAL: The patient is awake and alert in no apparent distress.     HEENT: Head is normocephalic and atraumatic. Extraocular muscles are intact. Mucous membranes are moist.    NECK: Supple.    LUNGS: bl crackles    HEART: Regular rate and rhythm without murmur.    ABDOMEN: Soft, nontender, and nondistended.      EXTREMITIES: Without any cyanosis, clubbing, rash, lesions or edema.    NEUROLOGIC: Grossly intact.    SKIN: No ulceration or induration present.      LABS:                        14.2   14.38 )-----------( 329      ( 06 Nov 2020 07:27 )             43.0     11-06    140  |  102  |  17  ----------------------------<  130<H>  4.5   |  26  |  0.8    Ca    8.9      06 Nov 2020 07:27    TPro  6.0  /  Alb  3.3<L>  /  TBili  0.3  /  DBili  x   /  AST  18  /  ALT  36  /  AlkPhos  96  11-06                            MICROBIOLOGY:      MEDICATIONS  (STANDING):  dexAMETHasone     Tablet 6 milliGRAM(s) Oral daily  dextrose 5%. 1000 milliLiter(s) (50 mL/Hr) IV Continuous <Continuous>  dextrose 50% Injectable 12.5 Gram(s) IV Push once  dextrose 50% Injectable 25 Gram(s) IV Push once  dextrose 50% Injectable 25 Gram(s) IV Push once  enoxaparin Injectable 40 milliGRAM(s) SubCutaneous daily  guaiFENesin ER 1200 milliGRAM(s) Oral every 12 hours  influenza   Vaccine 0.5 milliLiter(s) IntraMuscular once  insulin glargine Injectable (LANTUS) 16 Unit(s) SubCutaneous at bedtime  insulin lispro (ADMELOG) corrective regimen sliding scale   SubCutaneous three times a day before meals  insulin lispro Injectable (ADMELOG) 5 Unit(s) SubCutaneous three times a day before meals  lisinopril 40 milliGRAM(s) Oral daily  pantoprazole    Tablet 40 milliGRAM(s) Oral before breakfast  remdesivir  IVPB   IV Intermittent   remdesivir  IVPB 100 milliGRAM(s) IV Intermittent every 24 hours    MEDICATIONS  (PRN):  dextrose 40% Gel 15 Gram(s) Oral once PRN Blood Glucose LESS THAN 70 milliGRAM(s)/deciliter  glucagon  Injectable 1 milliGRAM(s) IntraMuscular once PRN Glucose LESS THAN 70 milligrams/deciliter  guaifenesin/dextromethorphan  Syrup 10 milliLiter(s) Oral every 6 hours PRN Cough      RADIOLOGY & ADDITIONAL STUDIES:

## 2020-11-09 LAB
ALBUMIN SERPL ELPH-MCNC: 3.5 G/DL — SIGNIFICANT CHANGE UP (ref 3.5–5.2)
ALP SERPL-CCNC: 97 U/L — SIGNIFICANT CHANGE UP (ref 30–115)
ALT FLD-CCNC: 36 U/L — SIGNIFICANT CHANGE UP (ref 0–41)
ANION GAP SERPL CALC-SCNC: 9 MMOL/L — SIGNIFICANT CHANGE UP (ref 7–14)
AST SERPL-CCNC: 17 U/L — SIGNIFICANT CHANGE UP (ref 0–41)
BILIRUB SERPL-MCNC: 0.4 MG/DL — SIGNIFICANT CHANGE UP (ref 0.2–1.2)
BUN SERPL-MCNC: 22 MG/DL — HIGH (ref 10–20)
CALCIUM SERPL-MCNC: 9.3 MG/DL — SIGNIFICANT CHANGE UP (ref 8.5–10.1)
CHLORIDE SERPL-SCNC: 101 MMOL/L — SIGNIFICANT CHANGE UP (ref 98–110)
CO2 SERPL-SCNC: 28 MMOL/L — SIGNIFICANT CHANGE UP (ref 17–32)
CREAT SERPL-MCNC: 0.8 MG/DL — SIGNIFICANT CHANGE UP (ref 0.7–1.5)
FERRITIN SERPL-MCNC: 1007 NG/ML — HIGH (ref 30–400)
GLUCOSE BLDC GLUCOMTR-MCNC: 120 MG/DL — HIGH (ref 70–99)
GLUCOSE BLDC GLUCOMTR-MCNC: 203 MG/DL — HIGH (ref 70–99)
GLUCOSE BLDC GLUCOMTR-MCNC: 204 MG/DL — HIGH (ref 70–99)
GLUCOSE BLDC GLUCOMTR-MCNC: 272 MG/DL — HIGH (ref 70–99)
GLUCOSE SERPL-MCNC: 146 MG/DL — HIGH (ref 70–99)
HCT VFR BLD CALC: 45.8 % — SIGNIFICANT CHANGE UP (ref 42–52)
HGB BLD-MCNC: 14.5 G/DL — SIGNIFICANT CHANGE UP (ref 14–18)
MCHC RBC-ENTMCNC: 28.7 PG — SIGNIFICANT CHANGE UP (ref 27–31)
MCHC RBC-ENTMCNC: 31.7 G/DL — LOW (ref 32–37)
MCV RBC AUTO: 90.5 FL — SIGNIFICANT CHANGE UP (ref 80–94)
NRBC # BLD: 0 /100 WBCS — SIGNIFICANT CHANGE UP (ref 0–0)
PLATELET # BLD AUTO: 385 K/UL — SIGNIFICANT CHANGE UP (ref 130–400)
POTASSIUM SERPL-MCNC: 4.6 MMOL/L — SIGNIFICANT CHANGE UP (ref 3.5–5)
POTASSIUM SERPL-SCNC: 4.6 MMOL/L — SIGNIFICANT CHANGE UP (ref 3.5–5)
PROCALCITONIN SERPL-MCNC: 0.04 NG/ML — SIGNIFICANT CHANGE UP (ref 0.02–0.1)
PROT SERPL-MCNC: 6.5 G/DL — SIGNIFICANT CHANGE UP (ref 6–8)
RBC # BLD: 5.06 M/UL — SIGNIFICANT CHANGE UP (ref 4.7–6.1)
RBC # FLD: 15.4 % — HIGH (ref 11.5–14.5)
SODIUM SERPL-SCNC: 138 MMOL/L — SIGNIFICANT CHANGE UP (ref 135–146)
WBC # BLD: 10.94 K/UL — HIGH (ref 4.8–10.8)
WBC # FLD AUTO: 10.94 K/UL — HIGH (ref 4.8–10.8)

## 2020-11-09 RX ORDER — ENOXAPARIN SODIUM 100 MG/ML
40 INJECTION SUBCUTANEOUS EVERY 12 HOURS
Refills: 0 | Status: DISCONTINUED | OUTPATIENT
Start: 2020-11-09 | End: 2020-11-17

## 2020-11-09 RX ADMIN — ENOXAPARIN SODIUM 40 MILLIGRAM(S): 100 INJECTION SUBCUTANEOUS at 17:13

## 2020-11-09 RX ADMIN — Medication 5 UNIT(S): at 11:41

## 2020-11-09 RX ADMIN — Medication 5 UNIT(S): at 08:30

## 2020-11-09 RX ADMIN — Medication 1200 MILLIGRAM(S): at 17:12

## 2020-11-09 RX ADMIN — LISINOPRIL 40 MILLIGRAM(S): 2.5 TABLET ORAL at 05:05

## 2020-11-09 RX ADMIN — PANTOPRAZOLE SODIUM 40 MILLIGRAM(S): 20 TABLET, DELAYED RELEASE ORAL at 06:07

## 2020-11-09 RX ADMIN — Medication 3: at 11:41

## 2020-11-09 RX ADMIN — Medication 2: at 17:12

## 2020-11-09 RX ADMIN — REMDESIVIR 500 MILLIGRAM(S): 5 INJECTION INTRAVENOUS at 11:41

## 2020-11-09 RX ADMIN — INSULIN GLARGINE 16 UNIT(S): 100 INJECTION, SOLUTION SUBCUTANEOUS at 21:58

## 2020-11-09 RX ADMIN — Medication 6 MILLIGRAM(S): at 05:05

## 2020-11-09 RX ADMIN — Medication 1200 MILLIGRAM(S): at 05:05

## 2020-11-09 RX ADMIN — Medication 5 UNIT(S): at 17:12

## 2020-11-09 NOTE — PROGRESS NOTE ADULT - SUBJECTIVE AND OBJECTIVE BOX
Patient seen and examined at bedside. No acute events overnight. Continues to deny any SOB/ chest pain/ cough/ fever/ chills on 50L / 70% HFNC. Denies any leg pain. ROS otherwise negative.    PAST MEDICAL & SURGICAL HISTORY  Hyperlipidemia    Lumbago    Hypertension    H/O lymph node biopsy  Neck by Dr Case      ALLERGIES:  penicillin (Unknown)  shellfish (Pruritus)    MEDICATIONS:  STANDING MEDICATIONS  dexAMETHasone     Tablet 6 milliGRAM(s) Oral daily  dextrose 5%. 1000 milliLiter(s) IV Continuous <Continuous>  dextrose 50% Injectable 12.5 Gram(s) IV Push once  dextrose 50% Injectable 25 Gram(s) IV Push once  dextrose 50% Injectable 25 Gram(s) IV Push once  enoxaparin Injectable 40 milliGRAM(s) SubCutaneous every 12 hours  guaiFENesin ER 1200 milliGRAM(s) Oral every 12 hours  influenza   Vaccine 0.5 milliLiter(s) IntraMuscular once  insulin glargine Injectable (LANTUS) 16 Unit(s) SubCutaneous at bedtime  insulin lispro (ADMELOG) corrective regimen sliding scale   SubCutaneous three times a day before meals  insulin lispro Injectable (ADMELOG) 5 Unit(s) SubCutaneous three times a day before meals  pantoprazole    Tablet 40 milliGRAM(s) Oral before breakfast  remdesivir  IVPB   IV Intermittent   remdesivir  IVPB 100 milliGRAM(s) IV Intermittent every 24 hours    PRN MEDICATIONS  dextrose 40% Gel 15 Gram(s) Oral once PRN  glucagon  Injectable 1 milliGRAM(s) IntraMuscular once PRN  guaifenesin/dextromethorphan  Syrup 10 milliLiter(s) Oral every 6 hours PRN    VITALS:   T(F): 97.1  HR: 87  BP: 126/78  RR: 18  SpO2: 97%    LABS:                        14.5   10.94 )-----------( 385      ( 09 Nov 2020 08:45 )             45.8     11-09    138  |  101  |  22<H>  ----------------------------<  146<H>  4.6   |  28  |  0.8    Ca    9.3      09 Nov 2020 08:45  Mg     1.9     11-08    TPro  6.5  /  Alb  3.5  /  TBili  0.4  /  DBili  x   /  AST  17  /  ALT  36  /  AlkPhos  97  11-09        RADIOLOGY:cr< from: Xray Chest 1 View-PORTABLE IMMEDIATE (Xray Chest 1 View-PORTABLE IMMEDIATE .) (11.07.20 @ 09:39) >    EXAM:  XR CHEST PORTABLE IMMED 1V            PROCEDURE DATE:  11/07/2020            INTERPRETATION:  CLINICAL HISTORY: Shortness of breath.    COMPARISON: 11/4/2020.    TECHNIQUE/POSITIONING: Frontal view of the chest.    FINDINGS:    Support devices: EKG leads overlie the chest.    Cardiac/mediastinum/hilum: Unchanged.    Lung parenchyma/Pleura: Bilateral opacities, worsened.    Skeleton/soft tissues: Degenerative change in the spine.      IMPRESSION:    Worsened bilateral opacities.                  TOMI REIS MD; Attending Radiologist  This document has been electronically signed. Nov 7 2020 10:14AM    < end of copied text >      PHYSICAL EXAM:  GEN: No acute distress well nourished, well developed male on HFNC  LUNGS: decreased breath sounds b/l, no wheezes/ rales/ rhonchi   HEART: Regular  ABD: Soft, non-tender, non-distended.  EXT: NC/NC/NE/2+PP/CARREON/Skin Intact.   NEURO: AAOX3

## 2020-11-09 NOTE — PROGRESS NOTE ADULT - ASSESSMENT
60 YO M with PMHx of GIST (s/p resection), DM type II (on metformin), RA (on prednisone), HTN, HLD, herniated disc, admitted with Covid pneumonia    IMPRESSION;  COVID-19 with severe illness with hypoxemia ( 95 % NRB ) and CXR with opacities b/l  Early in the viral replicative phase  no evidence of cytokine release although markers slightly elevated  Ferritin 1079  CRP 10.70    ddimers 317>484    RECOMMENDATIONS;  Procalcitonin  Day 1 11/6-10 : Single  mg IV loading dose  Day 2-5 : single  mg IV once daily maintenance dose  Daily CBC,CMP.  Dexamethasone 6 mg iv q24h for 10 days 11/6-16  ( or until discharge ) or equivalent glucocorticoid dose may be substituted. Equivalent total dosis of alternative steroids are Methylprednisolone 32 mg and prednisone 40 mg q24h.  Monitor for side effects: hyperglycemia, neurological ( agitation/confusion), adrenal suppression, bacterial and fungal infections  Type and screen.  Convalescent plasma 2 units. Each unit over 2h  Adverse events to watch out for generally within 4 hours of completion of infusion  TACO: transfusion associated circulatory overload  TRALI :Transfusion related acute lung injury  Severe allergic transfusion reactions  Will evaluate for Enrollment  in the monoclonal antibodies vs interleukin investigational study protocol.   Anticoagulation as per team

## 2020-11-09 NOTE — PROGRESS NOTE ADULT - ASSESSMENT
IMPRESSION:    Acute hypoxemic resp failure 2/2 covid pna  doubt superimposed PNA nl procal  HO ILD?  HO RA on pred    PLAN:    CNS: avoid sedatives    HEENT: Oral care    PULMONARY:  HOB @ 45 degrees.  Aspiration precautions. Wean oxygen as tolerated, Goal Pox >92%.  CXR BIPAP alternate with HHFNC, decadron    CARDIOVASCULAR: Keep I=O, goal Map >65 po lasix, hold lisinopril    GI: GI prophylaxis.  Feeding.  Bowel regimen     RENAL:  Follow up lytes.  Correct as needed    INFECTIOUS DISEASE: Follow up cultures. Monitor procal. Dexamethaxone 6mg iv q24h, remdesivir, f/up infl markers    HEMATOLOGICAL:  DVT prophylaxis. LOVENOX 40 Q 12H    ENDOCRINE:  Follow up FS.  Insulin protocol if needed.    MUSCULOSKELETAL: OOBTC    Dispo: Stepdown Unit    poor proGNOSIS

## 2020-11-09 NOTE — PROGRESS NOTE ADULT - SUBJECTIVE AND OBJECTIVE BOX
JOSÉ LUIS AREVALO  59y, Male    All available historical data reviewed    OVERNIGHT EVENTS:  no fevers  HFNC    ROS:  General: Denies rigors, nightsweats  HEENT: Denies headache, rhinorrhea, sore throat, eye pain  CV: Denies CP, palpitations  PULM: Denies wheezing, hemoptysis  GI: Denies hematemesis, hematochezia, melena  : Denies discharge, hematuria  MSK: Denies arthralgias, myalgias  SKIN: Denies rash, lesions  NEURO: Denies paresthesias, weakness  PSYCH: Denies depression, anxiety    VITALS:  T(F): 97.1, Max: 97.9 (11-08-20 @ 12:30)  HR: 87  BP: 126/78  RR: 18Vital Signs Last 24 Hrs  T(C): 36.2 (09 Nov 2020 08:09), Max: 36.6 (08 Nov 2020 12:30)  T(F): 97.1 (09 Nov 2020 08:09), Max: 97.9 (08 Nov 2020 12:30)  HR: 87 (09 Nov 2020 08:09) (62 - 98)  BP: 126/78 (09 Nov 2020 08:09) (112/73 - 135/76)  BP(mean): 94 (08 Nov 2020 16:05) (94 - 94)  RR: 18 (09 Nov 2020 08:09) (18 - 20)  SpO2: 99% (09 Nov 2020 08:56) (94% - 99%)    TESTS & MEASUREMENTS:                        14.5   10.94 )-----------( 385      ( 09 Nov 2020 08:45 )             45.8     11-09    138  |  101  |  22<H>  ----------------------------<  146<H>  4.6   |  28  |  0.8    Ca    9.3      09 Nov 2020 08:45  Mg     1.9     11-08    TPro  6.5  /  Alb  3.5  /  TBili  0.4  /  DBili  x   /  AST  17  /  ALT  36  /  AlkPhos  97  11-09    LIVER FUNCTIONS - ( 09 Nov 2020 08:45 )  Alb: 3.5 g/dL / Pro: 6.5 g/dL / ALK PHOS: 97 U/L / ALT: 36 U/L / AST: 17 U/L / GGT: x                   RADIOLOGY & ADDITIONAL TESTS:  Personal review of radiological diagnostics performed  Echo and EKG results noted when applicable.     MEDICATIONS:  dexAMETHasone     Tablet 6 milliGRAM(s) Oral daily  dextrose 40% Gel 15 Gram(s) Oral once PRN  dextrose 5%. 1000 milliLiter(s) IV Continuous <Continuous>  dextrose 50% Injectable 12.5 Gram(s) IV Push once  dextrose 50% Injectable 25 Gram(s) IV Push once  dextrose 50% Injectable 25 Gram(s) IV Push once  enoxaparin Injectable 40 milliGRAM(s) SubCutaneous every 12 hours  glucagon  Injectable 1 milliGRAM(s) IntraMuscular once PRN  guaiFENesin ER 1200 milliGRAM(s) Oral every 12 hours  guaifenesin/dextromethorphan  Syrup 10 milliLiter(s) Oral every 6 hours PRN  influenza   Vaccine 0.5 milliLiter(s) IntraMuscular once  insulin glargine Injectable (LANTUS) 16 Unit(s) SubCutaneous at bedtime  insulin lispro (ADMELOG) corrective regimen sliding scale   SubCutaneous three times a day before meals  insulin lispro Injectable (ADMELOG) 5 Unit(s) SubCutaneous three times a day before meals  pantoprazole    Tablet 40 milliGRAM(s) Oral before breakfast      ANTIBIOTICS:

## 2020-11-09 NOTE — PROGRESS NOTE ADULT - ASSESSMENT
58 YO M with PMHx of GIST (s/p resection), DM type II (on metformin), RA (on prednisone), HTN, HLD, herniated disc, presented to the ED with c/c of cough and SOB x 3 days admitted to medicine for treatment of Covid pneumonia    #Acute Hypoxic Respiratory Failure 2/2  #COVID-19 Pneumonia   - O2 Sat on HFNC (down to 50L 70% today) Keep above 94%  - CT Angio Chest PE Protocol w/ IV Cont was performed which showed worsening scattered patchy bilateral ground glass opacities, peripherally dominant, compatible with infectious/inflammatory etiology. No pulmonary embolus.  - Follow inflammatory markers (ESR, CRP, D-dimer, ferritin, LDH).  -s/p 2 units Plasma y'day  - c/w Dexamethasone 6mg PO Qd, Remdesivir   - f/u ID recommendations regarding Toci today  - f/u am CXR      #Rising D-dimer in the setting of #Covid pneumonia   - D-dimer on admission 287- d-dimer now 484  - covid pneumonia--> heightened risk of blood clot  - Increase to therapeutic Lovenox (40 BID)  - Lower extremity duplex if d-dimer markedly elevates    #RA  - not in acute flair  - Will hold prednisone for now since patient on dexamethasone   -c/w Allopurinol 300 daily    #HTN  - Home medication Lisinopril 40 mg PO QD  - Ptn to be diuresed w PO Lasix today  - hold Lisinopril today   - Restart tomorrow depending on am renal function and blood pressure post-Lasix  - continue to monitor    #DM type II  - On metformin at home, continue to hold for now  - Monitor FS. Start insulin if FS > 180. Keep between 140 - 180 (120-146 today)  - Start Lispro once patient resumes meals     #Misc  - DVT Prophylaxis: Lovenox 40 BID  - Diet: DASH/TLC, Carb consistent   - GI Prophylaxis: Pantoprazole   - Activity: AAT  - Code Status: Full Code    Dispo: Still acute.

## 2020-11-09 NOTE — PROGRESS NOTE ADULT - SUBJECTIVE AND OBJECTIVE BOX
Patient was seen and examined. Spoke with RN. Chart reviewed.  No events overnight. Still on high flow O2   Vital Signs Last 24 Hrs  T(F): 97.1 (09 Nov 2020 08:09), Max: 97.9 (08 Nov 2020 12:30)  HR: 87 (09 Nov 2020 08:09) (62 - 98)  BP: 126/78 (09 Nov 2020 08:09) (112/73 - 145/86)  SpO2: 97% (09 Nov 2020 08:09) (93% - 99%)  MEDICATIONS  (STANDING):  dexAMETHasone     Tablet 6 milliGRAM(s) Oral daily  dextrose 5%. 1000 milliLiter(s) (50 mL/Hr) IV Continuous <Continuous>  dextrose 50% Injectable 12.5 Gram(s) IV Push once  dextrose 50% Injectable 25 Gram(s) IV Push once  dextrose 50% Injectable 25 Gram(s) IV Push once  enoxaparin Injectable 40 milliGRAM(s) SubCutaneous daily  guaiFENesin ER 1200 milliGRAM(s) Oral every 12 hours  influenza   Vaccine 0.5 milliLiter(s) IntraMuscular once  insulin glargine Injectable (LANTUS) 16 Unit(s) SubCutaneous at bedtime  insulin lispro (ADMELOG) corrective regimen sliding scale   SubCutaneous three times a day before meals  insulin lispro Injectable (ADMELOG) 5 Unit(s) SubCutaneous three times a day before meals  lisinopril 40 milliGRAM(s) Oral daily  pantoprazole    Tablet 40 milliGRAM(s) Oral before breakfast  remdesivir  IVPB   IV Intermittent   remdesivir  IVPB 100 milliGRAM(s) IV Intermittent every 24 hours    MEDICATIONS  (PRN):  dextrose 40% Gel 15 Gram(s) Oral once PRN Blood Glucose LESS THAN 70 milliGRAM(s)/deciliter  glucagon  Injectable 1 milliGRAM(s) IntraMuscular once PRN Glucose LESS THAN 70 milligrams/deciliter  guaifenesin/dextromethorphan  Syrup 10 milliLiter(s) Oral every 6 hours PRN Cough    Labs:                        13.1   8.67  )-----------( 334      ( 08 Nov 2020 07:40 )             41.3     08 Nov 2020 07:40    139    |  101    |  19     ----------------------------<  117    4.0     |  27     |  0.8      Ca    9.0        08 Nov 2020 07:40  Mg     1.9       08 Nov 2020 07:40    TPro  5.8    /  Alb  3.2    /  TBili  0.4    /  DBili  x      /  AST  17     /  ALT  32     /  AlkPhos  96     08 Nov 2020 07:40          General: comfortable, NAD  exam unchanged- in good spirits      A/P:  58 YO M with PMHx of GIST (s/p resection), DM type II (on metformin), RA (on prednisone), HTN, HLD, herniated disc, admitted with Covid pneumonia    pulm/ICU f/u- Dr Pelletier    ID f/u- Dr Crowder     IV dexamathasone    IV remdesivir    plasma given 11/6    O2 as needed- on high flow    lovenox- full dose    monitor inflammatory markers     ICU transfer if any change in status  DVT prophylaxis  Decubitus prevention- all measures as per RN protocol  Please call or text me with any questions or updates

## 2020-11-09 NOTE — PROGRESS NOTE ADULT - SUBJECTIVE AND OBJECTIVE BOX
OVERNIGHT EVENTS: events noted, on HHFNC, afebrile    Vital Signs Last 24 Hrs  T(C): 36.4 (09 Nov 2020 05:00), Max: 36.6 (08 Nov 2020 12:30)  T(F): 97.5 (09 Nov 2020 05:00), Max: 97.9 (08 Nov 2020 12:30)  HR: 69 (09 Nov 2020 05:00) (62 - 98)  BP: 121/77 (09 Nov 2020 05:00) (112/73 - 145/86)  BP(mean): 94 (08 Nov 2020 16:05) (94 - 94)  RR: 18 (09 Nov 2020 05:00) (18 - 20)  SpO2: 97% (09 Nov 2020 05:31) (93% - 99%)    PHYSICAL EXAMINATION:    GENERAL: The patient is awake and alert in no apparent distress.     HEENT: Head is normocephalic and atraumatic. Extraocular muscles are intact. Mucous membranes are moist.    NECK: Supple.    LUNGS: bl rhonchi    HEART: Regular rate and rhythm without murmur.    ABDOMEN: Soft, nontender, and nondistended.      EXTREMITIES: Without any cyanosis, clubbing, rash, lesions or edema.    NEUROLOGIC: Grossly intact.    SKIN: No ulceration or induration present.      LABS:                        13.1   8.67  )-----------( 334      ( 08 Nov 2020 07:40 )             41.3     11-08    139  |  101  |  19  ----------------------------<  117<H>  4.0   |  27  |  0.8    Ca    9.0      08 Nov 2020 07:40  Mg     1.9     11-08    TPro  5.8<L>  /  Alb  3.2<L>  /  TBili  0.4  /  DBili  x   /  AST  17  /  ALT  32  /  AlkPhos  96  11-08              D-Dimer Assay, Quantitative: 484 ng/mL DDU (11-08-20 @ 07:40)              11-08-20 @ 07:01  -  11-09-20 @ 07:00  --------------------------------------------------------  IN: 0 mL / OUT: 1450 mL / NET: -1450 mL        MICROBIOLOGY:      MEDICATIONS  (STANDING):  dexAMETHasone     Tablet 6 milliGRAM(s) Oral daily  dextrose 5%. 1000 milliLiter(s) (50 mL/Hr) IV Continuous <Continuous>  dextrose 50% Injectable 12.5 Gram(s) IV Push once  dextrose 50% Injectable 25 Gram(s) IV Push once  dextrose 50% Injectable 25 Gram(s) IV Push once  enoxaparin Injectable 40 milliGRAM(s) SubCutaneous daily  guaiFENesin ER 1200 milliGRAM(s) Oral every 12 hours  influenza   Vaccine 0.5 milliLiter(s) IntraMuscular once  insulin glargine Injectable (LANTUS) 16 Unit(s) SubCutaneous at bedtime  insulin lispro (ADMELOG) corrective regimen sliding scale   SubCutaneous three times a day before meals  insulin lispro Injectable (ADMELOG) 5 Unit(s) SubCutaneous three times a day before meals  lisinopril 40 milliGRAM(s) Oral daily  pantoprazole    Tablet 40 milliGRAM(s) Oral before breakfast  remdesivir  IVPB   IV Intermittent   remdesivir  IVPB 100 milliGRAM(s) IV Intermittent every 24 hours    MEDICATIONS  (PRN):  dextrose 40% Gel 15 Gram(s) Oral once PRN Blood Glucose LESS THAN 70 milliGRAM(s)/deciliter  glucagon  Injectable 1 milliGRAM(s) IntraMuscular once PRN Glucose LESS THAN 70 milligrams/deciliter  guaifenesin/dextromethorphan  Syrup 10 milliLiter(s) Oral every 6 hours PRN Cough      RADIOLOGY & ADDITIONAL STUDIES:         OVERNIGHT EVENTS: events noted, on HHFNC 80%, afebrile    Vital Signs Last 24 Hrs  T(C): 36.4 (09 Nov 2020 05:00), Max: 36.6 (08 Nov 2020 12:30)  T(F): 97.5 (09 Nov 2020 05:00), Max: 97.9 (08 Nov 2020 12:30)  HR: 69 (09 Nov 2020 05:00) (62 - 98)  BP: 121/77 (09 Nov 2020 05:00) (112/73 - 145/86)  BP(mean): 94 (08 Nov 2020 16:05) (94 - 94)  RR: 18 (09 Nov 2020 05:00) (18 - 20)  SpO2: 97% (09 Nov 2020 05:31) (93% - 99%)    PHYSICAL EXAMINATION:    GENERAL: The patient is awake and alert in no apparent distress.     HEENT: Head is normocephalic and atraumatic. Extraocular muscles are intact. Mucous membranes are moist.    NECK: Supple.    LUNGS: bl rhonchi    HEART: Regular rate and rhythm without murmur.    ABDOMEN: Soft, nontender, and nondistended.      EXTREMITIES: Without any cyanosis, clubbing, rash, lesions or edema.    NEUROLOGIC: Grossly intact.    SKIN: No ulceration or induration present.      LABS:                        13.1   8.67  )-----------( 334      ( 08 Nov 2020 07:40 )             41.3     11-08    139  |  101  |  19  ----------------------------<  117<H>  4.0   |  27  |  0.8    Ca    9.0      08 Nov 2020 07:40  Mg     1.9     11-08    TPro  5.8<L>  /  Alb  3.2<L>  /  TBili  0.4  /  DBili  x   /  AST  17  /  ALT  32  /  AlkPhos  96  11-08              D-Dimer Assay, Quantitative: 484 ng/mL DDU (11-08-20 @ 07:40)              11-08-20 @ 07:01  -  11-09-20 @ 07:00  --------------------------------------------------------  IN: 0 mL / OUT: 1450 mL / NET: -1450 mL        MICROBIOLOGY:      MEDICATIONS  (STANDING):  dexAMETHasone     Tablet 6 milliGRAM(s) Oral daily  dextrose 5%. 1000 milliLiter(s) (50 mL/Hr) IV Continuous <Continuous>  dextrose 50% Injectable 12.5 Gram(s) IV Push once  dextrose 50% Injectable 25 Gram(s) IV Push once  dextrose 50% Injectable 25 Gram(s) IV Push once  enoxaparin Injectable 40 milliGRAM(s) SubCutaneous daily  guaiFENesin ER 1200 milliGRAM(s) Oral every 12 hours  influenza   Vaccine 0.5 milliLiter(s) IntraMuscular once  insulin glargine Injectable (LANTUS) 16 Unit(s) SubCutaneous at bedtime  insulin lispro (ADMELOG) corrective regimen sliding scale   SubCutaneous three times a day before meals  insulin lispro Injectable (ADMELOG) 5 Unit(s) SubCutaneous three times a day before meals  lisinopril 40 milliGRAM(s) Oral daily  pantoprazole    Tablet 40 milliGRAM(s) Oral before breakfast  remdesivir  IVPB   IV Intermittent   remdesivir  IVPB 100 milliGRAM(s) IV Intermittent every 24 hours    MEDICATIONS  (PRN):  dextrose 40% Gel 15 Gram(s) Oral once PRN Blood Glucose LESS THAN 70 milliGRAM(s)/deciliter  glucagon  Injectable 1 milliGRAM(s) IntraMuscular once PRN Glucose LESS THAN 70 milligrams/deciliter  guaifenesin/dextromethorphan  Syrup 10 milliLiter(s) Oral every 6 hours PRN Cough      RADIOLOGY & ADDITIONAL STUDIES:

## 2020-11-10 ENCOUNTER — APPOINTMENT (OUTPATIENT)
Dept: RHEUMATOLOGY | Facility: CLINIC | Age: 60
End: 2020-11-10

## 2020-11-10 LAB
ALBUMIN SERPL ELPH-MCNC: 3.4 G/DL — LOW (ref 3.5–5.2)
ALP SERPL-CCNC: 96 U/L — SIGNIFICANT CHANGE UP (ref 30–115)
ALT FLD-CCNC: 33 U/L — SIGNIFICANT CHANGE UP (ref 0–41)
ANION GAP SERPL CALC-SCNC: 10 MMOL/L — SIGNIFICANT CHANGE UP (ref 7–14)
AST SERPL-CCNC: 16 U/L — SIGNIFICANT CHANGE UP (ref 0–41)
BILIRUB SERPL-MCNC: 0.4 MG/DL — SIGNIFICANT CHANGE UP (ref 0.2–1.2)
BUN SERPL-MCNC: 23 MG/DL — HIGH (ref 10–20)
CALCIUM SERPL-MCNC: 9 MG/DL — SIGNIFICANT CHANGE UP (ref 8.5–10.1)
CHLORIDE SERPL-SCNC: 104 MMOL/L — SIGNIFICANT CHANGE UP (ref 98–110)
CO2 SERPL-SCNC: 25 MMOL/L — SIGNIFICANT CHANGE UP (ref 17–32)
CREAT SERPL-MCNC: 0.8 MG/DL — SIGNIFICANT CHANGE UP (ref 0.7–1.5)
CRP SERPL-MCNC: 1.38 MG/DL — HIGH (ref 0–0.4)
D DIMER BLD IA.RAPID-MCNC: 444 NG/ML DDU — HIGH (ref 0–230)
GLUCOSE BLDC GLUCOMTR-MCNC: 198 MG/DL — HIGH (ref 70–99)
GLUCOSE BLDC GLUCOMTR-MCNC: 239 MG/DL — HIGH (ref 70–99)
GLUCOSE BLDC GLUCOMTR-MCNC: 295 MG/DL — HIGH (ref 70–99)
GLUCOSE BLDC GLUCOMTR-MCNC: 97 MG/DL — SIGNIFICANT CHANGE UP (ref 70–99)
GLUCOSE SERPL-MCNC: 120 MG/DL — HIGH (ref 70–99)
HCT VFR BLD CALC: 45.2 % — SIGNIFICANT CHANGE UP (ref 42–52)
HGB BLD-MCNC: 14.7 G/DL — SIGNIFICANT CHANGE UP (ref 14–18)
MAGNESIUM SERPL-MCNC: 1.9 MG/DL — SIGNIFICANT CHANGE UP (ref 1.8–2.4)
MCHC RBC-ENTMCNC: 29.3 PG — SIGNIFICANT CHANGE UP (ref 27–31)
MCHC RBC-ENTMCNC: 32.5 G/DL — SIGNIFICANT CHANGE UP (ref 32–37)
MCV RBC AUTO: 90 FL — SIGNIFICANT CHANGE UP (ref 80–94)
NRBC # BLD: 0 /100 WBCS — SIGNIFICANT CHANGE UP (ref 0–0)
PLATELET # BLD AUTO: 375 K/UL — SIGNIFICANT CHANGE UP (ref 130–400)
POTASSIUM SERPL-MCNC: 4.2 MMOL/L — SIGNIFICANT CHANGE UP (ref 3.5–5)
POTASSIUM SERPL-SCNC: 4.2 MMOL/L — SIGNIFICANT CHANGE UP (ref 3.5–5)
PROCALCITONIN SERPL-MCNC: 0.03 NG/ML — SIGNIFICANT CHANGE UP (ref 0.02–0.1)
PROT SERPL-MCNC: 6 G/DL — SIGNIFICANT CHANGE UP (ref 6–8)
RBC # BLD: 5.02 M/UL — SIGNIFICANT CHANGE UP (ref 4.7–6.1)
RBC # FLD: 15.2 % — HIGH (ref 11.5–14.5)
SODIUM SERPL-SCNC: 139 MMOL/L — SIGNIFICANT CHANGE UP (ref 135–146)
WBC # BLD: 9.91 K/UL — SIGNIFICANT CHANGE UP (ref 4.8–10.8)
WBC # FLD AUTO: 9.91 K/UL — SIGNIFICANT CHANGE UP (ref 4.8–10.8)

## 2020-11-10 PROCEDURE — 93970 EXTREMITY STUDY: CPT | Mod: 26

## 2020-11-10 PROCEDURE — 71045 X-RAY EXAM CHEST 1 VIEW: CPT | Mod: 26

## 2020-11-10 RX ORDER — DEXAMETHASONE 0.5 MG/5ML
6 ELIXIR ORAL DAILY
Refills: 0 | Status: DISCONTINUED | OUTPATIENT
Start: 2020-11-11 | End: 2020-11-18

## 2020-11-10 RX ORDER — DEXAMETHASONE 0.5 MG/5ML
6 ELIXIR ORAL DAILY
Refills: 0 | Status: DISCONTINUED | OUTPATIENT
Start: 2020-11-10 | End: 2020-11-10

## 2020-11-10 RX ADMIN — ENOXAPARIN SODIUM 40 MILLIGRAM(S): 100 INJECTION SUBCUTANEOUS at 07:01

## 2020-11-10 RX ADMIN — Medication 6 MILLIGRAM(S): at 07:00

## 2020-11-10 RX ADMIN — Medication 5 UNIT(S): at 12:09

## 2020-11-10 RX ADMIN — Medication 1200 MILLIGRAM(S): at 17:16

## 2020-11-10 RX ADMIN — Medication 2: at 17:16

## 2020-11-10 RX ADMIN — ENOXAPARIN SODIUM 40 MILLIGRAM(S): 100 INJECTION SUBCUTANEOUS at 17:17

## 2020-11-10 RX ADMIN — Medication 5 UNIT(S): at 17:16

## 2020-11-10 RX ADMIN — INSULIN GLARGINE 16 UNIT(S): 100 INJECTION, SOLUTION SUBCUTANEOUS at 22:07

## 2020-11-10 RX ADMIN — Medication 3: at 12:09

## 2020-11-10 RX ADMIN — PANTOPRAZOLE SODIUM 40 MILLIGRAM(S): 20 TABLET, DELAYED RELEASE ORAL at 07:00

## 2020-11-10 RX ADMIN — Medication 1200 MILLIGRAM(S): at 07:00

## 2020-11-10 NOTE — PROGRESS NOTE ADULT - ASSESSMENT
IMPRESSION:    Acute hypoxemic resp failure 2/2 covid pna  doubt superimposed PNA nl procal  HO ILD?  HO RA on pred    PLAN:    CNS: avoid sedatives    HEENT: Oral care    PULMONARY:  HOB @ 45 degrees.  Aspiration precautions. Wean oxygen as tolerated, Goal Pox >92%.  CXR BIPAP alternate with HHFNC, decadron    CARDIOVASCULAR: Keep I=O, goal Map >65 po lasix, hold lisinopril    GI: GI prophylaxis.  Feeding.  Bowel regimen     RENAL:  Follow up lytes.  Correct as needed    INFECTIOUS DISEASE: Follow up cultures. Monitor procal. Dexamethaxone 6mg iv q24h, remdesivir, f/up infl markers    HEMATOLOGICAL:  DVT prophylaxis. LOVENOX 40 Q 12H, LE doppler    ENDOCRINE:  Follow up FS.  Insulin protocol if needed.    MUSCULOSKELETAL: OOBTC    Dispo: Stepdown Unit    poor proGNOSIS     IMPRESSION:    Acute hypoxemic resp failure 2/2 covid pna  doubt superimposed PNA nl procal  HO ILD?  HO RA on pred    PLAN:    CNS: avoid sedatives    HEENT: Oral care    PULMONARY:  HOB @ 45 degrees.  Aspiration precautions. Wean oxygen as tolerated, Goal Pox >92%.  CXR BIPAP alternate with HHFNC dec to 60%, decadron    CARDIOVASCULAR: Keep I=O, goal Map >65 po lasix, hold lisinopril    GI: GI prophylaxis.  Feeding.  Bowel regimen     RENAL:  Follow up lytes.  Correct as needed    INFECTIOUS DISEASE: Follow up cultures. Monitor procal. Dexamethaxone 6mg iv q24h, remdesivir, f/up infl markers    HEMATOLOGICAL:  DVT prophylaxis. LOVENOX 40 Q 12H, LE doppler    ENDOCRINE:  Follow up FS.  Insulin protocol if needed.    MUSCULOSKELETAL: OOBTC    Dispo: Stepdown Unit    poor proGNOSIS

## 2020-11-10 NOTE — PROGRESS NOTE ADULT - SUBJECTIVE AND OBJECTIVE BOX
Patient was seen and examined. Spoke with RN. Chart reviewed.  No events overnight. Says he is feeling a bit better, still on high flow O2  Vital Signs Last 24 Hrs  T(F): 97.6 (10 Nov 2020 07:55), Max: 98.6 (09 Nov 2020 13:32)  HR: 86 (10 Nov 2020 07:55) (66 - 86)  BP: 112/72 (10 Nov 2020 07:55) (112/72 - 120/76)  SpO2: 96% (10 Nov 2020 07:55) (95% - 99%)  MEDICATIONS  (STANDING):  dexAMETHasone  IVPB 6 milliGRAM(s) IV Intermittent daily  dextrose 5%. 1000 milliLiter(s) (50 mL/Hr) IV Continuous <Continuous>  dextrose 50% Injectable 12.5 Gram(s) IV Push once  dextrose 50% Injectable 25 Gram(s) IV Push once  dextrose 50% Injectable 25 Gram(s) IV Push once  enoxaparin Injectable 40 milliGRAM(s) SubCutaneous every 12 hours  guaiFENesin ER 1200 milliGRAM(s) Oral every 12 hours  influenza   Vaccine 0.5 milliLiter(s) IntraMuscular once  insulin glargine Injectable (LANTUS) 16 Unit(s) SubCutaneous at bedtime  insulin lispro (ADMELOG) corrective regimen sliding scale   SubCutaneous three times a day before meals  insulin lispro Injectable (ADMELOG) 5 Unit(s) SubCutaneous three times a day before meals  pantoprazole    Tablet 40 milliGRAM(s) Oral before breakfast    MEDICATIONS  (PRN):  dextrose 40% Gel 15 Gram(s) Oral once PRN Blood Glucose LESS THAN 70 milliGRAM(s)/deciliter  glucagon  Injectable 1 milliGRAM(s) IntraMuscular once PRN Glucose LESS THAN 70 milligrams/deciliter  guaifenesin/dextromethorphan  Syrup 10 milliLiter(s) Oral every 6 hours PRN Cough    Labs:                        14.5   10.94 )-----------( 385      ( 09 Nov 2020 08:45 )             45.8     09 Nov 2020 08:45    138    |  101    |  22     ----------------------------<  146    4.6     |  28     |  0.8      Ca    9.3        09 Nov 2020 08:45    TPro  6.5    /  Alb  3.5    /  TBili  0.4    /  DBili  x      /  AST  17     /  ALT  36     /  AlkPhos  97     09 Nov 2020 08:45          General: comfortable, NAD  Neurology: A&Ox3, nonfocal  Head:  Normocephalic, atraumatic  ENT:  Mucosa moist, no ulcerations  Neck:  Supple, no JVD,   Skin: no breakdowns (as per RN)  Resp: CTA B/L  CV: RRR, S1S2,   GI: Soft, NT, bowel sounds  MS: No edema, + peripheral pulses, FROM all 4 extremity      A/P:  58 YO M with PMHx of GIST (s/p resection), DM type II (on metformin), RA (on prednisone), HTN, HLD, herniated disc, admitted with Covid pneumonia    pulm/ICU f/u- Dr Pelletier    ID f/u- Dr Crowder     IV dexamathasone    IV remdesivir    plasma given 11/6    O2 as needed- on high flow    lovenox- full dose    monitor inflammatory markers     ICU transfer if any change in status  DVT prophylaxis  Decubitus prevention- all measures as per RN protocol  Please call or text me with any questions or updates

## 2020-11-10 NOTE — PROGRESS NOTE ADULT - ASSESSMENT
60 YO M with PMHx of GIST (s/p resection), DM type II (on metformin), RA (on prednisone), HTN, HLD, herniated disc, presented to the ED with c/c of cough and SOB x 3 days admitted to medicine for treatment of Covid pneumonia    #Acute Hypoxic Respiratory Failure 2/2  #COVID-19 Pneumonia   - O2 Sat on HFNC (down to 50L 60% today) Keep above 94%  -Chest X-ray this am showing worsening opacities b/l-->anticipated natural history of Covid infection  - CT Angio Chest PE Protocol w/ IV Cont was performed which showed worsening scattered patchy bilateral ground glass opacities, peripherally dominant, compatible with infectious/inflammatory etiology. No pulmonary embolus.  - Follow inflammatory markers (ESR, CRP, D-dimer, ferritin, LDH).  -s/p 2 units Plasma 11/6 s/p 5 day course Remdesivir completed 11/09  - c/w Dexamethasone 6mg PO Qd  - f/u ID recommendations        #Rising D-dimer in the setting of #Covid pneumonia   - D-dimer on admission 287-> 484-->444 today  - covid pneumonia--> heightened risk of blood clot  - Increase to therapeutic Lovenox (40 BID)  - Lower extremity duplex ordered today --> f/u  - D-dimer not increasing for now--> continue to trend q48h      #RA  - not in acute flair  - Will hold prednisone for now since patient on dexamethasone   -c/w Allopurinol 300 daily    #HTN  - Home medication Lisinopril 40 mg PO QD  - Ptn diuresed w PO Lasix y/day  - Cr stable at 0.8 s/p Lasix  - continue holding Lisinopril  - continue to monitor BP/ Cr    #DM type II  - On metformin at home, continue to hold for now  - Monitor FS. Start insulin if FS > 180. Keep between 140 - 180 (120-146 today)  - c/w insulin Lantus 16 units at bedtime  - c/w Lispro 5 mg with meals    #Misc  - DVT Prophylaxis: Lovenox 40 BID  - Diet: DASH/TLC, Carb consistent   - GI Prophylaxis: Pantoprazole   - Activity: AAT  - Code Status: Full Code    Dispo: Still acute.

## 2020-11-10 NOTE — PROGRESS NOTE ADULT - SUBJECTIVE AND OBJECTIVE BOX
Hospital day 5. Ptn seen and examined at bedside. Reports no acute events overnight. Ptn endorses continued improvement of pneumonia symptoms and has no new complaints.    PAST MEDICAL & SURGICAL HISTORY  Hyperlipidemia    Lumbago    Hypertension    H/O lymph node biopsy  Neck by Dr Case      SOCIAL HISTORY:    ALLERGIES:  penicillin (Unknown)  shellfish (Pruritus)    MEDICATIONS:  STANDING MEDICATIONS  dexAMETHasone  IVPB 6 milliGRAM(s) IV Intermittent daily  dextrose 5%. 1000 milliLiter(s) IV Continuous <Continuous>  dextrose 50% Injectable 12.5 Gram(s) IV Push once  dextrose 50% Injectable 25 Gram(s) IV Push once  dextrose 50% Injectable 25 Gram(s) IV Push once  enoxaparin Injectable 40 milliGRAM(s) SubCutaneous every 12 hours  guaiFENesin ER 1200 milliGRAM(s) Oral every 12 hours  influenza   Vaccine 0.5 milliLiter(s) IntraMuscular once  insulin glargine Injectable (LANTUS) 16 Unit(s) SubCutaneous at bedtime  insulin lispro (ADMELOG) corrective regimen sliding scale   SubCutaneous three times a day before meals  insulin lispro Injectable (ADMELOG) 5 Unit(s) SubCutaneous three times a day before meals  pantoprazole    Tablet 40 milliGRAM(s) Oral before breakfast    PRN MEDICATIONS  dextrose 40% Gel 15 Gram(s) Oral once PRN  glucagon  Injectable 1 milliGRAM(s) IntraMuscular once PRN  guaifenesin/dextromethorphan  Syrup 10 milliLiter(s) Oral every 6 hours PRN    VITALS:   T(F): 97.6  HR: 86  BP: 112/72  RR: 20  SpO2: 96%    LABS:                        14.7   9.91  )-----------( 375      ( 10 Nov 2020 08:50 )             45.2     11-09    138  |  101  |  22<H>  ----------------------------<  146<H>  4.6   |  28  |  0.8    Ca    9.3      09 Nov 2020 08:45    TPro  6.5  /  Alb  3.5  /  TBili  0.4  /  DBili  x   /  AST  17  /  ALT  36  /  AlkPhos  97  11-09      RADIOLOGY: pending today's CXR    PHYSICAL EXAM:  GEN: No acute distress well nourished, well developed male on HFNC sat 93%  LUNGS: decreased breath sounds b/l, no wheezes/ rales/ rhonchi   HEART: Regular  ABD: Soft, non-tender, non-distended.  EXT: NC/NC/NE/2+PP/CARREON/Skin Intact.   NEURO: AAOX3

## 2020-11-10 NOTE — PROGRESS NOTE ADULT - SUBJECTIVE AND OBJECTIVE BOX
OVERNIGHT EVENTS: events noted, still on HHFNC, aferile    Vital Signs Last 24 Hrs  T(C): 36.4 (09 Nov 2020 21:50), Max: 37 (09 Nov 2020 13:32)  T(F): 97.6 (09 Nov 2020 21:50), Max: 98.6 (09 Nov 2020 13:32)  HR: 75 (09 Nov 2020 21:50) (66 - 87)  BP: 120/76 (09 Nov 2020 21:50) (117/76 - 126/78)  RR: 20 (09 Nov 2020 21:50) (18 - 20)  SpO2: 98% (09 Nov 2020 21:50) (95% - 99%)    PHYSICAL EXAMINATION:    GENERAL: comfortable    HEENT: Head is normocephalic and atraumatic. Extraocular muscles are intact. Mucous membranes are moist.    NECK: Supple.    LUNGS: bl crackles    HEART: Regular rate and rhythm without murmur.    ABDOMEN: Soft, nontender, and nondistended.      EXTREMITIES: Without any cyanosis, clubbing, rash, lesions or edema.    NEUROLOGIC: Grossly intact.    SKIN: No ulceration or induration present.      LABS:                        14.5   10.94 )-----------( 385      ( 09 Nov 2020 08:45 )             45.8     11-09    138  |  101  |  22<H>  ----------------------------<  146<H>  4.6   |  28  |  0.8    Ca    9.3      09 Nov 2020 08:45  Mg     1.9     11-08    TPro  6.5  /  Alb  3.5  /  TBili  0.4  /  DBili  x   /  AST  17  /  ALT  36  /  AlkPhos  97  11-09                    Procalcitonin, Serum: 0.04 ng/mL (11-08-20 @ 07:40)        11-09-20 @ 07:01  -  11-10-20 @ 07:00  --------------------------------------------------------  IN: 790 mL / OUT: 1925 mL / NET: -1135 mL        MICROBIOLOGY:      MEDICATIONS  (STANDING):  dexAMETHasone     Tablet 6 milliGRAM(s) Oral daily  dextrose 5%. 1000 milliLiter(s) (50 mL/Hr) IV Continuous <Continuous>  dextrose 50% Injectable 12.5 Gram(s) IV Push once  dextrose 50% Injectable 25 Gram(s) IV Push once  dextrose 50% Injectable 25 Gram(s) IV Push once  enoxaparin Injectable 40 milliGRAM(s) SubCutaneous every 12 hours  guaiFENesin ER 1200 milliGRAM(s) Oral every 12 hours  influenza   Vaccine 0.5 milliLiter(s) IntraMuscular once  insulin glargine Injectable (LANTUS) 16 Unit(s) SubCutaneous at bedtime  insulin lispro (ADMELOG) corrective regimen sliding scale   SubCutaneous three times a day before meals  insulin lispro Injectable (ADMELOG) 5 Unit(s) SubCutaneous three times a day before meals  pantoprazole    Tablet 40 milliGRAM(s) Oral before breakfast    MEDICATIONS  (PRN):  dextrose 40% Gel 15 Gram(s) Oral once PRN Blood Glucose LESS THAN 70 milliGRAM(s)/deciliter  glucagon  Injectable 1 milliGRAM(s) IntraMuscular once PRN Glucose LESS THAN 70 milligrams/deciliter  guaifenesin/dextromethorphan  Syrup 10 milliLiter(s) Oral every 6 hours PRN Cough      RADIOLOGY & ADDITIONAL STUDIES:         OVERNIGHT EVENTS: events noted, still on HHFNC 70%, afebrile    Vital Signs Last 24 Hrs  T(C): 36.4 (09 Nov 2020 21:50), Max: 37 (09 Nov 2020 13:32)  T(F): 97.6 (09 Nov 2020 21:50), Max: 98.6 (09 Nov 2020 13:32)  HR: 75 (09 Nov 2020 21:50) (66 - 87)  BP: 120/76 (09 Nov 2020 21:50) (117/76 - 126/78)  RR: 20 (09 Nov 2020 21:50) (18 - 20)  SpO2: 98% (09 Nov 2020 21:50) (95% - 99%)    PHYSICAL EXAMINATION:    GENERAL: comfortable    HEENT: Head is normocephalic and atraumatic. Extraocular muscles are intact. Mucous membranes are moist.    NECK: Supple.    LUNGS: bl crackles    HEART: Regular rate and rhythm without murmur.    ABDOMEN: Soft, nontender, and nondistended.      EXTREMITIES: Without any cyanosis, clubbing, rash, lesions or edema.    NEUROLOGIC: Grossly intact.    SKIN: No ulceration or induration present.      LABS:                        14.5   10.94 )-----------( 385      ( 09 Nov 2020 08:45 )             45.8     11-09    138  |  101  |  22<H>  ----------------------------<  146<H>  4.6   |  28  |  0.8    Ca    9.3      09 Nov 2020 08:45  Mg     1.9     11-08    TPro  6.5  /  Alb  3.5  /  TBili  0.4  /  DBili  x   /  AST  17  /  ALT  36  /  AlkPhos  97  11-09                    Procalcitonin, Serum: 0.04 ng/mL (11-08-20 @ 07:40)        11-09-20 @ 07:01  -  11-10-20 @ 07:00  --------------------------------------------------------  IN: 790 mL / OUT: 1925 mL / NET: -1135 mL        MICROBIOLOGY:      MEDICATIONS  (STANDING):  dexAMETHasone     Tablet 6 milliGRAM(s) Oral daily  dextrose 5%. 1000 milliLiter(s) (50 mL/Hr) IV Continuous <Continuous>  dextrose 50% Injectable 12.5 Gram(s) IV Push once  dextrose 50% Injectable 25 Gram(s) IV Push once  dextrose 50% Injectable 25 Gram(s) IV Push once  enoxaparin Injectable 40 milliGRAM(s) SubCutaneous every 12 hours  guaiFENesin ER 1200 milliGRAM(s) Oral every 12 hours  influenza   Vaccine 0.5 milliLiter(s) IntraMuscular once  insulin glargine Injectable (LANTUS) 16 Unit(s) SubCutaneous at bedtime  insulin lispro (ADMELOG) corrective regimen sliding scale   SubCutaneous three times a day before meals  insulin lispro Injectable (ADMELOG) 5 Unit(s) SubCutaneous three times a day before meals  pantoprazole    Tablet 40 milliGRAM(s) Oral before breakfast    MEDICATIONS  (PRN):  dextrose 40% Gel 15 Gram(s) Oral once PRN Blood Glucose LESS THAN 70 milliGRAM(s)/deciliter  glucagon  Injectable 1 milliGRAM(s) IntraMuscular once PRN Glucose LESS THAN 70 milligrams/deciliter  guaifenesin/dextromethorphan  Syrup 10 milliLiter(s) Oral every 6 hours PRN Cough      RADIOLOGY & ADDITIONAL STUDIES:

## 2020-11-11 LAB
ALBUMIN SERPL ELPH-MCNC: 3.3 G/DL — LOW (ref 3.5–5.2)
ALP SERPL-CCNC: 95 U/L — SIGNIFICANT CHANGE UP (ref 30–115)
ALT FLD-CCNC: 37 U/L — SIGNIFICANT CHANGE UP (ref 0–41)
ANION GAP SERPL CALC-SCNC: 8 MMOL/L — SIGNIFICANT CHANGE UP (ref 7–14)
AST SERPL-CCNC: 16 U/L — SIGNIFICANT CHANGE UP (ref 0–41)
BILIRUB SERPL-MCNC: 0.4 MG/DL — SIGNIFICANT CHANGE UP (ref 0.2–1.2)
BUN SERPL-MCNC: 23 MG/DL — HIGH (ref 10–20)
CALCIUM SERPL-MCNC: 8.8 MG/DL — SIGNIFICANT CHANGE UP (ref 8.5–10.1)
CHLORIDE SERPL-SCNC: 102 MMOL/L — SIGNIFICANT CHANGE UP (ref 98–110)
CO2 SERPL-SCNC: 26 MMOL/L — SIGNIFICANT CHANGE UP (ref 17–32)
CREAT SERPL-MCNC: 0.8 MG/DL — SIGNIFICANT CHANGE UP (ref 0.7–1.5)
CRP SERPL-MCNC: 0.72 MG/DL — HIGH (ref 0–0.4)
D DIMER BLD IA.RAPID-MCNC: 405 NG/ML DDU — HIGH (ref 0–230)
FERRITIN SERPL-MCNC: 828 NG/ML — HIGH (ref 30–400)
FERRITIN SERPL-MCNC: 885 NG/ML — HIGH (ref 30–400)
GLUCOSE BLDC GLUCOMTR-MCNC: 136 MG/DL — HIGH (ref 70–99)
GLUCOSE BLDC GLUCOMTR-MCNC: 138 MG/DL — HIGH (ref 70–99)
GLUCOSE BLDC GLUCOMTR-MCNC: 195 MG/DL — HIGH (ref 70–99)
GLUCOSE BLDC GLUCOMTR-MCNC: 292 MG/DL — HIGH (ref 70–99)
GLUCOSE SERPL-MCNC: 118 MG/DL — HIGH (ref 70–99)
HBV CORE IGG+IGM SER QL: NONREACTIVE
HBV SURFACE AG SER QL: NONREACTIVE
HCT VFR BLD CALC: 43.6 % — SIGNIFICANT CHANGE UP (ref 42–52)
HCV AB SER QL: NONREACTIVE
HCV S/CO RATIO: 0.26 S/CO
HGB BLD-MCNC: 14.4 G/DL — SIGNIFICANT CHANGE UP (ref 14–18)
MAGNESIUM SERPL-MCNC: 1.9 MG/DL — SIGNIFICANT CHANGE UP (ref 1.8–2.4)
MCHC RBC-ENTMCNC: 29.3 PG — SIGNIFICANT CHANGE UP (ref 27–31)
MCHC RBC-ENTMCNC: 33 G/DL — SIGNIFICANT CHANGE UP (ref 32–37)
MCV RBC AUTO: 88.8 FL — SIGNIFICANT CHANGE UP (ref 80–94)
NRBC # BLD: 0 /100 WBCS — SIGNIFICANT CHANGE UP (ref 0–0)
PLATELET # BLD AUTO: 372 K/UL — SIGNIFICANT CHANGE UP (ref 130–400)
POTASSIUM SERPL-MCNC: 4.5 MMOL/L — SIGNIFICANT CHANGE UP (ref 3.5–5)
POTASSIUM SERPL-SCNC: 4.5 MMOL/L — SIGNIFICANT CHANGE UP (ref 3.5–5)
PROCALCITONIN SERPL-MCNC: 0.03 NG/ML — SIGNIFICANT CHANGE UP (ref 0.02–0.1)
PROT SERPL-MCNC: 5.9 G/DL — LOW (ref 6–8)
RBC # BLD: 4.91 M/UL — SIGNIFICANT CHANGE UP (ref 4.7–6.1)
RBC # FLD: 15.3 % — HIGH (ref 11.5–14.5)
SODIUM SERPL-SCNC: 136 MMOL/L — SIGNIFICANT CHANGE UP (ref 135–146)
WBC # BLD: 10.21 K/UL — SIGNIFICANT CHANGE UP (ref 4.8–10.8)
WBC # FLD AUTO: 10.21 K/UL — SIGNIFICANT CHANGE UP (ref 4.8–10.8)

## 2020-11-11 RX ADMIN — Medication 6 MILLIGRAM(S): at 05:17

## 2020-11-11 RX ADMIN — INSULIN GLARGINE 16 UNIT(S): 100 INJECTION, SOLUTION SUBCUTANEOUS at 21:27

## 2020-11-11 RX ADMIN — Medication 1200 MILLIGRAM(S): at 17:51

## 2020-11-11 RX ADMIN — Medication 1: at 16:55

## 2020-11-11 RX ADMIN — ENOXAPARIN SODIUM 40 MILLIGRAM(S): 100 INJECTION SUBCUTANEOUS at 05:17

## 2020-11-11 RX ADMIN — Medication 5 UNIT(S): at 11:53

## 2020-11-11 RX ADMIN — Medication 1200 MILLIGRAM(S): at 05:17

## 2020-11-11 RX ADMIN — Medication 5 UNIT(S): at 08:41

## 2020-11-11 RX ADMIN — PANTOPRAZOLE SODIUM 40 MILLIGRAM(S): 20 TABLET, DELAYED RELEASE ORAL at 06:19

## 2020-11-11 RX ADMIN — ENOXAPARIN SODIUM 40 MILLIGRAM(S): 100 INJECTION SUBCUTANEOUS at 17:50

## 2020-11-11 RX ADMIN — Medication 5 UNIT(S): at 16:54

## 2020-11-11 RX ADMIN — Medication 3: at 11:53

## 2020-11-11 NOTE — PROGRESS NOTE ADULT - SUBJECTIVE AND OBJECTIVE BOX
OVERNIGHT EVENTS: events noted, afebrile, improved inf markers    Vital Signs Last 24 Hrs  T(C): 36.7 (11 Nov 2020 04:00), Max: 36.7 (10 Nov 2020 12:14)  T(F): 98 (11 Nov 2020 04:00), Max: 98.1 (10 Nov 2020 12:14)  HR: 61 (11 Nov 2020 04:00) (61 - 86)  BP: 97/63 (11 Nov 2020 04:00) (97/63 - 129/80)  BP(mean): --  RR: 17 (11 Nov 2020 04:00) (17 - 20)  SpO2: 96% (11 Nov 2020 04:00) (94% - 96%)    PHYSICAL EXAMINATION:    GENERAL: comfortable    HEENT: Head is normocephalic and atraumatic. Extraocular muscles are intact. Mucous membranes are moist.    NECK: Supple.    LUNGS: bl crackles    HEART: Regular rate and rhythm without murmur.    ABDOMEN: Soft, nontender, and nondistended.      EXTREMITIES: Without any cyanosis, clubbing, rash, lesions or edema.    NEUROLOGIC: Grossly intact.    SKIN: No ulceration or induration present.      LABS:                        14.7   9.91  )-----------( 375      ( 10 Nov 2020 08:50 )             45.2     11-10    139  |  104  |  23<H>  ----------------------------<  120<H>  4.2   |  25  |  0.8    Ca    9.0      10 Nov 2020 08:50  Mg     1.9     11-10    TPro  6.0  /  Alb  3.4<L>  /  TBili  0.4  /  DBili  x   /  AST  16  /  ALT  33  /  AlkPhos  96  11-10              D-Dimer Assay, Quantitative: 444 ng/mL DDU (11-10-20 @ 08:50)        Procalcitonin, Serum: 0.03 ng/mL (11-10-20 @ 08:50)  Procalcitonin, Serum: 0.04 ng/mL (11-08-20 @ 07:40)        11-10-20 @ 07:01  -  11-11-20 @ 07:00  --------------------------------------------------------  IN: 810 mL / OUT: 1225 mL / NET: -415 mL        MICROBIOLOGY:      MEDICATIONS  (STANDING):  dexAMETHasone  Injectable 6 milliGRAM(s) IV Push daily  dextrose 5%. 1000 milliLiter(s) (50 mL/Hr) IV Continuous <Continuous>  dextrose 50% Injectable 12.5 Gram(s) IV Push once  dextrose 50% Injectable 25 Gram(s) IV Push once  dextrose 50% Injectable 25 Gram(s) IV Push once  enoxaparin Injectable 40 milliGRAM(s) SubCutaneous every 12 hours  guaiFENesin ER 1200 milliGRAM(s) Oral every 12 hours  influenza   Vaccine 0.5 milliLiter(s) IntraMuscular once  insulin glargine Injectable (LANTUS) 16 Unit(s) SubCutaneous at bedtime  insulin lispro (ADMELOG) corrective regimen sliding scale   SubCutaneous three times a day before meals  insulin lispro Injectable (ADMELOG) 5 Unit(s) SubCutaneous three times a day before meals  pantoprazole    Tablet 40 milliGRAM(s) Oral before breakfast    MEDICATIONS  (PRN):  dextrose 40% Gel 15 Gram(s) Oral once PRN Blood Glucose LESS THAN 70 milliGRAM(s)/deciliter  glucagon  Injectable 1 milliGRAM(s) IntraMuscular once PRN Glucose LESS THAN 70 milligrams/deciliter  guaifenesin/dextromethorphan  Syrup 10 milliLiter(s) Oral every 6 hours PRN Cough      RADIOLOGY & ADDITIONAL STUDIES:         OVERNIGHT EVENTS: events noted, afebrile, improved inf markers, HHFNC 60%    Vital Signs Last 24 Hrs  T(C): 36.7 (11 Nov 2020 04:00), Max: 36.7 (10 Nov 2020 12:14)  T(F): 98 (11 Nov 2020 04:00), Max: 98.1 (10 Nov 2020 12:14)  HR: 61 (11 Nov 2020 04:00) (61 - 86)  BP: 97/63 (11 Nov 2020 04:00) (97/63 - 129/80)  BP(mean): --  RR: 17 (11 Nov 2020 04:00) (17 - 20)  SpO2: 96% (11 Nov 2020 04:00) (94% - 96%)    PHYSICAL EXAMINATION:    GENERAL: comfortable    HEENT: Head is normocephalic and atraumatic. Extraocular muscles are intact. Mucous membranes are moist.    NECK: Supple.    LUNGS: bl crackles    HEART: Regular rate and rhythm without murmur.    ABDOMEN: Soft, nontender, and nondistended.      EXTREMITIES: Without any cyanosis, clubbing, rash, lesions or edema.    NEUROLOGIC: Grossly intact.    SKIN: No ulceration or induration present.      LABS:                        14.7   9.91  )-----------( 375      ( 10 Nov 2020 08:50 )             45.2     11-10    139  |  104  |  23<H>  ----------------------------<  120<H>  4.2   |  25  |  0.8    Ca    9.0      10 Nov 2020 08:50  Mg     1.9     11-10    TPro  6.0  /  Alb  3.4<L>  /  TBili  0.4  /  DBili  x   /  AST  16  /  ALT  33  /  AlkPhos  96  11-10              D-Dimer Assay, Quantitative: 444 ng/mL DDU (11-10-20 @ 08:50)        Procalcitonin, Serum: 0.03 ng/mL (11-10-20 @ 08:50)  Procalcitonin, Serum: 0.04 ng/mL (11-08-20 @ 07:40)        11-10-20 @ 07:01  -  11-11-20 @ 07:00  --------------------------------------------------------  IN: 810 mL / OUT: 1225 mL / NET: -415 mL        MICROBIOLOGY:      MEDICATIONS  (STANDING):  dexAMETHasone  Injectable 6 milliGRAM(s) IV Push daily  dextrose 5%. 1000 milliLiter(s) (50 mL/Hr) IV Continuous <Continuous>  dextrose 50% Injectable 12.5 Gram(s) IV Push once  dextrose 50% Injectable 25 Gram(s) IV Push once  dextrose 50% Injectable 25 Gram(s) IV Push once  enoxaparin Injectable 40 milliGRAM(s) SubCutaneous every 12 hours  guaiFENesin ER 1200 milliGRAM(s) Oral every 12 hours  influenza   Vaccine 0.5 milliLiter(s) IntraMuscular once  insulin glargine Injectable (LANTUS) 16 Unit(s) SubCutaneous at bedtime  insulin lispro (ADMELOG) corrective regimen sliding scale   SubCutaneous three times a day before meals  insulin lispro Injectable (ADMELOG) 5 Unit(s) SubCutaneous three times a day before meals  pantoprazole    Tablet 40 milliGRAM(s) Oral before breakfast    MEDICATIONS  (PRN):  dextrose 40% Gel 15 Gram(s) Oral once PRN Blood Glucose LESS THAN 70 milliGRAM(s)/deciliter  glucagon  Injectable 1 milliGRAM(s) IntraMuscular once PRN Glucose LESS THAN 70 milligrams/deciliter  guaifenesin/dextromethorphan  Syrup 10 milliLiter(s) Oral every 6 hours PRN Cough      RADIOLOGY & ADDITIONAL STUDIES:

## 2020-11-11 NOTE — PROGRESS NOTE ADULT - SUBJECTIVE AND OBJECTIVE BOX
JOSÉ LUIS AREVALO  59y, Male    All available historical data reviewed    OVERNIGHT EVENTS:  no fevers  feels well and has no complaints  99% HFNC 50%    ROS:  General: Denies rigors, nightsweats  HEENT: Denies headache, rhinorrhea, sore throat, eye pain  CV: Denies CP, palpitations  PULM: Denies wheezing, hemoptysis  GI: Denies hematemesis, hematochezia, melena  : Denies discharge, hematuria  MSK: Denies arthralgias, myalgias  SKIN: Denies rash, lesions  NEURO: Denies paresthesias, weakness  PSYCH: Denies depression, anxiety    VITALS:  T(F): 98.2, Max: 98.2 (11-11-20 @ 12:18)  HR: 97  BP: 129/77  RR: 18Vital Signs Last 24 Hrs  T(C): 36.8 (11 Nov 2020 12:18), Max: 36.8 (11 Nov 2020 12:18)  T(F): 98.2 (11 Nov 2020 12:18), Max: 98.2 (11 Nov 2020 12:18)  HR: 97 (11 Nov 2020 12:18) (61 - 97)  BP: 129/77 (11 Nov 2020 12:18) (97/63 - 129/80)  BP(mean): 96 (11 Nov 2020 12:18) (78 - 96)  RR: 18 (11 Nov 2020 12:18) (17 - 20)  SpO2: 96% (11 Nov 2020 04:00) (94% - 96%)    TESTS & MEASUREMENTS:                        14.4   10.21 )-----------( 372      ( 11 Nov 2020 07:18 )             43.6     11-11    136  |  102  |  23<H>  ----------------------------<  118<H>  4.5   |  26  |  0.8    Ca    8.8      11 Nov 2020 07:18  Mg     1.9     11-11    TPro  5.9<L>  /  Alb  3.3<L>  /  TBili  0.4  /  DBili  x   /  AST  16  /  ALT  37  /  AlkPhos  95  11-11    LIVER FUNCTIONS - ( 11 Nov 2020 07:18 )  Alb: 3.3 g/dL / Pro: 5.9 g/dL / ALK PHOS: 95 U/L / ALT: 37 U/L / AST: 16 U/L / GGT: x                   RADIOLOGY & ADDITIONAL TESTS:  Personal review of radiological diagnostics performed  Echo and EKG results noted when applicable.     MEDICATIONS:  dexAMETHasone  Injectable 6 milliGRAM(s) IV Push daily  dextrose 40% Gel 15 Gram(s) Oral once PRN  dextrose 5%. 1000 milliLiter(s) IV Continuous <Continuous>  dextrose 50% Injectable 12.5 Gram(s) IV Push once  dextrose 50% Injectable 25 Gram(s) IV Push once  dextrose 50% Injectable 25 Gram(s) IV Push once  enoxaparin Injectable 40 milliGRAM(s) SubCutaneous every 12 hours  glucagon  Injectable 1 milliGRAM(s) IntraMuscular once PRN  guaiFENesin ER 1200 milliGRAM(s) Oral every 12 hours  guaifenesin/dextromethorphan  Syrup 10 milliLiter(s) Oral every 6 hours PRN  influenza   Vaccine 0.5 milliLiter(s) IntraMuscular once  insulin glargine Injectable (LANTUS) 16 Unit(s) SubCutaneous at bedtime  insulin lispro (ADMELOG) corrective regimen sliding scale   SubCutaneous three times a day before meals  insulin lispro Injectable (ADMELOG) 5 Unit(s) SubCutaneous three times a day before meals  pantoprazole    Tablet 40 milliGRAM(s) Oral before breakfast      ANTIBIOTICS:

## 2020-11-11 NOTE — PROGRESS NOTE ADULT - ASSESSMENT
· Assessment	  58 YO M with PMHx of GIST (s/p resection), DM type II (on metformin), RA (on prednisone), HTN, HLD, herniated disc, admitted with Covid pneumonia    IMPRESSION;  Clinically feels well and has no complaints   COVID-19 with severe illness with hypoxemia ( 99% HFNC )  ) and CXR with opacities b/l  Early in the viral replicative phase  no evidence of cytokine release although markers slightly elevated ( and are decreasing )  Ferritin 1079> 885  CRP 10.70>1.38    ddimers 317>484    RECOMMENDATIONS;  If pt comes off supplemental O2 will d/c home  No need to repeat markers   Day 1 11/6-10 : Single  mg IV loading dose  Day 2-5 : single  mg IV once daily maintenance dose  Daily CBC,CMP.  Dexamethasone 6 mg iv q24h for 10 days 11/6-16  ( or until discharge ) or equivalent glucocorticoid dose may be substituted. Equivalent total dosis of alternative steroids are Methylprednisolone 32 mg and prednisone 40 mg q24h.  Monitor for side effects: hyperglycemia, neurological ( agitation/confusion), adrenal suppression, bacterial and fungal infections  Type and screen.  S/p Convalescent plasma 2 units. 11/10  Will evaluate for Enrollment  in the monoclonal antibodies vs interleukin investigational study protocol.   Anticoagulation as per team

## 2020-11-11 NOTE — PROGRESS NOTE ADULT - SUBJECTIVE AND OBJECTIVE BOX
Patient was seen and examined. Spoke with RN. Chart reviewed.  No events overnight.  Vital Signs Last 24 Hrs  T(F): 98 (11 Nov 2020 04:00), Max: 98.1 (10 Nov 2020 12:14)  HR: 61 (11 Nov 2020 04:00) (61 - 81)  BP: 97/63 (11 Nov 2020 04:00) (97/63 - 129/80)  SpO2: 96% (11 Nov 2020 04:00) (94% - 96%)  MEDICATIONS  (STANDING):  dexAMETHasone  Injectable 6 milliGRAM(s) IV Push daily  dextrose 5%. 1000 milliLiter(s) (50 mL/Hr) IV Continuous <Continuous>  dextrose 50% Injectable 12.5 Gram(s) IV Push once  dextrose 50% Injectable 25 Gram(s) IV Push once  dextrose 50% Injectable 25 Gram(s) IV Push once  enoxaparin Injectable 40 milliGRAM(s) SubCutaneous every 12 hours  guaiFENesin ER 1200 milliGRAM(s) Oral every 12 hours  influenza   Vaccine 0.5 milliLiter(s) IntraMuscular once  insulin glargine Injectable (LANTUS) 16 Unit(s) SubCutaneous at bedtime  insulin lispro (ADMELOG) corrective regimen sliding scale   SubCutaneous three times a day before meals  insulin lispro Injectable (ADMELOG) 5 Unit(s) SubCutaneous three times a day before meals  pantoprazole    Tablet 40 milliGRAM(s) Oral before breakfast    MEDICATIONS  (PRN):  dextrose 40% Gel 15 Gram(s) Oral once PRN Blood Glucose LESS THAN 70 milliGRAM(s)/deciliter  glucagon  Injectable 1 milliGRAM(s) IntraMuscular once PRN Glucose LESS THAN 70 milligrams/deciliter  guaifenesin/dextromethorphan  Syrup 10 milliLiter(s) Oral every 6 hours PRN Cough    Labs:                        14.7   9.91  )-----------( 375      ( 10 Nov 2020 08:50 )             45.2                         14.5   10.94 )-----------( 385      ( 09 Nov 2020 08:45 )             45.8     10 Nov 2020 08:50    139    |  104    |  23     ----------------------------<  120    4.2     |  25     |  0.8    09 Nov 2020 08:45    138    |  101    |  22     ----------------------------<  146    4.6     |  28     |  0.8      Ca    9.0        10 Nov 2020 08:50  Ca    9.3        09 Nov 2020 08:45  Mg     1.9       10 Nov 2020 08:50    TPro  6.0    /  Alb  3.4    /  TBili  0.4    /  DBili  x      /  AST  16     /  ALT  33     /  AlkPhos  96     10 Nov 2020 08:50  TPro  6.5    /  Alb  3.5    /  TBili  0.4    /  DBili  x      /  AST  17     /  ALT  36     /  AlkPhos  97     09 Nov 2020 08:45          exam unchanged    A/P:  58 YO M with PMHx of GIST (s/p resection), DM type II (on metformin), RA (on prednisone), HTN, HLD, herniated disc, admitted with Covid pneumonia s/p  remdesivir and plasma    pulm/ICU f/u- Dr Pelletier    ID f/u- Dr Crowder     IV dexamathasone    O2 as needed- on high flow    lovenox- full dose    monitor inflammatory markers     ICU transfer if any change in status  DVT prophylaxis  Decubitus prevention- all measures as per RN protocol  Please call or text me with any questions or updates

## 2020-11-11 NOTE — PROGRESS NOTE ADULT - ASSESSMENT
60 YO M with PMHx of GIST (s/p resection), DM type II (on metformin), RA (on prednisone), HTN, HLD, herniated disc, presented to the ED with c/c of cough and SOB x 3 days admitted to medicine for treatment of Covid pneumonia    #Acute Hypoxic Respiratory Failure 2/2  #COVID-19 Pneumonia   - O2 Sat on HFNC (down to 50L 60% today) Keep above 94%  - CT Angio on admission negative for PE  - Chest X-ray 11/10 showing worsening opacities b/l-->anticipated natural history of Covid infection  - s/p 2 units Plasma 11/6 s/p 5 day course Remdesivir completed 11/09  - Follow inflammatory markers (ESR, CRP, D-dimer, ferritin, LDH).  - c/w Dexamethasone 6mg PO Qd  - f/u ID recommendations        #Rising D-dimer in the setting of #Covid pneumonia   - D-dimer on admission 287-> 484-->444-->405 11/11  - covid pneumonia--> heightened risk of blood clot  - c/w therapeutic Lovenox (40 BID)  - Lower extremity duplex ordered today --> f/u final results (pending)  - D-dimer not increasing for now--> continue to trend q48h      #RA  - not in acute flair  - Will hold prednisone for now since patient on dexamethasone   -c/w Allopurinol 300 daily    #HTN  - Home medication Lisinopril 40 mg PO QD  - Ptn diuresed w PO Lasix y/day  - Cr stable at 0.8 s/p Lasix  - continue holding Lisinopril  - continue to monitor BP/ Cr    #DM type II  - On metformin at home, continue to hold for now  - Monitor FS. Start insulin if FS > 180. Keep between 140 - 180 (120-146 today)  - c/w insulin Lantus 16 units at bedtime  - c/w Lispro 5 mg with meals    #Misc  - DVT Prophylaxis: Lovenox 40 BID  - Diet: DASH/TLC, Carb consistent   - GI Prophylaxis: Pantoprazole   - Activity: AAT  - Code Status: Full Code    Dispo: Still acute.

## 2020-11-11 NOTE — PROGRESS NOTE ADULT - SUBJECTIVE AND OBJECTIVE BOX
Hospital day 6. No acute events overnight. Ptn denies any discomfort/ SOB. Endorses continued improvement of covid pneumonia symptoms. No new complaints    PAST MEDICAL & SURGICAL HISTORY  Hyperlipidemia    Lumbago    Hypertension    H/O lymph node biopsy  Neck by Dr Case      SOCIAL HISTORY:    ALLERGIES:  penicillin (Unknown)  shellfish (Pruritus)    MEDICATIONS:  STANDING MEDICATIONS  dexAMETHasone  Injectable 6 milliGRAM(s) IV Push daily  dextrose 5%. 1000 milliLiter(s) IV Continuous <Continuous>  dextrose 50% Injectable 12.5 Gram(s) IV Push once  dextrose 50% Injectable 25 Gram(s) IV Push once  dextrose 50% Injectable 25 Gram(s) IV Push once  enoxaparin Injectable 40 milliGRAM(s) SubCutaneous every 12 hours  guaiFENesin ER 1200 milliGRAM(s) Oral every 12 hours  influenza   Vaccine 0.5 milliLiter(s) IntraMuscular once  insulin glargine Injectable (LANTUS) 16 Unit(s) SubCutaneous at bedtime  insulin lispro (ADMELOG) corrective regimen sliding scale   SubCutaneous three times a day before meals  insulin lispro Injectable (ADMELOG) 5 Unit(s) SubCutaneous three times a day before meals  pantoprazole    Tablet 40 milliGRAM(s) Oral before breakfast    PRN MEDICATIONS  dextrose 40% Gel 15 Gram(s) Oral once PRN  glucagon  Injectable 1 milliGRAM(s) IntraMuscular once PRN  guaifenesin/dextromethorphan  Syrup 10 milliLiter(s) Oral every 6 hours PRN    VITALS:   T(F): 98.2  HR: 97  BP: 129/77  RR: 18  SpO2: 96%    LABS:                        14.4   10.21 )-----------( 372      ( 11 Nov 2020 07:18 )             43.6     11-11    136  |  102  |  23<H>  ----------------------------<  118<H>  4.5   |  26  |  0.8    Ca    8.8      11 Nov 2020 07:18  Mg     1.9     11-11    TPro  5.9<L>  /  Alb  3.3<L>  /  TBili  0.4  /  DBili  x   /  AST  16  /  ALT  37  /  AlkPhos  95  11-11                  RADIOLOGY:< from: Xray Chest 1 View- PORTABLE-Routine (Xray Chest 1 View- PORTABLE-Routine in AM.) (11.10.20 @ 05:52) >  EXAM:  XR CHEST PORTABLE ROUTINE 1V            PROCEDURE DATE:  11/10/2020            INTERPRETATION:  CLINICAL INDICATION:  covid    COMPARISON: Chest radiograph dated 11/7/2020    TECHNIQUE: Frontal radiograph the chest. Rotated exam.    FINDINGS:    Support devices: None.    Cardiac/mediastinum/hilum: Stable.    Lung parenchyma/Pleura: Unchanged left greater than right bilateral opacities. There is no pneumothorax.    Skeleton/soft tissues: Stable.    IMPRESSION:    Unchanged left greater than right bilateral opacities.                INDIGO GARCIA MD; Attending Radiologist  This document has been electronically signed. Nov 10 2020 12:53PM    < end of copied text >      PHYSICAL EXAM:  GEN: No acute distress well nourished, well developed male on HFNC sat 93%  LUNGS: decreased breath sounds b/l, no wheezes/ rales/ rhonchi   HEART: Regular  ABD: Soft, non-tender, non-distended.  EXT: NC/NC/NE/2+PP/CARREON/Skin Intact.   NEURO: AAOX3    Intravenous access:   NG tube:   Coleman Catheter:

## 2020-11-11 NOTE — PROGRESS NOTE ADULT - ASSESSMENT
IMPRESSION:    Acute hypoxemic resp failure 2/2 covid pna  doubt superimposed PNA nl procal  HO ILD?  HO RA on pred    PLAN:    CNS: avoid sedatives    HEENT: Oral care    PULMONARY:  HOB @ 45 degrees.  Aspiration precautions. Wean oxygen as tolerated, Goal Pox >92%.  CXR BIPAP alternate with HHFNC dec to 60%, decadron    CARDIOVASCULAR: Keep I=O, goal Map >65 po lasix, hold lisinopril    GI: GI prophylaxis.  Feeding.  Bowel regimen     RENAL:  Follow up lytes.  Correct as needed    INFECTIOUS DISEASE: Follow up cultures. Monitor procal. Dexamethaxone 6mg iv q24h, remdesivir, f/up infl markers    HEMATOLOGICAL:  DVT prophylaxis. LOVENOX 40 Q 12H, LE doppler    ENDOCRINE:  Follow up FS.  Insulin protocol if needed.    MUSCULOSKELETAL: OOBTC    Dispo: Stepdown Unit    poor proGNOSIS     IMPRESSION:    Acute hypoxemic resp failure 2/2 covid pna  doubt superimposed PNA nl procal  HO ILD?  HO RA on pred    PLAN:    CNS: avoid sedatives    HEENT: Oral care    PULMONARY:  HOB @ 45 degrees.  Aspiration precautions. Wean oxygen as tolerated, Goal Pox >92%.  CXR BIPAP alternate with HHFNC dec to 50%, decadron    CARDIOVASCULAR: Keep I=O, goal Map >65 po lasiX    GI: GI prophylaxis.  Feeding.  Bowel regimen     RENAL:  Follow up lytes.  Correct as needed    INFECTIOUS DISEASE: Follow up cultures. Monitor procal. Dexamethaxone 6mg iv q24h, remdesivir, f/up infl markers    HEMATOLOGICAL:  DVT prophylaxis. LOVENOX 40 Q 12H, LE doppler    ENDOCRINE:  Follow up FS.  Insulin protocol if needed.    MUSCULOSKELETAL: OOBTC    Dispo: Stepdown Unit    poor proGNOSIS

## 2020-11-12 LAB
GLUCOSE BLDC GLUCOMTR-MCNC: 102 MG/DL — HIGH (ref 70–99)
GLUCOSE BLDC GLUCOMTR-MCNC: 160 MG/DL — HIGH (ref 70–99)
GLUCOSE BLDC GLUCOMTR-MCNC: 219 MG/DL — HIGH (ref 70–99)
GLUCOSE BLDC GLUCOMTR-MCNC: 263 MG/DL — HIGH (ref 70–99)

## 2020-11-12 RX ADMIN — INSULIN GLARGINE 16 UNIT(S): 100 INJECTION, SOLUTION SUBCUTANEOUS at 21:56

## 2020-11-12 RX ADMIN — ENOXAPARIN SODIUM 40 MILLIGRAM(S): 100 INJECTION SUBCUTANEOUS at 06:03

## 2020-11-12 RX ADMIN — Medication 5 UNIT(S): at 11:57

## 2020-11-12 RX ADMIN — Medication 1200 MILLIGRAM(S): at 17:06

## 2020-11-12 RX ADMIN — Medication 1200 MILLIGRAM(S): at 06:03

## 2020-11-12 RX ADMIN — Medication 6 MILLIGRAM(S): at 06:03

## 2020-11-12 RX ADMIN — Medication 5 UNIT(S): at 16:44

## 2020-11-12 RX ADMIN — ENOXAPARIN SODIUM 40 MILLIGRAM(S): 100 INJECTION SUBCUTANEOUS at 17:07

## 2020-11-12 RX ADMIN — Medication 3: at 16:43

## 2020-11-12 RX ADMIN — PANTOPRAZOLE SODIUM 40 MILLIGRAM(S): 20 TABLET, DELAYED RELEASE ORAL at 06:03

## 2020-11-12 RX ADMIN — Medication 2: at 11:56

## 2020-11-12 RX ADMIN — Medication 5 UNIT(S): at 08:10

## 2020-11-12 NOTE — DIETITIAN INITIAL EVALUATION ADULT. - CONTINUE CURRENT NUTRITION CARE PLAN
yes/Nutrition intervention: meals and snacks, medical food supplement. Continue DASh/TLC, consistent carb w/snack diet order as tolerated.

## 2020-11-12 NOTE — PROGRESS NOTE ADULT - SUBJECTIVE AND OBJECTIVE BOX
Hospital day 7. No acute events overnight. Ptn denies any discomfort/ SOB. Endorses continued improvement of covid pneumonia symptoms. No new complaints. Ptn updating his family daily by smartphone.    PAST MEDICAL & SURGICAL HISTORY  Hyperlipidemia    Lumbago    Hypertension    H/O lymph node biopsy  Neck by Dr Case    ALLERGIES:  penicillin (Unknown)  shellfish (Pruritus)    MEDICATIONS:  STANDING MEDICATIONS  dexAMETHasone  Injectable 6 milliGRAM(s) IV Push daily  dextrose 5%. 1000 milliLiter(s) IV Continuous <Continuous>  dextrose 50% Injectable 12.5 Gram(s) IV Push once  dextrose 50% Injectable 25 Gram(s) IV Push once  dextrose 50% Injectable 25 Gram(s) IV Push once  enoxaparin Injectable 40 milliGRAM(s) SubCutaneous every 12 hours  guaiFENesin ER 1200 milliGRAM(s) Oral every 12 hours  influenza   Vaccine 0.5 milliLiter(s) IntraMuscular once  insulin glargine Injectable (LANTUS) 16 Unit(s) SubCutaneous at bedtime  insulin lispro (ADMELOG) corrective regimen sliding scale   SubCutaneous three times a day before meals  insulin lispro Injectable (ADMELOG) 5 Unit(s) SubCutaneous three times a day before meals  pantoprazole    Tablet 40 milliGRAM(s) Oral before breakfast    PRN MEDICATIONS  dextrose 40% Gel 15 Gram(s) Oral once PRN  glucagon  Injectable 1 milliGRAM(s) IntraMuscular once PRN  guaifenesin/dextromethorphan  Syrup 10 milliLiter(s) Oral every 6 hours PRN    VITALS:   T(F): 96.7  HR: 97  BP: 110/647  RR: 20  SpO2: 96%    LABS:                        14.4   10.21 )-----------( 372      ( 11 Nov 2020 07:18 )             43.6     11-11    136  |  102  |  23<H>  ----------------------------<  118<H>  4.5   |  26  |  0.8    Ca    8.8      11 Nov 2020 07:18  Mg     1.9     11-11    TPro  5.9<L>  /  Alb  3.3<L>  /  TBili  0.4  /  DBili  x   /  AST  16  /  ALT  37  /  AlkPhos  95  11-11      RADIOLOGY:< from: Xray Chest 1 View- PORTABLE-Routine (Xray Chest 1 View- PORTABLE-Routine in AM.) (11.10.20 @ 05:52) >  EXAM:  XR CHEST PORTABLE ROUTINE 1V            PROCEDURE DATE:  11/10/2020            INTERPRETATION:  CLINICAL INDICATION:  covid    COMPARISON: Chest radiograph dated 11/7/2020    TECHNIQUE: Frontal radiograph the chest. Rotated exam.    FINDINGS:    Support devices: None.    Cardiac/mediastinum/hilum: Stable.    Lung parenchyma/Pleura: Unchanged left greater than right bilateral opacities. There is no pneumothorax.    Skeleton/soft tissues: Stable.    IMPRESSION:    Unchanged left greater than right bilateral opacities.                INDIGO GARCIA MD; Attending Radiologist  This document has been electronically signed. Nov 10 2020 12:53PM    < end of copied text >      PHYSICAL EXAM:  GEN: No acute distress well nourished, well developed male on HFNC sat 93%  LUNGS: decreased breath sounds b/l, no wheezes/ rales/ rhonchi   HEART: Regular  ABD: Soft, non-tender, non-distended.  EXT: NC/NC/NE/2+PP/CARREON/Skin Intact.   NEURO:

## 2020-11-12 NOTE — PROGRESS NOTE ADULT - SUBJECTIVE AND OBJECTIVE BOX
JOSÉ LUIS AREVALO  59y, Male    All available historical data reviewed    OVERNIGHT EVENTS:  no fevers  feels well and has no complaints  99% HFNC 50%    ROS:  General: Denies rigors, nightsweats  HEENT: Denies headache, rhinorrhea, sore throat, eye pain  CV: Denies CP, palpitations  PULM: Denies wheezing, hemoptysis  GI: Denies hematemesis, hematochezia, melena  : Denies discharge, hematuria  MSK: Denies arthralgias, myalgias  SKIN: Denies rash, lesions  NEURO: Denies paresthesias, weakness  PSYCH: Denies depression, anxiety    VITALS:  T(F): 96.7, Max: 98.2 (11-11-20 @ 12:18)  HR: 97  BP: 110/647  RR: 20Vital Signs Last 24 Hrs  T(C): 35.9 (12 Nov 2020 08:29), Max: 36.8 (11 Nov 2020 12:18)  T(F): 96.7 (12 Nov 2020 08:29), Max: 98.2 (11 Nov 2020 12:18)  HR: 97 (12 Nov 2020 08:29) (72 - 97)  BP: 110/647 (12 Nov 2020 08:29) (102/70 - 129/77)  BP(mean): 82 (12 Nov 2020 08:29) (82 - 96)  RR: 20 (12 Nov 2020 08:29) (18 - 20)  SpO2: 96% (12 Nov 2020 06:33) (95% - 97%)    TESTS & MEASUREMENTS:                        14.4   10.21 )-----------( 372      ( 11 Nov 2020 07:18 )             43.6     11-11    136  |  102  |  23<H>  ----------------------------<  118<H>  4.5   |  26  |  0.8    Ca    8.8      11 Nov 2020 07:18  Mg     1.9     11-11    TPro  5.9<L>  /  Alb  3.3<L>  /  TBili  0.4  /  DBili  x   /  AST  16  /  ALT  37  /  AlkPhos  95  11-11    LIVER FUNCTIONS - ( 11 Nov 2020 07:18 )  Alb: 3.3 g/dL / Pro: 5.9 g/dL / ALK PHOS: 95 U/L / ALT: 37 U/L / AST: 16 U/L / GGT: x                   RADIOLOGY & ADDITIONAL TESTS:  Personal review of radiological diagnostics performed  Echo and EKG results noted when applicable.     MEDICATIONS:  dexAMETHasone  Injectable 6 milliGRAM(s) IV Push daily  dextrose 40% Gel 15 Gram(s) Oral once PRN  dextrose 5%. 1000 milliLiter(s) IV Continuous <Continuous>  dextrose 50% Injectable 12.5 Gram(s) IV Push once  dextrose 50% Injectable 25 Gram(s) IV Push once  dextrose 50% Injectable 25 Gram(s) IV Push once  enoxaparin Injectable 40 milliGRAM(s) SubCutaneous every 12 hours  glucagon  Injectable 1 milliGRAM(s) IntraMuscular once PRN  guaiFENesin ER 1200 milliGRAM(s) Oral every 12 hours  guaifenesin/dextromethorphan  Syrup 10 milliLiter(s) Oral every 6 hours PRN  influenza   Vaccine 0.5 milliLiter(s) IntraMuscular once  insulin glargine Injectable (LANTUS) 16 Unit(s) SubCutaneous at bedtime  insulin lispro (ADMELOG) corrective regimen sliding scale   SubCutaneous three times a day before meals  insulin lispro Injectable (ADMELOG) 5 Unit(s) SubCutaneous three times a day before meals  pantoprazole    Tablet 40 milliGRAM(s) Oral before breakfast      ANTIBIOTICS:

## 2020-11-12 NOTE — PROGRESS NOTE ADULT - ASSESSMENT
IMPRESSION:    Acute hypoxemic resp failure 2/2 covid pna  doubt superimposed PNA nl procal  HO ILD?  HO RA on pred    PLAN:    CNS: avoid sedatives    HEENT: Oral care    PULMONARY:  HOB @ 45 degrees.  Aspiration precautions. Wean oxygen as tolerated, Goal Pox >92%.  CXR BIPAP alternate with HHFNC dec to 50%, decadron    CARDIOVASCULAR: Keep I=O, goal Map >65 po lasiX    GI: GI prophylaxis.  Feeding.  Bowel regimen     RENAL:  Follow up lytes.  Correct as needed    INFECTIOUS DISEASE: Follow up cultures. Monitor procal. Dexamethaxone 6mg iv q24h, remdesivir, f/up infl markers    HEMATOLOGICAL:  DVT prophylaxis. LOVENOX 40 Q 12H,    ENDOCRINE:  Follow up FS.  Insulin protocol if needed.    MUSCULOSKELETAL: OOBTC    Dispo: Stepdown Unit    poor proGNOSIS

## 2020-11-12 NOTE — DIETITIAN INITIAL EVALUATION ADULT. - ORAL INTAKE PTA/DIET HISTORY
Unable to obtain nutr hx d/t strict isolation precautions, COVID 19. No weight loss noted when compare weights to previous admit. Shellfish allergy noted.

## 2020-11-12 NOTE — PROGRESS NOTE ADULT - ASSESSMENT
· Assessment	  60 YO M with PMHx of GIST (s/p resection), DM type II (on metformin), RA (on prednisone), HTN, HLD, herniated disc, admitted with Covid pneumonia    IMPRESSION;  Clinically feels well and has no complaints   COVID-19 with severe illness with hypoxemia ( 99% HFNC )  ) and CXR with opacities b/l  Early in the viral replicative phase  no evidence of cytokine release although markers slightly elevated ( and are decreasing )  Ferritin 1079> 885  CRP 10.70>1.38    ddimers 317>484    RECOMMENDATIONS;  If pt comes off supplemental O2 will d/c home  No need to repeat markers   Day 1 11/6-10 : Single  mg IV loading dose  Day 2-5 : single  mg IV once daily maintenance dose  Daily CBC,CMP.  Dexamethasone 6 mg iv q24h for 10 days 11/6-16  ( or until discharge ) or equivalent glucocorticoid dose may be substituted. Equivalent total dosis of alternative steroids are Methylprednisolone 32 mg and prednisone 40 mg q24h ( on discharge )  Monitor for side effects: hyperglycemia, neurological ( agitation/confusion), adrenal suppression, bacterial and fungal infections  Type and screen.  S/p Convalescent plasma 2 units. 11/10  Anticoagulation as per team

## 2020-11-12 NOTE — PROGRESS NOTE ADULT - ASSESSMENT
60 YO M with PMHx of GIST (s/p resection), DM type II (on metformin), RA (on prednisone), HTN, HLD, herniated disc, presented to the ED with c/c of cough and SOB x 3 days admitted to medicine for treatment of Covid pneumonia    #Acute Hypoxic Respiratory Failure 2/2  #COVID-19 Pneumonia   - O2 Sat on HFNC (down to 50L 60% today)   - CT Angio on admission negative for PE  - Chest X-ray 11/10 showing worsening opacities b/l-->anticipated natural history of Covid infection  - s/p 2 units Plasma 11/6 s/p 5 day course Remdesivir completed 11/09  - Id recommendations noted, no need to continue following inflammatory markers   - Titrate down O2 today  - Keep O2 sat above 93%        #Rising D-dimer in the setting of #Covid pneumonia --resolved  - D-dimer on admission 287-> 484-->444-->405 11/11  - covid pneumonia--> heightened risk of blood clot  - c/w therapeutic Lovenox (40 BID)  - Lower extremity duplex negative 11/11  - D-dimer decreasing now-->no need to continue monitoring      #RA  - not in acute flair  - Will hold prednisone for now since patient on dexamethasone   -c/w Allopurinol 300 daily    #HTN  - Home medication Lisinopril 40 mg PO QD  - Ptn diuresed w PO Lasix y/day  - Cr stable at 0.8 s/p Lasix  - continue holding Lisinopril  - continue to monitor BP/ Cr    #DM type II  - On metformin at home, continue to hold for now  - Monitor FS. Start insulin if FS > 180. Keep between 140 - 180 (120-146 today)  - c/w insulin Lantus 16 units at bedtime  - c/w Lispro 5 mg with meals    #Misc  - DVT Prophylaxis: Lovenox 40 BID  - Diet: DASH/TLC, Carb consistent   - GI Prophylaxis: Pantoprazole   - Activity: AAT  - Code Status: Full Code    Dispo: Downgrade to medicine floors if remains stable

## 2020-11-12 NOTE — PROGRESS NOTE ADULT - SUBJECTIVE AND OBJECTIVE BOX
OVERNIGHT EVENTS: Events noted. on HHFNC 60%, Afebrile    Vital Signs Last 24 Hrs  T(C): 35.9 (12 Nov 2020 08:29), Max: 36.8 (11 Nov 2020 12:18)  T(F): 96.7 (12 Nov 2020 08:29), Max: 98.2 (11 Nov 2020 12:18)  HR: 97 (12 Nov 2020 08:29) (72 - 97)  BP: 110/647 (12 Nov 2020 08:29) (102/70 - 129/77)  BP(mean): 82 (12 Nov 2020 08:29) (82 - 96)  RR: 20 (12 Nov 2020 08:29) (18 - 20)  SpO2: 96% (12 Nov 2020 06:33) (95% - 97%)    PHYSICAL EXAMINATION:    GENERAL: comfortable    HEENT: Head is normocephalic and atraumatic.     NECK: Supple.    LUNGS: bl crackles    HEART: ABDIAS 3/6    ABDOMEN: Soft, nontender, and nondistended.      EXTREMITIES: Without any cyanosis, clubbing, rash, lesions or edema.    NEUROLOGIC: Grossly intact.    SKIN: No ulceration or induration present.      LABS:                        14.4   10.21 )-----------( 372      ( 11 Nov 2020 07:18 )             43.6     11-11    136  |  102  |  23<H>  ----------------------------<  118<H>  4.5   |  26  |  0.8    Ca    8.8      11 Nov 2020 07:18  Mg     1.9     11-11    TPro  5.9<L>  /  Alb  3.3<L>  /  TBili  0.4  /  DBili  x   /  AST  16  /  ALT  37  /  AlkPhos  95  11-11                    Procalcitonin, Serum: 0.03 ng/mL (11-11-20 @ 07:18)  Procalcitonin, Serum: 0.03 ng/mL (11-10-20 @ 08:50)        11-11-20 @ 07:01  -  11-12-20 @ 07:00  --------------------------------------------------------  IN: 0 mL / OUT: 1200 mL / NET: -1200 mL        MICROBIOLOGY:      MEDICATIONS  (STANDING):  dexAMETHasone  Injectable 6 milliGRAM(s) IV Push daily  dextrose 5%. 1000 milliLiter(s) (50 mL/Hr) IV Continuous <Continuous>  dextrose 50% Injectable 12.5 Gram(s) IV Push once  dextrose 50% Injectable 25 Gram(s) IV Push once  dextrose 50% Injectable 25 Gram(s) IV Push once  enoxaparin Injectable 40 milliGRAM(s) SubCutaneous every 12 hours  guaiFENesin ER 1200 milliGRAM(s) Oral every 12 hours  influenza   Vaccine 0.5 milliLiter(s) IntraMuscular once  insulin glargine Injectable (LANTUS) 16 Unit(s) SubCutaneous at bedtime  insulin lispro (ADMELOG) corrective regimen sliding scale   SubCutaneous three times a day before meals  insulin lispro Injectable (ADMELOG) 5 Unit(s) SubCutaneous three times a day before meals  pantoprazole    Tablet 40 milliGRAM(s) Oral before breakfast    MEDICATIONS  (PRN):  dextrose 40% Gel 15 Gram(s) Oral once PRN Blood Glucose LESS THAN 70 milliGRAM(s)/deciliter  glucagon  Injectable 1 milliGRAM(s) IntraMuscular once PRN Glucose LESS THAN 70 milligrams/deciliter  guaifenesin/dextromethorphan  Syrup 10 milliLiter(s) Oral every 6 hours PRN Cough      RADIOLOGY & ADDITIONAL STUDIES:

## 2020-11-12 NOTE — PROGRESS NOTE ADULT - SUBJECTIVE AND OBJECTIVE BOX
Patient was seen and examined. Chart reviewed.  No events overnight.  Vital Signs Last 24 Hrs  T(F): 97.6 (11 Nov 2020 21:10), Max: 98.2 (11 Nov 2020 12:18)  HR: 72 (11 Nov 2020 21:10) (72 - 97)  BP: 120/77 (11 Nov 2020 21:10) (102/70 - 129/77)  SpO2: 96% (12 Nov 2020 06:33) (95% - 97%)  MEDICATIONS  (STANDING):  dexAMETHasone  Injectable 6 milliGRAM(s) IV Push daily  dextrose 5%. 1000 milliLiter(s) (50 mL/Hr) IV Continuous <Continuous>  dextrose 50% Injectable 12.5 Gram(s) IV Push once  dextrose 50% Injectable 25 Gram(s) IV Push once  dextrose 50% Injectable 25 Gram(s) IV Push once  enoxaparin Injectable 40 milliGRAM(s) SubCutaneous every 12 hours  guaiFENesin ER 1200 milliGRAM(s) Oral every 12 hours  influenza   Vaccine 0.5 milliLiter(s) IntraMuscular once  insulin glargine Injectable (LANTUS) 16 Unit(s) SubCutaneous at bedtime  insulin lispro (ADMELOG) corrective regimen sliding scale   SubCutaneous three times a day before meals  insulin lispro Injectable (ADMELOG) 5 Unit(s) SubCutaneous three times a day before meals  pantoprazole    Tablet 40 milliGRAM(s) Oral before breakfast    MEDICATIONS  (PRN):  dextrose 40% Gel 15 Gram(s) Oral once PRN Blood Glucose LESS THAN 70 milliGRAM(s)/deciliter  glucagon  Injectable 1 milliGRAM(s) IntraMuscular once PRN Glucose LESS THAN 70 milligrams/deciliter  guaifenesin/dextromethorphan  Syrup 10 milliLiter(s) Oral every 6 hours PRN Cough    Labs:                        14.4   10.21 )-----------( 372      ( 11 Nov 2020 07:18 )             43.6                         14.7   9.91  )-----------( 375      ( 10 Nov 2020 08:50 )             45.2     11 Nov 2020 07:18    136    |  102    |  23     ----------------------------<  118    4.5     |  26     |  0.8    10 Nov 2020 08:50    139    |  104    |  23     ----------------------------<  120    4.2     |  25     |  0.8      Ca    8.8        11 Nov 2020 07:18  Ca    9.0        10 Nov 2020 08:50  Mg     1.9       11 Nov 2020 07:18  Mg     1.9       10 Nov 2020 08:50    TPro  5.9    /  Alb  3.3    /  TBili  0.4    /  DBili  x      /  AST  16     /  ALT  37     /  AlkPhos  95     11 Nov 2020 07:18  TPro  6.0    /  Alb  3.4    /  TBili  0.4    /  DBili  x      /  AST  16     /  ALT  33     /  AlkPhos  96     10 Nov 2020 08:50          General: comfortable, NAD  Neurology: A&Ox3, nonfocal  Head:  Normocephalic, atraumatic  ENT:  Mucosa moist, no ulcerations  Neck:  Supple, no JVD,   Skin: no breakdowns (as per RN)  Resp: CTA B/L  CV: RRR, S1S2,   GI: Soft, NT, bowel sounds  MS: No edema, + peripheral pulses, FROM all 4 extremity      A/P:  60 YO M with PMHx of GIST (s/p resection), DM type II (on metformin), RA (on prednisone), HTN, HLD, herniated disc, admitted with Covid pneumonia s/p  remdesivir and plasma    pulm/ICU f/u- Dr Pelletier    ID f/u- Dr Crowder     IV dexamathasone- consider taper    O2 as needed- on high flow    lovenox- full dose    monitor inflammatory markers     OOB to chair    PT if needed    DVT prophylaxis  Decubitus prevention- all measures as per RN protocol  Please call or text me with any questions or updates

## 2020-11-13 LAB
ALBUMIN SERPL ELPH-MCNC: 3.3 G/DL — LOW (ref 3.5–5.2)
ALP SERPL-CCNC: 83 U/L — SIGNIFICANT CHANGE UP (ref 30–115)
ALT FLD-CCNC: 45 U/L — HIGH (ref 0–41)
ANION GAP SERPL CALC-SCNC: 8 MMOL/L — SIGNIFICANT CHANGE UP (ref 7–14)
AST SERPL-CCNC: 19 U/L — SIGNIFICANT CHANGE UP (ref 0–41)
BILIRUB SERPL-MCNC: 0.4 MG/DL — SIGNIFICANT CHANGE UP (ref 0.2–1.2)
BUN SERPL-MCNC: 23 MG/DL — HIGH (ref 10–20)
CALCIUM SERPL-MCNC: 9.2 MG/DL — SIGNIFICANT CHANGE UP (ref 8.5–10.1)
CHLORIDE SERPL-SCNC: 103 MMOL/L — SIGNIFICANT CHANGE UP (ref 98–110)
CO2 SERPL-SCNC: 26 MMOL/L — SIGNIFICANT CHANGE UP (ref 17–32)
CREAT SERPL-MCNC: 0.9 MG/DL — SIGNIFICANT CHANGE UP (ref 0.7–1.5)
GLUCOSE BLDC GLUCOMTR-MCNC: 134 MG/DL — HIGH (ref 70–99)
GLUCOSE BLDC GLUCOMTR-MCNC: 161 MG/DL — HIGH (ref 70–99)
GLUCOSE BLDC GLUCOMTR-MCNC: 225 MG/DL — HIGH (ref 70–99)
GLUCOSE BLDC GLUCOMTR-MCNC: 252 MG/DL — HIGH (ref 70–99)
GLUCOSE SERPL-MCNC: 139 MG/DL — HIGH (ref 70–99)
HCT VFR BLD CALC: 42.1 % — SIGNIFICANT CHANGE UP (ref 42–52)
HGB BLD-MCNC: 13.7 G/DL — LOW (ref 14–18)
MAGNESIUM SERPL-MCNC: 2 MG/DL — SIGNIFICANT CHANGE UP (ref 1.8–2.4)
MCHC RBC-ENTMCNC: 29.1 PG — SIGNIFICANT CHANGE UP (ref 27–31)
MCHC RBC-ENTMCNC: 32.5 G/DL — SIGNIFICANT CHANGE UP (ref 32–37)
MCV RBC AUTO: 89.6 FL — SIGNIFICANT CHANGE UP (ref 80–94)
NRBC # BLD: 0 /100 WBCS — SIGNIFICANT CHANGE UP (ref 0–0)
PLATELET # BLD AUTO: 366 K/UL — SIGNIFICANT CHANGE UP (ref 130–400)
POTASSIUM SERPL-MCNC: 4.3 MMOL/L — SIGNIFICANT CHANGE UP (ref 3.5–5)
POTASSIUM SERPL-SCNC: 4.3 MMOL/L — SIGNIFICANT CHANGE UP (ref 3.5–5)
PROT SERPL-MCNC: 5.7 G/DL — LOW (ref 6–8)
RBC # BLD: 4.7 M/UL — SIGNIFICANT CHANGE UP (ref 4.7–6.1)
RBC # FLD: 14.9 % — HIGH (ref 11.5–14.5)
SODIUM SERPL-SCNC: 137 MMOL/L — SIGNIFICANT CHANGE UP (ref 135–146)
WBC # BLD: 11.15 K/UL — HIGH (ref 4.8–10.8)
WBC # FLD AUTO: 11.15 K/UL — HIGH (ref 4.8–10.8)

## 2020-11-13 PROCEDURE — 71045 X-RAY EXAM CHEST 1 VIEW: CPT | Mod: 26

## 2020-11-13 RX ORDER — FUROSEMIDE 40 MG
40 TABLET ORAL DAILY
Refills: 0 | Status: DISCONTINUED | OUTPATIENT
Start: 2020-11-13 | End: 2020-11-21

## 2020-11-13 RX ADMIN — Medication 5 UNIT(S): at 08:29

## 2020-11-13 RX ADMIN — Medication 2: at 12:20

## 2020-11-13 RX ADMIN — Medication 6 MILLIGRAM(S): at 05:35

## 2020-11-13 RX ADMIN — Medication 5 UNIT(S): at 16:53

## 2020-11-13 RX ADMIN — Medication 40 MILLIGRAM(S): at 11:53

## 2020-11-13 RX ADMIN — PANTOPRAZOLE SODIUM 40 MILLIGRAM(S): 20 TABLET, DELAYED RELEASE ORAL at 05:35

## 2020-11-13 RX ADMIN — ENOXAPARIN SODIUM 40 MILLIGRAM(S): 100 INJECTION SUBCUTANEOUS at 17:21

## 2020-11-13 RX ADMIN — Medication 5 UNIT(S): at 12:20

## 2020-11-13 RX ADMIN — Medication 3: at 16:53

## 2020-11-13 RX ADMIN — Medication 1200 MILLIGRAM(S): at 17:21

## 2020-11-13 RX ADMIN — Medication 1200 MILLIGRAM(S): at 05:35

## 2020-11-13 RX ADMIN — ENOXAPARIN SODIUM 40 MILLIGRAM(S): 100 INJECTION SUBCUTANEOUS at 05:35

## 2020-11-13 RX ADMIN — INSULIN GLARGINE 16 UNIT(S): 100 INJECTION, SOLUTION SUBCUTANEOUS at 21:35

## 2020-11-13 NOTE — PROGRESS NOTE ADULT - SUBJECTIVE AND OBJECTIVE BOX
JOSÉ LUIS AREVALO  59y, Male    All available historical data reviewed    OVERNIGHT EVENTS:  HFNC 40%  feels well and has no complaints    ROS:  General: Denies rigors, nightsweats  HEENT: Denies headache, rhinorrhea, sore throat, eye pain  CV: Denies CP, palpitations  PULM: Denies wheezing, hemoptysis  GI: Denies hematemesis, hematochezia, melena  : Denies discharge, hematuria  MSK: Denies arthralgias, myalgias  SKIN: Denies rash, lesions  NEURO: Denies paresthesias, weakness  PSYCH: Denies depression, anxiety    VITALS:  T(F): 97.4, Max: 97.6 (11-13-20 @ 00:00)  HR: 63  BP: 117/76  RR: 18Vital Signs Last 24 Hrs  T(C): 36.3 (13 Nov 2020 08:00), Max: 36.4 (13 Nov 2020 00:00)  T(F): 97.4 (13 Nov 2020 08:00), Max: 97.6 (13 Nov 2020 00:00)  HR: 63 (13 Nov 2020 08:00) (63 - 96)  BP: 117/76 (13 Nov 2020 08:00) (105/61 - 132/75)  BP(mean): 81 (13 Nov 2020 04:00) (78 - 92)  RR: 18 (13 Nov 2020 08:30) (18 - 20)  SpO2: 94% (12 Nov 2020 21:25) (94% - 97%)    TESTS & MEASUREMENTS:                        13.7   11.15 )-----------( 366      ( 13 Nov 2020 08:20 )             42.1                     RADIOLOGY & ADDITIONAL TESTS:  Personal review of radiological diagnostics performed  Echo and EKG results noted when applicable.     MEDICATIONS:  dexAMETHasone  Injectable 6 milliGRAM(s) IV Push daily  dextrose 40% Gel 15 Gram(s) Oral once PRN  dextrose 5%. 1000 milliLiter(s) IV Continuous <Continuous>  dextrose 50% Injectable 12.5 Gram(s) IV Push once  dextrose 50% Injectable 25 Gram(s) IV Push once  dextrose 50% Injectable 25 Gram(s) IV Push once  enoxaparin Injectable 40 milliGRAM(s) SubCutaneous every 12 hours  furosemide    Tablet 40 milliGRAM(s) Oral daily  glucagon  Injectable 1 milliGRAM(s) IntraMuscular once PRN  guaiFENesin ER 1200 milliGRAM(s) Oral every 12 hours  guaifenesin/dextromethorphan  Syrup 10 milliLiter(s) Oral every 6 hours PRN  influenza   Vaccine 0.5 milliLiter(s) IntraMuscular once  insulin glargine Injectable (LANTUS) 16 Unit(s) SubCutaneous at bedtime  insulin lispro (ADMELOG) corrective regimen sliding scale   SubCutaneous three times a day before meals  insulin lispro Injectable (ADMELOG) 5 Unit(s) SubCutaneous three times a day before meals  pantoprazole    Tablet 40 milliGRAM(s) Oral before breakfast      ANTIBIOTICS:

## 2020-11-13 NOTE — PROGRESS NOTE ADULT - SUBJECTIVE AND OBJECTIVE BOX
SUBJECTIVE:    Patient is a 59y old Male who presents with a chief complaint of Suspected COVID-19 Pneumonia (13 Nov 2020 07:16)      HPI:  58 YO M with PMHx of GIST (s/p resection), DM type II (on metformin), RA (on prednisone), HTN, HLD, herniated disc, presented to the ED with c/c of cough and SOB x 3 days. Patient reports that his daughter tested positive for COVID-19 10 days ago. He started experiencing SOB 3 days ago and isolated himself at home. Today his SOB got worse so he decided to come to the ED. Patient also admits to loss of sense of smell and taste   Patient denies smoking, ETOH use, drug use, fevers, chills, headache, dizziness, chest pain, palpitations, abdominal pain. N/V/D/C.  In the ED vitals were sig for SpO2 of 93% on RA and HR of 119 bpm. Patient was placed on 4L NC.  D-dimer was sightly elevated at 287. CT Angio Chest PE Protocol w/ IV Cont was performed which showed Worsening scattered patchy bilateral ground glass opacities, peripherally dominant, compatible with infectious/inflammatory etiology. No pulmonary embolus.       (05 Nov 2020 02:00)      Currently admitted to medicine with the primary diagnosis of COVID-19         Besides the pertinent positives and negatives described above, the ROS was within normal limits.    PAST MEDICAL & SURGICAL HISTORY  Hyperlipidemia    Lumbago    Hypertension    H/O lymph node biopsy  Neck by Dr Case      SOCIAL HISTORY:    ALLERGIES:  penicillin (Unknown)  shellfish (Pruritus)    MEDICATIONS:  STANDING MEDICATIONS  dexAMETHasone  Injectable 6 milliGRAM(s) IV Push daily  dextrose 5%. 1000 milliLiter(s) IV Continuous <Continuous>  dextrose 50% Injectable 12.5 Gram(s) IV Push once  dextrose 50% Injectable 25 Gram(s) IV Push once  dextrose 50% Injectable 25 Gram(s) IV Push once  enoxaparin Injectable 40 milliGRAM(s) SubCutaneous every 12 hours  furosemide    Tablet 40 milliGRAM(s) Oral daily  guaiFENesin ER 1200 milliGRAM(s) Oral every 12 hours  influenza   Vaccine 0.5 milliLiter(s) IntraMuscular once  insulin glargine Injectable (LANTUS) 16 Unit(s) SubCutaneous at bedtime  insulin lispro (ADMELOG) corrective regimen sliding scale   SubCutaneous three times a day before meals  insulin lispro Injectable (ADMELOG) 5 Unit(s) SubCutaneous three times a day before meals  pantoprazole    Tablet 40 milliGRAM(s) Oral before breakfast    PRN MEDICATIONS  dextrose 40% Gel 15 Gram(s) Oral once PRN  glucagon  Injectable 1 milliGRAM(s) IntraMuscular once PRN  guaifenesin/dextromethorphan  Syrup 10 milliLiter(s) Oral every 6 hours PRN    VITALS:   T(F): 97.5  HR: 70  BP: 106/65  RR: 18  SpO2: 94%    LABS:                        RADIOLOGY:    PHYSICAL EXAM:  GEN: No acute distress  LUNGS: Clear to auscultation bilaterally   HEART: Regular  ABD: Soft, non-tender, non-distended.  EXT: NC/NC/NE/2+PP/CARREON/Skin Intact.   NEURO: AAOX3    Intravenous access:   NG tube:   Coleman Catheter:        Hospital day 8. No acute events overnight. Ptn denies any discomfort/ SOB. Endorses continued improvement of covid pneumonia symptoms. No new complaints. Ptn updating his family daily by smartphone. Understands plan to continue weaning O2 today.    PAST MEDICAL & SURGICAL HISTORY  Hyperlipidemia    Lumbago    Hypertension    H/O lymph node biopsy  Neck by Dr Case    ALLERGIES:  penicillin (Unknown)  shellfish (Pruritus)    MEDICATIONS:  STANDING MEDICATIONS  dexAMETHasone  Injectable 6 milliGRAM(s) IV Push daily  dextrose 5%. 1000 milliLiter(s) IV Continuous <Continuous>  dextrose 50% Injectable 12.5 Gram(s) IV Push once  dextrose 50% Injectable 25 Gram(s) IV Push once  dextrose 50% Injectable 25 Gram(s) IV Push once  enoxaparin Injectable 40 milliGRAM(s) SubCutaneous every 12 hours  furosemide    Tablet 40 milliGRAM(s) Oral daily  guaiFENesin ER 1200 milliGRAM(s) Oral every 12 hours  influenza   Vaccine 0.5 milliLiter(s) IntraMuscular once  insulin glargine Injectable (LANTUS) 16 Unit(s) SubCutaneous at bedtime  insulin lispro (ADMELOG) corrective regimen sliding scale   SubCutaneous three times a day before meals  insulin lispro Injectable (ADMELOG) 5 Unit(s) SubCutaneous three times a day before meals  pantoprazole    Tablet 40 milliGRAM(s) Oral before breakfast    PRN MEDICATIONS  dextrose 40% Gel 15 Gram(s) Oral once PRN  glucagon  Injectable 1 milliGRAM(s) IntraMuscular once PRN  guaifenesin/dextromethorphan  Syrup 10 milliLiter(s) Oral every 6 hours PRN    VITALS:   T(F): 97.5  HR: 70  BP: 106/65  RR: 18  SpO2: 94%    RADIOLOGY:< from: Xray Chest 1 View- PORTABLE-Routine (Xray Chest 1 View- PORTABLE-Routine in AM.) (11.10.20 @ 05:52) >    EXAM:  XR CHEST PORTABLE ROUTINE 1V            PROCEDURE DATE:  11/10/2020            INTERPRETATION:  CLINICAL INDICATION:  covid    COMPARISON: Chest radiograph dated 11/7/2020    TECHNIQUE: Frontal radiograph the chest. Rotated exam.    FINDINGS:    Support devices: None.    Cardiac/mediastinum/hilum: Stable.    Lung parenchyma/Pleura: Unchanged left greater than right bilateral opacities. There is no pneumothorax.    Skeleton/soft tissues: Stable.    IMPRESSION:    Unchanged left greater than right bilateral opacities.                INDIGO GARCIA MD; Attending Radiologist  This document has been electronically signed. Nov 10 2020 12:53PM    < end of copied text >      PHYSICAL EXAM:  GEN: No acute distress well nourished, well developed male on HFNC sat 93%  LUNGS: decreased breath sounds b/l, no wheezes/ rales/ rhonchi   HEART: Regular  ABD: Soft, non-tender, non-distended.  EXT: NC/NC/NE/2+PP/CARREON/Skin Intact.   NEURO: AOx3, no focal neurological defects

## 2020-11-13 NOTE — PROGRESS NOTE ADULT - ASSESSMENT
· Assessment	  58 YO M with PMHx of GIST (s/p resection), DM type II (on metformin), RA (on prednisone), HTN, HLD, herniated disc, admitted with Covid pneumonia    IMPRESSION;  Clinically feels well and has no complaints   COVID-19 with severe illness with hypoxemia ( 99% HFNC )  ) and CXR with opacities b/l  Early in the viral replicative phase  no evidence of cytokine release although markers slightly elevated ( and are decreasing )  Ferritin 1079> 885  CRP 10.70>1.38    ddimers 317>484    RECOMMENDATIONS;  If pt comes off supplemental O2 will d/c home  No need to repeat markers   Day 1 11/6-10 : Single  mg IV loading dose  Day 2-5 : single  mg IV once daily maintenance dose  Daily CBC,CMP.  Dexamethasone 6 mg iv q24h for 10 days 11/6-16  ( or until discharge ) or equivalent glucocorticoid dose may be substituted. Equivalent total dosis of alternative steroids are Methylprednisolone 32 mg and prednisone 40 mg q24h ( on discharge )  Monitor for side effects: hyperglycemia, neurological ( agitation/confusion), adrenal suppression, bacterial and fungal infections  Type and screen.  S/p Convalescent plasma 2 units. 11/10  Anticoagulation as per team  recall prn please

## 2020-11-13 NOTE — PROGRESS NOTE ADULT - SUBJECTIVE AND OBJECTIVE BOX
Patient was seen and examined.  Chart reviewed.  No events overnight.  Vital Signs Last 24 Hrs  T(F): 97.5 (13 Nov 2020 04:00), Max: 97.6 (13 Nov 2020 00:00)  HR: 70 (13 Nov 2020 04:00) (70 - 97)  BP: 106/65 (13 Nov 2020 04:00) (105/61 - 132/75)  SpO2: 94% (12 Nov 2020 21:25) (94% - 97%)  MEDICATIONS  (STANDING):  dexAMETHasone  Injectable 6 milliGRAM(s) IV Push daily  dextrose 5%. 1000 milliLiter(s) (50 mL/Hr) IV Continuous <Continuous>  dextrose 50% Injectable 12.5 Gram(s) IV Push once  dextrose 50% Injectable 25 Gram(s) IV Push once  dextrose 50% Injectable 25 Gram(s) IV Push once  enoxaparin Injectable 40 milliGRAM(s) SubCutaneous every 12 hours  furosemide    Tablet 40 milliGRAM(s) Oral daily  guaiFENesin ER 1200 milliGRAM(s) Oral every 12 hours  influenza   Vaccine 0.5 milliLiter(s) IntraMuscular once  insulin glargine Injectable (LANTUS) 16 Unit(s) SubCutaneous at bedtime  insulin lispro (ADMELOG) corrective regimen sliding scale   SubCutaneous three times a day before meals  insulin lispro Injectable (ADMELOG) 5 Unit(s) SubCutaneous three times a day before meals  pantoprazole    Tablet 40 milliGRAM(s) Oral before breakfast    MEDICATIONS  (PRN):  dextrose 40% Gel 15 Gram(s) Oral once PRN Blood Glucose LESS THAN 70 milliGRAM(s)/deciliter  glucagon  Injectable 1 milliGRAM(s) IntraMuscular once PRN Glucose LESS THAN 70 milligrams/deciliter  guaifenesin/dextromethorphan  Syrup 10 milliLiter(s) Oral every 6 hours PRN Cough      General: comfortable, NAD  Neurology: A&Ox3, nonfocal  Head:  Normocephalic, atraumatic  ENT:  Mucosa moist, no ulcerations  Neck:  Supple, no JVD,   Resp: CTA B/L  CV: RRR, S1S2,   GI: Soft, NT, bowel sounds  MS: No edema, + peripheral pulses, FROM all 4 extremity      A/P:  58 YO M with PMHx of GIST (s/p resection), DM type II (on metformin), RA (on prednisone), HTN, HLD, herniated disc, admitted with Covid pneumonia s/p  remdesivir and plasma    pulm/ICU f/u- Dr Pelletier    ID f/u- Dr Crowder     IV dexamathasone- consider taper    O2 as needed- on high flow    lovenox- full dose    monitor inflammatory markers     OOB to chair    PT if needed    DVT prophylaxis  Decubitus prevention- all measures as per RN protocol  Please call or text me with any questions or updates

## 2020-11-13 NOTE — PROGRESS NOTE ADULT - ASSESSMENT
IMPRESSION:    Acute hypoxemic resp failure 2/2 covid pna  doubt superimposed PNA nl procal  HO ILD?  HO RA on pred    PLAN:    CNS: avoid sedatives    HEENT: Oral care    PULMONARY:  HOB @ 45 degrees.  Aspiration precautions. Wean oxygen as tolerated, Goal Pox >92%.  CXR BIPAP alternate with HHFNC dec to 50%, decadron    CARDIOVASCULAR: Keep I=O, goal Map >65 po lasiX    GI: GI prophylaxis.  Feeding.  Bowel regimen     RENAL:  Follow up lytes.  Correct as needed    INFECTIOUS DISEASE: Follow up cultures. Monitor procal. Dexamethaxone 6mg iv q24h, remdesivir, f/up infl markers    HEMATOLOGICAL:  DVT prophylaxis. LOVENOX 40 Q 12H,    ENDOCRINE:  Follow up FS.  Insulin protocol if needed.    MUSCULOSKELETAL: OOBTC    Dispo: Stepdown Unit    poor proGNOSIS     IMPRESSION:    Acute hypoxemic resp failure 2/2 covid pna  doubt superimposed PNA nl procal  HO ILD?  HO RA on pred    PLAN:    CNS: avoid sedatives    HEENT: Oral care    PULMONARY:  HOB @ 45 degrees.  Aspiration precautions. Wean oxygen as tolerated, Goal Pox >92%.  CXR BIPAP alternate with HHFNC dec to 40%, decadron    CARDIOVASCULAR: Keep I=O, goal Map >65 po lasiX    GI: GI prophylaxis.  Feeding.  Bowel regimen     RENAL:  Follow up lytes.  Correct as needed    INFECTIOUS DISEASE: Follow up cultures. Monitor procal. Dexamethaxone 6mg iv q24h, remdesivir, f/up infl markers    HEMATOLOGICAL:  DVT prophylaxis. LOVENOX 40 Q 12H,    ENDOCRINE:  Follow up FS.  Insulin protocol if needed.    MUSCULOSKELETAL: OOBTC    Dispo: Stepdown Unit    poor proGNOSIS

## 2020-11-13 NOTE — PROGRESS NOTE ADULT - ASSESSMENT
58 YO M with PMHx of GIST (s/p resection), DM type II (on metformin), RA (on prednisone), HTN, HLD, herniated disc, presented to the ED with c/c of cough and SOB x 3 days admitted to medicine for treatment of Covid pneumonia    #Acute Hypoxic Respiratory Failure 2/2  #COVID-19 Pneumonia #Pleural Effusions  - O2 Sat 97% on HFNC (down to 50L 40% today)   - CT Angio on admission negative for PE  - Chest X-ray 11/12 showing improving opacities b/l L>R  - s/p 2 units Plasma 11/6 s/p 5 day course Remdesivir completed 11/09  - ID recommendations noted, no need to continue following inflammatory markers   - PO 40mg Lasix for diuresis today -->f/u CXR in 48hrs  - Monitor on titrate oxygen today (40%)  - Keep O2 sat above 93%        #Rising D-dimer in the setting of #Covid pneumonia --resolved  - D-dimer on admission 287-> 484-->444-->405 11/11  - covid pneumonia--> heightened risk of blood clot  - c/w therapeutic Lovenox (40 BID)  - Lower extremity duplex negative 11/11  - D-dimer decreasing now-->no need to continue monitoring      #RA  - not in acute flair  - Will hold prednisone for now since patient on dexamethasone   -c/w Allopurinol 300 daily    #HTN  - Home medication Lisinopril 40 mg PO QD  - Ptn diuresed w PO Lasix y/day  - Cr stable at 0.8 s/p Lasix  - continue holding Lisinopril  - continue to monitor BP/ Cr    #DM type II  - On metformin at home, continue to hold for now  - Monitor FS. Start insulin if FS > 180. Keep between 140 - 180 (120-146 today)  - c/w insulin Lantus 16 units at bedtime  - c/w Lispro 5 mg with meals    #Misc  - DVT Prophylaxis: Lovenox 40 BID  - Diet: DASH/TLC, Carb consistent   - GI Prophylaxis: Pantoprazole   - Activity: AAT  - Code Status: Full Code    Dispo: Downgrade to medicine floors in 24 hrs if remains stable

## 2020-11-13 NOTE — PROGRESS NOTE ADULT - SUBJECTIVE AND OBJECTIVE BOX
OVERNIGHT EVENTS: events noted, afebrile    Vital Signs Last 24 Hrs  T(C): 36.4 (13 Nov 2020 04:00), Max: 36.4 (13 Nov 2020 00:00)  T(F): 97.5 (13 Nov 2020 04:00), Max: 97.6 (13 Nov 2020 00:00)  HR: 70 (13 Nov 2020 04:00) (70 - 97)  BP: 106/65 (13 Nov 2020 04:00) (105/61 - 132/75)  BP(mean): 81 (13 Nov 2020 04:00) (78 - 92)  RR: 18 (13 Nov 2020 04:00) (18 - 20)  SpO2: 94% (12 Nov 2020 21:25) (94% - 97%)    PHYSICAL EXAMINATION:    GENERAL: comfortable    HEENT: Head is normocephalic and atraumatic.     NECK: Supple.    LUNGS:  bl crackles    HEART: Regular rate and rhythm without murmur.    ABDOMEN: Soft, nontender, and nondistended.      EXTREMITIES: Without any cyanosis, clubbing, rash, lesions or edema.    NEUROLOGIC: Grossly intact.    SKIN: No ulceration or induration present.      LABS:                        14.4   10.21 )-----------( 372      ( 11 Nov 2020 07:18 )             43.6     11-11    136  |  102  |  23<H>  ----------------------------<  118<H>  4.5   |  26  |  0.8    Ca    8.8      11 Nov 2020 07:18  Mg     1.9     11-11    TPro  5.9<L>  /  Alb  3.3<L>  /  TBili  0.4  /  DBili  x   /  AST  16  /  ALT  37  /  AlkPhos  95  11-11                    Procalcitonin, Serum: 0.03 ng/mL (11-11-20 @ 07:18)  Procalcitonin, Serum: 0.03 ng/mL (11-10-20 @ 08:50)          MICROBIOLOGY:      MEDICATIONS  (STANDING):  dexAMETHasone  Injectable 6 milliGRAM(s) IV Push daily  dextrose 5%. 1000 milliLiter(s) (50 mL/Hr) IV Continuous <Continuous>  dextrose 50% Injectable 12.5 Gram(s) IV Push once  dextrose 50% Injectable 25 Gram(s) IV Push once  dextrose 50% Injectable 25 Gram(s) IV Push once  enoxaparin Injectable 40 milliGRAM(s) SubCutaneous every 12 hours  guaiFENesin ER 1200 milliGRAM(s) Oral every 12 hours  influenza   Vaccine 0.5 milliLiter(s) IntraMuscular once  insulin glargine Injectable (LANTUS) 16 Unit(s) SubCutaneous at bedtime  insulin lispro (ADMELOG) corrective regimen sliding scale   SubCutaneous three times a day before meals  insulin lispro Injectable (ADMELOG) 5 Unit(s) SubCutaneous three times a day before meals  pantoprazole    Tablet 40 milliGRAM(s) Oral before breakfast    MEDICATIONS  (PRN):  dextrose 40% Gel 15 Gram(s) Oral once PRN Blood Glucose LESS THAN 70 milliGRAM(s)/deciliter  glucagon  Injectable 1 milliGRAM(s) IntraMuscular once PRN Glucose LESS THAN 70 milligrams/deciliter  guaifenesin/dextromethorphan  Syrup 10 milliLiter(s) Oral every 6 hours PRN Cough      RADIOLOGY & ADDITIONAL STUDIES:         OVERNIGHT EVENTS: events noted, afebrile, feels better on HHFNC 50%    Vital Signs Last 24 Hrs  T(C): 36.4 (13 Nov 2020 04:00), Max: 36.4 (13 Nov 2020 00:00)  T(F): 97.5 (13 Nov 2020 04:00), Max: 97.6 (13 Nov 2020 00:00)  HR: 70 (13 Nov 2020 04:00) (70 - 97)  BP: 106/65 (13 Nov 2020 04:00) (105/61 - 132/75)  BP(mean): 81 (13 Nov 2020 04:00) (78 - 92)  RR: 18 (13 Nov 2020 04:00) (18 - 20)  SpO2: 94% (12 Nov 2020 21:25) (94% - 97%)    PHYSICAL EXAMINATION:    GENERAL: comfortable    HEENT: Head is normocephalic and atraumatic.     NECK: Supple.    LUNGS:  bl crackles    HEART: Regular rate and rhythm without murmur.    ABDOMEN: Soft, nontender, and nondistended.      EXTREMITIES: Without any cyanosis, clubbing, rash, lesions or edema.    NEUROLOGIC: Grossly intact.    SKIN: No ulceration or induration present.      LABS:                        14.4   10.21 )-----------( 372      ( 11 Nov 2020 07:18 )             43.6     11-11    136  |  102  |  23<H>  ----------------------------<  118<H>  4.5   |  26  |  0.8    Ca    8.8      11 Nov 2020 07:18  Mg     1.9     11-11    TPro  5.9<L>  /  Alb  3.3<L>  /  TBili  0.4  /  DBili  x   /  AST  16  /  ALT  37  /  AlkPhos  95  11-11                    Procalcitonin, Serum: 0.03 ng/mL (11-11-20 @ 07:18)  Procalcitonin, Serum: 0.03 ng/mL (11-10-20 @ 08:50)          MICROBIOLOGY:      MEDICATIONS  (STANDING):  dexAMETHasone  Injectable 6 milliGRAM(s) IV Push daily  dextrose 5%. 1000 milliLiter(s) (50 mL/Hr) IV Continuous <Continuous>  dextrose 50% Injectable 12.5 Gram(s) IV Push once  dextrose 50% Injectable 25 Gram(s) IV Push once  dextrose 50% Injectable 25 Gram(s) IV Push once  enoxaparin Injectable 40 milliGRAM(s) SubCutaneous every 12 hours  guaiFENesin ER 1200 milliGRAM(s) Oral every 12 hours  influenza   Vaccine 0.5 milliLiter(s) IntraMuscular once  insulin glargine Injectable (LANTUS) 16 Unit(s) SubCutaneous at bedtime  insulin lispro (ADMELOG) corrective regimen sliding scale   SubCutaneous three times a day before meals  insulin lispro Injectable (ADMELOG) 5 Unit(s) SubCutaneous three times a day before meals  pantoprazole    Tablet 40 milliGRAM(s) Oral before breakfast    MEDICATIONS  (PRN):  dextrose 40% Gel 15 Gram(s) Oral once PRN Blood Glucose LESS THAN 70 milliGRAM(s)/deciliter  glucagon  Injectable 1 milliGRAM(s) IntraMuscular once PRN Glucose LESS THAN 70 milligrams/deciliter  guaifenesin/dextromethorphan  Syrup 10 milliLiter(s) Oral every 6 hours PRN Cough      RADIOLOGY & ADDITIONAL STUDIES:

## 2020-11-14 LAB
ALBUMIN SERPL ELPH-MCNC: 3.2 G/DL — LOW (ref 3.5–5.2)
ALP SERPL-CCNC: 84 U/L — SIGNIFICANT CHANGE UP (ref 30–115)
ALT FLD-CCNC: 46 U/L — HIGH (ref 0–41)
ANION GAP SERPL CALC-SCNC: 11 MMOL/L — SIGNIFICANT CHANGE UP (ref 7–14)
AST SERPL-CCNC: 17 U/L — SIGNIFICANT CHANGE UP (ref 0–41)
BILIRUB SERPL-MCNC: 1.1 MG/DL — SIGNIFICANT CHANGE UP (ref 0.2–1.2)
BUN SERPL-MCNC: 25 MG/DL — HIGH (ref 10–20)
CALCIUM SERPL-MCNC: 9.1 MG/DL — SIGNIFICANT CHANGE UP (ref 8.5–10.1)
CHLORIDE SERPL-SCNC: 99 MMOL/L — SIGNIFICANT CHANGE UP (ref 98–110)
CO2 SERPL-SCNC: 27 MMOL/L — SIGNIFICANT CHANGE UP (ref 17–32)
CREAT SERPL-MCNC: 0.9 MG/DL — SIGNIFICANT CHANGE UP (ref 0.7–1.5)
GLUCOSE BLDC GLUCOMTR-MCNC: 138 MG/DL — HIGH (ref 70–99)
GLUCOSE BLDC GLUCOMTR-MCNC: 219 MG/DL — HIGH (ref 70–99)
GLUCOSE BLDC GLUCOMTR-MCNC: 324 MG/DL — HIGH (ref 70–99)
GLUCOSE BLDC GLUCOMTR-MCNC: 327 MG/DL — HIGH (ref 70–99)
GLUCOSE SERPL-MCNC: 109 MG/DL — HIGH (ref 70–99)
HCT VFR BLD CALC: 43.2 % — SIGNIFICANT CHANGE UP (ref 42–52)
HGB BLD-MCNC: 14.3 G/DL — SIGNIFICANT CHANGE UP (ref 14–18)
MAGNESIUM SERPL-MCNC: 1.9 MG/DL — SIGNIFICANT CHANGE UP (ref 1.8–2.4)
MCHC RBC-ENTMCNC: 29 PG — SIGNIFICANT CHANGE UP (ref 27–31)
MCHC RBC-ENTMCNC: 33.1 G/DL — SIGNIFICANT CHANGE UP (ref 32–37)
MCV RBC AUTO: 87.6 FL — SIGNIFICANT CHANGE UP (ref 80–94)
NRBC # BLD: 0 /100 WBCS — SIGNIFICANT CHANGE UP (ref 0–0)
PLATELET # BLD AUTO: 378 K/UL — SIGNIFICANT CHANGE UP (ref 130–400)
POTASSIUM SERPL-MCNC: 3.7 MMOL/L — SIGNIFICANT CHANGE UP (ref 3.5–5)
POTASSIUM SERPL-SCNC: 3.7 MMOL/L — SIGNIFICANT CHANGE UP (ref 3.5–5)
PROT SERPL-MCNC: 5.6 G/DL — LOW (ref 6–8)
RBC # BLD: 4.93 M/UL — SIGNIFICANT CHANGE UP (ref 4.7–6.1)
RBC # FLD: 15.2 % — HIGH (ref 11.5–14.5)
SODIUM SERPL-SCNC: 137 MMOL/L — SIGNIFICANT CHANGE UP (ref 135–146)
WBC # BLD: 12.76 K/UL — HIGH (ref 4.8–10.8)
WBC # FLD AUTO: 12.76 K/UL — HIGH (ref 4.8–10.8)

## 2020-11-14 RX ADMIN — Medication 1200 MILLIGRAM(S): at 05:22

## 2020-11-14 RX ADMIN — Medication 4: at 11:58

## 2020-11-14 RX ADMIN — INSULIN GLARGINE 16 UNIT(S): 100 INJECTION, SOLUTION SUBCUTANEOUS at 21:19

## 2020-11-14 RX ADMIN — ENOXAPARIN SODIUM 40 MILLIGRAM(S): 100 INJECTION SUBCUTANEOUS at 17:33

## 2020-11-14 RX ADMIN — Medication 5 UNIT(S): at 17:32

## 2020-11-14 RX ADMIN — Medication 1200 MILLIGRAM(S): at 17:33

## 2020-11-14 RX ADMIN — ENOXAPARIN SODIUM 40 MILLIGRAM(S): 100 INJECTION SUBCUTANEOUS at 05:22

## 2020-11-14 RX ADMIN — Medication 6 MILLIGRAM(S): at 05:22

## 2020-11-14 RX ADMIN — Medication 5 UNIT(S): at 11:58

## 2020-11-14 RX ADMIN — Medication 4: at 17:35

## 2020-11-14 RX ADMIN — Medication 40 MILLIGRAM(S): at 05:22

## 2020-11-14 NOTE — PROGRESS NOTE ADULT - SUBJECTIVE AND OBJECTIVE BOX
SUBJECTIVE:    Patient is a 59y old Male who presents with a chief complaint of Suspected COVID-19 Pneumonia (13 Nov 2020 10:03)    Currently admitted to medicine with the primary diagnosis of COVID-19       Today is hospital day 9d. This morning he is resting comfortably in bed and reports no new issues or overnight events. Feel sob on ambulation     PAST MEDICAL & SURGICAL HISTORY  Hyperlipidemia    Lumbago    Hypertension    H/O lymph node biopsy  Neck by Dr Case      SOCIAL HISTORY:  Negative for smoking/alcohol/drug use.     ALLERGIES:  penicillin (Unknown)  shellfish (Pruritus)    MEDICATIONS:  STANDING MEDICATIONS  dexAMETHasone  Injectable 6 milliGRAM(s) IV Push daily  dextrose 5%. 1000 milliLiter(s) IV Continuous <Continuous>  dextrose 50% Injectable 12.5 Gram(s) IV Push once  dextrose 50% Injectable 25 Gram(s) IV Push once  dextrose 50% Injectable 25 Gram(s) IV Push once  enoxaparin Injectable 40 milliGRAM(s) SubCutaneous every 12 hours  furosemide    Tablet 40 milliGRAM(s) Oral daily  guaiFENesin ER 1200 milliGRAM(s) Oral every 12 hours  influenza   Vaccine 0.5 milliLiter(s) IntraMuscular once  insulin glargine Injectable (LANTUS) 16 Unit(s) SubCutaneous at bedtime  insulin lispro (ADMELOG) corrective regimen sliding scale   SubCutaneous three times a day before meals  insulin lispro Injectable (ADMELOG) 5 Unit(s) SubCutaneous three times a day before meals  pantoprazole    Tablet 40 milliGRAM(s) Oral before breakfast    PRN MEDICATIONS  dextrose 40% Gel 15 Gram(s) Oral once PRN  glucagon  Injectable 1 milliGRAM(s) IntraMuscular once PRN  guaifenesin/dextromethorphan  Syrup 10 milliLiter(s) Oral every 6 hours PRN    VITALS:   T(F): 98.3  HR: 108  BP: 128/85  RR: 19  SpO2: 94%    LABS:                        14.3   12.76 )-----------( 378      ( 14 Nov 2020 06:11 )             43.2     11-14    137  |  99  |  25<H>  ----------------------------<  109<H>  3.7   |  27  |  0.9    Ca    9.1      14 Nov 2020 06:11  Mg     1.9     11-14    TPro  5.6<L>  /  Alb  3.2<L>  /  TBili  1.1  /  DBili  x   /  AST  17  /  ALT  46<H>  /  AlkPhos  84  11-14      RADIOLOGY:  < from: Xray Chest 1 View- PORTABLE-Routine (Xray Chest 1 View- PORTABLE-Routine in AM.) (11.13.20 @ 06:41) >  Unchanged patchy bilateral airspace opacities, left greater than right.      PHYSICAL EXAM:  GEN: No acute distress, on high flow   LUNGS: Clear to auscultation bilaterally   HEART: S1/S2 present. RRR.   ABD: Soft, non-tender, non-distended. Bowel sounds present  EXT: NC/NC/NE/2+PP/CARREON  NEURO: AAOX3  SKIN: intact

## 2020-11-14 NOTE — PROGRESS NOTE ADULT - NUTRITIONAL ASSESSMENT
59 year old male with PMHx of GIST (s/p resection), DM type II (on metformin), RA (on prednisone), HTN, HLD, herniated disc, admitted with Covid pneumonia.    # Acute Hypoxemic respiratory failure secondary to SARS-CoV-2 PNA  - hemodynamically stable  - sat 96% on High flow (50L, 40%)  - CXR b/l  opacities  - s/p RDV and Convalescent plasma 2 units.   - c/w dexamethasone    # DM   - c/w sliding scale insulin    # DVT ppx  - c/w Lovenox    # Full code

## 2020-11-14 NOTE — GOALS OF CARE CONVERSATION - ADVANCED CARE PLANNING - CONVERSATION DETAILS
Updated family/ patient  regarding  current condition and overall prognosis. Both able to express a reasonable understanding of his current medical condition including respiratory failure 2/2 COVID 19 infection with high oxygen requirements and that the treatment plan includes continued oxygenation support via nonrebreather with ongoing pronation and chest PT. Also, both understand, that if patient's condition continues to decline, he may require intubation which although may be life saving, may further complicate his overall prognosis. Remains Full Code.

## 2020-11-15 LAB
ALBUMIN SERPL ELPH-MCNC: 3.3 G/DL — LOW (ref 3.5–5.2)
ALP SERPL-CCNC: 98 U/L — SIGNIFICANT CHANGE UP (ref 30–115)
ALT FLD-CCNC: 63 U/L — HIGH (ref 0–41)
ANION GAP SERPL CALC-SCNC: 11 MMOL/L — SIGNIFICANT CHANGE UP (ref 7–14)
AST SERPL-CCNC: 21 U/L — SIGNIFICANT CHANGE UP (ref 0–41)
BASOPHILS # BLD AUTO: 0.04 K/UL — SIGNIFICANT CHANGE UP (ref 0–0.2)
BASOPHILS NFR BLD AUTO: 0.3 % — SIGNIFICANT CHANGE UP (ref 0–1)
BILIRUB SERPL-MCNC: 0.5 MG/DL — SIGNIFICANT CHANGE UP (ref 0.2–1.2)
BUN SERPL-MCNC: 29 MG/DL — HIGH (ref 10–20)
CALCIUM SERPL-MCNC: 9 MG/DL — SIGNIFICANT CHANGE UP (ref 8.5–10.1)
CHLORIDE SERPL-SCNC: 101 MMOL/L — SIGNIFICANT CHANGE UP (ref 98–110)
CO2 SERPL-SCNC: 27 MMOL/L — SIGNIFICANT CHANGE UP (ref 17–32)
CREAT SERPL-MCNC: 0.9 MG/DL — SIGNIFICANT CHANGE UP (ref 0.7–1.5)
EOSINOPHIL # BLD AUTO: 0.08 K/UL — SIGNIFICANT CHANGE UP (ref 0–0.7)
EOSINOPHIL NFR BLD AUTO: 0.5 % — SIGNIFICANT CHANGE UP (ref 0–8)
GLUCOSE BLDC GLUCOMTR-MCNC: 188 MG/DL — HIGH (ref 70–99)
GLUCOSE BLDC GLUCOMTR-MCNC: 206 MG/DL — HIGH (ref 70–99)
GLUCOSE BLDC GLUCOMTR-MCNC: 286 MG/DL — HIGH (ref 70–99)
GLUCOSE BLDC GLUCOMTR-MCNC: 357 MG/DL — HIGH (ref 70–99)
GLUCOSE SERPL-MCNC: 136 MG/DL — HIGH (ref 70–99)
HCT VFR BLD CALC: 44.2 % — SIGNIFICANT CHANGE UP (ref 42–52)
HGB BLD-MCNC: 14.7 G/DL — SIGNIFICANT CHANGE UP (ref 14–18)
IMM GRANULOCYTES NFR BLD AUTO: 1.8 % — HIGH (ref 0.1–0.3)
LYMPHOCYTES # BLD AUTO: 15.7 % — LOW (ref 20.5–51.1)
LYMPHOCYTES # BLD AUTO: 2.35 K/UL — SIGNIFICANT CHANGE UP (ref 1.2–3.4)
MAGNESIUM SERPL-MCNC: 2 MG/DL — SIGNIFICANT CHANGE UP (ref 1.8–2.4)
MCHC RBC-ENTMCNC: 29.8 PG — SIGNIFICANT CHANGE UP (ref 27–31)
MCHC RBC-ENTMCNC: 33.3 G/DL — SIGNIFICANT CHANGE UP (ref 32–37)
MCV RBC AUTO: 89.7 FL — SIGNIFICANT CHANGE UP (ref 80–94)
MONOCYTES # BLD AUTO: 1.38 K/UL — HIGH (ref 0.1–0.6)
MONOCYTES NFR BLD AUTO: 9.2 % — SIGNIFICANT CHANGE UP (ref 1.7–9.3)
NEUTROPHILS # BLD AUTO: 10.87 K/UL — HIGH (ref 1.4–6.5)
NEUTROPHILS NFR BLD AUTO: 72.5 % — SIGNIFICANT CHANGE UP (ref 42.2–75.2)
NRBC # BLD: 0 /100 WBCS — SIGNIFICANT CHANGE UP (ref 0–0)
PLATELET # BLD AUTO: 374 K/UL — SIGNIFICANT CHANGE UP (ref 130–400)
POTASSIUM SERPL-MCNC: 3.9 MMOL/L — SIGNIFICANT CHANGE UP (ref 3.5–5)
POTASSIUM SERPL-SCNC: 3.9 MMOL/L — SIGNIFICANT CHANGE UP (ref 3.5–5)
PROT SERPL-MCNC: 5.7 G/DL — LOW (ref 6–8)
RBC # BLD: 4.93 M/UL — SIGNIFICANT CHANGE UP (ref 4.7–6.1)
RBC # FLD: 15.3 % — HIGH (ref 11.5–14.5)
SODIUM SERPL-SCNC: 139 MMOL/L — SIGNIFICANT CHANGE UP (ref 135–146)
WBC # BLD: 14.99 K/UL — HIGH (ref 4.8–10.8)
WBC # FLD AUTO: 14.99 K/UL — HIGH (ref 4.8–10.8)

## 2020-11-15 PROCEDURE — 71045 X-RAY EXAM CHEST 1 VIEW: CPT | Mod: 26

## 2020-11-15 RX ADMIN — Medication 5 UNIT(S): at 08:59

## 2020-11-15 RX ADMIN — Medication 1200 MILLIGRAM(S): at 18:44

## 2020-11-15 RX ADMIN — Medication 2: at 17:07

## 2020-11-15 RX ADMIN — Medication 3: at 08:59

## 2020-11-15 RX ADMIN — Medication 1200 MILLIGRAM(S): at 05:15

## 2020-11-15 RX ADMIN — Medication 40 MILLIGRAM(S): at 05:12

## 2020-11-15 RX ADMIN — Medication 5: at 11:24

## 2020-11-15 RX ADMIN — Medication 5 UNIT(S): at 17:07

## 2020-11-15 RX ADMIN — ENOXAPARIN SODIUM 40 MILLIGRAM(S): 100 INJECTION SUBCUTANEOUS at 05:12

## 2020-11-15 RX ADMIN — ENOXAPARIN SODIUM 40 MILLIGRAM(S): 100 INJECTION SUBCUTANEOUS at 18:44

## 2020-11-15 RX ADMIN — Medication 5 UNIT(S): at 11:24

## 2020-11-15 RX ADMIN — Medication 6 MILLIGRAM(S): at 05:13

## 2020-11-15 RX ADMIN — INSULIN GLARGINE 16 UNIT(S): 100 INJECTION, SOLUTION SUBCUTANEOUS at 21:20

## 2020-11-15 NOTE — PROGRESS NOTE ADULT - SUBJECTIVE AND OBJECTIVE BOX
Patient was seen and examined- moved to ED-3. Chart reviewed.  No events overnight.  Vital Signs Last 24 Hrs  T(F): 98.2 (15 Nov 2020 05:08), Max: 98.3 (14 Nov 2020 08:38)  HR: 62 (15 Nov 2020 06:30) (62 - 110)  BP: 118/73 (15 Nov 2020 05:08) (111/67 - 128/85)  SpO2: 96% (15 Nov 2020 06:30) (93% - 96%)  MEDICATIONS  (STANDING):  dexAMETHasone  Injectable 6 milliGRAM(s) IV Push daily  dextrose 5%. 1000 milliLiter(s) (50 mL/Hr) IV Continuous <Continuous>  dextrose 50% Injectable 12.5 Gram(s) IV Push once  dextrose 50% Injectable 25 Gram(s) IV Push once  dextrose 50% Injectable 25 Gram(s) IV Push once  enoxaparin Injectable 40 milliGRAM(s) SubCutaneous every 12 hours  furosemide    Tablet 40 milliGRAM(s) Oral daily  guaiFENesin ER 1200 milliGRAM(s) Oral every 12 hours  influenza   Vaccine 0.5 milliLiter(s) IntraMuscular once  insulin glargine Injectable (LANTUS) 16 Unit(s) SubCutaneous at bedtime  insulin lispro (ADMELOG) corrective regimen sliding scale   SubCutaneous three times a day before meals  insulin lispro Injectable (ADMELOG) 5 Unit(s) SubCutaneous three times a day before meals  pantoprazole    Tablet 40 milliGRAM(s) Oral before breakfast    MEDICATIONS  (PRN):  dextrose 40% Gel 15 Gram(s) Oral once PRN Blood Glucose LESS THAN 70 milliGRAM(s)/deciliter  glucagon  Injectable 1 milliGRAM(s) IntraMuscular once PRN Glucose LESS THAN 70 milligrams/deciliter  guaifenesin/dextromethorphan  Syrup 10 milliLiter(s) Oral every 6 hours PRN Cough    Labs:                        14.7   14.99 )-----------( 374      ( 15 Nov 2020 05:27 )             44.2                         14.3   12.76 )-----------( 378      ( 14 Nov 2020 06:11 )             43.2     15 Nov 2020 05:27    139    |  101    |  29     ----------------------------<  136    3.9     |  27     |  0.9    14 Nov 2020 06:11    137    |  99     |  25     ----------------------------<  109    3.7     |  27     |  0.9      Ca    9.0        15 Nov 2020 05:27  Ca    9.1        14 Nov 2020 06:11  Mg     2.0       15 Nov 2020 05:27  Mg     1.9       14 Nov 2020 06:11    TPro  5.7    /  Alb  3.3    /  TBili  0.5    /  DBili  x      /  AST  21     /  ALT  63     /  AlkPhos  98     15 Nov 2020 05:27  TPro  5.6    /  Alb  3.2    /  TBili  1.1    /  DBili  x      /  AST  17     /  ALT  46     /  AlkPhos  84     14 Nov 2020 06:11        inexcellent spirits  no complaints      A/P:  60 YO M with PMHx of GIST (s/p resection), DM type II (on metformin), RA (on prednisone), HTN, HLD, herniated disc, admitted with Covid pneumonia s/p  remdesivir and plasma    pulm/ICU f/u- Dr Pelletier    ID f/u- Dr Crowder     IV dexamethasone- consider taper    glycemic control while on steroids    O2 as needed- on high flow; can likely decrease O2 today    lovenox- full dose    monitor inflammatory markers     OOB to chair    PT if needed  DVT prophylaxis  Decubitus prevention- all measures as per RN protocol  Please call or text me with any questions or updates

## 2020-11-15 NOTE — PROGRESS NOTE ADULT - SUBJECTIVE AND OBJECTIVE BOX
24H events:    Patient is a 59y old Male who presents with a chief complaint of Suspected COVID-19 Pneumonia (15 Nov 2020 08:14)    Primary diagnosis of COVID-19       Today is hospital day 10d. This morning patient was seen and examined at bedside, resting comfortably in bed.      PAST MEDICAL & SURGICAL HISTORY  Hyperlipidemia    Lumbago    Hypertension    H/O lymph node biopsy  Neck by Dr Case      SOCIAL HISTORY:  Social History:      ALLERGIES:  penicillin (Unknown)  shellfish (Pruritus)    MEDICATIONS:  STANDING MEDICATIONS  dexAMETHasone  Injectable 6 milliGRAM(s) IV Push daily  dextrose 5%. 1000 milliLiter(s) IV Continuous <Continuous>  dextrose 50% Injectable 12.5 Gram(s) IV Push once  dextrose 50% Injectable 25 Gram(s) IV Push once  dextrose 50% Injectable 25 Gram(s) IV Push once  enoxaparin Injectable 40 milliGRAM(s) SubCutaneous every 12 hours  furosemide    Tablet 40 milliGRAM(s) Oral daily  guaiFENesin ER 1200 milliGRAM(s) Oral every 12 hours  influenza   Vaccine 0.5 milliLiter(s) IntraMuscular once  insulin glargine Injectable (LANTUS) 16 Unit(s) SubCutaneous at bedtime  insulin lispro (ADMELOG) corrective regimen sliding scale   SubCutaneous three times a day before meals  insulin lispro Injectable (ADMELOG) 5 Unit(s) SubCutaneous three times a day before meals  pantoprazole    Tablet 40 milliGRAM(s) Oral before breakfast    PRN MEDICATIONS  dextrose 40% Gel 15 Gram(s) Oral once PRN  glucagon  Injectable 1 milliGRAM(s) IntraMuscular once PRN  guaifenesin/dextromethorphan  Syrup 10 milliLiter(s) Oral every 6 hours PRN        LABS:                        14.7   14.99 )-----------( 374      ( 15 Nov 2020 05:27 )             44.2     11-15    139  |  101  |  29<H>  ----------------------------<  136<H>  3.9   |  27  |  0.9    Ca    9.0      15 Nov 2020 05:27  Mg     2.0     11-15    TPro  5.7<L>  /  Alb  3.3<L>  /  TBili  0.5  /  DBili  x   /  AST  21  /  ALT  63<H>  /  AlkPhos  98  11-15      RADIOLOGY:  < from: VA Duplex Lower Ext Vein Scan, Bilat (11.10.20 @ 15:25) >  Impression:    No evidence of deep venousthrombosis or superficial thrombophlebitis in the bilateral lower extremities.    < end of copied text >        < from: Xray Chest 1 View- PORTABLE-Routine (Xray Chest 1 View- PORTABLE-Routine in AM.) (11.15.20 @ 06:38)   Impression:    Bilateral opacities, unchanged.    < end of copied text >    VITALS:   T(F): 97.9  HR: 87  BP: 107/75  RR: 19  SpO2: 94%    PHYSICAL EXAM:  GENERAL: NAD  HEAD:  Atraumatic, Normocephalic  EYES: EOMI  NECK: Supple  NERVOUS SYSTEM:  Alert & Oriented X3, non focal   CHEST/LUNG: on HFNC  ; No rales, rhonchi, wheezing, or rubs  HEART: Regular rate and rhythm; No murmurs, rubs, or gallops  ABDOMEN: Soft, Nontender, Nondistended; Bowel sounds present  EXTREMITIES:  2+ Peripheral Pulses, No clubbing, cyanosis, or edema        Assessment and Plan:   · Assessment	  58 YO M with PMHx of GIST (s/p resection), DM type II (on metformin), RA (on prednisone), HTN, HLD, herniated disc, presented to the ED with c/c of cough and SOB x 3 days admitted to medicine for treatment of Covid pneumonia    #Acute Hypoxic Respiratory Failure 2/2  #COVID-19 Pneumonia #Pleural Effusions  - O2 Sat 97% on HFNC  - CT Angio on admission negative for PE  - Chest X-ray 11/12 showing improving opacities b/l L>R  - s/p 2 units Plasma 11/6 s/p 5 day course Remdesivir completed 11/09  - C/W IV dexamethasone 6 mg iv q24h for 10 days 11/6-16  ( or until discharge ) or equivalent glucocorticoid dose may be substituted. Equivalent total dosis of alternative steroids are Methylprednisolone 32 mg and prednisone 40 mg q24h ( on discharge )   - ID recommendations noted, no need to continue following inflammatory markers   - Lovenox 40 mg BID   - PO 40mg Lasix for diuresis   - Keep O2 sat above 93%        #Rising D-dimer in the setting of #Covid pneumonia --resolved  - D-dimer on admission 287-> 484-->444-->405 11/11  - covid pneumonia--> heightened risk of blood clot  - c/w therapeutic Lovenox (40 BID)  - Lower extremity duplex negative 11/11  - D-dimer decreasing now-->no need to continue monitoring      #RA  - not in acute flair  - Will hold prednisone for now since patient on dexamethasone   -c/w Allopurinol 300 daily    #HTN  - Home medication Lisinopril 40 mg PO QD  - c/w lasix po daily  - continue holding Lisinopril  - continue to monitor BP/ Cr    #DM type II  - On metformin at home, continue to hold for now  - Monitor FS. Start insulin if FS > 180. Keep between 140 - 180 (120-146 today)  - c/w insulin Lantus 16 units at bedtime  - c/w Lispro 5 mg with meals    #Misc  - DVT Prophylaxis: Lovenox 40 BID  - Diet: DASH/TLC, Carb consistent   - GI Prophylaxis: Pantoprazole   - Activity: AAT  - Code Status: Full Code

## 2020-11-16 LAB
ALBUMIN SERPL ELPH-MCNC: 3.2 G/DL — LOW (ref 3.5–5.2)
ALP SERPL-CCNC: 99 U/L — SIGNIFICANT CHANGE UP (ref 30–115)
ALT FLD-CCNC: 81 U/L — HIGH (ref 0–41)
ANION GAP SERPL CALC-SCNC: 8 MMOL/L — SIGNIFICANT CHANGE UP (ref 7–14)
AST SERPL-CCNC: 25 U/L — SIGNIFICANT CHANGE UP (ref 0–41)
BILIRUB SERPL-MCNC: 0.3 MG/DL — SIGNIFICANT CHANGE UP (ref 0.2–1.2)
BUN SERPL-MCNC: 33 MG/DL — HIGH (ref 10–20)
CALCIUM SERPL-MCNC: 9.3 MG/DL — SIGNIFICANT CHANGE UP (ref 8.5–10.1)
CHLORIDE SERPL-SCNC: 100 MMOL/L — SIGNIFICANT CHANGE UP (ref 98–110)
CO2 SERPL-SCNC: 30 MMOL/L — SIGNIFICANT CHANGE UP (ref 17–32)
CREAT SERPL-MCNC: 1.1 MG/DL — SIGNIFICANT CHANGE UP (ref 0.7–1.5)
GLUCOSE BLDC GLUCOMTR-MCNC: 174 MG/DL — HIGH (ref 70–99)
GLUCOSE BLDC GLUCOMTR-MCNC: 214 MG/DL — HIGH (ref 70–99)
GLUCOSE BLDC GLUCOMTR-MCNC: 265 MG/DL — HIGH (ref 70–99)
GLUCOSE BLDC GLUCOMTR-MCNC: 322 MG/DL — HIGH (ref 70–99)
GLUCOSE SERPL-MCNC: 144 MG/DL — HIGH (ref 70–99)
HCT VFR BLD CALC: 45.1 % — SIGNIFICANT CHANGE UP (ref 42–52)
HGB BLD-MCNC: 14.7 G/DL — SIGNIFICANT CHANGE UP (ref 14–18)
MAGNESIUM SERPL-MCNC: 2.1 MG/DL — SIGNIFICANT CHANGE UP (ref 1.8–2.4)
MCHC RBC-ENTMCNC: 29.5 PG — SIGNIFICANT CHANGE UP (ref 27–31)
MCHC RBC-ENTMCNC: 32.6 G/DL — SIGNIFICANT CHANGE UP (ref 32–37)
MCV RBC AUTO: 90.4 FL — SIGNIFICANT CHANGE UP (ref 80–94)
NRBC # BLD: 0 /100 WBCS — SIGNIFICANT CHANGE UP (ref 0–0)
PLATELET # BLD AUTO: 347 K/UL — SIGNIFICANT CHANGE UP (ref 130–400)
POTASSIUM SERPL-MCNC: 3.6 MMOL/L — SIGNIFICANT CHANGE UP (ref 3.5–5)
POTASSIUM SERPL-SCNC: 3.6 MMOL/L — SIGNIFICANT CHANGE UP (ref 3.5–5)
PROT SERPL-MCNC: 5.9 G/DL — LOW (ref 6–8)
RBC # BLD: 4.99 M/UL — SIGNIFICANT CHANGE UP (ref 4.7–6.1)
RBC # FLD: 15.6 % — HIGH (ref 11.5–14.5)
SODIUM SERPL-SCNC: 138 MMOL/L — SIGNIFICANT CHANGE UP (ref 135–146)
WBC # BLD: 14.17 K/UL — HIGH (ref 4.8–10.8)
WBC # FLD AUTO: 14.17 K/UL — HIGH (ref 4.8–10.8)

## 2020-11-16 PROCEDURE — 71045 X-RAY EXAM CHEST 1 VIEW: CPT | Mod: 26

## 2020-11-16 RX ADMIN — ENOXAPARIN SODIUM 40 MILLIGRAM(S): 100 INJECTION SUBCUTANEOUS at 17:34

## 2020-11-16 RX ADMIN — Medication 1200 MILLIGRAM(S): at 17:34

## 2020-11-16 RX ADMIN — PANTOPRAZOLE SODIUM 40 MILLIGRAM(S): 20 TABLET, DELAYED RELEASE ORAL at 08:22

## 2020-11-16 RX ADMIN — Medication 1200 MILLIGRAM(S): at 05:35

## 2020-11-16 RX ADMIN — Medication 4: at 11:47

## 2020-11-16 RX ADMIN — Medication 5 UNIT(S): at 17:33

## 2020-11-16 RX ADMIN — Medication 6 MILLIGRAM(S): at 05:35

## 2020-11-16 RX ADMIN — Medication 5 UNIT(S): at 08:20

## 2020-11-16 RX ADMIN — Medication 2: at 08:20

## 2020-11-16 RX ADMIN — Medication 3: at 17:33

## 2020-11-16 RX ADMIN — INSULIN GLARGINE 16 UNIT(S): 100 INJECTION, SOLUTION SUBCUTANEOUS at 21:57

## 2020-11-16 RX ADMIN — Medication 5 UNIT(S): at 11:47

## 2020-11-16 RX ADMIN — Medication 40 MILLIGRAM(S): at 05:35

## 2020-11-16 RX ADMIN — ENOXAPARIN SODIUM 40 MILLIGRAM(S): 100 INJECTION SUBCUTANEOUS at 05:35

## 2020-11-16 NOTE — PROGRESS NOTE ADULT - SUBJECTIVE AND OBJECTIVE BOX
24H events:    Patient is a 59y old Male who presents with a chief complaint of Suspected COVID-19 Pneumonia (16 Nov 2020 08:27)    Primary diagnosis of COVID-19       Today is hospital day 11d. This morning patient was seen and examined at bedside, resting comfortably in bed.      PAST MEDICAL & SURGICAL HISTORY  Hyperlipidemia    Lumbago    Hypertension    H/O lymph node biopsy  Neck by Dr Case      SOCIAL HISTORY:  Social History:      ALLERGIES:  penicillin (Unknown)  shellfish (Pruritus)    MEDICATIONS:  STANDING MEDICATIONS  dexAMETHasone  Injectable 6 milliGRAM(s) IV Push daily  dextrose 5%. 1000 milliLiter(s) IV Continuous <Continuous>  dextrose 50% Injectable 12.5 Gram(s) IV Push once  dextrose 50% Injectable 25 Gram(s) IV Push once  dextrose 50% Injectable 25 Gram(s) IV Push once  enoxaparin Injectable 40 milliGRAM(s) SubCutaneous every 12 hours  furosemide    Tablet 40 milliGRAM(s) Oral daily  guaiFENesin ER 1200 milliGRAM(s) Oral every 12 hours  influenza   Vaccine 0.5 milliLiter(s) IntraMuscular once  insulin glargine Injectable (LANTUS) 16 Unit(s) SubCutaneous at bedtime  insulin lispro (ADMELOG) corrective regimen sliding scale   SubCutaneous three times a day before meals  insulin lispro Injectable (ADMELOG) 5 Unit(s) SubCutaneous three times a day before meals  pantoprazole    Tablet 40 milliGRAM(s) Oral before breakfast    PRN MEDICATIONS  dextrose 40% Gel 15 Gram(s) Oral once PRN  glucagon  Injectable 1 milliGRAM(s) IntraMuscular once PRN  guaifenesin/dextromethorphan  Syrup 10 milliLiter(s) Oral every 6 hours PRN        LABS:                        14.7   14.17 )-----------( 347      ( 16 Nov 2020 04:44 )             45.1     11-16    138  |  100  |  33<H>  ----------------------------<  144<H>  3.6   |  30  |  1.1    Ca    9.3      16 Nov 2020 04:44  Mg     2.1     11-16    TPro  5.9<L>  /  Alb  3.2<L>  /  TBili  0.3  /  DBili  x   /  AST  25  /  ALT  81<H>  /  AlkPhos  99  11-16                  RADIOLOGY:  < from: [facility] Duplex Lower Ext Vein Scan, Bilat (11.10.20 @ 15:25) >  Impression:    No evidence of deep venousthrombosis or superficial thrombophlebitis in the bilateral lower extremities.    < end of copied text >        < from: Xray Chest 1 View- PORTABLE-Routine (Xray Chest 1 View- PORTABLE-Routine in AM.) (11.15.20 @ 06:38)   Impression:    Bilateral opacities, unchanged.    < end of copied text >    VITALS:   T(F): 98.3  HR: 82  BP: 114/77  RR: 18  SpO2: 95%        PHYSICAL EXAM:  GENERAL: NAD  HEAD:  Atraumatic, Normocephalic  EYES: EOMI  NECK: Supple  NERVOUS SYSTEM:  Alert & Oriented X3, non focal   CHEST/LUNG: on HFNC  ; No rales, rhonchi, wheezing, or rubs  HEART: Regular rate and rhythm; No murmurs, rubs, or gallops  ABDOMEN: Soft, Nontender, Nondistended; Bowel sounds present  EXTREMITIES:  2+ Peripheral Pulses, No clubbing, cyanosis, or edema        Assessment and Plan:   · Assessment	  58 YO M with PMHx of GIST (s/p resection), DM type II (on metformin), RA (on prednisone), HTN, HLD, herniated disc, presented to the ED with c/c of cough and SOB x 3 days admitted to medicine for treatment of Covid pneumonia    #Acute Hypoxic Respiratory Failure 2/2  #COVID-19 Pneumonia #Pleural Effusions  - O2 Sat 97% on HFNC  - CT Angio on admission negative for PE  - Chest X-ray 11/12 showing improving opacities b/l L>R  - s/p 2 units Plasma 11/6 s/p 5 day course Remdesivir completed 11/09  - C/W IV dexamethasone 6 mg iv q24h for 10 days 11/6-16  ( or until discharge ) or equivalent glucocorticoid dose may be substituted. Equivalent total dosis of alternative steroids are Methylprednisolone 32 mg and prednisone 40 mg q24h ( on discharge )   - ID recommendations noted, no need to continue following inflammatory markers   - Lovenox 40 mg BID   - PO 40mg Lasix for diuresis   - Keep O2 sat above 93%        #Rising D-dimer in the setting of #Covid pneumonia --resolved  - D-dimer on admission 287-> 484-->444-->405 11/11  - covid pneumonia--> heightened risk of blood clot  - c/w therapeutic Lovenox (40 BID)  - Lower extremity duplex negative 11/11  - D-dimer decreasing now-->no need to continue monitoring      #RA  - not in acute flair  - Will hold prednisone for now since patient on dexamethasone   -c/w Allopurinol 300 daily    #HTN  - Home medication Lisinopril 40 mg PO QD  - c/w lasix po daily  - continue holding Lisinopril  - continue to monitor BP/ Cr    #DM type II  - On metformin at home, continue to hold for now  - Monitor FS. Start insulin if FS > 180. Keep between 140 - 180 (120-146 today)  - c/w insulin Lantus 16 units at bedtime  - c/w Lispro 5 mg with meals    #Misc  - DVT Prophylaxis: Lovenox 40 BID  - Diet: DASH/TLC, Carb consistent   - GI Prophylaxis: Pantoprazole   - Activity: AAT  - Code Status: Full Code           24H events:    Patient is a 59y old Male who presents with a chief complaint of Suspected COVID-19 Pneumonia (16 Nov 2020 08:27)    Primary diagnosis of COVID-19       Today is hospital day 11d. This morning patient was seen and examined at bedside, resting comfortably in bed.      PAST MEDICAL & SURGICAL HISTORY  Hyperlipidemia    Lumbago    Hypertension    H/O lymph node biopsy  Neck by Dr Case      SOCIAL HISTORY:  Social History:      ALLERGIES:  penicillin (Unknown)  shellfish (Pruritus)    MEDICATIONS:  STANDING MEDICATIONS  dexAMETHasone  Injectable 6 milliGRAM(s) IV Push daily  dextrose 5%. 1000 milliLiter(s) IV Continuous <Continuous>  dextrose 50% Injectable 12.5 Gram(s) IV Push once  dextrose 50% Injectable 25 Gram(s) IV Push once  dextrose 50% Injectable 25 Gram(s) IV Push once  enoxaparin Injectable 40 milliGRAM(s) SubCutaneous every 12 hours  furosemide    Tablet 40 milliGRAM(s) Oral daily  guaiFENesin ER 1200 milliGRAM(s) Oral every 12 hours  influenza   Vaccine 0.5 milliLiter(s) IntraMuscular once  insulin glargine Injectable (LANTUS) 16 Unit(s) SubCutaneous at bedtime  insulin lispro (ADMELOG) corrective regimen sliding scale   SubCutaneous three times a day before meals  insulin lispro Injectable (ADMELOG) 5 Unit(s) SubCutaneous three times a day before meals  pantoprazole    Tablet 40 milliGRAM(s) Oral before breakfast    PRN MEDICATIONS  dextrose 40% Gel 15 Gram(s) Oral once PRN  glucagon  Injectable 1 milliGRAM(s) IntraMuscular once PRN  guaifenesin/dextromethorphan  Syrup 10 milliLiter(s) Oral every 6 hours PRN        LABS:                        14.7   14.17 )-----------( 347      ( 16 Nov 2020 04:44 )             45.1     11-16    138  |  100  |  33<H>  ----------------------------<  144<H>  3.6   |  30  |  1.1    Ca    9.3      16 Nov 2020 04:44  Mg     2.1     11-16    TPro  5.9<L>  /  Alb  3.2<L>  /  TBili  0.3  /  DBili  x   /  AST  25  /  ALT  81<H>  /  AlkPhos  99  11-16                  RADIOLOGY:  < from: [facility] Duplex Lower Ext Vein Scan, Bilat (11.10.20 @ 15:25) >  Impression:    No evidence of deep venousthrombosis or superficial thrombophlebitis in the bilateral lower extremities.    < end of copied text >        < from: Xray Chest 1 View- PORTABLE-Routine (Xray Chest 1 View- PORTABLE-Routine in AM.) (11.15.20 @ 06:38)   Impression:    Bilateral opacities, unchanged.    < end of copied text >    VITALS:   T(F): 98.3  HR: 82  BP: 114/77  RR: 18  SpO2: 95%        PHYSICAL EXAM:  GENERAL: NAD  HEAD:  Atraumatic, Normocephalic  EYES: EOMI  NECK: Supple  NERVOUS SYSTEM:  Alert & Oriented X3, non focal   CHEST/LUNG: on HFNC  ; No rales, rhonchi, wheezing, or rubs  HEART: Regular rate and rhythm; No murmurs, rubs, or gallops  ABDOMEN: Soft, Nontender, Nondistended; Bowel sounds present  EXTREMITIES:  2+ Peripheral Pulses, No clubbing, cyanosis, or edema        Assessment and Plan:   · Assessment	  60 YO M with PMHx of GIST (s/p resection), DM type II (on metformin), RA (on prednisone), HTN, HLD, herniated disc, presented to the ED with c/c of cough and SOB x 3 days admitted to medicine for treatment of Covid pneumonia    #Acute Hypoxic Respiratory Failure 2/2  #COVID-19 Pneumonia #Pleural Effusions  - O2 Sat 88% on RA this AM   - CT Angio on admission negative for PE  - Chest X-ray 11/12 showing improving opacities b/l L>R  - s/p 2 units Plasma 11/6 s/p 5 day course Remdesivir completed 11/09  - C/W IV dexamethasone 6 mg iv q24h for 10 days 11/6-16  ( or until discharge ) or equivalent glucocorticoid dose may be substituted. Equivalent total dosis of alternative steroids are Methylprednisolone 32 mg and prednisone 40 mg q24h ( on discharge )   - ID recommendations noted, no need to continue following inflammatory markers   - Lovenox 40 mg BID   - PO 40mg Lasix for diuresis   - Keep O2 sat above 93%        #Rising D-dimer in the setting of #Covid pneumonia --resolved  - D-dimer on admission 287-> 484-->444-->405 11/11  - covid pneumonia--> heightened risk of blood clot  - c/w therapeutic Lovenox (40 BID)  - Lower extremity duplex negative 11/11  - D-dimer decreasing now-->no need to continue monitoring      #RA  - not in acute flair  - Will hold prednisone for now since patient on dexamethasone   -c/w Allopurinol 300 daily    #HTN  - Home medication Lisinopril 40 mg PO QD  - c/w lasix po daily  - continue holding Lisinopril  - continue to monitor BP/ Cr    #DM type II  - On metformin at home, continue to hold for now  - Monitor FS. Start insulin if FS > 180. Keep between 140 - 180 (120-146 today)  - c/w insulin Lantus 16 units at bedtime  - c/w Lispro 5 mg with meals    #Misc  - DVT Prophylaxis: Lovenox 40 BID  - Diet: DASH/TLC, Carb consistent   - GI Prophylaxis: Pantoprazole   - Activity: AAT  - Code Status: Full Code           24H events:    Patient is a 59y old Male who presents with a chief complaint of Suspected COVID-19 Pneumonia (16 Nov 2020 08:27)    Primary diagnosis of COVID-19 PNA       Today is hospital day 11d. This morning patient was seen and examined at bedside, resting comfortably in bed.      PAST MEDICAL & SURGICAL HISTORY  Hyperlipidemia    Lumbago    Hypertension    H/O lymph node biopsy  Neck by Dr Case      SOCIAL HISTORY:  Social History:      ALLERGIES:  penicillin (Unknown)  shellfish (Pruritus)    MEDICATIONS:  STANDING MEDICATIONS  dexAMETHasone  Injectable 6 milliGRAM(s) IV Push daily  dextrose 5%. 1000 milliLiter(s) IV Continuous <Continuous>  dextrose 50% Injectable 12.5 Gram(s) IV Push once  dextrose 50% Injectable 25 Gram(s) IV Push once  dextrose 50% Injectable 25 Gram(s) IV Push once  enoxaparin Injectable 40 milliGRAM(s) SubCutaneous every 12 hours  furosemide    Tablet 40 milliGRAM(s) Oral daily  guaiFENesin ER 1200 milliGRAM(s) Oral every 12 hours  influenza   Vaccine 0.5 milliLiter(s) IntraMuscular once  insulin glargine Injectable (LANTUS) 16 Unit(s) SubCutaneous at bedtime  insulin lispro (ADMELOG) corrective regimen sliding scale   SubCutaneous three times a day before meals  insulin lispro Injectable (ADMELOG) 5 Unit(s) SubCutaneous three times a day before meals  pantoprazole    Tablet 40 milliGRAM(s) Oral before breakfast    PRN MEDICATIONS  dextrose 40% Gel 15 Gram(s) Oral once PRN  glucagon  Injectable 1 milliGRAM(s) IntraMuscular once PRN  guaifenesin/dextromethorphan  Syrup 10 milliLiter(s) Oral every 6 hours PRN        LABS:                        14.7   14.17 )-----------( 347      ( 16 Nov 2020 04:44 )             45.1     11-16    138  |  100  |  33<H>  ----------------------------<  144<H>  3.6   |  30  |  1.1    Ca    9.3      16 Nov 2020 04:44  Mg     2.1     11-16    TPro  5.9<L>  /  Alb  3.2<L>  /  TBili  0.3  /  DBili  x   /  AST  25  /  ALT  81<H>  /  AlkPhos  99  11-16                  RADIOLOGY:  < from: [facility] Duplex Lower Ext Vein Scan, Bilat (11.10.20 @ 15:25) >  Impression:    No evidence of deep venousthrombosis or superficial thrombophlebitis in the bilateral lower extremities.    < end of copied text >        < from: Xray Chest 1 View- PORTABLE-Routine (Xray Chest 1 View- PORTABLE-Routine in AM.) (11.15.20 @ 06:38)   Impression:    Bilateral opacities, unchanged.    < end of copied text >    VITALS:   T(F): 98.3  HR: 82  BP: 114/77  RR: 18  SpO2: 95%        PHYSICAL EXAM:  GENERAL: NAD, refusing O2 , satting 86%  HEAD:  Atraumatic, Normocephalic  EYES: EOMI  NECK: Supple  NERVOUS SYSTEM:  Alert & Oriented X3, non focal   CHEST/LUNG: bibasilar crackles   HEART: Regular rate and rhythm; No murmurs, rubs, or gallops  ABDOMEN: Soft, Nontender, Nondistended; Bowel sounds present  EXTREMITIES:  2+ Peripheral Pulses, No clubbing, cyanosis, or edema        Assessment and Plan:   · Assessment	  58 YO M with PMHx of GIST (s/p resection), DM type II (on metformin), RA (on prednisone), HTN, HLD, herniated disc, presented to the ED with c/c of cough and SOB x 3 days admitted to medicine for treatment of Covid pneumonia    #Acute Hypoxic Respiratory Failure 2/2  #COVID-19 Pneumonia   #Pleural Effusions  - O2 Sat 86% on RA this AM   - CT Angio on admission negative for PE  - Chest X-ray 11/12 showing improving opacities b/l L>R  - s/p 2 units Plasma 11/6 s/p 5 day course Remdesivir completed 11/09  - C/W IV dexamethasone 6 mg iv q24h for 10 days 11/6-16  ( or until discharge ) or equivalent glucocorticoid dose may be substituted. Equivalent total dosis of alternative steroids are Methylprednisolone 32 mg and prednisone 40 mg q24h ( on discharge )   - ID recommendations noted, no need to continue following inflammatory markers   - Lovenox 40 mg BID   - PO 40mg Lasix for diuresis   - Keep O2 sat above 93%        #Rising D-dimer in the setting of #Covid pneumonia --resolved  - D-dimer on admission 287-> 484-->444-->405 11/11  - covid pneumonia--> heightened risk of blood clot  - c/w therapeutic Lovenox (40 BID)  - Lower extremity duplex negative 11/11  - D-dimer decreasing now-->no need to continue monitoring      #RA  - not in acute flair  - Will hold prednisone for now since patient on dexamethasone   -c/w Allopurinol 300 daily    #HTN  - Home medication Lisinopril 40 mg PO QD  - c/w lasix po daily  - continue holding Lisinopril  - continue to monitor BP/ Cr    #DM type II  - c/w insulin Lantus 16 units at bedtime  - c/w Lispro 5 mg with meals    #Misc  - DVT Prophylaxis: Lovenox 40 BID  - Diet: DASH/TLC, Carb consistent   - GI Prophylaxis: Pantoprazole   - Activity: AAT  - Code Status: Full Code

## 2020-11-16 NOTE — PROGRESS NOTE ADULT - SUBJECTIVE AND OBJECTIVE BOX
Patient was seen and examined.  Chart reviewed.  Off O2 this am- but sat 85% and with dyspnea on mild exertion  Vital Signs Last 24 Hrs  T(F): 98.3 (16 Nov 2020 08:07), Max: 98.6 (15 Nov 2020 20:32)  HR: 82 (16 Nov 2020 08:07) (62 - 103)  BP: 114/77 (16 Nov 2020 08:07) (104/76 - 129/79)  SpO2: 95% (16 Nov 2020 08:07) (91% - 96%)  MEDICATIONS  (STANDING):  dexAMETHasone  Injectable 6 milliGRAM(s) IV Push daily  dextrose 5%. 1000 milliLiter(s) (50 mL/Hr) IV Continuous <Continuous>  dextrose 50% Injectable 12.5 Gram(s) IV Push once  dextrose 50% Injectable 25 Gram(s) IV Push once  dextrose 50% Injectable 25 Gram(s) IV Push once  enoxaparin Injectable 40 milliGRAM(s) SubCutaneous every 12 hours  furosemide    Tablet 40 milliGRAM(s) Oral daily  guaiFENesin ER 1200 milliGRAM(s) Oral every 12 hours  influenza   Vaccine 0.5 milliLiter(s) IntraMuscular once  insulin glargine Injectable (LANTUS) 16 Unit(s) SubCutaneous at bedtime  insulin lispro (ADMELOG) corrective regimen sliding scale   SubCutaneous three times a day before meals  insulin lispro Injectable (ADMELOG) 5 Unit(s) SubCutaneous three times a day before meals  pantoprazole    Tablet 40 milliGRAM(s) Oral before breakfast    MEDICATIONS  (PRN):  dextrose 40% Gel 15 Gram(s) Oral once PRN Blood Glucose LESS THAN 70 milliGRAM(s)/deciliter  glucagon  Injectable 1 milliGRAM(s) IntraMuscular once PRN Glucose LESS THAN 70 milligrams/deciliter  guaifenesin/dextromethorphan  Syrup 10 milliLiter(s) Oral every 6 hours PRN Cough    Labs:                        14.7   14.17 )-----------( 347      ( 16 Nov 2020 04:44 )             45.1                         14.7   14.99 )-----------( 374      ( 15 Nov 2020 05:27 )             44.2     16 Nov 2020 04:44    138    |  100    |  33     ----------------------------<  144    3.6     |  30     |  1.1    15 Nov 2020 05:27    139    |  101    |  29     ----------------------------<  136    3.9     |  27     |  0.9      Ca    9.3        16 Nov 2020 04:44  Ca    9.0        15 Nov 2020 05:27  Mg     2.1       16 Nov 2020 04:44  Mg     2.0       15 Nov 2020 05:27    TPro  5.9    /  Alb  3.2    /  TBili  0.3    /  DBili  x      /  AST  25     /  ALT  81     /  AlkPhos  99     16 Nov 2020 04:44  TPro  5.7    /  Alb  3.3    /  TBili  0.5    /  DBili  x      /  AST  21     /  ALT  63     /  AlkPhos  98     15 Nov 2020 05:27          General: comfortable, NAD at rest      A/P:  60 YO M with PMHx of GIST (s/p resection), DM type II (on metformin), RA (on prednisone), HTN, HLD, herniated disc, admitted with Covid pneumonia s/p  remdesivir and plasma    pulm/ICU f/u- Dr Pelletier - please call to f/u today    ID f/u appreciated    PO decadron taper- start today    glycemic control while on steroids    O2 as needed- likely still needs O2 VNC    lovenox- full dose    monitor inflammatory markers     OOB to chair    PT if needed  DVT prophylaxis  Decubitus prevention- all measures as per RN protocol  Please call or text me with any questions or updates

## 2020-11-17 LAB
ALBUMIN SERPL ELPH-MCNC: 3.4 G/DL — LOW (ref 3.5–5.2)
ALP SERPL-CCNC: 96 U/L — SIGNIFICANT CHANGE UP (ref 30–115)
ALT FLD-CCNC: 73 U/L — HIGH (ref 0–41)
ANION GAP SERPL CALC-SCNC: 10 MMOL/L — SIGNIFICANT CHANGE UP (ref 7–14)
AST SERPL-CCNC: 18 U/L — SIGNIFICANT CHANGE UP (ref 0–41)
BILIRUB SERPL-MCNC: 0.3 MG/DL — SIGNIFICANT CHANGE UP (ref 0.2–1.2)
BUN SERPL-MCNC: 34 MG/DL — HIGH (ref 10–20)
CALCIUM SERPL-MCNC: 9.4 MG/DL — SIGNIFICANT CHANGE UP (ref 8.5–10.1)
CHLORIDE SERPL-SCNC: 101 MMOL/L — SIGNIFICANT CHANGE UP (ref 98–110)
CO2 SERPL-SCNC: 26 MMOL/L — SIGNIFICANT CHANGE UP (ref 17–32)
CREAT SERPL-MCNC: 1 MG/DL — SIGNIFICANT CHANGE UP (ref 0.7–1.5)
GLUCOSE BLDC GLUCOMTR-MCNC: 232 MG/DL — HIGH (ref 70–99)
GLUCOSE BLDC GLUCOMTR-MCNC: 327 MG/DL — HIGH (ref 70–99)
GLUCOSE BLDC GLUCOMTR-MCNC: 350 MG/DL — HIGH (ref 70–99)
GLUCOSE SERPL-MCNC: 118 MG/DL — HIGH (ref 70–99)
HCT VFR BLD CALC: 45.6 % — SIGNIFICANT CHANGE UP (ref 42–52)
HGB BLD-MCNC: 15.1 G/DL — SIGNIFICANT CHANGE UP (ref 14–18)
MCHC RBC-ENTMCNC: 29.5 PG — SIGNIFICANT CHANGE UP (ref 27–31)
MCHC RBC-ENTMCNC: 33.1 G/DL — SIGNIFICANT CHANGE UP (ref 32–37)
MCV RBC AUTO: 89.1 FL — SIGNIFICANT CHANGE UP (ref 80–94)
NRBC # BLD: 0 /100 WBCS — SIGNIFICANT CHANGE UP (ref 0–0)
PLATELET # BLD AUTO: 319 K/UL — SIGNIFICANT CHANGE UP (ref 130–400)
POTASSIUM SERPL-MCNC: 3.4 MMOL/L — LOW (ref 3.5–5)
POTASSIUM SERPL-SCNC: 3.4 MMOL/L — LOW (ref 3.5–5)
PROT SERPL-MCNC: 5.9 G/DL — LOW (ref 6–8)
RBC # BLD: 5.12 M/UL — SIGNIFICANT CHANGE UP (ref 4.7–6.1)
RBC # FLD: 15.6 % — HIGH (ref 11.5–14.5)
SODIUM SERPL-SCNC: 137 MMOL/L — SIGNIFICANT CHANGE UP (ref 135–146)
WBC # BLD: 15.1 K/UL — HIGH (ref 4.8–10.8)
WBC # FLD AUTO: 15.1 K/UL — HIGH (ref 4.8–10.8)

## 2020-11-17 PROCEDURE — 71045 X-RAY EXAM CHEST 1 VIEW: CPT | Mod: 26

## 2020-11-17 PROCEDURE — 93970 EXTREMITY STUDY: CPT | Mod: 26

## 2020-11-17 RX ORDER — LACTULOSE 10 G/15ML
10 SOLUTION ORAL ONCE
Refills: 0 | Status: COMPLETED | OUTPATIENT
Start: 2020-11-17 | End: 2020-11-17

## 2020-11-17 RX ORDER — POLYETHYLENE GLYCOL 3350 17 G/17G
17 POWDER, FOR SOLUTION ORAL ONCE
Refills: 0 | Status: COMPLETED | OUTPATIENT
Start: 2020-11-17 | End: 2020-11-17

## 2020-11-17 RX ORDER — SENNA PLUS 8.6 MG/1
2 TABLET ORAL AT BEDTIME
Refills: 0 | Status: DISCONTINUED | OUTPATIENT
Start: 2020-11-17 | End: 2020-11-21

## 2020-11-17 RX ORDER — ENOXAPARIN SODIUM 100 MG/ML
80 INJECTION SUBCUTANEOUS
Refills: 0 | Status: DISCONTINUED | OUTPATIENT
Start: 2020-11-17 | End: 2020-11-18

## 2020-11-17 RX ADMIN — POLYETHYLENE GLYCOL 3350 17 GRAM(S): 17 POWDER, FOR SOLUTION ORAL at 06:28

## 2020-11-17 RX ADMIN — SENNA PLUS 2 TABLET(S): 8.6 TABLET ORAL at 21:32

## 2020-11-17 RX ADMIN — ENOXAPARIN SODIUM 40 MILLIGRAM(S): 100 INJECTION SUBCUTANEOUS at 17:13

## 2020-11-17 RX ADMIN — Medication 6 MILLIGRAM(S): at 05:19

## 2020-11-17 RX ADMIN — LACTULOSE 10 GRAM(S): 10 SOLUTION ORAL at 11:48

## 2020-11-17 RX ADMIN — ENOXAPARIN SODIUM 80 MILLIGRAM(S): 100 INJECTION SUBCUTANEOUS at 21:31

## 2020-11-17 RX ADMIN — ENOXAPARIN SODIUM 40 MILLIGRAM(S): 100 INJECTION SUBCUTANEOUS at 06:28

## 2020-11-17 RX ADMIN — Medication 5 UNIT(S): at 11:48

## 2020-11-17 RX ADMIN — Medication 5 UNIT(S): at 08:16

## 2020-11-17 RX ADMIN — Medication 40 MILLIGRAM(S): at 05:20

## 2020-11-17 RX ADMIN — Medication 4: at 11:48

## 2020-11-17 RX ADMIN — Medication 1200 MILLIGRAM(S): at 17:13

## 2020-11-17 RX ADMIN — INSULIN GLARGINE 16 UNIT(S): 100 INJECTION, SOLUTION SUBCUTANEOUS at 21:31

## 2020-11-17 RX ADMIN — PANTOPRAZOLE SODIUM 40 MILLIGRAM(S): 20 TABLET, DELAYED RELEASE ORAL at 06:58

## 2020-11-17 RX ADMIN — Medication 5 UNIT(S): at 17:11

## 2020-11-17 RX ADMIN — Medication 1200 MILLIGRAM(S): at 05:20

## 2020-11-17 RX ADMIN — Medication 2: at 08:17

## 2020-11-17 RX ADMIN — Medication 4: at 17:12

## 2020-11-17 RX ADMIN — SENNA PLUS 2 TABLET(S): 8.6 TABLET ORAL at 11:49

## 2020-11-17 NOTE — PROGRESS NOTE ADULT - SUBJECTIVE AND OBJECTIVE BOX
Patient is a 59y old  Male who presents with a chief complaint of Suspected COVID-19 Pneumonia (17 Nov 2020 08:45)        SUBJECTIVE:  complaining of worsening SOB at rest overnight a/w chest tightness  currently on 4liters nasal cannula  vitals in system show 94% on RA at 4pm yesterday    REVIEW OF SYSTEMS:    All Pertinent ROS are negative except per HPI       PHYSICAL EXAM  Vital Signs Last 24 Hrs  T(C): 37 (17 Nov 2020 07:58), Max: 37 (17 Nov 2020 07:58)  T(F): 98.6 (17 Nov 2020 07:58), Max: 98.6 (17 Nov 2020 07:58)  HR: 94 (17 Nov 2020 07:58) (94 - 99)  BP: 125/83 (17 Nov 2020 07:58) (119/84 - 125/83)  BP(mean): --  RR: 18 (17 Nov 2020 07:58) (18 - 18)  SpO2: 93% (17 Nov 2020 07:58) (93% - 94%)    CONSTITUTIONAL:  Well nourished.  NAD    ENT:   Airway patent,   No thrush    CARDIAC:   Normal rate,   regular rhythm.    no edema      RESPIRATORY:   NO Wheezing  Normal chest expansion  Not tachypneic,  No use of accessory muscles    GASTROINTESTINAL:  Abdomen soft,   non-tender,   no guarding,     MUSCULOSKELETAL:   range of motion is not limited,  no clubbing, cyanosis    NEUROLOGICAL:   Alert and oriented   no motor or deficits.    SKIN:   Skin normal color for race,   warm, dry   No evidence of rash.      11-16-20 @ 07:01  -  11-17-20 @ 07:00  --------------------------------------------------------  IN:  Total IN: 0 mL    OUT:    Voided (mL): 400 mL  Total OUT: 400 mL    Total NET: -400 mL          LABS:                          15.1   15.10 )-----------( 319      ( 17 Nov 2020 05:28 )             45.6                                               11-17    137  |  101  |  34<H>  ----------------------------<  118<H>  3.4<L>   |  26  |  1.0    Ca    9.4      17 Nov 2020 05:28  Mg     2.1     11-16    TPro  5.9<L>  /  Alb  3.4<L>  /  TBili  0.3  /  DBili  x   /  AST  18  /  ALT  73<H>  /  AlkPhos  96  11-17                                                                                           LIVER FUNCTIONS - ( 17 Nov 2020 05:28 )  Alb: 3.4 g/dL / Pro: 5.9 g/dL / ALK PHOS: 96 U/L / ALT: 73 U/L / AST: 18 U/L / GGT: x                                                                                                MEDICATIONS  (STANDING):  dexAMETHasone  Injectable 6 milliGRAM(s) IV Push daily  dextrose 5%. 1000 milliLiter(s) (50 mL/Hr) IV Continuous <Continuous>  dextrose 50% Injectable 12.5 Gram(s) IV Push once  dextrose 50% Injectable 25 Gram(s) IV Push once  dextrose 50% Injectable 25 Gram(s) IV Push once  enoxaparin Injectable 40 milliGRAM(s) SubCutaneous every 12 hours  furosemide    Tablet 40 milliGRAM(s) Oral daily  guaiFENesin ER 1200 milliGRAM(s) Oral every 12 hours  influenza   Vaccine 0.5 milliLiter(s) IntraMuscular once  insulin glargine Injectable (LANTUS) 16 Unit(s) SubCutaneous at bedtime  insulin lispro (ADMELOG) corrective regimen sliding scale   SubCutaneous three times a day before meals  insulin lispro Injectable (ADMELOG) 5 Unit(s) SubCutaneous three times a day before meals  lactulose Syrup 10 Gram(s) Oral once  pantoprazole    Tablet 40 milliGRAM(s) Oral before breakfast  senna 2 Tablet(s) Oral at bedtime    MEDICATIONS  (PRN):  dextrose 40% Gel 15 Gram(s) Oral once PRN Blood Glucose LESS THAN 70 milliGRAM(s)/deciliter  glucagon  Injectable 1 milliGRAM(s) IntraMuscular once PRN Glucose LESS THAN 70 milligrams/deciliter  guaifenesin/dextromethorphan  Syrup 10 milliLiter(s) Oral every 6 hours PRN Cough      X-Rays reviewed    CXR interpreted by me: worsening wedge shaped opacity Left LL Patient is a 59y old  Male who presents with a chief complaint of Suspected COVID-19 Pneumonia (17 Nov 2020 08:45)        SUBJECTIVE:  complaining of worsening SOB at rest overnight a/w chest tightness  currently on 4liters nasal cannula  vitals in system show 94% on RA at 4pm yesterday    REVIEW OF SYSTEMS:    All Pertinent ROS are negative except per HPI       PHYSICAL EXAM  Vital Signs Last 24 Hrs  T(C): 37 (17 Nov 2020 07:58), Max: 37 (17 Nov 2020 07:58)  T(F): 98.6 (17 Nov 2020 07:58), Max: 98.6 (17 Nov 2020 07:58)  HR: 94 (17 Nov 2020 07:58) (94 - 99)  BP: 125/83 (17 Nov 2020 07:58) (119/84 - 125/83)  BP(mean): --  RR: 18 (17 Nov 2020 07:58) (18 - 18)  SpO2: 93% (17 Nov 2020 07:58) (93% - 94%)    CONSTITUTIONAL:  Well nourished.  NAD    ENT:   Airway patent,   No thrush    CARDIAC:   Normal rate,   regular rhythm.    no edema      RESPIRATORY:   NO Wheezing  Normal chest expansion  Not tachypneic,  No use of accessory muscles    GASTROINTESTINAL:  Abdomen soft,   non-tender,   no guarding,     MUSCULOSKELETAL:   range of motion is not limited,  no clubbing, cyanosis    NEUROLOGICAL:   Alert and oriented   no motor or deficits.    SKIN:   Skin normal color for race,   warm, dry   No evidence of rash.      11-16-20 @ 07:01  -  11-17-20 @ 07:00  --------------------------------------------------------  IN:  Total IN: 0 mL    OUT:    Voided (mL): 400 mL  Total OUT: 400 mL    Total NET: -400 mL          LABS:                          15.1   15.10 )-----------( 319      ( 17 Nov 2020 05:28 )             45.6                                               11-17    137  |  101  |  34<H>  ----------------------------<  118<H>  3.4<L>   |  26  |  1.0    Ca    9.4      17 Nov 2020 05:28  Mg     2.1     11-16    TPro  5.9<L>  /  Alb  3.4<L>  /  TBili  0.3  /  DBili  x   /  AST  18  /  ALT  73<H>  /  AlkPhos  96  11-17                                                                                           LIVER FUNCTIONS - ( 17 Nov 2020 05:28 )  Alb: 3.4 g/dL / Pro: 5.9 g/dL / ALK PHOS: 96 U/L / ALT: 73 U/L / AST: 18 U/L / GGT: x                                                                                                MEDICATIONS  (STANDING):  dexAMETHasone  Injectable 6 milliGRAM(s) IV Push daily  dextrose 5%. 1000 milliLiter(s) (50 mL/Hr) IV Continuous <Continuous>  dextrose 50% Injectable 12.5 Gram(s) IV Push once  dextrose 50% Injectable 25 Gram(s) IV Push once  dextrose 50% Injectable 25 Gram(s) IV Push once  enoxaparin Injectable 40 milliGRAM(s) SubCutaneous every 12 hours  furosemide    Tablet 40 milliGRAM(s) Oral daily  guaiFENesin ER 1200 milliGRAM(s) Oral every 12 hours  influenza   Vaccine 0.5 milliLiter(s) IntraMuscular once  insulin glargine Injectable (LANTUS) 16 Unit(s) SubCutaneous at bedtime  insulin lispro (ADMELOG) corrective regimen sliding scale   SubCutaneous three times a day before meals  insulin lispro Injectable (ADMELOG) 5 Unit(s) SubCutaneous three times a day before meals  lactulose Syrup 10 Gram(s) Oral once  pantoprazole    Tablet 40 milliGRAM(s) Oral before breakfast  senna 2 Tablet(s) Oral at bedtime    MEDICATIONS  (PRN):  dextrose 40% Gel 15 Gram(s) Oral once PRN Blood Glucose LESS THAN 70 milliGRAM(s)/deciliter  glucagon  Injectable 1 milliGRAM(s) IntraMuscular once PRN Glucose LESS THAN 70 milligrams/deciliter  guaifenesin/dextromethorphan  Syrup 10 milliLiter(s) Oral every 6 hours PRN Cough      X-Rays reviewed    CXR interpreted by me: Pending

## 2020-11-17 NOTE — PROGRESS NOTE ADULT - ASSESSMENT
Impression:  Acute hypoxemic resp failure 2/2 covid pna s/p plasma, remdesivir  doubt superimposed PNA nl procal  HO ILD?  HO RA on pred    plan:   Continue with Dexamethasone 6mg IV daily  Repeat cxr today  Start Cefepime+ Azithro for now, Repeat procal  Wean oxygen as tolerated, goal >92%  DVT ppx   Impression:  Acute hypoxemic resp failure 2/2 covid pna s/p plasma, remdesivir  HO RA on pred    plan:   Continue with Dexamethasone 6mg IV daily  Repeat cxr STAT   Repeat Procal   Wean oxygen as tolerated, goal >92%  DVT ppx.   Dimer and BNP   Incentive spirometry Impression:  Acute hypoxemic resp failure 2/2 covid pna s/p plasma, remdesivir. possible PE  HO RA on pred    plan:   Continue with Dexamethasone 6mg IV daily  Repeat cxr STAT   Repeat Procal   Wean oxygen as tolerated, goal >92%  Full A/C for now  Check Dimer and BNP   Repeat venous duplex  Incentive spirometry

## 2020-11-17 NOTE — PROGRESS NOTE ADULT - SUBJECTIVE AND OBJECTIVE BOX
Patient was seen and examined. Spoke with RN and staff in ED3, and charge nruse of ER. Chart reviewed.  Pt still with dyspnea, O2 sat at present 87% on RA  Complains bitterly of conditions in ED3- no privacy, just curtain, food in box, poor attention; no new medical complaints  Vital Signs Last 24 Hrs  T(F): 98.6 (17 Nov 2020 07:58), Max: 98.6 (17 Nov 2020 07:58)  HR: 94 (17 Nov 2020 07:58) (94 - 99)  BP: 125/83 (17 Nov 2020 07:58) (119/84 - 125/83)  SpO2: 93% (17 Nov 2020 07:58) (93% - 94%)  MEDICATIONS  (STANDING):  dexAMETHasone  Injectable 6 milliGRAM(s) IV Push daily  dextrose 5%. 1000 milliLiter(s) (50 mL/Hr) IV Continuous <Continuous>  dextrose 50% Injectable 12.5 Gram(s) IV Push once  dextrose 50% Injectable 25 Gram(s) IV Push once  dextrose 50% Injectable 25 Gram(s) IV Push once  enoxaparin Injectable 40 milliGRAM(s) SubCutaneous every 12 hours  furosemide    Tablet 40 milliGRAM(s) Oral daily  guaiFENesin ER 1200 milliGRAM(s) Oral every 12 hours  influenza   Vaccine 0.5 milliLiter(s) IntraMuscular once  insulin glargine Injectable (LANTUS) 16 Unit(s) SubCutaneous at bedtime  insulin lispro (ADMELOG) corrective regimen sliding scale   SubCutaneous three times a day before meals  insulin lispro Injectable (ADMELOG) 5 Unit(s) SubCutaneous three times a day before meals  pantoprazole    Tablet 40 milliGRAM(s) Oral before breakfast    MEDICATIONS  (PRN):  dextrose 40% Gel 15 Gram(s) Oral once PRN Blood Glucose LESS THAN 70 milliGRAM(s)/deciliter  glucagon  Injectable 1 milliGRAM(s) IntraMuscular once PRN Glucose LESS THAN 70 milligrams/deciliter  guaifenesin/dextromethorphan  Syrup 10 milliLiter(s) Oral every 6 hours PRN Cough    Labs:                        15.1   15.10 )-----------( 319      ( 17 Nov 2020 05:28 )             45.6                         14.7   14.17 )-----------( 347      ( 16 Nov 2020 04:44 )             45.1     17 Nov 2020 05:28    137    |  101    |  34     ----------------------------<  118    3.4     |  26     |  1.0    16 Nov 2020 04:44    138    |  100    |  33     ----------------------------<  144    3.6     |  30     |  1.1      Ca    9.4        17 Nov 2020 05:28  Ca    9.3        16 Nov 2020 04:44  Mg     2.1       16 Nov 2020 04:44    TPro  5.9    /  Alb  3.4    /  TBili  0.3    /  DBili  x      /  AST  18     /  ALT  73     /  AlkPhos  96     17 Nov 2020 05:28  TPro  5.9    /  Alb  3.2    /  TBili  0.3    /  DBili  x      /  AST  25     /  ALT  81     /  AlkPhos  99     16 Nov 2020 04:44      exam unchanged- just in bad mood as above      A/P:  60 YO M with PMHx of GIST (s/p resection), DM type II (on metformin), RA (on prednisone), HTN, HLD, herniated disc, admitted with Covid pneumonia s/p  remdesivir and plasma    pulm/ICU f/u- Dr Pelletier - please call to f/u today    ID f/u appreciated    PO decadron taper- start today    glycemic control while on steroids    O2 as needed- still needs O2 VNC    lovenox- full dose    monitor inflammatory markers     OOB to chair, ambulate    transfer to regular room when bed available    DVT prophylaxis  Decubitus prevention- all measures as per RN protocol  Please call or text me with any questions or updates

## 2020-11-18 LAB
ALBUMIN SERPL ELPH-MCNC: 3.6 G/DL — SIGNIFICANT CHANGE UP (ref 3.5–5.2)
ALP SERPL-CCNC: 128 U/L — HIGH (ref 30–115)
ALT FLD-CCNC: 80 U/L — HIGH (ref 0–41)
ANION GAP SERPL CALC-SCNC: 16 MMOL/L — HIGH (ref 7–14)
AST SERPL-CCNC: 25 U/L — SIGNIFICANT CHANGE UP (ref 0–41)
BILIRUB SERPL-MCNC: 0.3 MG/DL — SIGNIFICANT CHANGE UP (ref 0.2–1.2)
BUN SERPL-MCNC: 36 MG/DL — HIGH (ref 10–20)
CALCIUM SERPL-MCNC: 8.9 MG/DL — SIGNIFICANT CHANGE UP (ref 8.5–10.1)
CHLORIDE SERPL-SCNC: 98 MMOL/L — SIGNIFICANT CHANGE UP (ref 98–110)
CO2 SERPL-SCNC: 21 MMOL/L — SIGNIFICANT CHANGE UP (ref 17–32)
CREAT SERPL-MCNC: 1 MG/DL — SIGNIFICANT CHANGE UP (ref 0.7–1.5)
D DIMER BLD IA.RAPID-MCNC: 163 NG/ML DDU — SIGNIFICANT CHANGE UP (ref 0–230)
GLUCOSE BLDC GLUCOMTR-MCNC: 141 MG/DL — HIGH (ref 70–99)
GLUCOSE BLDC GLUCOMTR-MCNC: 176 MG/DL — HIGH (ref 70–99)
GLUCOSE BLDC GLUCOMTR-MCNC: 257 MG/DL — HIGH (ref 70–99)
GLUCOSE BLDC GLUCOMTR-MCNC: 347 MG/DL — HIGH (ref 70–99)
GLUCOSE SERPL-MCNC: 390 MG/DL — HIGH (ref 70–99)
HCT VFR BLD CALC: 48.2 % — SIGNIFICANT CHANGE UP (ref 42–52)
HGB BLD-MCNC: 15.7 G/DL — SIGNIFICANT CHANGE UP (ref 14–18)
MCHC RBC-ENTMCNC: 29.6 PG — SIGNIFICANT CHANGE UP (ref 27–31)
MCHC RBC-ENTMCNC: 32.6 G/DL — SIGNIFICANT CHANGE UP (ref 32–37)
MCV RBC AUTO: 90.9 FL — SIGNIFICANT CHANGE UP (ref 80–94)
NRBC # BLD: 0 /100 WBCS — SIGNIFICANT CHANGE UP (ref 0–0)
NT-PROBNP SERPL-SCNC: 134 PG/ML — SIGNIFICANT CHANGE UP (ref 0–300)
PLATELET # BLD AUTO: 284 K/UL — SIGNIFICANT CHANGE UP (ref 130–400)
POTASSIUM SERPL-MCNC: 3.8 MMOL/L — SIGNIFICANT CHANGE UP (ref 3.5–5)
POTASSIUM SERPL-SCNC: 3.8 MMOL/L — SIGNIFICANT CHANGE UP (ref 3.5–5)
PROCALCITONIN SERPL-MCNC: 0.03 NG/ML — SIGNIFICANT CHANGE UP (ref 0.02–0.1)
PROT SERPL-MCNC: 6 G/DL — SIGNIFICANT CHANGE UP (ref 6–8)
RBC # BLD: 5.3 M/UL — SIGNIFICANT CHANGE UP (ref 4.7–6.1)
RBC # FLD: 15.9 % — HIGH (ref 11.5–14.5)
SODIUM SERPL-SCNC: 135 MMOL/L — SIGNIFICANT CHANGE UP (ref 135–146)
WBC # BLD: 15.99 K/UL — HIGH (ref 4.8–10.8)
WBC # FLD AUTO: 15.99 K/UL — HIGH (ref 4.8–10.8)

## 2020-11-18 PROCEDURE — 99497 ADVNCD CARE PLAN 30 MIN: CPT | Mod: 25

## 2020-11-18 PROCEDURE — 99233 SBSQ HOSP IP/OBS HIGH 50: CPT

## 2020-11-18 RX ORDER — ENOXAPARIN SODIUM 100 MG/ML
40 INJECTION SUBCUTANEOUS
Refills: 0 | Status: DISCONTINUED | OUTPATIENT
Start: 2020-11-18 | End: 2020-11-21

## 2020-11-18 RX ADMIN — Medication 5: at 08:16

## 2020-11-18 RX ADMIN — Medication 1200 MILLIGRAM(S): at 05:30

## 2020-11-18 RX ADMIN — Medication 1200 MILLIGRAM(S): at 17:52

## 2020-11-18 RX ADMIN — INSULIN GLARGINE 16 UNIT(S): 100 INJECTION, SOLUTION SUBCUTANEOUS at 22:32

## 2020-11-18 RX ADMIN — Medication 40 MILLIGRAM(S): at 05:30

## 2020-11-18 RX ADMIN — SENNA PLUS 2 TABLET(S): 8.6 TABLET ORAL at 22:32

## 2020-11-18 RX ADMIN — Medication 5 UNIT(S): at 08:17

## 2020-11-18 RX ADMIN — Medication 6 MILLIGRAM(S): at 05:30

## 2020-11-18 RX ADMIN — Medication 3: at 12:09

## 2020-11-18 RX ADMIN — PANTOPRAZOLE SODIUM 40 MILLIGRAM(S): 20 TABLET, DELAYED RELEASE ORAL at 05:30

## 2020-11-18 RX ADMIN — Medication 5 UNIT(S): at 12:09

## 2020-11-18 NOTE — PROGRESS NOTE ADULT - SUBJECTIVE AND OBJECTIVE BOX
24H events:    Patient is a 59y old Male who presents with a chief complaint of Suspected COVID-19 Pneumonia (18 Nov 2020 08:32)    Primary diagnosis of COVID-19       Today is hospital day 13d. This morning patient was seen and examined at bedside, resting comfortably in bed.      PAST MEDICAL & SURGICAL HISTORY  Hyperlipidemia    Lumbago    Hypertension    H/O lymph node biopsy  Neck by Dr Case      SOCIAL HISTORY:  Social History:      ALLERGIES:  penicillin (Unknown)  shellfish (Pruritus)    MEDICATIONS:  STANDING MEDICATIONS  dexAMETHasone  Injectable 6 milliGRAM(s) IV Push daily  dextrose 5%. 1000 milliLiter(s) IV Continuous <Continuous>  dextrose 50% Injectable 12.5 Gram(s) IV Push once  dextrose 50% Injectable 25 Gram(s) IV Push once  dextrose 50% Injectable 25 Gram(s) IV Push once  enoxaparin Injectable 80 milliGRAM(s) SubCutaneous two times a day  furosemide    Tablet 40 milliGRAM(s) Oral daily  guaiFENesin ER 1200 milliGRAM(s) Oral every 12 hours  influenza   Vaccine 0.5 milliLiter(s) IntraMuscular once  insulin glargine Injectable (LANTUS) 16 Unit(s) SubCutaneous at bedtime  insulin lispro (ADMELOG) corrective regimen sliding scale   SubCutaneous three times a day before meals  insulin lispro Injectable (ADMELOG) 5 Unit(s) SubCutaneous three times a day before meals  pantoprazole    Tablet 40 milliGRAM(s) Oral before breakfast  senna 2 Tablet(s) Oral at bedtime    PRN MEDICATIONS  dextrose 40% Gel 15 Gram(s) Oral once PRN  glucagon  Injectable 1 milliGRAM(s) IntraMuscular once PRN  guaifenesin/dextromethorphan  Syrup 10 milliLiter(s) Oral every 6 hours PRN        LABS:                        15.1   15.10 )-----------( 319      ( 17 Nov 2020 05:28 )             45.6     11-17    137  |  101  |  34<H>  ----------------------------<  118<H>  3.4<L>   |  26  |  1.0    Ca    9.4      17 Nov 2020 05:28    TPro  5.9<L>  /  Alb  3.4<L>  /  TBili  0.3  /  DBili  x   /  AST  18  /  ALT  73<H>  /  AlkPhos  96  11-17        RADIOLOGY:  < from: [facility] Duplex Lower Ext Vein Scan, Bilat (11.17.20 @ 19:28) >  Impression:    No evidence of deep venous thrombosis or superficial thrombophlebitis in the bilateral lower extremities.    ICD-10:M79.89    < end of copied text >        < from: Xray Chest 1 View-PORTABLE IMMEDIATE (Xray Chest 1 View-PORTABLE IMMEDIATE .) (11.16.20 @ 16:14) >  Impression:    Diffuse interstitial opacifications consistent with viral pneumonia.    < end of copied text >        VITALS:   T(F): 98.4  HR: 94  BP: 119/80  RR: 20  SpO2: 94%    PHYSICAL EXAM:  GENERAL: NAD, still requiring 4 L O2  HEAD:  Atraumatic, Normocephalic  EYES: EOMI  NECK: Supple  NERVOUS SYSTEM:  Alert & Oriented X3, non focal   CHEST/LUNG: on NC   ; bibasilar crackles   HEART: Regular rate and rhythm; No murmurs, rubs, or gallops  ABDOMEN: Soft, Nontender, Nondistended; Bowel sounds present  EXTREMITIES:  2+ Peripheral Pulses, No clubbing, cyanosis, or edema        Assessment and Plan:   · Assessment	  60 YO M with PMHx of GIST (s/p resection), DM type II (on metformin), RA (on prednisone), HTN, HLD, herniated disc, presented to the ED with c/c of cough and SOB x 3 days admitted to medicine for treatment of Covid pneumonia    # Acute Hypoxic Respiratory Failure 2/2  # COVID-19 Pneumonia   # Pleural Effusions  - Pt still requiring 4 L O2 Nc , desating on ambulation   - Pulm recs noted, f/u [facility] duples, BNP, Procal, D-dimer  - CT Angio on admission negative for PE  - Chest X-ray 11/16 showing Diffuse interstitial opacifications consistent with viral pneumonia.  - s/p 2 units Plasma 11/6 s/p 5 day course Remdesivir completed 11/09  - C/W IV dexamethasone 6 mg iv q24h as per Pum  - ID recommendations noted, no need to continue following inflammatory markers   - make  Lovenox 80 mg BID   - PO 40mg Lasix for diuresis   - Keep O2 sat above 93%        #Rising D-dimer in the setting of Covid pneumonia --resolved  - D-dimer on admission 287-> 484-->444-->405 11/11  - covid pneumonia--> heightened risk of blood clot  - c/w therapeutic Lovenox (80 BID)  - Lower extremity duplex negative 11/11  - will recheck [facility] LE and d-dimer       #RA  - not in acute flair  - Will hold prednisone for now since patient on dexamethasone   -c/w Allopurinol 300 daily    #HTN  - Home medication Lisinopril 40 mg PO QD  - c/w lasix po daily  - continue holding Lisinopril  - continue to monitor BP/ Cr    #DM type II  - On metformin at home, continue to hold for now  - c/w insulin Lantus 16 units at bedtime  - c/w Lispro 5 mg with meals    #Misc  - DVT Prophylaxis: Lovenox   - Diet: DASH/TLC, Carb consistent   - GI Prophylaxis: Pantoprazole   - Activity: AAT  - Code Status: Full Code           24H events:    Patient is a 59y old Male who presents with a chief complaint of Suspected COVID-19 Pneumonia (18 Nov 2020 08:32)    Primary diagnosis of COVID-19       Today is hospital day 13d. This morning patient was seen and examined at bedside, resting comfortably in bed.      PAST MEDICAL & SURGICAL HISTORY  Hyperlipidemia    Lumbago    Hypertension    H/O lymph node biopsy  Neck by Dr Case      SOCIAL HISTORY:  Social History:      ALLERGIES:  penicillin (Unknown)  shellfish (Pruritus)    MEDICATIONS:  STANDING MEDICATIONS  dexAMETHasone  Injectable 6 milliGRAM(s) IV Push daily  dextrose 5%. 1000 milliLiter(s) IV Continuous <Continuous>  dextrose 50% Injectable 12.5 Gram(s) IV Push once  dextrose 50% Injectable 25 Gram(s) IV Push once  dextrose 50% Injectable 25 Gram(s) IV Push once  enoxaparin Injectable 80 milliGRAM(s) SubCutaneous two times a day  furosemide    Tablet 40 milliGRAM(s) Oral daily  guaiFENesin ER 1200 milliGRAM(s) Oral every 12 hours  influenza   Vaccine 0.5 milliLiter(s) IntraMuscular once  insulin glargine Injectable (LANTUS) 16 Unit(s) SubCutaneous at bedtime  insulin lispro (ADMELOG) corrective regimen sliding scale   SubCutaneous three times a day before meals  insulin lispro Injectable (ADMELOG) 5 Unit(s) SubCutaneous three times a day before meals  pantoprazole    Tablet 40 milliGRAM(s) Oral before breakfast  senna 2 Tablet(s) Oral at bedtime    PRN MEDICATIONS  dextrose 40% Gel 15 Gram(s) Oral once PRN  glucagon  Injectable 1 milliGRAM(s) IntraMuscular once PRN  guaifenesin/dextromethorphan  Syrup 10 milliLiter(s) Oral every 6 hours PRN        LABS:                        15.1   15.10 )-----------( 319      ( 17 Nov 2020 05:28 )             45.6     11-17    137  |  101  |  34<H>  ----------------------------<  118<H>  3.4<L>   |  26  |  1.0    Ca    9.4      17 Nov 2020 05:28    TPro  5.9<L>  /  Alb  3.4<L>  /  TBili  0.3  /  DBili  x   /  AST  18  /  ALT  73<H>  /  AlkPhos  96  11-17        RADIOLOGY:  < from: [facility] Duplex Lower Ext Vein Scan, Bilat (11.17.20 @ 19:28) >  Impression:    No evidence of deep venous thrombosis or superficial thrombophlebitis in the bilateral lower extremities.    ICD-10:M79.89    < end of copied text >        < from: Xray Chest 1 View-PORTABLE IMMEDIATE (Xray Chest 1 View-PORTABLE IMMEDIATE .) (11.16.20 @ 16:14) >  Impression:    Diffuse interstitial opacifications consistent with viral pneumonia.    < end of copied text >        VITALS:   T(F): 98.4  HR: 94  BP: 119/80  RR: 20  SpO2: 94%    PHYSICAL EXAM:  GENERAL: NAD, still requiring 4 L O2  HEAD:  Atraumatic, Normocephalic  EYES: EOMI  NECK: Supple  NERVOUS SYSTEM:  Alert & Oriented X3, non focal   CHEST/LUNG: on NC   ; bibasilar crackles   HEART: Regular rate and rhythm; No murmurs, rubs, or gallops  ABDOMEN: Soft, Nontender, Nondistended; Bowel sounds present  EXTREMITIES:  2+ Peripheral Pulses, No clubbing, cyanosis, or edema        Assessment and Plan:   · Assessment	  58 YO M with PMHx of GIST (s/p resection), DM type II (on metformin), RA (on prednisone), HTN, HLD, herniated disc, presented to the ED with c/c of cough and SOB x 3 days admitted to medicine for treatment of Covid pneumonia    # Acute Hypoxic Respiratory Failure 2/2  # COVID-19 Pneumonia   # Pleural Effusions  - Pt still requiring 4 L O2 Nc , desating on ambulation   - Pulm recs noted, f/u [facility] duples, BNP, Procal, D-dimer  - CT Angio on admission negative for PE  - Chest X-ray 11/16 showing Diffuse interstitial opacifications consistent with viral pneumonia.  - s/p 2 units Plasma 11/6 s/p 5 day course Remdesivir completed 11/09  - C/W IV dexamethasone 6 mg iv q24h as per Pum  - ID recommendations noted, no need to continue following inflammatory markers   - make  Lovenox 80 mg BID   - PO 40mg Lasix for diuresis   - Keep O2 sat above 93%        #Rising D-dimer in the setting of Covid pneumonia --resolved  - D-dimer on admission 287-> 484-->444-->405 11/11  - covid pneumonia--> heightened risk of blood clot  - c/w therapeutic Lovenox (80 BID)  - Lower extremity duplex negative 11/11  - will recheck [facility] LE and d-dimer       #RA  - not in acute flair  - Will hold prednisone for now since patient on dexamethasone   -c/w Allopurinol 300 daily    #HTN  - Home medication Lisinopril 40 mg PO QD  - c/w lasix po daily  - continue holding Lisinopril  - continue to monitor BP/ Cr    #DM type II  - will increase insulin to 20-7/7/7 in setting of steroid use    #Misc  - DVT Prophylaxis: Lovenox   - Diet: DASH/TLC, Carb consistent   - GI Prophylaxis: Pantoprazole   - Activity: AAT  - Code Status: Full Code           24H events:    Patient is a 59y old Male who presents with a chief complaint of Suspected COVID-19 Pneumonia (18 Nov 2020 08:32)    Primary diagnosis of COVID-19       Today is hospital day 13d. This morning patient was seen and examined at bedside, resting comfortably in bed.      PAST MEDICAL & SURGICAL HISTORY  Hyperlipidemia    Lumbago    Hypertension    H/O lymph node biopsy  Neck by Dr Case      SOCIAL HISTORY:  Social History:      ALLERGIES:  penicillin (Unknown)  shellfish (Pruritus)    MEDICATIONS:  STANDING MEDICATIONS  dexAMETHasone  Injectable 6 milliGRAM(s) IV Push daily  dextrose 5%. 1000 milliLiter(s) IV Continuous <Continuous>  dextrose 50% Injectable 12.5 Gram(s) IV Push once  dextrose 50% Injectable 25 Gram(s) IV Push once  dextrose 50% Injectable 25 Gram(s) IV Push once  enoxaparin Injectable 80 milliGRAM(s) SubCutaneous two times a day  furosemide    Tablet 40 milliGRAM(s) Oral daily  guaiFENesin ER 1200 milliGRAM(s) Oral every 12 hours  influenza   Vaccine 0.5 milliLiter(s) IntraMuscular once  insulin glargine Injectable (LANTUS) 16 Unit(s) SubCutaneous at bedtime  insulin lispro (ADMELOG) corrective regimen sliding scale   SubCutaneous three times a day before meals  insulin lispro Injectable (ADMELOG) 5 Unit(s) SubCutaneous three times a day before meals  pantoprazole    Tablet 40 milliGRAM(s) Oral before breakfast  senna 2 Tablet(s) Oral at bedtime    PRN MEDICATIONS  dextrose 40% Gel 15 Gram(s) Oral once PRN  glucagon  Injectable 1 milliGRAM(s) IntraMuscular once PRN  guaifenesin/dextromethorphan  Syrup 10 milliLiter(s) Oral every 6 hours PRN        LABS:                        15.1   15.10 )-----------( 319      ( 17 Nov 2020 05:28 )             45.6     11-17    137  |  101  |  34<H>  ----------------------------<  118<H>  3.4<L>   |  26  |  1.0    Ca    9.4      17 Nov 2020 05:28    TPro  5.9<L>  /  Alb  3.4<L>  /  TBili  0.3  /  DBili  x   /  AST  18  /  ALT  73<H>  /  AlkPhos  96  11-17        RADIOLOGY:  < from: [facility] Duplex Lower Ext Vein Scan, Bilat (11.17.20 @ 19:28) >  Impression:    No evidence of deep venous thrombosis or superficial thrombophlebitis in the bilateral lower extremities.    ICD-10:M79.89    < end of copied text >        < from: Xray Chest 1 View-PORTABLE IMMEDIATE (Xray Chest 1 View-PORTABLE IMMEDIATE .) (11.16.20 @ 16:14) >  Impression:    Diffuse interstitial opacifications consistent with viral pneumonia.    < end of copied text >        VITALS:   T(F): 98.4  HR: 94  BP: 119/80  RR: 20  SpO2: 94%    PHYSICAL EXAM:  GENERAL: NAD, still requiring 4 L O2  HEAD:  Atraumatic, Normocephalic  EYES: EOMI  NECK: Supple  NERVOUS SYSTEM:  Alert & Oriented X3, non focal   CHEST/LUNG: on NC   ; bibasilar crackles   HEART: Regular rate and rhythm; No murmurs, rubs, or gallops  ABDOMEN: Soft, Nontender, Nondistended; Bowel sounds present  EXTREMITIES:  2+ Peripheral Pulses, No clubbing, cyanosis, or edema        Assessment and Plan:   · Assessment	  60 YO M with PMHx of GIST (s/p resection), DM type II (on metformin), RA (on prednisone), HTN, HLD, herniated disc, presented to the ED with c/c of cough and SOB x 3 days admitted to medicine for treatment of Covid pneumonia    # Acute Hypoxic Respiratory Failure 2/2  # COVID-19 Pneumonia   # Pleural Effusions  - Pt still requiring 4 L O2 Nc , desating on ambulation   - Duplex us LE negative for DVT,  - , D-dimer 163  - Pulm recs noted, f/u  Procal,   - CT Angio on admission negative for PE  - Chest X-ray 11/16 showing Diffuse interstitial opacifications consistent with viral pneumonia.  - s/p 2 units Plasma 11/6 s/p 5 day course Remdesivir completed 11/09  - C/W IV dexamethasone 6 mg iv q24h as per Pum  - ID recommendations noted, no need to continue following inflammatory markers   - keep  Lovenox  40 mg BID   - PO 40mg Lasix for diuresis   - Keep O2 sat above 93%        #Rising D-dimer in the setting of Covid pneumonia --resolved  - D-dimer on admission 287-> 484-->444-->405 11/11  - covid pneumonia--> heightened risk of blood clot  - c/w therapeutic Lovenox (80 BID)  - Lower extremity duplex negative 11/11  - will recheck [facility] LE and d-dimer       #RA  - not in acute flair  - Will hold prednisone for now since patient on dexamethasone   -c/w Allopurinol 300 daily    #HTN  - Home medication Lisinopril 40 mg PO QD  - c/w lasix po daily  - continue holding Lisinopril  - continue to monitor BP/ Cr    #DM type II  - will increase insulin to 20-7/7/7 in setting of steroid use    #Misc  - DVT Prophylaxis: Lovenox   - Diet: DASH/TLC, Carb consistent   - GI Prophylaxis: Pantoprazole   - Activity: AAT  - Code Status: Full Code           24H events:    Patient is a 59y old Male who presents with a chief complaint of Suspected COVID-19 Pneumonia (18 Nov 2020 08:32)    Primary diagnosis of COVID-19       Today is hospital day 13d. This morning patient was seen and examined at bedside, resting comfortably in bed.      PAST MEDICAL & SURGICAL HISTORY  Hyperlipidemia    Lumbago    Hypertension    H/O lymph node biopsy  Neck by Dr Case      SOCIAL HISTORY:  Social History:      ALLERGIES:  penicillin (Unknown)  shellfish (Pruritus)    MEDICATIONS:  STANDING MEDICATIONS  dexAMETHasone  Injectable 6 milliGRAM(s) IV Push daily  dextrose 5%. 1000 milliLiter(s) IV Continuous <Continuous>  dextrose 50% Injectable 12.5 Gram(s) IV Push once  dextrose 50% Injectable 25 Gram(s) IV Push once  dextrose 50% Injectable 25 Gram(s) IV Push once  enoxaparin Injectable 80 milliGRAM(s) SubCutaneous two times a day  furosemide    Tablet 40 milliGRAM(s) Oral daily  guaiFENesin ER 1200 milliGRAM(s) Oral every 12 hours  influenza   Vaccine 0.5 milliLiter(s) IntraMuscular once  insulin glargine Injectable (LANTUS) 16 Unit(s) SubCutaneous at bedtime  insulin lispro (ADMELOG) corrective regimen sliding scale   SubCutaneous three times a day before meals  insulin lispro Injectable (ADMELOG) 5 Unit(s) SubCutaneous three times a day before meals  pantoprazole    Tablet 40 milliGRAM(s) Oral before breakfast  senna 2 Tablet(s) Oral at bedtime    PRN MEDICATIONS  dextrose 40% Gel 15 Gram(s) Oral once PRN  glucagon  Injectable 1 milliGRAM(s) IntraMuscular once PRN  guaifenesin/dextromethorphan  Syrup 10 milliLiter(s) Oral every 6 hours PRN        LABS:                        15.1   15.10 )-----------( 319      ( 17 Nov 2020 05:28 )             45.6     11-17    137  |  101  |  34<H>  ----------------------------<  118<H>  3.4<L>   |  26  |  1.0    Ca    9.4      17 Nov 2020 05:28    TPro  5.9<L>  /  Alb  3.4<L>  /  TBili  0.3  /  DBili  x   /  AST  18  /  ALT  73<H>  /  AlkPhos  96  11-17        RADIOLOGY:  < from: [facility] Duplex Lower Ext Vein Scan, Bilat (11.17.20 @ 19:28) >  Impression:    No evidence of deep venous thrombosis or superficial thrombophlebitis in the bilateral lower extremities.    ICD-10:M79.89    < end of copied text >        < from: Xray Chest 1 View-PORTABLE IMMEDIATE (Xray Chest 1 View-PORTABLE IMMEDIATE .) (11.16.20 @ 16:14) >  Impression:    Diffuse interstitial opacifications consistent with viral pneumonia.    < end of copied text >        VITALS:   T(F): 98.4  HR: 94  BP: 119/80  RR: 20  SpO2: 94%    PHYSICAL EXAM:  GENERAL: NAD, still requiring 4 L O2  HEAD:  Atraumatic, Normocephalic  EYES: EOMI  NECK: Supple  NERVOUS SYSTEM:  Alert & Oriented X3, non focal   CHEST/LUNG: on NC   ; bibasilar crackles   HEART: Regular rate and rhythm; No murmurs, rubs, or gallops  ABDOMEN: Soft, Nontender, Nondistended; Bowel sounds present  EXTREMITIES:  2+ Peripheral Pulses, No clubbing, cyanosis, or edema        Assessment and Plan:   · Assessment	  60 YO M with PMHx of GIST (s/p resection), DM type II (on metformin), RA (on prednisone), HTN, HLD, herniated disc, presented to the ED with c/c of cough and SOB x 3 days admitted to medicine for treatment of Covid pneumonia    # Acute Hypoxic Respiratory Failure 2/2  # COVID-19 Pneumonia   # Pleural Effusions  - Pt still requiring 4 L O2 Nc , desating on ambulation   - Duplex us LE negative for DVT,  - , D-dimer 163  - Pulm recs noted, f/u  Procal,   - CT Angio on admission negative for PE  - Chest X-ray 11/16 showing Diffuse interstitial opacifications consistent with viral pneumonia.  - s/p 2 units Plasma 11/6 s/p 5 day course Remdesivir completed 11/09  - C/W IV dexamethasone 6 mg iv q24h as per Pulm, will switch to PO prednisone 40 daily   - ID recommendations noted, no need to continue following inflammatory markers   - keep  Lovenox  40 mg BID   - PO 40mg Lasix for diuresis   - Keep O2 sat above 93%        #Rising D-dimer in the setting of Covid pneumonia --resolved  - D-dimer on admission 287-> 484-->444-->405 11/11  - covid pneumonia--> heightened risk of blood clot  - c/w therapeutic Lovenox (80 BID)  - Lower extremity duplex negative 11/11  - will recheck [facility] LE and d-dimer       #RA  - not in acute flair  - Will hold prednisone for now since patient on dexamethasone   -c/w Allopurinol 300 daily    #HTN  - Home medication Lisinopril 40 mg PO QD  - c/w lasix po daily  - continue holding Lisinopril  - continue to monitor BP/ Cr    #DM type II  - will makeinsulin to 20-7/7/7 in setting of steroid use    #Misc  - DVT Prophylaxis: Lovenox   - Diet: DASH/TLC, Carb consistent   - GI Prophylaxis: Pantoprazole   - Activity: AAT  - Code Status: Full Code           24H events:    Patient is a 59y old Male who presents with a chief complaint of Suspected COVID-19 Pneumonia (18 Nov 2020 08:32)    Primary diagnosis of COVID-19       Today is hospital day 13d. This morning patient was seen and examined at bedside, resting comfortably in bed.      PAST MEDICAL & SURGICAL HISTORY  Hyperlipidemia    Lumbago    Hypertension    H/O lymph node biopsy  Neck by Dr Case      SOCIAL HISTORY:  Social History:      ALLERGIES:  penicillin (Unknown)  shellfish (Pruritus)    MEDICATIONS:  STANDING MEDICATIONS  dexAMETHasone  Injectable 6 milliGRAM(s) IV Push daily  dextrose 5%. 1000 milliLiter(s) IV Continuous <Continuous>  dextrose 50% Injectable 12.5 Gram(s) IV Push once  dextrose 50% Injectable 25 Gram(s) IV Push once  dextrose 50% Injectable 25 Gram(s) IV Push once  enoxaparin Injectable 80 milliGRAM(s) SubCutaneous two times a day  furosemide    Tablet 40 milliGRAM(s) Oral daily  guaiFENesin ER 1200 milliGRAM(s) Oral every 12 hours  influenza   Vaccine 0.5 milliLiter(s) IntraMuscular once  insulin glargine Injectable (LANTUS) 16 Unit(s) SubCutaneous at bedtime  insulin lispro (ADMELOG) corrective regimen sliding scale   SubCutaneous three times a day before meals  insulin lispro Injectable (ADMELOG) 5 Unit(s) SubCutaneous three times a day before meals  pantoprazole    Tablet 40 milliGRAM(s) Oral before breakfast  senna 2 Tablet(s) Oral at bedtime    PRN MEDICATIONS  dextrose 40% Gel 15 Gram(s) Oral once PRN  glucagon  Injectable 1 milliGRAM(s) IntraMuscular once PRN  guaifenesin/dextromethorphan  Syrup 10 milliLiter(s) Oral every 6 hours PRN        LABS:                        15.1   15.10 )-----------( 319      ( 17 Nov 2020 05:28 )             45.6     11-17    137  |  101  |  34<H>  ----------------------------<  118<H>  3.4<L>   |  26  |  1.0    Ca    9.4      17 Nov 2020 05:28    TPro  5.9<L>  /  Alb  3.4<L>  /  TBili  0.3  /  DBili  x   /  AST  18  /  ALT  73<H>  /  AlkPhos  96  11-17        RADIOLOGY:  < from: [facility] Duplex Lower Ext Vein Scan, Bilat (11.17.20 @ 19:28) >  Impression:    No evidence of deep venous thrombosis or superficial thrombophlebitis in the bilateral lower extremities.    ICD-10:M79.89    < end of copied text >        < from: Xray Chest 1 View-PORTABLE IMMEDIATE (Xray Chest 1 View-PORTABLE IMMEDIATE .) (11.16.20 @ 16:14) >  Impression:    Diffuse interstitial opacifications consistent with viral pneumonia.    < end of copied text >        VITALS:   T(F): 98.4  HR: 94  BP: 119/80  RR: 20  SpO2: 94%    PHYSICAL EXAM:  GENERAL: NAD, still requiring 4 L O2  HEAD:  Atraumatic, Normocephalic  EYES: EOMI  NECK: Supple  NERVOUS SYSTEM:  Alert & Oriented X3, non focal   CHEST/LUNG: on NC   ; bibasilar crackles   HEART: Regular rate and rhythm; No murmurs, rubs, or gallops  ABDOMEN: Soft, Nontender, Nondistended; Bowel sounds present  EXTREMITIES:  2+ Peripheral Pulses, No clubbing, cyanosis, or edema        Assessment and Plan:   · Assessment	  58 YO M with PMHx of GIST (s/p resection), DM type II (on metformin), RA (on prednisone), HTN, HLD, herniated disc, presented to the ED with c/c of cough and SOB x 3 days admitted to medicine for treatment of Covid pneumonia    # Acute Hypoxic Respiratory Failure 2/2  # COVID-19 Pneumonia   # Pleural Effusions  - Pt still requiring 4 L O2 Nc , desating on ambulation   - Duplex us LE negative for DVT,  - , D-dimer 163  - Pulm recs noted, f/u  Procal,   - CT Angio on admission negative for PE  - Chest X-ray 11/16 showing Diffuse interstitial opacifications consistent with viral pneumonia.  - s/p 2 units Plasma 11/6 s/p 5 day course Remdesivir completed 11/09  - C/W IV dexamethasone 6 mg iv q24h as per Pulm, will switch to PO prednisone 40 daily   - ID recommendations noted, no need to continue following inflammatory markers   - keep  Lovenox  40 mg BID   - PO 40mg Lasix for diuresis   - Keep O2 sat above 93%        #Rising D-dimer in the setting of Covid pneumonia --resolved  - D-dimer on admission 287-> 484-->444-->405 11/11  - covid pneumonia--> heightened risk of blood clot  - c/w  Lovenox   - Lower extremity duplex negative 11/11  - will recheck [facility] LE and d-dimer       #RA  - not in acute flair  - Will hold prednisone for now since patient on dexamethasone   -c/w Allopurinol 300 daily    #HTN  - Home medication Lisinopril 40 mg PO QD  - c/w lasix po daily  - continue holding Lisinopril  - continue to monitor BP/ Cr    #DM type II  - will makeinsulin to 20-7/7/7 in setting of steroid use    #Misc  - DVT Prophylaxis: Lovenox   - Diet: DASH/TLC, Carb consistent   - GI Prophylaxis: Pantoprazole   - Activity: AAT  - Code Status: Full Code

## 2020-11-18 NOTE — PROGRESS NOTE ADULT - SUBJECTIVE AND OBJECTIVE BOX
Patient is a 59y old  Male who presents with a chief complaint of Suspected COVID-19 Pneumonia (18 Nov 2020 08:46)        SUBJECTIVE:  Patient seen and examined this afternoon  Still on 4liters nasal cannula, saturating 94%, looks comfortable    REVIEW OF SYSTEMS:    All Pertinent ROS are negative except per HPI       PHYSICAL EXAM  Vital Signs Last 24 Hrs  T(C): 36.9 (18 Nov 2020 08:01), Max: 36.9 (18 Nov 2020 00:50)  T(F): 98.4 (18 Nov 2020 08:01), Max: 98.4 (18 Nov 2020 00:50)  HR: 94 (18 Nov 2020 08:01) (81 - 94)  BP: 119/80 (18 Nov 2020 08:01) (108/66 - 142/98)  BP(mean): --  RR: 20 (18 Nov 2020 08:01) (18 - 20)  SpO2: 94% (18 Nov 2020 08:10) (91% - 97%)    CONSTITUTIONAL:  Well nourished sitting in chair in NAD    ENT:   Airway patent,   No thrush  on nasal cannula 4liters    CARDIAC:   Normal rate,   regular rhythm.    no edema    RESPIRATORY:   No Wheezing  Normal chest expansion  Not tachypneic,  No use of accessory muscles  Bibasilar crackles    GASTROINTESTINAL:  Abdomen soft,   non-tender,   no guarding,     MUSCULOSKELETAL:   range of motion is not limited,  no clubbing, cyanosis    NEUROLOGICAL:   Alert and oriented   no motor or deficits.    SKIN:   Skin normal color for race,   warm, dry   No evidence of rash.      11-17-20 @ 07:01  -  11-18-20 @ 07:00  --------------------------------------------------------  IN:    Oral Fluid: 480 mL  Total IN: 480 mL    OUT:    Voided (mL): 1000 mL  Total OUT: 1000 mL    Total NET: -520 mL          LABS:                          15.7   15.99 )-----------( 284      ( 18 Nov 2020 08:16 )             48.2                                               11-18    135  |  98  |  36<H>  ----------------------------<  390<H>  3.8   |  21  |  1.0    Ca    8.9      18 Nov 2020 08:16    TPro  6.0  /  Alb  3.6  /  TBili  0.3  /  DBili  x   /  AST  25  /  ALT  80<H>  /  AlkPhos  128<H>  11-18                                                                                           LIVER FUNCTIONS - ( 18 Nov 2020 08:16 )  Alb: 3.6 g/dL / Pro: 6.0 g/dL / ALK PHOS: 128 U/L / ALT: 80 U/L / AST: 25 U/L / GGT: x                                                                                                MEDICATIONS  (STANDING):  dexAMETHasone  Injectable 6 milliGRAM(s) IV Push daily  dextrose 5%. 1000 milliLiter(s) (50 mL/Hr) IV Continuous <Continuous>  dextrose 50% Injectable 12.5 Gram(s) IV Push once  dextrose 50% Injectable 25 Gram(s) IV Push once  dextrose 50% Injectable 25 Gram(s) IV Push once  enoxaparin Injectable 40 milliGRAM(s) SubCutaneous two times a day  furosemide    Tablet 40 milliGRAM(s) Oral daily  guaiFENesin ER 1200 milliGRAM(s) Oral every 12 hours  influenza   Vaccine 0.5 milliLiter(s) IntraMuscular once  insulin glargine Injectable (LANTUS) 16 Unit(s) SubCutaneous at bedtime  insulin lispro (ADMELOG) corrective regimen sliding scale   SubCutaneous three times a day before meals  insulin lispro Injectable (ADMELOG) 5 Unit(s) SubCutaneous three times a day before meals  pantoprazole    Tablet 40 milliGRAM(s) Oral before breakfast  senna 2 Tablet(s) Oral at bedtime    MEDICATIONS  (PRN):  dextrose 40% Gel 15 Gram(s) Oral once PRN Blood Glucose LESS THAN 70 milliGRAM(s)/deciliter  glucagon  Injectable 1 milliGRAM(s) IntraMuscular once PRN Glucose LESS THAN 70 milligrams/deciliter  guaifenesin/dextromethorphan  Syrup 10 milliLiter(s) Oral every 6 hours PRN Cough

## 2020-11-18 NOTE — PROGRESS NOTE ADULT - ATTENDING COMMENTS
60 YO M with PMHx of GIST (s/p resection), DM type II (on metformin), RA (on prednisone), HTN, HLD, herniated disc, presented to the ED with c/c of cough and SOB x 3 days admitted to medicine for treatment of Covid pneumonia    #Acute HYpoxic REspiratory Failure - 2/2  #COVID-19 Pneumonia   -Early in the viral replicative phase  - CT Angio Chest : worsening scattered patchy bilateral ground glass opacities, peripherally dominant, compatible with infectious/inflammatory etiology. No pulmonary embolus.  -No evidence of cytokine release although markers slightly elevated  Ferritin 1079  CRP 10.70    ddimers 317  Trend Q48    - s/p Plasma 11/6  - On IV RDV (11/6-10)  - On Dexamethasone 6 mg iv q24h for 10 days 11/6-16   - ID will evaluate him for Enrollment  in the monoclonal antibodies vs interleukin investigational study protocol.     11/7 : Currently on HFNC (currently 50L 80%). Keep above 94%. Downtitrate as tolerated. f/u ID on Tocilizumab?       #RA  - not in acute flare  - Will hold prednisone for now since patient on dexamethasone     #HTN  - Continue Lisinopril 40 mg PO QD    #DM type II  - On metformin at home. Will hold for now  - Monitor FS. Start insulin if FS > 180. Keep between 140 - 180  - Start Lispro once patient resumes meals (if HFNC trial successful)    #Misc  - DVT Prophylaxis: Lovenox  - Diet: DASH/TLC, Carb consistent   - GI Prophylaxis: Pantoprazole   - Activity: AAT  - Code Status: Full Code    Dispo: Still acute.
Seen and examined with the pulmonary fellow at the bed side.  Impression and plan as outlined above.
Seen and examined with the pulmonary fellow at the bed side.  Impression and plan as outlined above.

## 2020-11-18 NOTE — PROGRESS NOTE ADULT - SUBJECTIVE AND OBJECTIVE BOX
Patient was seen and examined. Spoke with Dr Stewart last night. Chart reviewed.  No events overnight. In much better spirits today  Vital Signs Last 24 Hrs  T(F): 98.4 (18 Nov 2020 08:01), Max: 98.4 (18 Nov 2020 00:50)  HR: 94 (18 Nov 2020 08:01) (81 - 94)  BP: 119/80 (18 Nov 2020 08:01) (108/66 - 142/98)  SpO2: 94% (18 Nov 2020 08:10) (91% - 97%)  MEDICATIONS  (STANDING):  dexAMETHasone  Injectable 6 milliGRAM(s) IV Push daily  dextrose 5%. 1000 milliLiter(s) (50 mL/Hr) IV Continuous <Continuous>  dextrose 50% Injectable 12.5 Gram(s) IV Push once  dextrose 50% Injectable 25 Gram(s) IV Push once  dextrose 50% Injectable 25 Gram(s) IV Push once  enoxaparin Injectable 80 milliGRAM(s) SubCutaneous two times a day  furosemide    Tablet 40 milliGRAM(s) Oral daily  guaiFENesin ER 1200 milliGRAM(s) Oral every 12 hours  influenza   Vaccine 0.5 milliLiter(s) IntraMuscular once  insulin glargine Injectable (LANTUS) 16 Unit(s) SubCutaneous at bedtime  insulin lispro (ADMELOG) corrective regimen sliding scale   SubCutaneous three times a day before meals  insulin lispro Injectable (ADMELOG) 5 Unit(s) SubCutaneous three times a day before meals  pantoprazole    Tablet 40 milliGRAM(s) Oral before breakfast  senna 2 Tablet(s) Oral at bedtime    MEDICATIONS  (PRN):  dextrose 40% Gel 15 Gram(s) Oral once PRN Blood Glucose LESS THAN 70 milliGRAM(s)/deciliter  glucagon  Injectable 1 milliGRAM(s) IntraMuscular once PRN Glucose LESS THAN 70 milligrams/deciliter  guaifenesin/dextromethorphan  Syrup 10 milliLiter(s) Oral every 6 hours PRN Cough    Labs:                        15.1   15.10 )-----------( 319      ( 17 Nov 2020 05:28 )             45.6     17 Nov 2020 05:28    137    |  101    |  34     ----------------------------<  118    3.4     |  26     |  1.0      Ca    9.4        17 Nov 2020 05:28    TPro  5.9    /  Alb  3.4    /  TBili  0.3    /  DBili  x      /  AST  18     /  ALT  73     /  AlkPhos  96     17 Nov 2020 05:28      exam unchanged  comfortable when on 3-4L VNC    duplex negative for DVT      A/P:  58 YO M with PMHx of GIST (s/p resection), DM type II (on metformin), RA (on prednisone), HTN, HLD, herniated disc, admitted with Covid pneumonia s/p  remdesivir and plasma; slow recovery     pulm/ICU f/u- appreciated    PO decadron taper when ok with pulm    glycemic control while on steroids- needs additional insulin    O2 as needed- still needs O2 VNC    lovenox- full dose    monitor inflammatory markers/ procal    OOB to chair, ambulate    transfer to regular room when bed available  DVT prophylaxis  Decubitus prevention- all measures as per RN protocol  Please call or text me with any questions or updates

## 2020-11-18 NOTE — PROGRESS NOTE ADULT - ASSESSMENT
Impression:  Acute hypoxemic resp failure 2/2 covid pna s/p plasma, remdesivir.  HO RA on pred    plan:   Continue with Dexamethasone 6mg IV daily, can switch to home dose of prednisone on discharge  F/U Procal   Wean oxygen as tolerated, goal >92%  Incentive spirometry   DVT ppx   Impression:  Acute hypoxemic resp failure 2/2 covid pna s/p plasma, remdesivir.  HO RA on pred    plan:   Continue with Dexamethasone 6mg IV daily, can switch to home dose of prednisone on discharge  Wean oxygen as tolerated, goal >92%  patient encouraged to use Incentive spirometry hourly  DVT ppx  recall PRN   Impression:  Acute hypoxemic resp failure 2/2 covid pna s/p plasma and remdesivir.  HO RA on pred    plan:   Finish Dexamethasone course and switch to home dose of prednisone on discharge  Wean oxygen as tolerated, goal >92%  patient encouraged to use Incentive spirometry hourly  DVT ppx  recall PRN

## 2020-11-19 LAB
ALBUMIN SERPL ELPH-MCNC: 3.5 G/DL — SIGNIFICANT CHANGE UP (ref 3.5–5.2)
ALP SERPL-CCNC: 116 U/L — HIGH (ref 30–115)
ALT FLD-CCNC: 83 U/L — HIGH (ref 0–41)
ANION GAP SERPL CALC-SCNC: 10 MMOL/L — SIGNIFICANT CHANGE UP (ref 7–14)
AST SERPL-CCNC: 32 U/L — SIGNIFICANT CHANGE UP (ref 0–41)
BILIRUB SERPL-MCNC: 0.3 MG/DL — SIGNIFICANT CHANGE UP (ref 0.2–1.2)
BUN SERPL-MCNC: 37 MG/DL — HIGH (ref 10–20)
CALCIUM SERPL-MCNC: 9.3 MG/DL — SIGNIFICANT CHANGE UP (ref 8.5–10.1)
CHLORIDE SERPL-SCNC: 102 MMOL/L — SIGNIFICANT CHANGE UP (ref 98–110)
CO2 SERPL-SCNC: 28 MMOL/L — SIGNIFICANT CHANGE UP (ref 17–32)
CREAT SERPL-MCNC: 1 MG/DL — SIGNIFICANT CHANGE UP (ref 0.7–1.5)
GLUCOSE BLDC GLUCOMTR-MCNC: 142 MG/DL — HIGH (ref 70–99)
GLUCOSE BLDC GLUCOMTR-MCNC: 159 MG/DL — HIGH (ref 70–99)
GLUCOSE BLDC GLUCOMTR-MCNC: 253 MG/DL — HIGH (ref 70–99)
GLUCOSE BLDC GLUCOMTR-MCNC: 287 MG/DL — HIGH (ref 70–99)
GLUCOSE SERPL-MCNC: 135 MG/DL — HIGH (ref 70–99)
HCT VFR BLD CALC: 45.8 % — SIGNIFICANT CHANGE UP (ref 42–52)
HGB BLD-MCNC: 15.3 G/DL — SIGNIFICANT CHANGE UP (ref 14–18)
MCHC RBC-ENTMCNC: 29.7 PG — SIGNIFICANT CHANGE UP (ref 27–31)
MCHC RBC-ENTMCNC: 33.4 G/DL — SIGNIFICANT CHANGE UP (ref 32–37)
MCV RBC AUTO: 88.9 FL — SIGNIFICANT CHANGE UP (ref 80–94)
NRBC # BLD: 0 /100 WBCS — SIGNIFICANT CHANGE UP (ref 0–0)
PLATELET # BLD AUTO: 317 K/UL — SIGNIFICANT CHANGE UP (ref 130–400)
POTASSIUM SERPL-MCNC: 3.6 MMOL/L — SIGNIFICANT CHANGE UP (ref 3.5–5)
POTASSIUM SERPL-SCNC: 3.6 MMOL/L — SIGNIFICANT CHANGE UP (ref 3.5–5)
PROT SERPL-MCNC: 6 G/DL — SIGNIFICANT CHANGE UP (ref 6–8)
RBC # BLD: 5.15 M/UL — SIGNIFICANT CHANGE UP (ref 4.7–6.1)
RBC # FLD: 15.9 % — HIGH (ref 11.5–14.5)
SODIUM SERPL-SCNC: 140 MMOL/L — SIGNIFICANT CHANGE UP (ref 135–146)
WBC # BLD: 17.55 K/UL — HIGH (ref 4.8–10.8)
WBC # FLD AUTO: 17.55 K/UL — HIGH (ref 4.8–10.8)

## 2020-11-19 RX ORDER — INSULIN GLARGINE 100 [IU]/ML
20 INJECTION, SOLUTION SUBCUTANEOUS AT BEDTIME
Refills: 0 | Status: DISCONTINUED | OUTPATIENT
Start: 2020-11-19 | End: 2020-11-21

## 2020-11-19 RX ORDER — INSULIN LISPRO 100/ML
7 VIAL (ML) SUBCUTANEOUS
Refills: 0 | Status: DISCONTINUED | OUTPATIENT
Start: 2020-11-19 | End: 2020-11-21

## 2020-11-19 RX ADMIN — INSULIN GLARGINE 20 UNIT(S): 100 INJECTION, SOLUTION SUBCUTANEOUS at 22:46

## 2020-11-19 RX ADMIN — Medication 3: at 11:43

## 2020-11-19 RX ADMIN — ENOXAPARIN SODIUM 40 MILLIGRAM(S): 100 INJECTION SUBCUTANEOUS at 05:40

## 2020-11-19 RX ADMIN — Medication 7 UNIT(S): at 11:43

## 2020-11-19 RX ADMIN — Medication 1200 MILLIGRAM(S): at 05:40

## 2020-11-19 RX ADMIN — Medication 1: at 16:48

## 2020-11-19 RX ADMIN — Medication 40 MILLIGRAM(S): at 05:39

## 2020-11-19 RX ADMIN — Medication 40 MILLIGRAM(S): at 05:41

## 2020-11-19 RX ADMIN — SENNA PLUS 2 TABLET(S): 8.6 TABLET ORAL at 22:46

## 2020-11-19 RX ADMIN — Medication 1200 MILLIGRAM(S): at 17:11

## 2020-11-19 RX ADMIN — Medication 3: at 08:06

## 2020-11-19 RX ADMIN — Medication 7 UNIT(S): at 16:49

## 2020-11-19 NOTE — PROGRESS NOTE ADULT - SUBJECTIVE AND OBJECTIVE BOX
24H events:    Patient is a 59y old Male who presents with a chief complaint of Suspected COVID-19 Pneumonia (18 Nov 2020 11:55)    Primary diagnosis of COVID-19       Today is hospital day 14d. This morning patient was seen and examined at bedside, resting comfortably in bed.      PAST MEDICAL & SURGICAL HISTORY  Hyperlipidemia    Lumbago    Hypertension    H/O lymph node biopsy  Neck by Dr Case      SOCIAL HISTORY:  Social History:      ALLERGIES:  penicillin (Unknown)  shellfish (Pruritus)    MEDICATIONS:  STANDING MEDICATIONS  dextrose 5%. 1000 milliLiter(s) IV Continuous <Continuous>  dextrose 50% Injectable 12.5 Gram(s) IV Push once  dextrose 50% Injectable 25 Gram(s) IV Push once  dextrose 50% Injectable 25 Gram(s) IV Push once  enoxaparin Injectable 40 milliGRAM(s) SubCutaneous two times a day  furosemide    Tablet 40 milliGRAM(s) Oral daily  guaiFENesin ER 1200 milliGRAM(s) Oral every 12 hours  influenza   Vaccine 0.5 milliLiter(s) IntraMuscular once  insulin glargine Injectable (LANTUS) 16 Unit(s) SubCutaneous at bedtime  insulin lispro (ADMELOG) corrective regimen sliding scale   SubCutaneous three times a day before meals  insulin lispro Injectable (ADMELOG) 5 Unit(s) SubCutaneous three times a day before meals  pantoprazole    Tablet 40 milliGRAM(s) Oral before breakfast  predniSONE   Tablet 40 milliGRAM(s) Oral daily  senna 2 Tablet(s) Oral at bedtime    PRN MEDICATIONS  dextrose 40% Gel 15 Gram(s) Oral once PRN  glucagon  Injectable 1 milliGRAM(s) IntraMuscular once PRN  guaifenesin/dextromethorphan  Syrup 10 milliLiter(s) Oral every 6 hours PRN        LABS:                        15.3   17.55 )-----------( 317      ( 19 Nov 2020 05:36 )             45.8     11-19    140  |  102  |  37<H>  ----------------------------<  135<H>  3.6   |  28  |  1.0    Ca    9.3      19 Nov 2020 05:36    TPro  6.0  /  Alb  3.5  /  TBili  0.3  /  DBili  x   /  AST  32  /  ALT  83<H>  /  AlkPhos  116<H>  11-19        RADIOLOGY:  < from: [facility] Duplex Lower Ext Vein Scan, Bilat (11.17.20 @ 19:28) >  Impression:  No evidence of deep venous thrombosis or superficial thrombophlebitis in the bilateral lower extremities.  ICD-10:M79.89  < end of copied text >      < from: Xray Chest 1 View-PORTABLE IMMEDIATE (Xray Chest 1 View-PORTABLE IMMEDIATE .) (11.17.20 @ 15:57) >  IMPRESSION:  Increased bilateral peripheral opacities.  < end of copied text >      VITALS:   T(F): 98.6  HR: 79  BP: 122/78  RR: 19  SpO2: 95%    PHYSICAL EXAM:  GENERAL: NAD, 95% on  3 L O2  HEAD:  Atraumatic, Normocephalic  EYES: EOMI  NECK: Supple  NERVOUS SYSTEM:  Alert & Oriented X3, non focal   CHEST/LUNG: on NC   ; bibasilar crackles   HEART: Regular rate and rhythm; No murmurs, rubs, or gallops  ABDOMEN: Soft, Nontender, Nondistended; Bowel sounds present  EXTREMITIES:  2+ Peripheral Pulses, No clubbing, cyanosis, or edema        Assessment and Plan:   · Assessment	  60 YO M with PMHx of GIST (s/p resection), DM type II (on metformin), RA (on prednisone), HTN, HLD, herniated disc, presented to the ED with c/c of cough and SOB x 3 days admitted to medicine for treatment of Covid pneumonia    # Acute Hypoxic Respiratory Failure 2/2  # COVID-19 Pneumonia   - Pt still requiring 3 L O2 Nc , desating on ambulation   - Duplex us LE negative for DVT,  - , D-dimer 163  - Pulm recs noted  - Procal 0.03  - CT Angio on admission negative for PE  - Chest X-ray 11/16 showing Diffuse interstitial opacifications consistent with viral pneumonia.  - s/p 2 units Plasma 11/6 s/p 5 day course Remdesivir completed 11/09  - C/W  prednisone 40 daily , will taper to his home dose of steroids   - ID recommendations noted, no need to continue following inflammatory markers   -c/w Lovenox  40 mg BID   - PO 40mg Lasix for diuresis           #Rising D-dimer in the setting of Covid pneumonia --resolved  - D-dimer on admission 287-> 484-->444-->405 11/11  - covid pneumonia--> heightened risk of blood clot  - c/w therapeutic Lovenox (80 BID)  - Lower extremity duplex negative 11/11  - repeat Duplex and D-dimer wnl      #RA  - not in acute flair  - c/w prednisone   -c/w Allopurinol 300 daily    #HTN  - Home medication Lisinopril 40 mg PO QD  - c/w lasix po daily  - continue holding Lisinopril  - continue to monitor BP/ Cr    #DM type II  - c/w 20-7/7/7 in setting of steroid use    #Misc  - DVT Prophylaxis: Lovenox   - Diet: DASH/TLC, Carb consistent   - GI Prophylaxis: Pantoprazole   - Activity: AAT  - Code Status: Full Code       24H events:    Patient is a 59y old Male who presents with a chief complaint of Suspected COVID-19 Pneumonia (18 Nov 2020 11:55)    Primary diagnosis of COVID-19       Today is hospital day 14d. This morning patient was seen and examined at bedside, resting comfortably in bed.      PAST MEDICAL & SURGICAL HISTORY  Hyperlipidemia    Lumbago    Hypertension    H/O lymph node biopsy  Neck by Dr Case      SOCIAL HISTORY:  Social History:      ALLERGIES:  penicillin (Unknown)  shellfish (Pruritus)    MEDICATIONS:  STANDING MEDICATIONS  dextrose 5%. 1000 milliLiter(s) IV Continuous <Continuous>  dextrose 50% Injectable 12.5 Gram(s) IV Push once  dextrose 50% Injectable 25 Gram(s) IV Push once  dextrose 50% Injectable 25 Gram(s) IV Push once  enoxaparin Injectable 40 milliGRAM(s) SubCutaneous two times a day  furosemide    Tablet 40 milliGRAM(s) Oral daily  guaiFENesin ER 1200 milliGRAM(s) Oral every 12 hours  influenza   Vaccine 0.5 milliLiter(s) IntraMuscular once  insulin glargine Injectable (LANTUS) 16 Unit(s) SubCutaneous at bedtime  insulin lispro (ADMELOG) corrective regimen sliding scale   SubCutaneous three times a day before meals  insulin lispro Injectable (ADMELOG) 5 Unit(s) SubCutaneous three times a day before meals  pantoprazole    Tablet 40 milliGRAM(s) Oral before breakfast  predniSONE   Tablet 40 milliGRAM(s) Oral daily  senna 2 Tablet(s) Oral at bedtime    PRN MEDICATIONS  dextrose 40% Gel 15 Gram(s) Oral once PRN  glucagon  Injectable 1 milliGRAM(s) IntraMuscular once PRN  guaifenesin/dextromethorphan  Syrup 10 milliLiter(s) Oral every 6 hours PRN        LABS:                        15.3   17.55 )-----------( 317      ( 19 Nov 2020 05:36 )             45.8     11-19    140  |  102  |  37<H>  ----------------------------<  135<H>  3.6   |  28  |  1.0    Ca    9.3      19 Nov 2020 05:36    TPro  6.0  /  Alb  3.5  /  TBili  0.3  /  DBili  x   /  AST  32  /  ALT  83<H>  /  AlkPhos  116<H>  11-19        RADIOLOGY:  < from: [facility] Duplex Lower Ext Vein Scan, Bilat (11.17.20 @ 19:28) >  Impression:  No evidence of deep venous thrombosis or superficial thrombophlebitis in the bilateral lower extremities.  ICD-10:M79.89  < end of copied text >      < from: Xray Chest 1 View-PORTABLE IMMEDIATE (Xray Chest 1 View-PORTABLE IMMEDIATE .) (11.17.20 @ 15:57) >  IMPRESSION:  Increased bilateral peripheral opacities.  < end of copied text >      VITALS:   T(F): 98.6  HR: 79  BP: 122/78  RR: 19  SpO2: 95%    PHYSICAL EXAM:  GENERAL: NAD, 95% on  3 L O2  HEAD:  Atraumatic, Normocephalic  EYES: EOMI  NECK: Supple  NERVOUS SYSTEM:  Alert & Oriented X3, non focal   CHEST/LUNG: on NC   ; bibasilar crackles   HEART: Regular rate and rhythm; No murmurs, rubs, or gallops  ABDOMEN: Soft, Nontender, Nondistended; Bowel sounds present  EXTREMITIES:  2+ Peripheral Pulses, No clubbing, cyanosis, or edema        Assessment and Plan:   · Assessment	  58 YO M with PMHx of GIST (s/p resection), DM type II (on metformin), RA (on prednisone), HTN, HLD, herniated disc, presented to the ED with c/c of cough and SOB x 3 days admitted to medicine for treatment of Covid pneumonia    # Acute Hypoxic Respiratory Failure 2/2  # COVID-19 Pneumonia   - Pt still requiring 3 L O2 Nc , desating on ambulation   - Duplex us LE negative for DVT,  - , D-dimer 163  - Pulm recs noted  - Procal 0.03  - CT Angio on admission negative for PE  - Chest X-ray 11/16 showing Diffuse interstitial opacifications consistent with viral pneumonia.  - s/p 2 units Plasma 11/6 s/p 5 day course Remdesivir completed 11/09  - C/W  prednisone 40 daily , will taper to his home dose of steroids   - ID recommendations noted, no need to continue following inflammatory markers   -c/w Lovenox  40 mg BID   - PO 40mg Lasix for diuresis           #Rising D-dimer in the setting of Covid pneumonia --resolved  - D-dimer on admission 287-> 484-->444-->405 11/11  - covid pneumonia--> heightened risk of blood clot  - c/w therapeutic Lovenox (80 BID)  - Lower extremity duplex negative 11/11  - repeat Duplex and D-dimer wnl      #RA  - not in acute flair  - c/w prednisone   -c/w Allopurinol 300 daily    #HTN  - Home medication Lisinopril 40 mg PO QD  - c/w lasix po daily  - continue holding Lisinopril  - continue to monitor BP/ Cr    #DM type II  - c/w 20-7/7/7 in setting of steroid use      Despite steroids & nebulisers treatement this pt is still hypoxic due to COVID pneumonia  pt pulse ox on RA at rest is  86%     Patient is aware she will be going home on oxygen. Patient was tested in chronic stable state.     Pt is good with pulse dose for a portable concentrator       #Misc  - DVT Prophylaxis: Lovenox   - Diet: DASH/TLC, Carb consistent   - GI Prophylaxis: Pantoprazole   - Activity: AAT  - Code Status: Full Code       24H events:    Patient is a 59y old Male who presents with a chief complaint of Suspected COVID-19 Pneumonia (18 Nov 2020 11:55)    Primary diagnosis of COVID-19       Today is hospital day 14d. This morning patient was seen and examined at bedside, resting comfortably in bed.      PAST MEDICAL & SURGICAL HISTORY  Hyperlipidemia    Lumbago    Hypertension    H/O lymph node biopsy  Neck by Dr Case      SOCIAL HISTORY:  Social History:      ALLERGIES:  penicillin (Unknown)  shellfish (Pruritus)    MEDICATIONS:  STANDING MEDICATIONS  dextrose 5%. 1000 milliLiter(s) IV Continuous <Continuous>  dextrose 50% Injectable 12.5 Gram(s) IV Push once  dextrose 50% Injectable 25 Gram(s) IV Push once  dextrose 50% Injectable 25 Gram(s) IV Push once  enoxaparin Injectable 40 milliGRAM(s) SubCutaneous two times a day  furosemide    Tablet 40 milliGRAM(s) Oral daily  guaiFENesin ER 1200 milliGRAM(s) Oral every 12 hours  influenza   Vaccine 0.5 milliLiter(s) IntraMuscular once  insulin glargine Injectable (LANTUS) 16 Unit(s) SubCutaneous at bedtime  insulin lispro (ADMELOG) corrective regimen sliding scale   SubCutaneous three times a day before meals  insulin lispro Injectable (ADMELOG) 5 Unit(s) SubCutaneous three times a day before meals  pantoprazole    Tablet 40 milliGRAM(s) Oral before breakfast  predniSONE   Tablet 40 milliGRAM(s) Oral daily  senna 2 Tablet(s) Oral at bedtime    PRN MEDICATIONS  dextrose 40% Gel 15 Gram(s) Oral once PRN  glucagon  Injectable 1 milliGRAM(s) IntraMuscular once PRN  guaifenesin/dextromethorphan  Syrup 10 milliLiter(s) Oral every 6 hours PRN        LABS:                        15.3   17.55 )-----------( 317      ( 19 Nov 2020 05:36 )             45.8     11-19    140  |  102  |  37<H>  ----------------------------<  135<H>  3.6   |  28  |  1.0    Ca    9.3      19 Nov 2020 05:36    TPro  6.0  /  Alb  3.5  /  TBili  0.3  /  DBili  x   /  AST  32  /  ALT  83<H>  /  AlkPhos  116<H>  11-19        RADIOLOGY:  < from: [facility] Duplex Lower Ext Vein Scan, Bilat (11.17.20 @ 19:28) >  Impression:  No evidence of deep venous thrombosis or superficial thrombophlebitis in the bilateral lower extremities.  ICD-10:M79.89  < end of copied text >      < from: Xray Chest 1 View-PORTABLE IMMEDIATE (Xray Chest 1 View-PORTABLE IMMEDIATE .) (11.17.20 @ 15:57) >  IMPRESSION:  Increased bilateral peripheral opacities.  < end of copied text >      VITALS:   T(F): 98.6  HR: 79  BP: 122/78  RR: 19  SpO2: 95%    PHYSICAL EXAM:  GENERAL: NAD, 95% on  3 L O2  HEAD:  Atraumatic, Normocephalic  EYES: EOMI  NECK: Supple  NERVOUS SYSTEM:  Alert & Oriented X3, non focal   CHEST/LUNG: on NC   ; bibasilar crackles   HEART: Regular rate and rhythm; No murmurs, rubs, or gallops  ABDOMEN: Soft, Nontender, Nondistended; Bowel sounds present  EXTREMITIES:  2+ Peripheral Pulses, No clubbing, cyanosis, or edema        Assessment and Plan:   · Assessment	  58 YO M with PMHx of GIST (s/p resection), DM type II (on metformin), RA (on prednisone), HTN, HLD, herniated disc, presented to the ED with c/c of cough and SOB x 3 days admitted to medicine for treatment of Covid pneumonia    # Acute Hypoxic Respiratory Failure 2/2  # COVID-19 Pneumonia   - Pt still requiring 3 L O2 Nc , desating on ambulation   - Duplex us LE negative for DVT,  - , D-dimer 163  - Pulm recs noted  - Procal 0.03  - CT Angio on admission negative for PE  - Chest X-ray 11/16 showing Diffuse interstitial opacifications consistent with viral pneumonia.  - s/p 2 units Plasma 11/6 s/p 5 day course Remdesivir completed 11/09  - C/W  prednisone 40 daily , will taper to his home dose of steroids   - ID recommendations noted, no need to continue following inflammatory markers   -c/w Lovenox  40 mg BID   - PO 40mg Lasix for diuresis           #Rising D-dimer in the setting of Covid pneumonia --resolved  - D-dimer on admission 287-> 484-->444-->405 11/11  - covid pneumonia--> heightened risk of blood clot  - c/w Lovenox  - Lower extremity duplex negative 11/11  - repeat Duplex and D-dimer wnl      #RA  - not in acute flair  - c/w prednisone   -c/w Allopurinol 300 daily    #HTN  - Home medication Lisinopril 40 mg PO QD  - c/w lasix po daily  - continue holding Lisinopril  - continue to monitor BP/ Cr    #DM type II  - c/w 20-7/7/7 in setting of steroid use      Despite steroids & nebulisers treatement this pt is still hypoxic due to COVID pneumonia  pt pulse ox on RA at rest is  86%     Patient is aware she will be going home on oxygen. Patient was tested in chronic stable state.     Pt is good with pulse dose for a portable concentrator       #Misc  - DVT Prophylaxis: Lovenox   - Diet: DASH/TLC, Carb consistent   - GI Prophylaxis: Pantoprazole   - Activity: AAT  - Code Status: Full Code

## 2020-11-19 NOTE — PROGRESS NOTE ADULT - SUBJECTIVE AND OBJECTIVE BOX
Patient was seen and examined. Spoke with RN. Chart reviewed.  No new events overnight- still with hypoxia and dyspnea  Vital Signs Last 24 Hrs  T(F): 98.6 (18 Nov 2020 23:57), Max: 98.6 (18 Nov 2020 23:57)  HR: 79 (19 Nov 2020 03:15) (79 - 96)  BP: 122/78 (18 Nov 2020 23:57) (122/78 - 135/93)  SpO2: 95% (19 Nov 2020 03:15) (95% - 98%)  MEDICATIONS  (STANDING):  dextrose 5%. 1000 milliLiter(s) (50 mL/Hr) IV Continuous <Continuous>  dextrose 50% Injectable 12.5 Gram(s) IV Push once  dextrose 50% Injectable 25 Gram(s) IV Push once  dextrose 50% Injectable 25 Gram(s) IV Push once  enoxaparin Injectable 40 milliGRAM(s) SubCutaneous two times a day  furosemide    Tablet 40 milliGRAM(s) Oral daily  guaiFENesin ER 1200 milliGRAM(s) Oral every 12 hours  influenza   Vaccine 0.5 milliLiter(s) IntraMuscular once  insulin glargine Injectable (LANTUS) 20 Unit(s) SubCutaneous at bedtime  insulin lispro (ADMELOG) corrective regimen sliding scale   SubCutaneous three times a day before meals  insulin lispro Injectable (ADMELOG) 7 Unit(s) SubCutaneous three times a day before meals  pantoprazole    Tablet 40 milliGRAM(s) Oral before breakfast  predniSONE   Tablet 40 milliGRAM(s) Oral daily  senna 2 Tablet(s) Oral at bedtime    MEDICATIONS  (PRN):  dextrose 40% Gel 15 Gram(s) Oral once PRN Blood Glucose LESS THAN 70 milliGRAM(s)/deciliter  glucagon  Injectable 1 milliGRAM(s) IntraMuscular once PRN Glucose LESS THAN 70 milligrams/deciliter  guaifenesin/dextromethorphan  Syrup 10 milliLiter(s) Oral every 6 hours PRN Cough    Labs:                        15.3   17.55 )-----------( 317      ( 19 Nov 2020 05:36 )             45.8                         15.7   15.99 )-----------( 284      ( 18 Nov 2020 08:16 )             48.2     19 Nov 2020 05:36    140    |  102    |  37     ----------------------------<  135    3.6     |  28     |  1.0    18 Nov 2020 08:16    135    |  98     |  36     ----------------------------<  390    3.8     |  21     |  1.0      Ca    9.3        19 Nov 2020 05:36  Ca    8.9        18 Nov 2020 08:16    TPro  6.0    /  Alb  3.5    /  TBili  0.3    /  DBili  x      /  AST  32     /  ALT  83     /  AlkPhos  116    19 Nov 2020 05:36  TPro  6.0    /  Alb  3.6    /  TBili  0.3    /  DBili  x      /  AST  25     /  ALT  80     /  AlkPhos  128    18 Nov 2020 08:16    exam unchanged  in good spirits today      A/P:  58 YO M with PMHx of GIST (s/p resection), DM type II (on metformin), RA (on prednisone), HTN, HLD, herniated disc, admitted with Covid pneumonia s/p  remdesivir and plasma; slow recovery     pulm f/u today to decide if can be discharged tomorrow on home O2; if yes- please arrange and plan for DC    PO prednisone slow taper    glycemic control while on steroids- insulin    O2 as needed- still needs O2 VNC    lovenox    OOB to chair, ambulate    flu vaccine prior to DC    DC planning if cleared by pulm    DVT prophylaxis  Decubitus prevention- all measures as per RN protocol  Please call or text me with any questions or updates

## 2020-11-20 ENCOUNTER — TRANSCRIPTION ENCOUNTER (OUTPATIENT)
Age: 60
End: 2020-11-20

## 2020-11-20 ENCOUNTER — APPOINTMENT (OUTPATIENT)
Dept: HEMATOLOGY ONCOLOGY | Facility: CLINIC | Age: 60
End: 2020-11-20

## 2020-11-20 ENCOUNTER — APPOINTMENT (OUTPATIENT)
Dept: INFUSION THERAPY | Facility: CLINIC | Age: 60
End: 2020-11-20

## 2020-11-20 VITALS
DIASTOLIC BLOOD PRESSURE: 89 MMHG | OXYGEN SATURATION: 93 % | HEART RATE: 89 BPM | TEMPERATURE: 98 F | RESPIRATION RATE: 19 BRPM | SYSTOLIC BLOOD PRESSURE: 124 MMHG

## 2020-11-20 LAB
ALBUMIN SERPL ELPH-MCNC: 3.4 G/DL — LOW (ref 3.5–5.2)
ALP SERPL-CCNC: 102 U/L — SIGNIFICANT CHANGE UP (ref 30–115)
ALT FLD-CCNC: 83 U/L — HIGH (ref 0–41)
ANION GAP SERPL CALC-SCNC: 11 MMOL/L — SIGNIFICANT CHANGE UP (ref 7–14)
AST SERPL-CCNC: 32 U/L — SIGNIFICANT CHANGE UP (ref 0–41)
BILIRUB SERPL-MCNC: 0.4 MG/DL — SIGNIFICANT CHANGE UP (ref 0.2–1.2)
BUN SERPL-MCNC: 39 MG/DL — HIGH (ref 10–20)
CALCIUM SERPL-MCNC: 8.8 MG/DL — SIGNIFICANT CHANGE UP (ref 8.5–10.1)
CHLORIDE SERPL-SCNC: 98 MMOL/L — SIGNIFICANT CHANGE UP (ref 98–110)
CO2 SERPL-SCNC: 27 MMOL/L — SIGNIFICANT CHANGE UP (ref 17–32)
CREAT SERPL-MCNC: 1.2 MG/DL — SIGNIFICANT CHANGE UP (ref 0.7–1.5)
GLUCOSE BLDC GLUCOMTR-MCNC: 120 MG/DL — HIGH (ref 70–99)
GLUCOSE BLDC GLUCOMTR-MCNC: 228 MG/DL — HIGH (ref 70–99)
GLUCOSE BLDC GLUCOMTR-MCNC: 257 MG/DL — HIGH (ref 70–99)
GLUCOSE BLDC GLUCOMTR-MCNC: 91 MG/DL — SIGNIFICANT CHANGE UP (ref 70–99)
GLUCOSE SERPL-MCNC: 136 MG/DL — HIGH (ref 70–99)
HCT VFR BLD CALC: 46.6 % — SIGNIFICANT CHANGE UP (ref 42–52)
HGB BLD-MCNC: 15.3 G/DL — SIGNIFICANT CHANGE UP (ref 14–18)
MCHC RBC-ENTMCNC: 29.5 PG — SIGNIFICANT CHANGE UP (ref 27–31)
MCHC RBC-ENTMCNC: 32.8 G/DL — SIGNIFICANT CHANGE UP (ref 32–37)
MCV RBC AUTO: 90 FL — SIGNIFICANT CHANGE UP (ref 80–94)
NRBC # BLD: 0 /100 WBCS — SIGNIFICANT CHANGE UP (ref 0–0)
PLATELET # BLD AUTO: 288 K/UL — SIGNIFICANT CHANGE UP (ref 130–400)
POTASSIUM SERPL-MCNC: 3.7 MMOL/L — SIGNIFICANT CHANGE UP (ref 3.5–5)
POTASSIUM SERPL-SCNC: 3.7 MMOL/L — SIGNIFICANT CHANGE UP (ref 3.5–5)
PROT SERPL-MCNC: 5.7 G/DL — LOW (ref 6–8)
RBC # BLD: 5.18 M/UL — SIGNIFICANT CHANGE UP (ref 4.7–6.1)
RBC # FLD: 16.2 % — HIGH (ref 11.5–14.5)
SODIUM SERPL-SCNC: 136 MMOL/L — SIGNIFICANT CHANGE UP (ref 135–146)
WBC # BLD: 13 K/UL — HIGH (ref 4.8–10.8)
WBC # FLD AUTO: 13 K/UL — HIGH (ref 4.8–10.8)

## 2020-11-20 RX ORDER — APIXABAN 2.5 MG/1
1 TABLET, FILM COATED ORAL
Qty: 30 | Refills: 0
Start: 2020-11-20 | End: 2020-12-04

## 2020-11-20 RX ADMIN — ENOXAPARIN SODIUM 40 MILLIGRAM(S): 100 INJECTION SUBCUTANEOUS at 17:28

## 2020-11-20 RX ADMIN — Medication 40 MILLIGRAM(S): at 05:04

## 2020-11-20 RX ADMIN — Medication 7 UNIT(S): at 09:00

## 2020-11-20 RX ADMIN — Medication 2: at 09:01

## 2020-11-20 RX ADMIN — Medication 1200 MILLIGRAM(S): at 17:30

## 2020-11-20 RX ADMIN — Medication 1200 MILLIGRAM(S): at 05:04

## 2020-11-20 RX ADMIN — ENOXAPARIN SODIUM 40 MILLIGRAM(S): 100 INJECTION SUBCUTANEOUS at 05:05

## 2020-11-20 RX ADMIN — Medication 3: at 12:19

## 2020-11-20 RX ADMIN — PANTOPRAZOLE SODIUM 40 MILLIGRAM(S): 20 TABLET, DELAYED RELEASE ORAL at 05:04

## 2020-11-20 RX ADMIN — Medication 7 UNIT(S): at 12:18

## 2020-11-20 RX ADMIN — INFLUENZA VIRUS VACCINE 0.5 MILLILITER(S): 15; 15; 15; 15 SUSPENSION INTRAMUSCULAR at 20:35

## 2020-11-20 NOTE — PROGRESS NOTE ADULT - SUBJECTIVE AND OBJECTIVE BOX
24H events:    Patient is a 59y old Male who presents with a chief complaint of Suspected COVID-19 Pneumonia (20 Nov 2020 08:11)    Primary diagnosis of COVID-19       Today is hospital day 15d. This morning patient was seen and examined at bedside, resting comfortably in bed.      PAST MEDICAL & SURGICAL HISTORY  Hyperlipidemia    Lumbago    Hypertension    H/O lymph node biopsy  Neck by Dr Case      SOCIAL HISTORY:  Social History:      ALLERGIES:  penicillin (Unknown)  shellfish (Pruritus)    MEDICATIONS:  STANDING MEDICATIONS  dextrose 5%. 1000 milliLiter(s) IV Continuous <Continuous>  dextrose 50% Injectable 12.5 Gram(s) IV Push once  dextrose 50% Injectable 25 Gram(s) IV Push once  dextrose 50% Injectable 25 Gram(s) IV Push once  enoxaparin Injectable 40 milliGRAM(s) SubCutaneous two times a day  furosemide    Tablet 40 milliGRAM(s) Oral daily  guaiFENesin ER 1200 milliGRAM(s) Oral every 12 hours  influenza   Vaccine 0.5 milliLiter(s) IntraMuscular once  insulin glargine Injectable (LANTUS) 20 Unit(s) SubCutaneous at bedtime  insulin lispro (ADMELOG) corrective regimen sliding scale   SubCutaneous three times a day before meals  insulin lispro Injectable (ADMELOG) 7 Unit(s) SubCutaneous three times a day before meals  pantoprazole    Tablet 40 milliGRAM(s) Oral before breakfast  predniSONE   Tablet 40 milliGRAM(s) Oral daily  senna 2 Tablet(s) Oral at bedtime    PRN MEDICATIONS  dextrose 40% Gel 15 Gram(s) Oral once PRN  glucagon  Injectable 1 milliGRAM(s) IntraMuscular once PRN  guaifenesin/dextromethorphan  Syrup 10 milliLiter(s) Oral every 6 hours PRN        LABS:                        15.3   13.00 )-----------( 288      ( 20 Nov 2020 06:05 )             46.6     11-20    136  |  98  |  39<H>  ----------------------------<  136<H>  3.7   |  27  |  1.2    Ca    8.8      20 Nov 2020 06:05    TPro  5.7<L>  /  Alb  3.4<L>  /  TBili  0.4  /  DBili  x   /  AST  32  /  ALT  83<H>  /  AlkPhos  102  11-20        RADIOLOGY:  < from: [facility] Duplex Lower Ext Vein Scan, Bilat (11.17.20 @ 19:28) >  Impression:    No evidence of deep venousthrombosis or superficial thrombophlebitis in the bilateral lower extremities.    ICD-10:M79.89    < end of copied text >      < from: Xray Chest 1 View-PORTABLE IMMEDIATE (Xray Chest 1 View-PORTABLE IMMEDIATE .) (11.17.20 @ 15:57) >  IMPRESSION:  Increased bilateral peripheral opacities.  < end of copied text >    VITALS:   T(F): 97.6  HR: 95  BP: 125/86  RR: 17  SpO2: 95%    PHYSICAL EXAM:  GENERAL: NAD, 95% on  3 L O2  HEAD:  Atraumatic, Normocephalic  EYES: EOMI  NECK: Supple  NERVOUS SYSTEM:  Alert & Oriented X3, non focal   CHEST/LUNG: on NC   ; bibasilar crackles   HEART: Regular rate and rhythm; No murmurs, rubs, or gallops  ABDOMEN: Soft, Nontender, Nondistended; Bowel sounds present  EXTREMITIES:  2+ Peripheral Pulses, No clubbing, cyanosis, or edema        Assessment and Plan:   · Assessment	  58 YO M with PMHx of GIST (s/p resection), DM type II (on metformin), RA (on prednisone), HTN, HLD, herniated disc, presented to the ED with c/c of cough and SOB x 3 days admitted to medicine for treatment of Covid pneumonia    # Acute Hypoxic Respiratory Failure 2/2  # COVID-19 Pneumonia   - Pt still requiring 3 L O2 Nc , desating on ambulation   - Duplex US LE negative for DVT  - , D-dimer 163( 11/18)  - Pulm recs noted  - Procal 0.03  - CT Angio on admission negative for PE  - Chest X-ray 11/16 showing Diffuse interstitial opacifications consistent with viral pneumonia.  - s/p 2 units Plasma 11/6 s/p 5 day course Remdesivir completed 11/09  - C/W prednisone 40 daily, will taper to his home dose of steroids   - ID recommendations noted, no need to continue following inflammatory markers   - c/w Lovenox  40 mg BID   - PO 40mg Lasix for diuresis         # Rising D-dimer in the setting of Covid pneumonia --resolved  - D-dimer on admission 287-> 484-->444-->405 11/11  - COVID pneumonia --> heightened risk of blood clot  - c/w  Lovenox   - Lower extremity duplex negative 11/1  - repeat Duplex 11/17 and D-dimer wnl      # RA  - not in acute flair  - c/w prednisone   - c/w Allopurinol 300 daily    # HTN  - Home medication Lisinopril 40 mg PO QD  - c/w lasix po daily  - continue holding Lisinopril  - continue to monitor BP/Cr    # DM type II  - c/w 20-7/7/7 in setting of steroid use      Despite steroids & nebulisers treatement this pt is still hypoxic due to COVID pneumonia  pt pulse ox on RA at rest is  86%   Patient is aware she will be going home on oxygen. Patient was tested in chronic stable state.   Pt is good with pulse dose for a portable concentrator       #Misc  - DVT Prophylaxis: Lovenox   - Diet: DASH/TLC, Carb consistent   - GI Prophylaxis: Pantoprazole   - Activity: AAT  - Code Status: Full Code

## 2020-11-20 NOTE — PROGRESS NOTE ADULT - REASON FOR ADMISSION
Suspected COVID-19 Pneumonia

## 2020-11-20 NOTE — DISCHARGE NOTE PROVIDER - NSDCMRMEDTOKEN_GEN_ALL_CORE_FT
gabapentin enacarbil 300 mg oral tablet, extended release: 1 tab(s) orally 3 times a day  lisinopril 40 mg oral tablet: 1 tab(s) orally once a day  metFORMIN 500 mg oral tablet: 1 tab(s) orally 2 times a day   oxyCODONE 5 mg oral tablet: 1 tab(s) orally every 6 hours, As Needed MDD:4 tabs   predniSONE 10 mg oral tablet: 1 tab(s) orally once a day   Eliquis 2.5 mg oral tablet: 1 tab(s) orally 2 times a day   gabapentin enacarbil 300 mg oral tablet, extended release: 1 tab(s) orally 3 times a day  lisinopril 40 mg oral tablet: 1 tab(s) orally once a day  metFORMIN 500 mg oral tablet: 1 tab(s) orally 2 times a day   oxyCODONE 5 mg oral tablet: 1 tab(s) orally every 6 hours, As Needed MDD:4 tabs   predniSONE 10 mg oral tablet: Take 4 tabs daily for 1 day then take 2 tabs daily for 3 days the resume your home dose of prednisone 10 mg  daily

## 2020-11-20 NOTE — DISCHARGE NOTE PROVIDER - HOSPITAL COURSE
58 YO M with PMHx of GIST (s/p resection), DM type II (on metformin), RA (on prednisone), HTN, HLD, herniated disc, presented to the ED with c/c of cough and SOB x 3 days admitted to medicine for treatment of Covid pneumonia    # Acute Hypoxic Respiratory Failure 2/2  # COVID-19 Pneumonia   - 3 L O2 Nc , desating on ambulation   - Duplex US LE negative for DVT  - , D-dimer 163 (11/18)  - Pulm recs noted  - Procal 0.03  - CT Angio on admission negative for PE  - Chest X-ray 11/16 showing Diffuse interstitial opacifications consistent with viral pneumonia.  - s/p 2 units Plasma 11/6 s/p 5 day course Remdesivir completed 11/09  - C/W prednisone 40 daily, will taper to his home dose of steroids   - ID recommendations noted, no need to continue following inflammatory markers   - c/w Lovenox  40 mg BID   - PO 40mg Lasix for diuresis         # Rising D-dimer in the setting of Covid pneumonia --resolved  - D-dimer on admission 287-> 484-->444-->405 11/11  - COVID pneumonia --> heightened risk of blood clot  - c/w  Lovenox   - Lower extremity duplex negative 11/1  - repeat Duplex 11/17 and D-dimer wnl      # RA  - not in acute flair  - c/w prednisone   - c/w Allopurinol 300 daily    # HTN  - Home medication Lisinopril 40 mg PO QD  - c/w lasix po daily  - continue holding Lisinopril  - continue to monitor BP/Cr    # DM type II  - c/w 20-7/7/7 in setting of steroid use      Despite steroids & nebulisers treatement this pt is still hypoxic due to COVID pneumonia  pt pulse ox on RA at rest is  86%   Patient is aware she will be going home on oxygen. Patient was tested in chronic stable state.   Pt is good with pulse dose for a portable concentrator

## 2020-11-20 NOTE — DISCHARGE NOTE PROVIDER - NSDCCPCAREPLAN_GEN_ALL_CORE_FT
PRINCIPAL DISCHARGE DIAGNOSIS  Diagnosis: COVID-19  Assessment and Plan of Treatment: You were treated with Remdesivir, Dexamethasone and convalescent plasma . Your oxygen level dropped and you required High flow nasal cannula to keep your breathing comfortable, then you improved and now requiring regular nasal cannula. Please continue with the prednisone taper then resume your home dose og 10 mg . Also you were prescribed a blood thinner Eliquis to protect you from blood clots.   Follow up with your primary doctor. If you feel any cehst pain or worsening shortness of breath , call 911 or come to the nearest ED .

## 2020-11-20 NOTE — PROGRESS NOTE ADULT - SUBJECTIVE AND OBJECTIVE BOX
Patient was seen and examined. Spoke with RN. Chart reviewed.  No events overnight.  Vital Signs Last 24 Hrs  T(F): 98.2 (20 Nov 2020 01:06), Max: 98.2 (20 Nov 2020 01:06)  HR: 81 (20 Nov 2020 01:06) (81 - 101)  BP: 114/72 (20 Nov 2020 01:06) (114/72 - 132/78)  SpO2: 95% (20 Nov 2020 01:06) (92% - 96%)  MEDICATIONS  (STANDING):  dextrose 5%. 1000 milliLiter(s) (50 mL/Hr) IV Continuous <Continuous>  dextrose 50% Injectable 12.5 Gram(s) IV Push once  dextrose 50% Injectable 25 Gram(s) IV Push once  dextrose 50% Injectable 25 Gram(s) IV Push once  enoxaparin Injectable 40 milliGRAM(s) SubCutaneous two times a day  furosemide    Tablet 40 milliGRAM(s) Oral daily  guaiFENesin ER 1200 milliGRAM(s) Oral every 12 hours  influenza   Vaccine 0.5 milliLiter(s) IntraMuscular once  insulin glargine Injectable (LANTUS) 20 Unit(s) SubCutaneous at bedtime  insulin lispro (ADMELOG) corrective regimen sliding scale   SubCutaneous three times a day before meals  insulin lispro Injectable (ADMELOG) 7 Unit(s) SubCutaneous three times a day before meals  pantoprazole    Tablet 40 milliGRAM(s) Oral before breakfast  predniSONE   Tablet 40 milliGRAM(s) Oral daily  senna 2 Tablet(s) Oral at bedtime    MEDICATIONS  (PRN):  dextrose 40% Gel 15 Gram(s) Oral once PRN Blood Glucose LESS THAN 70 milliGRAM(s)/deciliter  glucagon  Injectable 1 milliGRAM(s) IntraMuscular once PRN Glucose LESS THAN 70 milligrams/deciliter  guaifenesin/dextromethorphan  Syrup 10 milliLiter(s) Oral every 6 hours PRN Cough    Labs:                        15.3   13.00 )-----------( 288      ( 20 Nov 2020 06:05 )             46.6                         15.3   17.55 )-----------( 317      ( 19 Nov 2020 05:36 )             45.8     20 Nov 2020 06:05    136    |  98     |  39     ----------------------------<  136    3.7     |  27     |  1.2    19 Nov 2020 05:36    140    |  102    |  37     ----------------------------<  135    3.6     |  28     |  1.0      Ca    8.8        20 Nov 2020 06:05  Ca    9.3        19 Nov 2020 05:36    TPro  5.7    /  Alb  3.4    /  TBili  0.4    /  DBili  x      /  AST  32     /  ALT  83     /  AlkPhos  102    20 Nov 2020 06:05  TPro  6.0    /  Alb  3.5    /  TBili  0.3    /  DBili  x      /  AST  32     /  ALT  83     /  AlkPhos  116    19 Nov 2020 05:36          General: comfortable, NAD  Neurology: A&Ox3, nonfocal  Head:  Normocephalic, atraumatic  ENT:  Mucosa moist, no ulcerations  Neck:  Supple, no JVD,   Skin: no breakdowns (as per RN)  Resp: CTA B/L  CV: RRR, S1S2,   GI: Soft, NT, bowel sounds  MS: No edema, + peripheral pulses, FROM all 4 extremity      A/P:  60 YO M with PMHx of GIST (s/p resection), DM type II (on metformin), RA (on prednisone), HTN, HLD, herniated disc, admitted with Covid pneumonia s/p  remdesivir and plasma; slow recovery     pulm cleared for DC    PO prednisone slow taper    glycemic control while on steroids- insulin    O2 as needed    lovenox    OOB to chair, ambulate    flu vaccine prior to DC    DC today if home O2 arranged    outpt pulm f/u    DVT prophylaxis  Decubitus prevention- all measures as per RN protocol  Please call or text me with any questions or updates

## 2020-11-20 NOTE — ED ADULT NURSE REASSESSMENT NOTE - NS ED NURSE REASSESS COMMENT FT1
Pt was assessed prior discharge home, alert and oriented x 4, no neurological deficits, no complaints of any pain or discomfort, tolerating activities on oxygen at 4 LPM via nasal cannula with slight dyspnea without accessory muscle use, influenza vaccine was administered prior discharge, patient was monitored for 30 minutes and reported no side effects, VS are WDL, SPO2 - 93% on 3 liters, MD is aware. Pt was discharged with a full tank of oxygen, education how to use it was provided with patients returned demonstration. Oxygen converter machine was delivered to pt's home and was confirmed with patient's family and . Pt was provided with N95 mask, pulse oximeter and all discharge instructions. Pt was picked up by his daughter at ER.

## 2020-11-20 NOTE — DISCHARGE NOTE NURSING/CASE MANAGEMENT/SOCIAL WORK - PATIENT PORTAL LINK FT
You can access the FollowMyHealth Patient Portal offered by [facility] by registering at the following website: [email or web address]. By joining [facility]’s FollowMyHealth portal, you will also be able to view your health information using other applications (apps) compatible with our system.

## 2020-11-20 NOTE — DISCHARGE NOTE PROVIDER - CARE PROVIDER_API CALL
Charles Hassan (MD)  Internal Medicine  [address], [address] [place] [address]  Phone: [phone]  Fax: [phone]  Follow Up Time: 1 week

## 2020-11-20 NOTE — PROGRESS NOTE ADULT - SUBJECTIVE AND OBJECTIVE BOX
24H events:    Patient is a 59y old Male who presents with a chief complaint of Suspected COVID-19 Pneumonia (19 Nov 2020 08:22)    Primary diagnosis of COVID-19       Today is hospital day 15d. This morning patient was seen and examined at bedside, resting comfortably in bed.      PAST MEDICAL & SURGICAL HISTORY  Hyperlipidemia    Lumbago    Hypertension    H/O lymph node biopsy  Neck by Dr Case      SOCIAL HISTORY:  Social History:      ALLERGIES:  penicillin (Unknown)  shellfish (Pruritus)    MEDICATIONS:  STANDING MEDICATIONS  dextrose 5%. 1000 milliLiter(s) IV Continuous <Continuous>  dextrose 50% Injectable 12.5 Gram(s) IV Push once  dextrose 50% Injectable 25 Gram(s) IV Push once  dextrose 50% Injectable 25 Gram(s) IV Push once  enoxaparin Injectable 40 milliGRAM(s) SubCutaneous two times a day  furosemide    Tablet 40 milliGRAM(s) Oral daily  guaiFENesin ER 1200 milliGRAM(s) Oral every 12 hours  influenza   Vaccine 0.5 milliLiter(s) IntraMuscular once  insulin glargine Injectable (LANTUS) 20 Unit(s) SubCutaneous at bedtime  insulin lispro (ADMELOG) corrective regimen sliding scale   SubCutaneous three times a day before meals  insulin lispro Injectable (ADMELOG) 7 Unit(s) SubCutaneous three times a day before meals  pantoprazole    Tablet 40 milliGRAM(s) Oral before breakfast  predniSONE   Tablet 40 milliGRAM(s) Oral daily  senna 2 Tablet(s) Oral at bedtime    PRN MEDICATIONS  dextrose 40% Gel 15 Gram(s) Oral once PRN  glucagon  Injectable 1 milliGRAM(s) IntraMuscular once PRN  guaifenesin/dextromethorphan  Syrup 10 milliLiter(s) Oral every 6 hours PRN        LABS:                        15.3   13.00 )-----------( 288      ( 20 Nov 2020 06:05 )             46.6     11-20    136  |  98  |  39<H>  ----------------------------<  136<H>  3.7   |  27  |  1.2    Ca    8.8      20 Nov 2020 06:05    TPro  5.7<L>  /  Alb  3.4<L>  /  TBili  0.4  /  DBili  x   /  AST  32  /  ALT  83<H>  /  AlkPhos  102  11-20      RADIOLOGY:  < from: [facility] Duplex Lower Ext Vein Scan, Bilat (11.17.20 @ 19:28) >  Impression:    No evidence of deep venousthrombosis or superficial thrombophlebitis in the bilateral lower extremities.    ICD-10:M79.89      < end of copied text >    < from: Xray Chest 1 View-PORTABLE IMMEDIATE (Xray Chest 1 View-PORTABLE IMMEDIATE .) (11.17.20 @ 15:57) >  IMPRESSION:  Increased bilateral peripheral opacities.  < end of copied text >        VITALS:   T(F): 98.2  HR: 81  BP: 114/72  RR: 18  SpO2: 95%    PHYSICAL EXAM:  GENERAL: NAD, 95% on  3 L O2  HEAD:  Atraumatic, Normocephalic  EYES: EOMI  NECK: Supple  NERVOUS SYSTEM:  Alert & Oriented X3, non focal   CHEST/LUNG: on NC   ; bibasilar crackles   HEART: Regular rate and rhythm; No murmurs, rubs, or gallops  ABDOMEN: Soft, Nontender, Nondistended; Bowel sounds present  EXTREMITIES:  2+ Peripheral Pulses, No clubbing, cyanosis, or edema        Assessment and Plan:   · Assessment	  58 YO M with PMHx of GIST (s/p resection), DM type II (on metformin), RA (on prednisone), HTN, HLD, herniated disc, presented to the ED with c/c of cough and SOB x 3 days admitted to medicine for treatment of Covid pneumonia    # Acute Hypoxic Respiratory Failure 2/2  # COVID-19 Pneumonia   - Pt still requiring 3 L O2 Nc , desating on ambulation   - Duplex US LE negative for DVT,  - , D-dimer 163  - Pulm recs noted  - Procal 0.03  - CT Angio on admission negative for PE  - Chest X-ray 11/16 showing Diffuse interstitial opacifications consistent with viral pneumonia.  - s/p 2 units Plasma 11/6 s/p 5 day course Remdesivir completed 11/09  - C/W  prednisone 40 daily , will taper to his home dose of steroids   - ID recommendations noted, no need to continue following inflammatory markers   - c/w Lovenox  40 mg BID   - PO 40mg Lasix for diuresis         # Rising D-dimer in the setting of Covid pneumonia --resolved  - D-dimer on admission 287-> 484-->444-->405 11/11  - covid pneumonia--> heightened risk of blood clot  - c/w therapeutic Lovenox (80 BID)  - Lower extremity duplex negative 11/11  - repeat Duplex and D-dimer wnl      # RA  - not in acute flair  - c/w prednisone   - c/w Allopurinol 300 daily    # HTN  - Home medication Lisinopril 40 mg PO QD  - c/w lasix po daily  - continue holding Lisinopril  - continue to monitor BP/Cr    # DM type II  - c/w 20-7/7/7 in setting of steroid use      Despite steroids & nebulisers treatement this pt is still hypoxic due to COVID pneumonia  pt pulse ox on RA at rest is  86%   Patient is aware she will be going home on oxygen. Patient was tested in chronic stable state.   Pt is good with pulse dose for a portable concentrator       #Misc  - DVT Prophylaxis: Lovenox   - Diet: DASH/TLC, Carb consistent   - GI Prophylaxis: Pantoprazole   - Activity: AAT  - Code Status: Full Code

## 2020-11-22 ENCOUNTER — TRANSCRIPTION ENCOUNTER (OUTPATIENT)
Age: 60
End: 2020-11-22

## 2020-11-22 ENCOUNTER — NON-APPOINTMENT (OUTPATIENT)
Age: 60
End: 2020-11-22

## 2020-11-23 DIAGNOSIS — I10 ESSENTIAL (PRIMARY) HYPERTENSION: ICD-10-CM

## 2020-11-23 DIAGNOSIS — Z71.3 DIETARY COUNSELING AND SURVEILLANCE: ICD-10-CM

## 2020-11-23 DIAGNOSIS — Z88.0 ALLERGY STATUS TO PENICILLIN: ICD-10-CM

## 2020-11-23 DIAGNOSIS — Z79.84 LONG TERM (CURRENT) USE OF ORAL HYPOGLYCEMIC DRUGS: ICD-10-CM

## 2020-11-23 DIAGNOSIS — Z23 ENCOUNTER FOR IMMUNIZATION: ICD-10-CM

## 2020-11-23 DIAGNOSIS — U07.1 COVID-19: ICD-10-CM

## 2020-11-23 DIAGNOSIS — J90 PLEURAL EFFUSION, NOT ELSEWHERE CLASSIFIED: ICD-10-CM

## 2020-11-23 DIAGNOSIS — J96.01 ACUTE RESPIRATORY FAILURE WITH HYPOXIA: ICD-10-CM

## 2020-11-23 DIAGNOSIS — Z99.81 DEPENDENCE ON SUPPLEMENTAL OXYGEN: ICD-10-CM

## 2020-11-23 DIAGNOSIS — J12.89 OTHER VIRAL PNEUMONIA: ICD-10-CM

## 2020-11-23 DIAGNOSIS — E78.5 HYPERLIPIDEMIA, UNSPECIFIED: ICD-10-CM

## 2020-11-23 DIAGNOSIS — Z91.013 ALLERGY TO SEAFOOD: ICD-10-CM

## 2020-11-23 DIAGNOSIS — M06.9 RHEUMATOID ARTHRITIS, UNSPECIFIED: ICD-10-CM

## 2020-11-23 DIAGNOSIS — E11.9 TYPE 2 DIABETES MELLITUS WITHOUT COMPLICATIONS: ICD-10-CM

## 2020-11-23 DIAGNOSIS — Z79.52 LONG TERM (CURRENT) USE OF SYSTEMIC STEROIDS: ICD-10-CM

## 2020-11-27 ENCOUNTER — TRANSCRIPTION ENCOUNTER (OUTPATIENT)
Age: 60
End: 2020-11-27

## 2020-12-07 ENCOUNTER — TRANSCRIPTION ENCOUNTER (OUTPATIENT)
Age: 60
End: 2020-12-07

## 2020-12-07 ENCOUNTER — INPATIENT (INPATIENT)
Facility: HOSPITAL | Age: 60
LOS: 13 days | Discharge: HOME | End: 2020-12-21
Attending: HOSPITALIST | Admitting: HOSPITALIST
Payer: COMMERCIAL

## 2020-12-07 VITALS
HEIGHT: 72 IN | DIASTOLIC BLOOD PRESSURE: 110 MMHG | TEMPERATURE: 99 F | SYSTOLIC BLOOD PRESSURE: 150 MMHG | RESPIRATION RATE: 30 BRPM | OXYGEN SATURATION: 86 % | HEART RATE: 125 BPM

## 2020-12-07 DIAGNOSIS — Z98.890 OTHER SPECIFIED POSTPROCEDURAL STATES: Chronic | ICD-10-CM

## 2020-12-07 LAB
ALBUMIN SERPL ELPH-MCNC: 3.9 G/DL — SIGNIFICANT CHANGE UP (ref 3.5–5.2)
ALP SERPL-CCNC: 144 U/L — HIGH (ref 30–115)
ALT FLD-CCNC: 70 U/L — HIGH (ref 0–41)
ANION GAP SERPL CALC-SCNC: 10 MMOL/L — SIGNIFICANT CHANGE UP (ref 7–14)
AST SERPL-CCNC: 38 U/L — SIGNIFICANT CHANGE UP (ref 0–41)
BASE EXCESS BLDV CALC-SCNC: 2.8 MMOL/L — HIGH (ref -2–2)
BASOPHILS # BLD AUTO: 0.06 K/UL — SIGNIFICANT CHANGE UP (ref 0–0.2)
BASOPHILS NFR BLD AUTO: 0.5 % — SIGNIFICANT CHANGE UP (ref 0–1)
BILIRUB SERPL-MCNC: 0.3 MG/DL — SIGNIFICANT CHANGE UP (ref 0.2–1.2)
BLD GP AB SCN SERPL QL: SIGNIFICANT CHANGE UP
BUN SERPL-MCNC: 16 MG/DL — SIGNIFICANT CHANGE UP (ref 10–20)
CA-I SERPL-SCNC: 1.26 MMOL/L — SIGNIFICANT CHANGE UP (ref 1.12–1.3)
CALCIUM SERPL-MCNC: 9.9 MG/DL — SIGNIFICANT CHANGE UP (ref 8.5–10.1)
CHLORIDE SERPL-SCNC: 102 MMOL/L — SIGNIFICANT CHANGE UP (ref 98–110)
CO2 SERPL-SCNC: 27 MMOL/L — SIGNIFICANT CHANGE UP (ref 17–32)
CREAT SERPL-MCNC: 0.9 MG/DL — SIGNIFICANT CHANGE UP (ref 0.7–1.5)
D DIMER BLD IA.RAPID-MCNC: 428 NG/ML DDU — HIGH (ref 0–230)
EOSINOPHIL # BLD AUTO: 0.42 K/UL — SIGNIFICANT CHANGE UP (ref 0–0.7)
EOSINOPHIL NFR BLD AUTO: 3.2 % — SIGNIFICANT CHANGE UP (ref 0–8)
FIBRINOGEN PPP-MCNC: >700 MG/DL — HIGH (ref 204.4–570.6)
GAS PNL BLDV: 142 MMOL/L — SIGNIFICANT CHANGE UP (ref 136–145)
GAS PNL BLDV: SIGNIFICANT CHANGE UP
GLUCOSE SERPL-MCNC: 227 MG/DL — HIGH (ref 70–99)
HCO3 BLDV-SCNC: 30 MMOL/L — HIGH (ref 22–29)
HCT VFR BLD CALC: 47.3 % — SIGNIFICANT CHANGE UP (ref 42–52)
HCT VFR BLDA CALC: 54.7 % — HIGH (ref 34–44)
HGB BLD CALC-MCNC: 17.8 G/DL — SIGNIFICANT CHANGE UP (ref 14–18)
HGB BLD-MCNC: 15.3 G/DL — SIGNIFICANT CHANGE UP (ref 14–18)
IMM GRANULOCYTES NFR BLD AUTO: 0.8 % — HIGH (ref 0.1–0.3)
LACTATE BLDV-MCNC: 1.9 MMOL/L — HIGH (ref 0.5–1.6)
LYMPHOCYTES # BLD AUTO: 1.54 K/UL — SIGNIFICANT CHANGE UP (ref 1.2–3.4)
LYMPHOCYTES # BLD AUTO: 11.7 % — LOW (ref 20.5–51.1)
MCHC RBC-ENTMCNC: 29.8 PG — SIGNIFICANT CHANGE UP (ref 27–31)
MCHC RBC-ENTMCNC: 32.3 G/DL — SIGNIFICANT CHANGE UP (ref 32–37)
MCV RBC AUTO: 92.2 FL — SIGNIFICANT CHANGE UP (ref 80–94)
MONOCYTES # BLD AUTO: 1.14 K/UL — HIGH (ref 0.1–0.6)
MONOCYTES NFR BLD AUTO: 8.7 % — SIGNIFICANT CHANGE UP (ref 1.7–9.3)
NEUTROPHILS # BLD AUTO: 9.86 K/UL — HIGH (ref 1.4–6.5)
NEUTROPHILS NFR BLD AUTO: 75.1 % — SIGNIFICANT CHANGE UP (ref 42.2–75.2)
NRBC # BLD: 0 /100 WBCS — SIGNIFICANT CHANGE UP (ref 0–0)
NT-PROBNP SERPL-SCNC: 60 PG/ML — SIGNIFICANT CHANGE UP (ref 0–300)
PCO2 BLDV: 52 MMHG — HIGH (ref 41–51)
PH BLDV: 7.36 — SIGNIFICANT CHANGE UP (ref 7.26–7.43)
PLATELET # BLD AUTO: 277 K/UL — SIGNIFICANT CHANGE UP (ref 130–400)
PO2 BLDV: 27 MMHG — SIGNIFICANT CHANGE UP (ref 20–40)
POTASSIUM BLDV-SCNC: 4.3 MMOL/L — SIGNIFICANT CHANGE UP (ref 3.3–5.6)
POTASSIUM SERPL-MCNC: 4.3 MMOL/L — SIGNIFICANT CHANGE UP (ref 3.5–5)
POTASSIUM SERPL-SCNC: 4.3 MMOL/L — SIGNIFICANT CHANGE UP (ref 3.5–5)
PROT SERPL-MCNC: 6.9 G/DL — SIGNIFICANT CHANGE UP (ref 6–8)
RBC # BLD: 5.13 M/UL — SIGNIFICANT CHANGE UP (ref 4.7–6.1)
RBC # FLD: 16.7 % — HIGH (ref 11.5–14.5)
SAO2 % BLDV: 47 % — SIGNIFICANT CHANGE UP
SODIUM SERPL-SCNC: 139 MMOL/L — SIGNIFICANT CHANGE UP (ref 135–146)
TROPONIN T SERPL-MCNC: <0.01 NG/ML — SIGNIFICANT CHANGE UP
WBC # BLD: 13.13 K/UL — HIGH (ref 4.8–10.8)
WBC # FLD AUTO: 13.13 K/UL — HIGH (ref 4.8–10.8)

## 2020-12-07 PROCEDURE — 99291 CRITICAL CARE FIRST HOUR: CPT

## 2020-12-07 PROCEDURE — 93010 ELECTROCARDIOGRAM REPORT: CPT

## 2020-12-07 PROCEDURE — 93010 ELECTROCARDIOGRAM REPORT: CPT | Mod: 77

## 2020-12-07 PROCEDURE — 71275 CT ANGIOGRAPHY CHEST: CPT | Mod: 26

## 2020-12-07 PROCEDURE — 71045 X-RAY EXAM CHEST 1 VIEW: CPT | Mod: 26

## 2020-12-07 RX ORDER — ACETAMINOPHEN 500 MG
1000 TABLET ORAL ONCE
Refills: 0 | Status: COMPLETED | OUTPATIENT
Start: 2020-12-07 | End: 2020-12-07

## 2020-12-07 RX ORDER — SODIUM CHLORIDE 9 MG/ML
1000 INJECTION, SOLUTION INTRAVENOUS ONCE
Refills: 0 | Status: COMPLETED | OUTPATIENT
Start: 2020-12-07 | End: 2020-12-07

## 2020-12-07 RX ORDER — MORPHINE SULFATE 50 MG/1
4 CAPSULE, EXTENDED RELEASE ORAL ONCE
Refills: 0 | Status: DISCONTINUED | OUTPATIENT
Start: 2020-12-07 | End: 2020-12-07

## 2020-12-07 RX ADMIN — SODIUM CHLORIDE 1000 MILLILITER(S): 9 INJECTION, SOLUTION INTRAVENOUS at 17:08

## 2020-12-07 RX ADMIN — MORPHINE SULFATE 4 MILLIGRAM(S): 50 CAPSULE, EXTENDED RELEASE ORAL at 17:08

## 2020-12-07 RX ADMIN — MORPHINE SULFATE 4 MILLIGRAM(S): 50 CAPSULE, EXTENDED RELEASE ORAL at 16:08

## 2020-12-07 RX ADMIN — MORPHINE SULFATE 4 MILLIGRAM(S): 50 CAPSULE, EXTENDED RELEASE ORAL at 20:45

## 2020-12-07 NOTE — H&P ADULT - ASSESSMENT
Impression:    60 yr M with recent COVID pneumonia discharged 3 weeks ago on 2LNC, s/p steroid course and plasma Tx, GIST (s/p resection), DM type II , RA (on prednisone), HTN, HLD and herniated disc    #acute hypoxic RS failure due to worsening COVID pneumonia and possible superimposed bacterial infection  #RA on prednisone   #DM  #HTN  #Hx of GIST s/p resection    Plans:     Impression:    60 yr M with recent COVID pneumonia discharged 3 weeks ago on 2LNC, s/p steroid course and plasma Tx, GIST (s/p resection), DM type II , RA (on prednisone), HTN, HLD and herniated disc    #acute hypoxic RS failure: worsening   #Covid PNA  #Probable superimposed bacterial PNA and bronchiectasis   #RA on prednisone     PLAN:    CNS: avoid sedation     HEENT: Oral care.    PULMONARY:  HOB @ 45 degrees. HFNC, alternate with BiPAP, wean off O2 as tolerated     CARDIOVASCULAR:      GI: GI prophylaxis.  NPO fo now    RENAL:  Follow up lytes.  Correct as needed,     INFECTIOUS DISEASE: s/p Dexa and Plasma tx, ID eval, start Vanco and cefepime, f/o procalcitonin, send deep sputum cx, Nasal MRSA, urine strep and legionella, Follow up cultures,     HEMATOLOGICAL:  DVT prophylaxis.  LMWH     ENDOCRINE:  Follow up FS.  Insulin protocol if needed.    Full code  poor prognosis          Impression:    60 yr M with recent COVID pneumonia discharged 3 weeks ago on 2LNC, s/p steroid course and plasma Tx, GIST (s/p resection), DM type II , RA (on prednisone), HTN, HLD and herniated disc    #acute hypoxic RS failure: worsening, no PE on CA angio  #Covid PNA  #Probable superimposed bacterial PNA and bronchiectasis   #RA on prednisone     PLAN:    CNS: avoid sedation     HEENT: Oral care.    PULMONARY:  HOB @ 45 degrees. HFNC, alternate with BiPAP, wean off O2 as tolerated     CARDIOVASCULAR:      GI: GI prophylaxis.  NPO fo now    RENAL:  Follow up lytes.  Correct as needed,     INFECTIOUS DISEASE: s/p Dexa and Plasma tx, ID eval, trend inflammatory markers, start Vanco and cefepime, f/o procalcitonin, send deep sputum cx, Nasal MRSA, urine strep and legionella, Follow up cultures,     HEMATOLOGICAL:  DVT prophylaxis.  LMWH     ENDOCRINE:  Follow up FS.  Insulin protocol if needed.    Full code  poor prognosis          Impression:    60 yr M with recent COVID pneumonia discharged 3 weeks ago on 2LNC, s/p steroid course and plasma Tx, GIST (s/p resection), DM type II , RA (on prednisone), HTN, HLD and herniated disc    #acute hypoxic RS failure: worsening, no PE on CA angio  #Covid PNA  #Probable superimposed bacterial PNA and bronchiectasis   #RA on prednisone     PLAN:    CNS: avoid sedation     HEENT: Oral care.    PULMONARY:  HOB @ 45 degrees. HFNC, alternate with BiPAP, wean off O2 as tolerated     CARDIOVASCULAR:  I=O,     GI: GI prophylaxis.  NPO fo now    RENAL:  Follow up lytes.  Correct as needed,     INFECTIOUS DISEASE: s/p Dexa and Plasma tx, ID eval, trend inflammatory markers, start Vanco and Levaquin, f/o procalcitonin, send deep sputum cx, Nasal MRSA, urine strep and legionella, Follow up cultures,     HEMATOLOGICAL:  DVT prophylaxis.  LMWH     ENDOCRINE:  Follow up FS.  Insulin protocol if needed.    Full code  poor prognosis          Impression:    60 yr M with recent COVID pneumonia discharged 3 weeks ago on 2LNC, s/p steroid course and plasma Tx, GIST (s/p resection), DM type II , RA (on prednisone), HTN, HLD and herniated disc    #worsening of hypoxic RS failure probably chronic sequel of covid   - no PE on CT angio  #Covid PNA  #Probable superimposed bacterial PNA and bronchiectasis   #RA on prednisone     PLAN:    CNS: avoid sedation     HEENT: Oral care.    PULMONARY:  HOB @ 45 degrees. HFNC, wean off O2 as tolerated     CARDIOVASCULAR:  I=O,     GI: GI prophylaxis.  NPO fo now    RENAL:  Follow up lytes.  Correct as needed,     INFECTIOUS DISEASE: s/p Dexa and Plasma tx, ID eval, trend inflammatory markers, start Vanco and Levaquin, f/o procalcitonin, send deep sputum cx, Nasal MRSA, urine strep and legionella, Follow up cultures,     HEMATOLOGICAL:  DVT prophylaxis.  LMWH     ENDOCRINE:  Follow up FS.  Insulin protocol if needed.    Full code  poor prognosis

## 2020-12-07 NOTE — ED PROVIDER NOTE - OBJECTIVE STATEMENT
61 yo male, PMH of DM, HTN, recent Covid + and PNA (end of October), presenting with chest wall pain and shortness of breath. He was in the hospital early November and treated for Covid and pneumonia and was discharged on antibiotics. He states the pneumonia never seemed to go away and he has been on 3L of O2 at home since, but the chest wall pain has worsened today across his entire right hemithorax, worse with exertion but present at rest. Per EMS daughter stated that when he exerted himself to the restroom his pulse ox went down to 72%.  Denies fevers, chills, abdominal pain, nausea, vomiting.

## 2020-12-07 NOTE — H&P ADULT - NSHPLABSRESULTS_GEN_ALL_CORE
LABS:                        15.3   13.13 )-----------( 277      ( 07 Dec 2020 15:10 )             47.3     07 Dec 2020 15:10    139    |  102    |  16     ----------------------------<  227    4.3     |  27     |  0.9      Ca    9.9        07 Dec 2020 15:10    TPro  6.9    /  Alb  3.9    /  TBili  0.3    /  DBili  x      /  AST  38     /  ALT  70     /  AlkPhos  144    07 Dec 2020 15:10    < from: CT Angio Chest PE Protocol w/ IV Cont (12.07.20 @ 19:45) >      IMPRESSION:    1. No central or segmental pulmonary embolus.    2. Since November 5, 2020, worsening diffuse bilateral patchy groundglass opacities with new areas of patchy consolidations and new bronchiectasis, compatible with an infectious/inflammatory etiology. Findings likely represent sequela of known previous Covid-19 infection. Superimposed bacterial infection is a possibility.    3. Increased mediastinal lymphadenopathy, likely reactive.    < end of copied text >

## 2020-12-07 NOTE — ED PROVIDER NOTE - PHYSICAL EXAMINATION
CONSTITUTIONAL: Well-developed; well-nourished; in no acute distress.   SKIN: warm, dry  HEAD: Normocephalic; atraumatic.  EYES: no conjunctival injection. PERRLA. EOMI.   ENT: No nasal discharge; airway clear.  NECK: Supple; non tender.  CARD: S1, S2 normal; no murmurs, gallops, or rubs. Regular rhythm. Tachycardic  RESP: +decreased breath sounds on the right with bilateral coarse breath sounds  ABD: soft ntnd. BS+ in all 4 quadrants.  EXT: Normal ROM.  No clubbing, cyanosis or edema.   LYMPH: No acute cervical adenopathy.  NEURO: Alert, oriented, grossly unremarkable.  PSYCH: Cooperative, appropriate. CONSTITUTIONAL: Well-developed; well-nourished; in no acute distress.   SKIN: warm, dry  HEAD: Normocephalic; atraumatic.  EYES: no conjunctival injection. PERRLA. EOMI.   ENT: No nasal discharge; airway clear.  NECK: Supple; non tender.  CARD: S1, S2 normal; no murmurs, gallops, or rubs. Regular rhythm. Tachycardic  RESP: +decreased breath sounds on the right with bilateral crackles  CHEST: reproducible tenderness of right anterior mid-ribs  ABD: soft ntnd. BS+ in all 4 quadrants.  EXT: Normal ROM.  No clubbing, cyanosis or edema.   NEURO: Alert, oriented, grossly unremarkable.  PSYCH: Cooperative, appropriate.

## 2020-12-07 NOTE — ED PROVIDER NOTE - ATTENDING CONTRIBUTION TO CARE
59 yo M dm, htn, GIST s/p resection, recent covid 1 month ago, now with 2 days of R sided CP. worse with movt. already treated with abx for pna.  + ZAMUDIO.  vs, tachy, anxious, in pain, mild tachpnea, pink conj, anicteric, MMM, neck supple, + crackles, RRR, equal radial pulses bilat, abd soft/nt/nd, no cva tend. no calves tend, no edema, no fnd. no rashes.   a/p: labs, imaging, reassess

## 2020-12-07 NOTE — ED PROVIDER NOTE - PROGRESS NOTE DETAILS
Authored by Dr. Owens: imprved with morphine, tachy  better, pending CT chest. cxr with bilat opaciites. Authored by Dr. Owens: s/o to dr regan - f/u cta chest, and admit SC: PT endorsed to me, started on high flow. Will admit for hypoxemic resp failure

## 2020-12-07 NOTE — ED PROVIDER NOTE - CLINICAL SUMMARY MEDICAL DECISION MAKING FREE TEXT BOX
Dr. Reid - Patient received as a sign out at 17:00 form Dr. Owens. 61 yo male, PMH of DM, HTN, recent Covid + and PNA (end of October), presenting with chest wall pain and shortness of breath, hypoxia with exertion to 72%. Patient was on home oxygen as using it as directed by Dr. Chavez. Patient has worsening dyspnea and came in hypoxic. Large bore IV placed and labs sent and CT PE study ordered. Patients oxygen corrected with 15 L O2 via NRB. CT scan shows worsening COVID without a PE.  Inflammatory markers sent and patient started on high flow nasal cannula.  His respiratory rate improved with this measure (RR 24-30 on NRB to RR 18-24 on NFNC). Patient remains slightly tachycardic in the 110's.  ICU consulted for care with will admit patient.  Patient does not require intubation at this time. Patient received advanced COVID treatment and is now back and worsened a month later. He will likely require infectious disease re-evaluation and recommendations. Type and Screen sent.

## 2020-12-07 NOTE — ED ADULT TRIAGE NOTE - CHIEF COMPLAINT QUOTE
Patient D/C home 1 month ago after being treated for COVID. Since D/C started on O2 and has been being treated for PNA. Today presents for sudden onset right rib pain and SOB 87 on RA

## 2020-12-07 NOTE — H&P ADULT - HISTORY OF PRESENT ILLNESS
60 yr M with recent COVID pneumonia discharged 3 weeks ago on 2LNC, s/p steroid course and plasma Tx, GIST (s/p resection), DM type II , RA (on prednisone), HTN, HLD and herniated disc  presented to ED for SOB and desaturation to 70s while walking in his house, pt was did=charged from the [facility] for covid pneumonia 3 weeks ago, on 2LNC, s/p steroid course (currently at 10mg which is his dose for RA) and plasma Tx,  was doing Ok at his baseline, uses O2 at rest and on exertion, pt was not able to walk today due to chest pain, SOB and his saturation was in 70s with HR 120s, so pt calleld 911,   pt denies any fever, cough, abd pain, diarrhea, N/V, urinary symptoms or legs swelling.    in ED; tachycardia 130, tachypnea 28, was placed on HFNC, CTA chest showed no PE, but   worsening diffuse bilateral patchy ground-glass opacities with new areas of patchy consolidations and new bronchiectasis, compatible with an infectious/inflammatory etiology.   Findings likely represent sequela of known previous Covid-19 infection. Superimposed bacterial infection is a possibility.     60 yr M with recent COVID pneumonia discharged 3 weeks ago on 2LNC, s/p steroid course and plasma Tx, GIST (s/p resection), DM type II , RA (on prednisone), HTN, HLD and herniated disc  presented to ED for SOB and desaturation to 70s while walking in his house, pt was did=charged from the [facility] for covid pneumonia 3 weeks ago, on 2LNC, s/p steroid course (currently at 10mg which is his dose for RA) and plasma Tx,  was doing Ok at his baseline, uses O2 at rest and on exertion, pt was not able to walk today due to chest pain, SOB and his saturation was in 70s with HR 120s, so pt calleld 911,   pt denies any fever, cough, abd pain, diarrhea, N/V, urinary symptoms or legs swelling.    in ED; tachycardia 130, tachypnea 28, was placed on HFNC, CTA chest showed no PE, but   worsening diffuse bilateral patchy ground-glass opacities with new areas of patchy consolidations and new bronchiectasis, compatible with an infectious/inflammatory etiology.  Findings likely represent sequela of known previous Covid-19 infection. Superimposed bacterial infection is a possibility.

## 2020-12-07 NOTE — ED PROVIDER NOTE - NS ED ROS FT
GEN:  no fever, no chills, no generalized weakness  NEURO:  +headache, no dizziness  ENT: no sore throat, no runny nose  CV:  +chest pain, no palpitations  RESP:  +sob, no cough  GI:  no nausea, no vomiting, no abdominal pain, no diarrhea  :  no dysuria, no urinary frequency, no hematuria  MSK:  no joint pain, no edema  SKIN:  no rash, no bruising  HEME: no hematochezia, no melena

## 2020-12-07 NOTE — ED ADULT NURSE REASSESSMENT NOTE - NS ED NURSE REASSESS COMMENT FT1
Patient received from RN, Otis, complaining of right sided chest pain at this time. Pain has not gotten any worse or better since arrival, awaiting results of CT- Cardiac monitoring maintained, NRB in place. Will continue to monitor.

## 2020-12-07 NOTE — ED ADULT NURSE NOTE - OBJECTIVE STATEMENT
Assessment done by prior rn- at this time patient is complaining of right sided chest pain no radiating, no exacerbating factors. Cardiac monitoring maintained

## 2020-12-07 NOTE — H&P ADULT - NSHPPHYSICALEXAM_GEN_ALL_CORE
CONSTITUTIONAL: on HFNC and in mild RS distress, , AAOx3  HEAD: Atraumatic, normocephalic  EYES: EOM intact, PERRLA, conjunctiva and sclera clear  ENT: Supple, no masses, no thyromegaly, no bruits, no JVD; moist mucous membranes  PULMONARY: decrease air entry bilateral with diffuse crackles   CARDIOVASCULAR: sinus tachycardia, normal rhythm; no murmurs, rubs, or gallops  GASTROINTESTINAL: Soft, non-tender, non-distended; bowel sounds present  MUSCULOSKELETAL: 2+ peripheral pulses; no clubbing, no cyanosis, no edema  NEUROLOGY: non-focal  SKIN: No rashes or lesions; warm and dry

## 2020-12-07 NOTE — H&P ADULT - NSICDXPASTMEDICALHX_GEN_ALL_CORE_FT
PAST MEDICAL HISTORY:  Hyperlipidemia     Hypertension     Lumbago     Pneumonia due to COVID-19 virus

## 2020-12-08 ENCOUNTER — TRANSCRIPTION ENCOUNTER (OUTPATIENT)
Age: 60
End: 2020-12-08

## 2020-12-08 LAB
ALBUMIN SERPL ELPH-MCNC: 3.4 G/DL — LOW (ref 3.5–5.2)
ALP SERPL-CCNC: 109 U/L — SIGNIFICANT CHANGE UP (ref 30–115)
ALT FLD-CCNC: 51 U/L — HIGH (ref 0–41)
ANION GAP SERPL CALC-SCNC: 11 MMOL/L — SIGNIFICANT CHANGE UP (ref 7–14)
AST SERPL-CCNC: 20 U/L — SIGNIFICANT CHANGE UP (ref 0–41)
BASOPHILS # BLD AUTO: 0.06 K/UL — SIGNIFICANT CHANGE UP (ref 0–0.2)
BASOPHILS NFR BLD AUTO: 0.4 % — SIGNIFICANT CHANGE UP (ref 0–1)
BILIRUB SERPL-MCNC: 1.1 MG/DL — SIGNIFICANT CHANGE UP (ref 0.2–1.2)
BUN SERPL-MCNC: 10 MG/DL — SIGNIFICANT CHANGE UP (ref 10–20)
CALCIUM SERPL-MCNC: 9.3 MG/DL — SIGNIFICANT CHANGE UP (ref 8.5–10.1)
CHLORIDE SERPL-SCNC: 101 MMOL/L — SIGNIFICANT CHANGE UP (ref 98–110)
CK MB CFR SERPL CALC: 1.9 NG/ML — SIGNIFICANT CHANGE UP (ref 0.6–6.3)
CO2 SERPL-SCNC: 24 MMOL/L — SIGNIFICANT CHANGE UP (ref 17–32)
CREAT SERPL-MCNC: 0.7 MG/DL — SIGNIFICANT CHANGE UP (ref 0.7–1.5)
CRP SERPL-MCNC: 12.04 MG/DL — HIGH (ref 0–0.4)
D DIMER BLD IA.RAPID-MCNC: 493 NG/ML DDU — HIGH (ref 0–230)
EOSINOPHIL # BLD AUTO: 0.35 K/UL — SIGNIFICANT CHANGE UP (ref 0–0.7)
EOSINOPHIL NFR BLD AUTO: 2.6 % — SIGNIFICANT CHANGE UP (ref 0–8)
FLUAV AG NPH QL: NEGATIVE COUNTS — SIGNIFICANT CHANGE UP
FLUBV AG NPH QL: NEGATIVE COUNTS — SIGNIFICANT CHANGE UP
GLUCOSE BLDC GLUCOMTR-MCNC: 131 MG/DL — HIGH (ref 70–99)
GLUCOSE BLDC GLUCOMTR-MCNC: 345 MG/DL — HIGH (ref 70–99)
GLUCOSE SERPL-MCNC: 178 MG/DL — HIGH (ref 70–99)
HCT VFR BLD CALC: 43.1 % — SIGNIFICANT CHANGE UP (ref 42–52)
HGB BLD-MCNC: 14.3 G/DL — SIGNIFICANT CHANGE UP (ref 14–18)
IMM GRANULOCYTES NFR BLD AUTO: 0.8 % — HIGH (ref 0.1–0.3)
LACTATE SERPL-SCNC: 1 MMOL/L — SIGNIFICANT CHANGE UP (ref 0.7–2)
LDH SERPL L TO P-CCNC: 300 U/L — HIGH (ref 50–242)
LEGIONELLA AG UR QL: NEGATIVE — SIGNIFICANT CHANGE UP
LYMPHOCYTES # BLD AUTO: 0.99 K/UL — LOW (ref 1.2–3.4)
LYMPHOCYTES # BLD AUTO: 7.4 % — LOW (ref 20.5–51.1)
MAGNESIUM SERPL-MCNC: 1.7 MG/DL — LOW (ref 1.8–2.4)
MCHC RBC-ENTMCNC: 30 PG — SIGNIFICANT CHANGE UP (ref 27–31)
MCHC RBC-ENTMCNC: 33.2 G/DL — SIGNIFICANT CHANGE UP (ref 32–37)
MCV RBC AUTO: 90.5 FL — SIGNIFICANT CHANGE UP (ref 80–94)
MONOCYTES # BLD AUTO: 1.27 K/UL — HIGH (ref 0.1–0.6)
MONOCYTES NFR BLD AUTO: 9.5 % — HIGH (ref 1.7–9.3)
MRSA PCR RESULT.: NEGATIVE — SIGNIFICANT CHANGE UP
NEUTROPHILS # BLD AUTO: 10.59 K/UL — HIGH (ref 1.4–6.5)
NEUTROPHILS NFR BLD AUTO: 79.3 % — HIGH (ref 42.2–75.2)
NRBC # BLD: 0 /100 WBCS — SIGNIFICANT CHANGE UP (ref 0–0)
PLATELET # BLD AUTO: 261 K/UL — SIGNIFICANT CHANGE UP (ref 130–400)
POTASSIUM SERPL-MCNC: 4.1 MMOL/L — SIGNIFICANT CHANGE UP (ref 3.5–5)
POTASSIUM SERPL-SCNC: 4.1 MMOL/L — SIGNIFICANT CHANGE UP (ref 3.5–5)
PROCALCITONIN SERPL-MCNC: 0.12 NG/ML — HIGH (ref 0.02–0.1)
PROT SERPL-MCNC: 6.5 G/DL — SIGNIFICANT CHANGE UP (ref 6–8)
RBC # BLD: 4.76 M/UL — SIGNIFICANT CHANGE UP (ref 4.7–6.1)
RBC # FLD: 16.5 % — HIGH (ref 11.5–14.5)
RSV RNA NPH QL NAA+NON-PROBE: NEGATIVE COUNTS — SIGNIFICANT CHANGE UP
SARS-COV-2 RNA SPEC QL NAA+PROBE: POSITIVE COUNTS
SODIUM SERPL-SCNC: 136 MMOL/L — SIGNIFICANT CHANGE UP (ref 135–146)
TROPONIN T SERPL-MCNC: <0.01 NG/ML — SIGNIFICANT CHANGE UP
WBC # BLD: 13.37 K/UL — HIGH (ref 4.8–10.8)
WBC # FLD AUTO: 13.37 K/UL — HIGH (ref 4.8–10.8)

## 2020-12-08 RX ORDER — MAGNESIUM SULFATE 500 MG/ML
2 VIAL (ML) INJECTION ONCE
Refills: 0 | Status: COMPLETED | OUTPATIENT
Start: 2020-12-08 | End: 2020-12-08

## 2020-12-08 RX ORDER — INSULIN LISPRO 100/ML
5 VIAL (ML) SUBCUTANEOUS
Refills: 0 | Status: DISCONTINUED | OUTPATIENT
Start: 2020-12-08 | End: 2020-12-13

## 2020-12-08 RX ORDER — INSULIN LISPRO 100/ML
VIAL (ML) SUBCUTANEOUS
Refills: 0 | Status: DISCONTINUED | OUTPATIENT
Start: 2020-12-08 | End: 2020-12-21

## 2020-12-08 RX ORDER — SODIUM CHLORIDE 9 MG/ML
1000 INJECTION, SOLUTION INTRAVENOUS
Refills: 0 | Status: DISCONTINUED | OUTPATIENT
Start: 2020-12-08 | End: 2020-12-21

## 2020-12-08 RX ORDER — VANCOMYCIN HCL 1 G
1250 VIAL (EA) INTRAVENOUS ONCE
Refills: 0 | Status: COMPLETED | OUTPATIENT
Start: 2020-12-08 | End: 2020-12-08

## 2020-12-08 RX ORDER — DEXTROSE 50 % IN WATER 50 %
25 SYRINGE (ML) INTRAVENOUS ONCE
Refills: 0 | Status: DISCONTINUED | OUTPATIENT
Start: 2020-12-08 | End: 2020-12-21

## 2020-12-08 RX ORDER — VANCOMYCIN HCL 1 G
VIAL (EA) INTRAVENOUS
Refills: 0 | Status: DISCONTINUED | OUTPATIENT
Start: 2020-12-08 | End: 2020-12-09

## 2020-12-08 RX ORDER — ENOXAPARIN SODIUM 100 MG/ML
40 INJECTION SUBCUTANEOUS EVERY 12 HOURS
Refills: 0 | Status: DISCONTINUED | OUTPATIENT
Start: 2020-12-08 | End: 2020-12-21

## 2020-12-08 RX ORDER — GLUCAGON INJECTION, SOLUTION 0.5 MG/.1ML
1 INJECTION, SOLUTION SUBCUTANEOUS ONCE
Refills: 0 | Status: DISCONTINUED | OUTPATIENT
Start: 2020-12-08 | End: 2020-12-21

## 2020-12-08 RX ORDER — FUROSEMIDE 40 MG
40 TABLET ORAL DAILY
Refills: 0 | Status: DISCONTINUED | OUTPATIENT
Start: 2020-12-08 | End: 2020-12-21

## 2020-12-08 RX ORDER — MAGNESIUM SULFATE 500 MG/ML
2 VIAL (ML) INJECTION ONCE
Refills: 0 | Status: DISCONTINUED | OUTPATIENT
Start: 2020-12-08 | End: 2020-12-08

## 2020-12-08 RX ORDER — VANCOMYCIN HCL 1 G
1250 VIAL (EA) INTRAVENOUS EVERY 12 HOURS
Refills: 0 | Status: DISCONTINUED | OUTPATIENT
Start: 2020-12-08 | End: 2020-12-09

## 2020-12-08 RX ORDER — CEFEPIME 1 G/1
INJECTION, POWDER, FOR SOLUTION INTRAMUSCULAR; INTRAVENOUS
Refills: 0 | Status: DISCONTINUED | OUTPATIENT
Start: 2020-12-08 | End: 2020-12-12

## 2020-12-08 RX ORDER — CEFEPIME 1 G/1
2000 INJECTION, POWDER, FOR SOLUTION INTRAMUSCULAR; INTRAVENOUS EVERY 12 HOURS
Refills: 0 | Status: DISCONTINUED | OUTPATIENT
Start: 2020-12-08 | End: 2020-12-09

## 2020-12-08 RX ORDER — INSULIN GLARGINE 100 [IU]/ML
15 INJECTION, SOLUTION SUBCUTANEOUS AT BEDTIME
Refills: 0 | Status: DISCONTINUED | OUTPATIENT
Start: 2020-12-08 | End: 2020-12-14

## 2020-12-08 RX ORDER — CEFEPIME 1 G/1
2000 INJECTION, POWDER, FOR SOLUTION INTRAMUSCULAR; INTRAVENOUS ONCE
Refills: 0 | Status: COMPLETED | OUTPATIENT
Start: 2020-12-08 | End: 2020-12-08

## 2020-12-08 RX ORDER — INSULIN LISPRO 100/ML
4 VIAL (ML) SUBCUTANEOUS ONCE
Refills: 0 | Status: COMPLETED | OUTPATIENT
Start: 2020-12-08 | End: 2020-12-08

## 2020-12-08 RX ORDER — VORICONAZOLE 10 MG/ML
500 INJECTION, POWDER, LYOPHILIZED, FOR SOLUTION INTRAVENOUS EVERY 12 HOURS
Refills: 0 | Status: COMPLETED | OUTPATIENT
Start: 2020-12-08 | End: 2020-12-09

## 2020-12-08 RX ORDER — DEXTROSE 50 % IN WATER 50 %
15 SYRINGE (ML) INTRAVENOUS ONCE
Refills: 0 | Status: DISCONTINUED | OUTPATIENT
Start: 2020-12-08 | End: 2020-12-21

## 2020-12-08 RX ORDER — MORPHINE SULFATE 50 MG/1
4 CAPSULE, EXTENDED RELEASE ORAL ONCE
Refills: 0 | Status: DISCONTINUED | OUTPATIENT
Start: 2020-12-08 | End: 2020-12-08

## 2020-12-08 RX ORDER — DEXTROSE 50 % IN WATER 50 %
12.5 SYRINGE (ML) INTRAVENOUS ONCE
Refills: 0 | Status: DISCONTINUED | OUTPATIENT
Start: 2020-12-08 | End: 2020-12-21

## 2020-12-08 RX ORDER — CEFEPIME 1 G/1
2000 INJECTION, POWDER, FOR SOLUTION INTRAMUSCULAR; INTRAVENOUS EVERY 8 HOURS
Refills: 0 | Status: DISCONTINUED | OUTPATIENT
Start: 2020-12-08 | End: 2020-12-12

## 2020-12-08 RX ADMIN — Medication 60 MILLIGRAM(S): at 23:12

## 2020-12-08 RX ADMIN — Medication 250 MILLIGRAM(S): at 01:12

## 2020-12-08 RX ADMIN — ENOXAPARIN SODIUM 40 MILLIGRAM(S): 100 INJECTION SUBCUTANEOUS at 06:00

## 2020-12-08 RX ADMIN — CEFEPIME 100 MILLIGRAM(S): 1 INJECTION, POWDER, FOR SOLUTION INTRAMUSCULAR; INTRAVENOUS at 13:26

## 2020-12-08 RX ADMIN — Medication 60 MILLIGRAM(S): at 13:26

## 2020-12-08 RX ADMIN — Medication 4 UNIT(S): at 23:30

## 2020-12-08 RX ADMIN — CEFEPIME 100 MILLIGRAM(S): 1 INJECTION, POWDER, FOR SOLUTION INTRAMUSCULAR; INTRAVENOUS at 17:02

## 2020-12-08 RX ADMIN — Medication 10 MILLIGRAM(S): at 09:19

## 2020-12-08 RX ADMIN — MORPHINE SULFATE 4 MILLIGRAM(S): 50 CAPSULE, EXTENDED RELEASE ORAL at 01:12

## 2020-12-08 RX ADMIN — Medication 25 GRAM(S): at 10:55

## 2020-12-08 RX ADMIN — VORICONAZOLE 125 MILLIGRAM(S): 10 INJECTION, POWDER, LYOPHILIZED, FOR SOLUTION INTRAVENOUS at 15:12

## 2020-12-08 RX ADMIN — Medication 250 MILLIGRAM(S): at 17:01

## 2020-12-08 RX ADMIN — Medication 40 MILLIGRAM(S): at 12:50

## 2020-12-08 RX ADMIN — INSULIN GLARGINE 15 UNIT(S): 100 INJECTION, SOLUTION SUBCUTANEOUS at 23:31

## 2020-12-08 RX ADMIN — CEFEPIME 100 MILLIGRAM(S): 1 INJECTION, POWDER, FOR SOLUTION INTRAMUSCULAR; INTRAVENOUS at 23:10

## 2020-12-08 RX ADMIN — ENOXAPARIN SODIUM 40 MILLIGRAM(S): 100 INJECTION SUBCUTANEOUS at 17:03

## 2020-12-08 RX ADMIN — CEFEPIME 100 MILLIGRAM(S): 1 INJECTION, POWDER, FOR SOLUTION INTRAMUSCULAR; INTRAVENOUS at 12:50

## 2020-12-08 NOTE — CHART NOTE - NSCHARTNOTEFT_GEN_A_CORE
Called patient's spouse Yohana Joseph, who deferred to patient's daughter, Jodi Joseph who's a nursing student. Updated patient's family and answered all questions.

## 2020-12-08 NOTE — CONSULT NOTE ADULT - ASSESSMENT
IMPRESSION:    Acute hypoxemic respiratory failure  HO COVID pneumonia   Worsening infiltrates RO opportunistic infection VS inflammatory / Fibrotic stage   HO RA Chronic steroid use     PLAN:    CNS:  No depressants     HEENT: Oral care    PULMONARY:  HOB @ 45 degrees.  Aspiration precautions.  Wean O2 as tolerated.  Solumedrol 60 mg Q8.      CARDIOVASCULAR:  I=O>  Avoid volume overload     GI: GI prophylaxis.  Feeding.  Bowel regimen     RENAL:  Follow up lytes.  Correct as needed    INFECTIOUS DISEASE: Follow up cultures.  Procal, LDH, Galactomannan, Fungitell.  Cefepime, Vanc, Levaquin, and Vori.  Urine strep adn Legionella Ag.  Inflammatory markers     HEMATOLOGICAL:  DVT prophylaxis.  Dimer     ENDOCRINE:  Follow up FS.  Insulin protocol if needed.    MUSCULOSKELETAL:  Bed Rest     MICU         
  60 yr M with recent COVID pneumonia discharged 3 weeks ago on 2LNC, s/p steroid course and plasma Tx, GIST (s/p resection), DM type II , RA (on prednisone), HTN, HLD and herniated disc    IMPRESSION;  COVID-19 positive 11/5. S/p dexamethason/plasma. CT with sequelae of COVID-19 .  PNA bacterial ? vs progressive lung patology secondary to COVID-19 ? Unclear given the very abnormal CT chest but will recommend treating for an ongoing infectius process  DDx : CAPA ( COVID-19 Associated Pulmonary Aspergillosis ) : described in pts s/p steroids. PCJ    RECOMMENDATIONS;  urine for legionella/strep pneumonia antigen  BCx  Nares ORSA  Serum fungitel/galactomannan assay  Zosyn 4.5 gm iv q6h  d/c other ABX

## 2020-12-08 NOTE — CONSULT NOTE ADULT - SUBJECTIVE AND OBJECTIVE BOX
JOSÉ LUIS AREVALO  60y, Male  Allergy: penicillin (Unknown)  shellfish (Pruritus)      All historical available data reviewed.    HPI:  60 yr M with recent COVID pneumonia discharged 3 weeks ago on 2LNC, s/p steroid course and plasma Tx, GIST (s/p resection), DM type II , RA (on prednisone), HTN, HLD and herniated disc  presented to ED for SOB and desaturation to 70s while walking in his house, pt was did=charged from the [facility] for covid pneumonia 3 weeks ago, on 2LNC, s/p steroid course (currently at 10mg which is his dose for RA) and plasma Tx,  was doing Ok at his baseline, uses O2 at rest and on exertion, pt was not able to walk today due to chest pain, SOB and his saturation was in 70s with HR 120s, so pt calleld 911,   pt denies any fever, cough, abd pain, diarrhea, N/V, urinary symptoms or legs swelling.    in ED; tachycardia 130, tachypnea 28, was placed on HFNC, CTA chest showed no PE, but   worsening diffuse bilateral patchy ground-glass opacities with new areas of patchy consolidations and new bronchiectasis, compatible with an infectious/inflammatory etiology.  Findings likely represent sequela of known previous Covid-19 infection. Superimposed bacterial infection is a possibility.     (07 Dec 2020 23:46)    ID called for PNA    FAMILY HISTORY:  Known health problems: none      PAST MEDICAL & SURGICAL HISTORY:  Pneumonia due to COVID-19 virus    Hyperlipidemia    Lumbago    Hypertension    H/O lymph node biopsy  Neck by Dr Case          VITALS:  T(F): 97.3, Max: 99.2 (12-08-20 @ 02:00)  HR: 112  BP: 130/92  RR: 30Vital Signs Last 24 Hrs  T(C): 36.3 (08 Dec 2020 05:00), Max: 37.3 (08 Dec 2020 02:00)  T(F): 97.3 (08 Dec 2020 05:00), Max: 99.2 (08 Dec 2020 02:00)  HR: 112 (08 Dec 2020 05:00) (107 - 125)  BP: 130/92 (08 Dec 2020 05:00) (119/90 - 150/110)  BP(mean): 106 (08 Dec 2020 05:00) (106 - 106)  RR: 30 (08 Dec 2020 05:00) (20 - 30)  SpO2: 99% (08 Dec 2020 06:37) (86% - 99%)    TESTS & MEASUREMENTS:                        14.3   13.37 )-----------( 261      ( 08 Dec 2020 05:16 )             43.1     12-08    136  |  101  |  10  ----------------------------<  178<H>  4.1   |  24  |  0.7    Ca    9.3      08 Dec 2020 05:16  Mg     1.7     12-08    TPro  6.5  /  Alb  3.4<L>  /  TBili  1.1  /  DBili  x   /  AST  20  /  ALT  51<H>  /  AlkPhos  109  12-08    LIVER FUNCTIONS - ( 08 Dec 2020 05:16 )  Alb: 3.4 g/dL / Pro: 6.5 g/dL / ALK PHOS: 109 U/L / ALT: 51 U/L / AST: 20 U/L / GGT: x                   RADIOLOGY & ADDITIONAL TESTS:  Personal review of radiological diagnostics performed  Echo and EKG results noted when applicable.     MEDICATIONS:  enoxaparin Injectable 40 milliGRAM(s) SubCutaneous every 12 hours  levoFLOXacin IVPB      predniSONE   Tablet 10 milliGRAM(s) Oral daily  vancomycin  IVPB 1250 milliGRAM(s) IV Intermittent every 12 hours  vancomycin  IVPB          ANTIBIOTICS:  levoFLOXacin IVPB      vancomycin  IVPB 1250 milliGRAM(s) IV Intermittent every 12 hours  vancomycin  IVPB

## 2020-12-08 NOTE — ED ADULT NURSE REASSESSMENT NOTE - NS ED NURSE REASSESS COMMENT FT1
MD  made aware about patietns HR- as per MD he is okay with HR and is attributing it to hypoxia and tachypnea. No interventions at this time.

## 2020-12-08 NOTE — CONSULT NOTE ADULT - SUBJECTIVE AND OBJECTIVE BOX
Patient is a 60y old  Male who presents with a chief complaint of SOB (08 Dec 2020 08:10)      HPI:  60 yr M with recent COVID pneumonia discharged 3 weeks ago on 2LNC, s/p steroid course and plasma Tx, GIST (s/p resection), DM type II , RA (on prednisone), HTN, HLD and herniated disc  presented to ED for SOB and desaturation to 70s while walking in his house, pt was did=charged from the [facility] for covid pneumonia 3 weeks ago, on 2LNC, s/p steroid course (currently at 10mg which is his dose for RA) and plasma Tx,  was doing Ok at his baseline, uses O2 at rest and on exertion, pt was not able to walk today due to chest pain, SOB and his saturation was in 70s with HR 120s, so pt calleld 911,   pt denies any fever, cough, abd pain, diarrhea, N/V, urinary symptoms or legs swelling.    in ED; tachycardia 130, tachypnea 28, was placed on HFNC, CTA chest showed no PE, but   worsening diffuse bilateral patchy ground-glass opacities with new areas of patchy consolidations and new bronchiectasis, compatible with an infectious/inflammatory etiology.  Findings likely represent sequela of known previous Covid-19 infection. Superimposed bacterial infection is a possibility.     (07 Dec 2020 23:46)      PAST MEDICAL & SURGICAL HISTORY:  Pneumonia due to COVID-19 virus    Hyperlipidemia    Lumbago    Hypertension    H/O lymph node biopsy  Neck by Dr Evie SANCHEZ HX:   Smoking                         ETOH                            Other    FAMILY HISTORY:  Known health problems: none    :  No known cardiovacular family hisotry     Review Of Systems:     All ROS are negative except per HPI       Allergies    penicillin (Unknown)  shellfish (Pruritus)    Intolerances          PHYSICAL EXAM    ICU Vital Signs Last 24 Hrs  T(C): 36.5 (08 Dec 2020 08:00), Max: 37.3 (08 Dec 2020 02:00)  T(F): 97.7 (08 Dec 2020 08:00), Max: 99.2 (08 Dec 2020 02:00)  HR: 104 (08 Dec 2020 08:00) (104 - 125)  BP: 124/83 (08 Dec 2020 08:00) (119/90 - 150/110)  BP(mean): 93 (08 Dec 2020 08:00) (93 - 106)  ABP: --  ABP(mean): --  RR: 16 (08 Dec 2020 08:00) (16 - 30)  SpO2: 99% (08 Dec 2020 08:00) (86% - 99%)      CONSTITUTIONAL:  Well nourished.  NAD    ENT:   Airway patent,   Mouth with normal mucosa.   No thrush    EYES:   pupils equal,   round and reactive to light.    CARDIAC:   Tachy   Regular rhythm.    Heart sounds S1, S2.   No edema      Vascular:   normal systolic impulse  no bruits    RESPIRATORY:   No wheezing   Bilateral crackles   Normal chest expansion  No use of accessory muscles    GASTROINTESTINAL:  Abdomen soft   Non-tender,   No guarding,   + BS    GENITOURINARY  normal genitalia for sex  no edema    MUSCULOSKELETAL:   Range of motion is not limited,  No clubbing, cyanosis    NEUROLOGICAL:   Alert and oriented   No motor or sensory deficits.  Pertinent DTRs normal    SKIN:   Skin normal color for race,   Warm and dry  No evidence of rash.    PSYCHIATRIC:   Normal mood and affect.   No apparent risk to self or others.    HEME LYMPH:   No cervical  lymphadenopathy.  No inguinal lymphadenopathy            12-07-20 @ 07:01  -  12-08-20 @ 07:00  --------------------------------------------------------  IN:    Oral Fluid: 240 mL  Total IN: 240 mL    OUT:  Total OUT: 0 mL    Total NET: 240 mL          LABS:                          14.3   13.37 )-----------( 261      ( 08 Dec 2020 05:16 )             43.1                                               12-08    136  |  101  |  10  ----------------------------<  178<H>  4.1   |  24  |  0.7    Ca    9.3      08 Dec 2020 05:16  Mg     1.7     12-08    TPro  6.5  /  Alb  3.4<L>  /  TBili  1.1  /  DBili  x   /  AST  20  /  ALT  51<H>  /  AlkPhos  109  12-08                                                 CARDIAC MARKERS ( 08 Dec 2020 05:16 )  x     / <0.01 ng/mL / x     / x     / 1.9 ng/mL  CARDIAC MARKERS ( 07 Dec 2020 21:10 )  x     / <0.01 ng/mL / x     / x     / x                                                LIVER FUNCTIONS - ( 08 Dec 2020 05:16 )  Alb: 3.4 g/dL / Pro: 6.5 g/dL / ALK PHOS: 109 U/L / ALT: 51 U/L / AST: 20 U/L / GGT: x                                                                                                                                       X-Rays reviewed:                                                                                    ECHO    CXR interpreted by me:  Bilateral infiltrates     MEDICATIONS  (STANDING):  enoxaparin Injectable 40 milliGRAM(s) SubCutaneous every 12 hours  levoFLOXacin IVPB      predniSONE   Tablet 10 milliGRAM(s) Oral daily  vancomycin  IVPB 1250 milliGRAM(s) IV Intermittent every 12 hours  vancomycin  IVPB        MEDICATIONS  (PRN):

## 2020-12-08 NOTE — PROGRESS NOTE ADULT - SUBJECTIVE AND OBJECTIVE BOX
JOSÉ LUIS AREVALO 60y Male  MRN#: [id number]       SUBJECTIVE  Patient is a 60y old Male who presents with a chief complaint of SOB (08 Dec 2020 09:31)      ***    Today is hospital day 1d, and he had 1 non-bloody emesis at 3am this morning. Otherwise doing well.        OBJECTIVE  PAST MEDICAL & SURGICAL HISTORY  Pneumonia due to COVID-19 virus    Hyperlipidemia    Lumbago    Hypertension    H/O lymph node biopsy  Neck by Dr Case      ALLERGIES:  penicillin (Unknown)  shellfish (Pruritus)    MEDICATIONS:  STANDING MEDICATIONS  cefepime   IVPB 2000 milliGRAM(s) IV Intermittent every 12 hours  cefepime   IVPB      cefepime   IVPB 2000 milliGRAM(s) IV Intermittent every 8 hours  enoxaparin Injectable 40 milliGRAM(s) SubCutaneous every 12 hours  furosemide    Tablet 40 milliGRAM(s) Oral daily  levoFLOXacin IVPB      methylPREDNISolone sodium succinate Injectable 60 milliGRAM(s) IV Push every 8 hours  vancomycin  IVPB 1250 milliGRAM(s) IV Intermittent every 12 hours  vancomycin  IVPB      voriconazole IVPB 500 milliGRAM(s) IV Intermittent every 12 hours    PRN MEDICATIONS      VITAL SIGNS: Last 24 Hours  T(C): 36.3 (08 Dec 2020 12:00), Max: 37.3 (08 Dec 2020 02:00)  T(F): 97.4 (08 Dec 2020 12:00), Max: 99.2 (08 Dec 2020 02:00)  HR: 108 (08 Dec 2020 13:00) (104 - 125)  BP: 140/89 (08 Dec 2020 13:00) (119/90 - 150/110)  BP(mean): 108 (08 Dec 2020 13:00) (93 - 112)  RR: 20 (08 Dec 2020 13:00) (16 - 33)  SpO2: 99% (08 Dec 2020 13:00) (86% - 99%)    LABS:                        14.3   13.37 )-----------( 261      ( 08 Dec 2020 05:16 )             43.1     12-08    136  |  101  |  10  ----------------------------<  178<H>  4.1   |  24  |  0.7    Ca    9.3      08 Dec 2020 05:16  Mg     1.7     12-08    TPro  6.5  /  Alb  3.4<L>  /  TBili  1.1  /  DBili  x   /  AST  20  /  ALT  51<H>  /  AlkPhos  109  12-08          Lactate, Blood: 1.0 mmol/L (12-08-20 @ 05:16)  Troponin T, Serum: <0.01 ng/mL (12-08-20 @ 05:16)  Troponin T, Serum: <0.01 ng/mL (12-07-20 @ 21:10)      CARDIAC MARKERS ( 08 Dec 2020 05:16 )  x     / <0.01 ng/mL / x     / x     / 1.9 ng/mL  CARDIAC MARKERS ( 07 Dec 2020 21:10 )  x     / <0.01 ng/mL / x     / x     / x          RADIOLOGY:    reviewed  PHYSICAL EXAM:    GENERAL: NAD, well-developed, AAOx3  HEENT:  Atraumatic, Normocephalic. EOMI, PERRLA, conjunctiva and sclera clear, No JVD  PULMONARY: b/l crackles  CARDIOVASCULAR: Regular rate and rhythm; No murmurs, rubs, or gallops  GASTROINTESTINAL: Soft, Nontender, Nondistended; Bowel sounds present  MUSCULOSKELETAL:  2+ Peripheral Pulses, No clubbing, cyanosis, or edema  NEUROLOGY: non-focal  SKIN: No rashes or lesions      ADMISSION SUMMARY  Patient is a 60y old Male who presents with a chief complaint of SOB (08 Dec 2020 09:31)

## 2020-12-09 LAB
A1C WITH ESTIMATED AVERAGE GLUCOSE RESULT: 7.5 % — HIGH (ref 4–5.6)
ALBUMIN SERPL ELPH-MCNC: 3.3 G/DL — LOW (ref 3.5–5.2)
ALP SERPL-CCNC: 115 U/L — SIGNIFICANT CHANGE UP (ref 30–115)
ALT FLD-CCNC: 35 U/L — SIGNIFICANT CHANGE UP (ref 0–41)
ANION GAP SERPL CALC-SCNC: 13 MMOL/L — SIGNIFICANT CHANGE UP (ref 7–14)
AST SERPL-CCNC: 13 U/L — SIGNIFICANT CHANGE UP (ref 0–41)
BASOPHILS # BLD AUTO: 0.02 K/UL — SIGNIFICANT CHANGE UP (ref 0–0.2)
BASOPHILS NFR BLD AUTO: 0.1 % — SIGNIFICANT CHANGE UP (ref 0–1)
BILIRUB SERPL-MCNC: 0.2 MG/DL — SIGNIFICANT CHANGE UP (ref 0.2–1.2)
BUN SERPL-MCNC: 15 MG/DL — SIGNIFICANT CHANGE UP (ref 10–20)
CALCIUM SERPL-MCNC: 9.1 MG/DL — SIGNIFICANT CHANGE UP (ref 8.5–10.1)
CHLORIDE SERPL-SCNC: 100 MMOL/L — SIGNIFICANT CHANGE UP (ref 98–110)
CO2 SERPL-SCNC: 23 MMOL/L — SIGNIFICANT CHANGE UP (ref 17–32)
CREAT SERPL-MCNC: 0.9 MG/DL — SIGNIFICANT CHANGE UP (ref 0.7–1.5)
CRP SERPL-MCNC: 20.7 MG/DL — HIGH (ref 0–0.4)
D DIMER BLD IA.RAPID-MCNC: 294 NG/ML DDU — HIGH (ref 0–230)
D DIMER BLD IA.RAPID-MCNC: 388 NG/ML DDU — HIGH (ref 0–230)
EOSINOPHIL # BLD AUTO: 0 K/UL — SIGNIFICANT CHANGE UP (ref 0–0.7)
EOSINOPHIL NFR BLD AUTO: 0 % — SIGNIFICANT CHANGE UP (ref 0–8)
ESTIMATED AVERAGE GLUCOSE: 169 MG/DL — HIGH (ref 68–114)
FERRITIN SERPL-MCNC: 576 NG/ML — HIGH (ref 30–400)
FERRITIN SERPL-MCNC: 674 NG/ML — HIGH (ref 30–400)
GLUCOSE BLDC GLUCOMTR-MCNC: 281 MG/DL — HIGH (ref 70–99)
GLUCOSE BLDC GLUCOMTR-MCNC: 315 MG/DL — HIGH (ref 70–99)
GLUCOSE BLDC GLUCOMTR-MCNC: 371 MG/DL — HIGH (ref 70–99)
GLUCOSE SERPL-MCNC: 311 MG/DL — HIGH (ref 70–99)
HCT VFR BLD CALC: 42.1 % — SIGNIFICANT CHANGE UP (ref 42–52)
HGB BLD-MCNC: 13.8 G/DL — LOW (ref 14–18)
IMM GRANULOCYTES NFR BLD AUTO: 0.8 % — HIGH (ref 0.1–0.3)
INR BLD: 1.27 RATIO — SIGNIFICANT CHANGE UP (ref 0.65–1.3)
LDH SERPL L TO P-CCNC: 390 U/L — HIGH (ref 50–242)
LYMPHOCYTES # BLD AUTO: 0.91 K/UL — LOW (ref 1.2–3.4)
LYMPHOCYTES # BLD AUTO: 6.3 % — LOW (ref 20.5–51.1)
MAGNESIUM SERPL-MCNC: 2.1 MG/DL — SIGNIFICANT CHANGE UP (ref 1.8–2.4)
MCHC RBC-ENTMCNC: 30.1 PG — SIGNIFICANT CHANGE UP (ref 27–31)
MCHC RBC-ENTMCNC: 32.8 G/DL — SIGNIFICANT CHANGE UP (ref 32–37)
MCV RBC AUTO: 91.9 FL — SIGNIFICANT CHANGE UP (ref 80–94)
MONOCYTES # BLD AUTO: 0.43 K/UL — SIGNIFICANT CHANGE UP (ref 0.1–0.6)
MONOCYTES NFR BLD AUTO: 3 % — SIGNIFICANT CHANGE UP (ref 1.7–9.3)
NEUTROPHILS # BLD AUTO: 12.99 K/UL — HIGH (ref 1.4–6.5)
NEUTROPHILS NFR BLD AUTO: 89.8 % — HIGH (ref 42.2–75.2)
NRBC # BLD: 0 /100 WBCS — SIGNIFICANT CHANGE UP (ref 0–0)
PHOSPHATE SERPL-MCNC: 3.1 MG/DL — SIGNIFICANT CHANGE UP (ref 2.1–4.9)
PLATELET # BLD AUTO: 307 K/UL — SIGNIFICANT CHANGE UP (ref 130–400)
POTASSIUM SERPL-MCNC: 4.2 MMOL/L — SIGNIFICANT CHANGE UP (ref 3.5–5)
POTASSIUM SERPL-SCNC: 4.2 MMOL/L — SIGNIFICANT CHANGE UP (ref 3.5–5)
PROCALCITONIN SERPL-MCNC: 0.1 NG/ML — SIGNIFICANT CHANGE UP (ref 0.02–0.1)
PROT SERPL-MCNC: 6.2 G/DL — SIGNIFICANT CHANGE UP (ref 6–8)
PROTHROM AB SERPL-ACNC: 14.6 SEC — HIGH (ref 9.95–12.87)
RBC # BLD: 4.58 M/UL — LOW (ref 4.7–6.1)
RBC # FLD: 16.2 % — HIGH (ref 11.5–14.5)
S PNEUM AG UR QL: NEGATIVE — SIGNIFICANT CHANGE UP
SODIUM SERPL-SCNC: 136 MMOL/L — SIGNIFICANT CHANGE UP (ref 135–146)
WBC # BLD: 14.47 K/UL — HIGH (ref 4.8–10.8)
WBC # FLD AUTO: 14.47 K/UL — HIGH (ref 4.8–10.8)

## 2020-12-09 PROCEDURE — 71045 X-RAY EXAM CHEST 1 VIEW: CPT | Mod: 26

## 2020-12-09 RX ORDER — SENNA PLUS 8.6 MG/1
2 TABLET ORAL AT BEDTIME
Refills: 0 | Status: DISCONTINUED | OUTPATIENT
Start: 2020-12-09 | End: 2020-12-21

## 2020-12-09 RX ORDER — VORICONAZOLE 10 MG/ML
330 INJECTION, POWDER, LYOPHILIZED, FOR SOLUTION INTRAVENOUS
Refills: 0 | Status: DISCONTINUED | OUTPATIENT
Start: 2020-12-09 | End: 2020-12-11

## 2020-12-09 RX ORDER — CHLORHEXIDINE GLUCONATE 213 G/1000ML
15 SOLUTION TOPICAL
Refills: 0 | Status: DISCONTINUED | OUTPATIENT
Start: 2020-12-09 | End: 2020-12-21

## 2020-12-09 RX ORDER — PANTOPRAZOLE SODIUM 20 MG/1
40 TABLET, DELAYED RELEASE ORAL
Refills: 0 | Status: DISCONTINUED | OUTPATIENT
Start: 2020-12-09 | End: 2020-12-21

## 2020-12-09 RX ADMIN — INSULIN GLARGINE 15 UNIT(S): 100 INJECTION, SOLUTION SUBCUTANEOUS at 21:25

## 2020-12-09 RX ADMIN — Medication 3: at 07:20

## 2020-12-09 RX ADMIN — VORICONAZOLE 125 MILLIGRAM(S): 10 INJECTION, POWDER, LYOPHILIZED, FOR SOLUTION INTRAVENOUS at 13:31

## 2020-12-09 RX ADMIN — SENNA PLUS 2 TABLET(S): 8.6 TABLET ORAL at 21:26

## 2020-12-09 RX ADMIN — VORICONAZOLE 125 MILLIGRAM(S): 10 INJECTION, POWDER, LYOPHILIZED, FOR SOLUTION INTRAVENOUS at 02:30

## 2020-12-09 RX ADMIN — CEFEPIME 100 MILLIGRAM(S): 1 INJECTION, POWDER, FOR SOLUTION INTRAMUSCULAR; INTRAVENOUS at 13:31

## 2020-12-09 RX ADMIN — Medication 600 MILLIGRAM(S): at 17:39

## 2020-12-09 RX ADMIN — Medication 5: at 11:10

## 2020-12-09 RX ADMIN — ENOXAPARIN SODIUM 40 MILLIGRAM(S): 100 INJECTION SUBCUTANEOUS at 17:40

## 2020-12-09 RX ADMIN — Medication 60 MILLIGRAM(S): at 21:26

## 2020-12-09 RX ADMIN — Medication 60 MILLIGRAM(S): at 13:32

## 2020-12-09 RX ADMIN — Medication 5 UNIT(S): at 16:41

## 2020-12-09 RX ADMIN — Medication 60 MILLIGRAM(S): at 05:10

## 2020-12-09 RX ADMIN — Medication 5 UNIT(S): at 11:09

## 2020-12-09 RX ADMIN — Medication 40 MILLIGRAM(S): at 05:10

## 2020-12-09 RX ADMIN — ENOXAPARIN SODIUM 40 MILLIGRAM(S): 100 INJECTION SUBCUTANEOUS at 05:09

## 2020-12-09 RX ADMIN — CEFEPIME 100 MILLIGRAM(S): 1 INJECTION, POWDER, FOR SOLUTION INTRAMUSCULAR; INTRAVENOUS at 21:25

## 2020-12-09 RX ADMIN — Medication 5 UNIT(S): at 07:19

## 2020-12-09 RX ADMIN — Medication 4: at 16:41

## 2020-12-09 RX ADMIN — CEFEPIME 100 MILLIGRAM(S): 1 INJECTION, POWDER, FOR SOLUTION INTRAMUSCULAR; INTRAVENOUS at 05:16

## 2020-12-09 RX ADMIN — CHLORHEXIDINE GLUCONATE 15 MILLILITER(S): 213 SOLUTION TOPICAL at 17:40

## 2020-12-09 RX ADMIN — Medication 250 MILLIGRAM(S): at 05:16

## 2020-12-09 NOTE — CHART NOTE - NSCHARTNOTEFT_GEN_A_CORE
Spoke and updated Jodi Joseph, the patient's daughter. Answered all of her questions, including lab trends, medication changes, oxygen requirement progressions, and plans.

## 2020-12-09 NOTE — PROGRESS NOTE ADULT - ASSESSMENT
IMPRESSION:    Acute hypoxemic respiratory failure  HO COVID pneumonia   Worsening infiltrates RO opportunistic infection VS inflammatory / Fibrotic changes    HO RA Chronic steroid use     PLAN:    CNS:  No depressants     HEENT: Oral care    PULMONARY:  HOB @ 45 degrees.  Aspiration precautions.  Wean O2 as tolerated.  Solumedrol 60 mg Q8.      CARDIOVASCULAR:  I=O>  Avoid volume overload     GI: GI prophylaxis.  Feeding.  Bowel regimen     RENAL:  Follow up lytes.  Correct as needed    INFECTIOUS DISEASE: Follow up cultures. FU  Galactomannan and Fungitell.  Cefepime Levaquin, and Vori. DC Vanc.   Urine strep and Legionella Ag.  Inflammatory markers     HEMATOLOGICAL:  DVT prophylaxis.     ENDOCRINE:  Follow up FS.  Insulin protocol if needed.    MUSCULOSKELETAL:  Bed Rest     MICU

## 2020-12-09 NOTE — PROGRESS NOTE ADULT - SUBJECTIVE AND OBJECTIVE BOX
Patient is a 60y old  Male who presents with a chief complaint of SOB (08 Dec 2020 14:26)        Over Night Events:  Feels better.  off pressors.  On HFNCO2 60 Liters 70%         ROS:     All ROS are negative except HPI         PHYSICAL EXAM    ICU Vital Signs Last 24 Hrs  T(C): 36.7 (09 Dec 2020 06:00), Max: 37.2 (09 Dec 2020 04:00)  T(F): 98 (09 Dec 2020 06:00), Max: 99 (09 Dec 2020 04:00)  HR: 114 (09 Dec 2020 08:00) (92 - 116)  BP: 127/92 (09 Dec 2020 08:00) (103/78 - 146/95)  BP(mean): 105 (09 Dec 2020 08:00) (85 - 112)  ABP: --  ABP(mean): --  RR: 31 (09 Dec 2020 08:00) (16 - 47)  SpO2: 92% (09 Dec 2020 08:00) (92% - 99%)      CONSTITUTIONAL:  Well nourished.  NAD    ENT:   Airway patent,   Mouth with normal mucosa.   No thrush    EYES:   Pupils equal,   Round and reactive to light.    CARDIAC:   Normal rate,   Regular rhythm.    No edema      Vascular:  Normal systolic impulse  No Carotid bruits    RESPIRATORY:   No wheezing  Bilateral crackles   Normal chest expansion  Not tachypneic,  No use of accessory muscles    GASTROINTESTINAL:  Abdomen soft,   Non-tender,   No guarding,   + BS    MUSCULOSKELETAL:   Range of motion is not limited,  No clubbing, cyanosis    NEUROLOGICAL:   Alert and oriented   No motor  deficits.    SKIN:   Skin normal color for race,   Warm and dry and intact.   No evidence of rash.    PSYCHIATRIC:   Normal mood and affect.   No apparent risk to self or others.    HEMATOLOGICAL:  No cervical  lymphadenopathy.  no inguinal lymphadenopathy      12-08-20 @ 07:01  -  12-09-20 @ 07:00  --------------------------------------------------------  IN:    IV PiggyBack: 1700 mL  Total IN: 1700 mL    OUT:    Voided (mL): 3000 mL  Total OUT: 3000 mL    Total NET: -1300 mL          LABS:                            13.8   14.47 )-----------( 307      ( 09 Dec 2020 04:53 )             42.1                                               12-09    136  |  100  |  15  ----------------------------<  311<H>  4.2   |  23  |  0.9    Ca    9.1      09 Dec 2020 04:53  Phos  3.1     12-09  Mg     2.1     12-09    TPro  6.2  /  Alb  3.3<L>  /  TBili  0.2  /  DBili  x   /  AST  13  /  ALT  35  /  AlkPhos  115  12-09      PT/INR - ( 09 Dec 2020 04:53 )   PT: 14.60 sec;   INR: 1.27 ratio                                                    CARDIAC MARKERS ( 08 Dec 2020 05:16 )  x     / <0.01 ng/mL / x     / x     / 1.9 ng/mL  CARDIAC MARKERS ( 07 Dec 2020 21:10 )  x     / <0.01 ng/mL / x     / x     / x                                                LIVER FUNCTIONS - ( 09 Dec 2020 04:53 )  Alb: 3.3 g/dL / Pro: 6.2 g/dL / ALK PHOS: 115 U/L / ALT: 35 U/L / AST: 13 U/L / GGT: x                                                  Culture - Fungal, Blood (collected 08 Dec 2020 12:01)  Source: .Blood Blood-Peripheral  Preliminary Report (09 Dec 2020 09:20):    Testing in progress    Culture - Blood (collected 07 Dec 2020 15:10)  Source: .Blood Blood  Preliminary Report (08 Dec 2020 23:02):    No growth to date.    Culture - Blood (collected 07 Dec 2020 15:10)  Source: .Blood Blood  Preliminary Report (08 Dec 2020 23:02):    No growth to date.                                                                                           MEDICATIONS  (STANDING):  cefepime   IVPB 2000 milliGRAM(s) IV Intermittent every 12 hours  cefepime   IVPB      cefepime   IVPB 2000 milliGRAM(s) IV Intermittent every 8 hours  dextrose 40% Gel 15 Gram(s) Oral once  dextrose 5%. 1000 milliLiter(s) (50 mL/Hr) IV Continuous <Continuous>  dextrose 5%. 1000 milliLiter(s) (100 mL/Hr) IV Continuous <Continuous>  dextrose 50% Injectable 25 Gram(s) IV Push once  dextrose 50% Injectable 12.5 Gram(s) IV Push once  dextrose 50% Injectable 25 Gram(s) IV Push once  enoxaparin Injectable 40 milliGRAM(s) SubCutaneous every 12 hours  furosemide    Tablet 40 milliGRAM(s) Oral daily  glucagon  Injectable 1 milliGRAM(s) IntraMuscular once  insulin glargine Injectable (LANTUS) 15 Unit(s) SubCutaneous at bedtime  insulin lispro (ADMELOG) corrective regimen sliding scale   SubCutaneous three times a day before meals  insulin lispro Injectable (ADMELOG) 5 Unit(s) SubCutaneous three times a day before meals  levoFLOXacin IVPB      levoFLOXacin IVPB 750 milliGRAM(s) IV Intermittent every 24 hours  methylPREDNISolone sodium succinate Injectable 60 milliGRAM(s) IV Push every 8 hours  vancomycin  IVPB 1250 milliGRAM(s) IV Intermittent every 12 hours  vancomycin  IVPB        MEDICATIONS  (PRN):      New X-rays reviewed:                                                                                  ECHO    CXR interpreted by me:  Bilateral infiltrates

## 2020-12-09 NOTE — PROGRESS NOTE ADULT - ASSESSMENT
Assessment and Plan:   · Assessment	  IMPRESSION:    #Acute hypoxemic respiratory failure    -HOB @ 45 degrees  -Aspiration precautions  -Wean O2 as tolerated-      #history of COVID pneumonia   #Worsening infiltrates RO opportunistic infection VS inflammatory / Fibrotic changes    -Follow up cultures  -f/u Galactomannan and Fungitell  -Cefepime, Levaquin, and Vori  -d/c Vanc  -Urine strep and Legionella Ag negative  -Inflammatory markers   --dimer 493 decreased to 388  --CRP 20.70 from yesterday  --ferritin 674 from yesterday  -- from yesterday    #HO RA Chronic steroid use   -Solumedrol 60 mg Q8    #hyperglycemia  -, insulin protocol      I=O>  Avoid volume overload   GI prophylaxis.  Feeding.  Bowel regimen   Follow up lytes.  Correct as needed  DVT prophylaxis.   Bed Rest     code status: full code  dispo: MICU

## 2020-12-09 NOTE — PROGRESS NOTE ADULT - SUBJECTIVE AND OBJECTIVE BOX
JOSÉ LUIS AREVALO 60y Male  MRN#: [id number]       SUBJECTIVE  Patient is a 60y old Male who presents with a chief complaint of SOB (09 Dec 2020 09:53)      ***    Today is hospital day 2d, and this morning he is doing well w/o complaints except for his chronic right wrist pain 2/2 rheumatoid arthritis. No acute overnight events.     OBJECTIVE  PAST MEDICAL & SURGICAL HISTORY  Pneumonia due to COVID-19 virus    Hyperlipidemia    Lumbago    Hypertension    H/O lymph node biopsy  Neck by Dr Case      ALLERGIES:  penicillin (Unknown)  shellfish (Pruritus)    MEDICATIONS:  STANDING MEDICATIONS  cefepime   IVPB      cefepime   IVPB 2000 milliGRAM(s) IV Intermittent every 8 hours  dextrose 40% Gel 15 Gram(s) Oral once  dextrose 5%. 1000 milliLiter(s) IV Continuous <Continuous>  dextrose 5%. 1000 milliLiter(s) IV Continuous <Continuous>  dextrose 50% Injectable 25 Gram(s) IV Push once  dextrose 50% Injectable 12.5 Gram(s) IV Push once  dextrose 50% Injectable 25 Gram(s) IV Push once  enoxaparin Injectable 40 milliGRAM(s) SubCutaneous every 12 hours  furosemide    Tablet 40 milliGRAM(s) Oral daily  glucagon  Injectable 1 milliGRAM(s) IntraMuscular once  guaiFENesin  milliGRAM(s) Oral every 12 hours  insulin glargine Injectable (LANTUS) 15 Unit(s) SubCutaneous at bedtime  insulin lispro (ADMELOG) corrective regimen sliding scale   SubCutaneous three times a day before meals  insulin lispro Injectable (ADMELOG) 5 Unit(s) SubCutaneous three times a day before meals  levoFLOXacin IVPB      levoFLOXacin IVPB 750 milliGRAM(s) IV Intermittent every 24 hours  methylPREDNISolone sodium succinate Injectable 60 milliGRAM(s) IV Push every 8 hours  senna 2 Tablet(s) Oral at bedtime  voriconazole IVPB 330 milliGRAM(s) IV Intermittent <User Schedule>    PRN MEDICATIONS      VITAL SIGNS: Last 24 Hours  T(C): 36.4 (09 Dec 2020 12:00), Max: 37.2 (09 Dec 2020 04:00)  T(F): 97.6 (09 Dec 2020 12:00), Max: 99 (09 Dec 2020 04:00)  HR: 122 (09 Dec 2020 13:00) (92 - 122)  BP: 128/86 (09 Dec 2020 13:00) (103/78 - 141/88)  BP(mean): 99 (09 Dec 2020 13:00) (85 - 108)  RR: 30 (09 Dec 2020 13:00) (16 - 31)  SpO2: 94% (09 Dec 2020 13:00) (92% - 99%)    LABS:                        13.8   14.47 )-----------( 307      ( 09 Dec 2020 04:53 )             42.1     12-09    136  |  100  |  15  ----------------------------<  311<H>  4.2   |  23  |  0.9    Ca    9.1      09 Dec 2020 04:53  Phos  3.1     12-09  Mg     2.1     12-09    TPro  6.2  /  Alb  3.3<L>  /  TBili  0.2  /  DBili  x   /  AST  13  /  ALT  35  /  AlkPhos  115  12-09    PT/INR - ( 09 Dec 2020 04:53 )   PT: 14.60 sec;   INR: 1.27 ratio                   Culture - Fungal, Blood (collected 08 Dec 2020 12:01)  Source: .Blood Blood-Peripheral  Preliminary Report (09 Dec 2020 09:20):    Testing in progress    Culture - Blood (collected 07 Dec 2020 15:10)  Source: .Blood Blood  Preliminary Report (08 Dec 2020 23:02):    No growth to date.    Culture - Blood (collected 07 Dec 2020 15:10)  Source: .Blood Blood  Preliminary Report (08 Dec 2020 23:02):    No growth to date.      CARDIAC MARKERS ( 08 Dec 2020 05:16 )  x     / <0.01 ng/mL / x     / x     / 1.9 ng/mL  CARDIAC MARKERS ( 07 Dec 2020 21:10 )  x     / <0.01 ng/mL / x     / x     / x          RADIOLOGY:  reviewed    PHYSICAL EXAM:    GENERAL: NAD, well-developed, AAOx3  HEENT:  Atraumatic, Normocephalic. EOMI, PERRLA, conjunctiva and sclera clear, No JVD  PULMONARY: b/ crackles  CARDIOVASCULAR: Regular rate and rhythm; No murmurs, rubs, or gallops  GASTROINTESTINAL: Soft, Nontender, Nondistended; Bowel sounds present  MUSCULOSKELETAL:  2+ Peripheral Pulses, No clubbing, cyanosis, or edema, right wrist tenderness  NEUROLOGY: non-focal  SKIN: No rashes or lesions      ADMISSION SUMMARY  Patient is a 60y old Male who presents with a chief complaint of SOB (09 Dec 2020 09:53)

## 2020-12-10 LAB
CRP SERPL-MCNC: 15.05 MG/DL — HIGH (ref 0–0.4)
FERRITIN SERPL-MCNC: 1039 NG/ML — HIGH (ref 30–400)
FUNGITELL: <31 PG/ML — SIGNIFICANT CHANGE UP
GLUCOSE BLDC GLUCOMTR-MCNC: 238 MG/DL — HIGH (ref 70–99)
GLUCOSE BLDC GLUCOMTR-MCNC: 245 MG/DL — HIGH (ref 70–99)
GLUCOSE BLDC GLUCOMTR-MCNC: 250 MG/DL — HIGH (ref 70–99)
GLUCOSE BLDC GLUCOMTR-MCNC: 250 MG/DL — HIGH (ref 70–99)
GLUCOSE BLDC GLUCOMTR-MCNC: 261 MG/DL — HIGH (ref 70–99)
LEGIONELLA AG UR QL: NEGATIVE — SIGNIFICANT CHANGE UP

## 2020-12-10 RX ORDER — METOPROLOL TARTRATE 50 MG
25 TABLET ORAL
Refills: 0 | Status: DISCONTINUED | OUTPATIENT
Start: 2020-12-10 | End: 2020-12-10

## 2020-12-10 RX ORDER — CHLORHEXIDINE GLUCONATE 213 G/1000ML
1 SOLUTION TOPICAL
Refills: 0 | Status: DISCONTINUED | OUTPATIENT
Start: 2020-12-10 | End: 2020-12-21

## 2020-12-10 RX ORDER — METOPROLOL TARTRATE 50 MG
25 TABLET ORAL EVERY 12 HOURS
Refills: 0 | Status: DISCONTINUED | OUTPATIENT
Start: 2020-12-10 | End: 2020-12-14

## 2020-12-10 RX ADMIN — VORICONAZOLE 125 MILLIGRAM(S): 10 INJECTION, POWDER, LYOPHILIZED, FOR SOLUTION INTRAVENOUS at 16:20

## 2020-12-10 RX ADMIN — Medication 25 MILLIGRAM(S): at 17:21

## 2020-12-10 RX ADMIN — Medication 5 UNIT(S): at 11:54

## 2020-12-10 RX ADMIN — Medication 60 MILLIGRAM(S): at 13:55

## 2020-12-10 RX ADMIN — ENOXAPARIN SODIUM 40 MILLIGRAM(S): 100 INJECTION SUBCUTANEOUS at 05:30

## 2020-12-10 RX ADMIN — CHLORHEXIDINE GLUCONATE 15 MILLILITER(S): 213 SOLUTION TOPICAL at 17:21

## 2020-12-10 RX ADMIN — Medication 40 MILLIGRAM(S): at 05:29

## 2020-12-10 RX ADMIN — Medication 5 UNIT(S): at 16:21

## 2020-12-10 RX ADMIN — VORICONAZOLE 125 MILLIGRAM(S): 10 INJECTION, POWDER, LYOPHILIZED, FOR SOLUTION INTRAVENOUS at 02:15

## 2020-12-10 RX ADMIN — CEFEPIME 100 MILLIGRAM(S): 1 INJECTION, POWDER, FOR SOLUTION INTRAMUSCULAR; INTRAVENOUS at 13:55

## 2020-12-10 RX ADMIN — Medication 5 UNIT(S): at 07:54

## 2020-12-10 RX ADMIN — Medication 60 MILLIGRAM(S): at 21:15

## 2020-12-10 RX ADMIN — CEFEPIME 100 MILLIGRAM(S): 1 INJECTION, POWDER, FOR SOLUTION INTRAMUSCULAR; INTRAVENOUS at 05:29

## 2020-12-10 RX ADMIN — PANTOPRAZOLE SODIUM 40 MILLIGRAM(S): 20 TABLET, DELAYED RELEASE ORAL at 05:29

## 2020-12-10 RX ADMIN — Medication 60 MILLIGRAM(S): at 05:30

## 2020-12-10 RX ADMIN — Medication 2: at 11:55

## 2020-12-10 RX ADMIN — Medication 2: at 07:54

## 2020-12-10 RX ADMIN — CHLORHEXIDINE GLUCONATE 15 MILLILITER(S): 213 SOLUTION TOPICAL at 05:29

## 2020-12-10 RX ADMIN — SENNA PLUS 2 TABLET(S): 8.6 TABLET ORAL at 21:15

## 2020-12-10 RX ADMIN — ENOXAPARIN SODIUM 40 MILLIGRAM(S): 100 INJECTION SUBCUTANEOUS at 17:21

## 2020-12-10 RX ADMIN — INSULIN GLARGINE 15 UNIT(S): 100 INJECTION, SOLUTION SUBCUTANEOUS at 21:15

## 2020-12-10 RX ADMIN — Medication 25 MILLIGRAM(S): at 11:44

## 2020-12-10 RX ADMIN — Medication 2: at 16:22

## 2020-12-10 RX ADMIN — Medication 600 MILLIGRAM(S): at 05:29

## 2020-12-10 RX ADMIN — CEFEPIME 100 MILLIGRAM(S): 1 INJECTION, POWDER, FOR SOLUTION INTRAMUSCULAR; INTRAVENOUS at 21:16

## 2020-12-10 RX ADMIN — Medication 600 MILLIGRAM(S): at 20:56

## 2020-12-10 NOTE — PHARMACOTHERAPY INTERVENTION NOTE - COMMENTS
bowel regimen, d/w team, add senna 2 tabs hs
plan: start metoprolol 25mg q12h, recommended 1st dose stat, -120
voriconazole per critical care team, restricted to ID, d/w Dr Crowder, voriconazole approved  -recommended 500mg IV q12h x2 doses, then 330mg IV q12h

## 2020-12-10 NOTE — PROGRESS NOTE ADULT - ASSESSMENT
ADMISSION SUMMARY  Patient is a 60y old Male who presents with a chief complaint of SOB (09 Dec 2020 09:53)    #Acute hypoxemic respiratory failure    -HOB @ 45 degrees  -Aspiration precautions  -Wean O2 as tolerated-      #history of COVID pneumonia   #Worsening infiltrates RO opportunistic infection VS inflammatory / Fibrotic changes    -Follow up cultures  -f/u Galactomannan and Fungitell  -Cefepime, Levaquin, and Vori  -d/c Vanc  -Urine strep and Legionella Ag negative  -Inflammatory markers   --dimer 493 decreased to 388  --CRP 20.70 from yesterday  --ferritin 674 from yesterday  -- from yesterday  -PENDING CULTURES    #HO RA Chronic steroid use   -Solumedrol 60 mg Q8    #hyperglycemia  -, insulin protocol    LASIX 40mg  START LOPRESSOR 25MG DAILY    I=O>  Avoid volume overload   GI prophylaxis.  Feeding.  Bowel regimen   Follow up lytes.  Correct as needed  DVT prophylaxis.   Bed Rest     code status: full code  dispo: MICU

## 2020-12-10 NOTE — PROGRESS NOTE ADULT - SUBJECTIVE AND OBJECTIVE BOX
Patient is a 60y old  Male who presents with a chief complaint of SOB (09 Dec 2020 15:41)        Over Night Events:  Remains in HFNCO2 60 liters 50% saturating 94         ROS:     All ROS are negative except HPI         PHYSICAL EXAM    ICU Vital Signs Last 24 Hrs  T(C): 36.4 (10 Dec 2020 08:00), Max: 36.4 (09 Dec 2020 12:00)  T(F): 97.6 (10 Dec 2020 08:00), Max: 97.6 (09 Dec 2020 12:00)  HR: 102 (10 Dec 2020 08:00) (92 - 122)  BP: 116/84 (10 Dec 2020 08:00) (100/74 - 141/88)  BP(mean): 94 (10 Dec 2020 08:00) (83 - 109)  ABP: --  ABP(mean): --  RR: 20 (10 Dec 2020 08:00) (15 - 42)  SpO2: 95% (10 Dec 2020 08:00) (93% - 99%)      CONSTITUTIONAL:  Well nourished.  NAD    ENT:   Airway patent,   Mouth with normal mucosa.   No thrush    EYES:   Pupils equal,   Round and reactive to light.    CARDIAC:   Tachycardic  Regular rhythm.    No edema      Vascular:  Normal systolic impulse  No Carotid bruits    RESPIRATORY:   No wheezing  Bilateral crackles   Normal chest expansion  Not tachypneic,  No use of accessory muscles    GASTROINTESTINAL:  Abdomen soft,   Non-tender,   No guarding,   + BS    MUSCULOSKELETAL:   Range of motion is not limited,  No clubbing, cyanosis    NEUROLOGICAL:   Alert and oriented   No motor  deficits.    SKIN:   Skin normal color for race,   Warm and dry and intact.   No evidence of rash.    PSYCHIATRIC:   Normal mood and affect.   No apparent risk to self or others.    HEMATOLOGICAL:  No cervical  lymphadenopathy.  no inguinal lymphadenopathy      12-09-20 @ 07:01  -  12-10-20 @ 07:00  --------------------------------------------------------  IN:    IV PiggyBack: 450 mL    Oral Fluid: 100 mL  Total IN: 550 mL    OUT:    Voided (mL): 2200 mL  Total OUT: 2200 mL    Total NET: -1650 mL          LABS:                            13.8   14.47 )-----------( 307      ( 09 Dec 2020 04:53 )             42.1                                               12-09    136  |  100  |  15  ----------------------------<  311<H>  4.2   |  23  |  0.9    Ca    9.1      09 Dec 2020 04:53  Phos  3.1     12-09  Mg     2.1     12-09    TPro  6.2  /  Alb  3.3<L>  /  TBili  0.2  /  DBili  x   /  AST  13  /  ALT  35  /  AlkPhos  115  12-09      PT/INR - ( 09 Dec 2020 04:53 )   PT: 14.60 sec;   INR: 1.27 ratio                                                                                              LIVER FUNCTIONS - ( 09 Dec 2020 04:53 )  Alb: 3.3 g/dL / Pro: 6.2 g/dL / ALK PHOS: 115 U/L / ALT: 35 U/L / AST: 13 U/L / GGT: x                                                  Culture - Blood (collected 08 Dec 2020 12:01)  Source: .Blood None  Preliminary Report (09 Dec 2020 23:02):    No growth to date.    Culture - Fungal, Blood (collected 08 Dec 2020 12:01)  Source: .Blood Blood-Peripheral  Preliminary Report (09 Dec 2020 09:20):    Testing in progress    Culture - Blood (collected 07 Dec 2020 15:10)  Source: .Blood Blood  Preliminary Report (08 Dec 2020 23:02):    No growth to date.    Culture - Blood (collected 07 Dec 2020 15:10)  Source: .Blood Blood  Preliminary Report (08 Dec 2020 23:02):    No growth to date.                                                                                           MEDICATIONS  (STANDING):  cefepime   IVPB      cefepime   IVPB 2000 milliGRAM(s) IV Intermittent every 8 hours  chlorhexidine 0.12% Liquid 15 milliLiter(s) Swish and Spit two times a day  chlorhexidine 4% Liquid 1 Application(s) Topical <User Schedule>  dextrose 40% Gel 15 Gram(s) Oral once  dextrose 5%. 1000 milliLiter(s) (50 mL/Hr) IV Continuous <Continuous>  dextrose 5%. 1000 milliLiter(s) (100 mL/Hr) IV Continuous <Continuous>  dextrose 50% Injectable 25 Gram(s) IV Push once  dextrose 50% Injectable 12.5 Gram(s) IV Push once  dextrose 50% Injectable 25 Gram(s) IV Push once  enoxaparin Injectable 40 milliGRAM(s) SubCutaneous every 12 hours  furosemide    Tablet 40 milliGRAM(s) Oral daily  glucagon  Injectable 1 milliGRAM(s) IntraMuscular once  guaiFENesin  milliGRAM(s) Oral every 12 hours  insulin glargine Injectable (LANTUS) 15 Unit(s) SubCutaneous at bedtime  insulin lispro (ADMELOG) corrective regimen sliding scale   SubCutaneous three times a day before meals  insulin lispro Injectable (ADMELOG) 5 Unit(s) SubCutaneous three times a day before meals  levoFLOXacin IVPB      levoFLOXacin IVPB 750 milliGRAM(s) IV Intermittent every 24 hours  methylPREDNISolone sodium succinate Injectable 60 milliGRAM(s) IV Push every 8 hours  pantoprazole    Tablet 40 milliGRAM(s) Oral before breakfast  senna 2 Tablet(s) Oral at bedtime  voriconazole IVPB 330 milliGRAM(s) IV Intermittent <User Schedule>    MEDICATIONS  (PRN):      New X-rays reviewed:                                                                                  ECHO    CXR interpreted by me:  Bilateral infiltrates

## 2020-12-10 NOTE — PROGRESS NOTE ADULT - SUBJECTIVE AND OBJECTIVE BOX
JOSÉ LUIS AREVALO 60y Male  MRN#: [id number]       SUBJECTIVE  Patient is a 60y old Male who presents with a chief complaint of SOB (10 Dec 2020 09:06)      ***    Today is hospital day 3d, and this morning he is doing better and speaking in full sentences without appearing short of breath. He wanted to started walking.    No acute overnight events.     OBJECTIVE  PAST MEDICAL & SURGICAL HISTORY  Pneumonia due to COVID-19 virus    Hyperlipidemia    Lumbago    Hypertension    H/O lymph node biopsy  Neck by Dr Case      ALLERGIES:  penicillin (Unknown)  shellfish (Pruritus)    MEDICATIONS:  STANDING MEDICATIONS  cefepime   IVPB      cefepime   IVPB 2000 milliGRAM(s) IV Intermittent every 8 hours  chlorhexidine 0.12% Liquid 15 milliLiter(s) Swish and Spit two times a day  chlorhexidine 4% Liquid 1 Application(s) Topical <User Schedule>  dextrose 40% Gel 15 Gram(s) Oral once  dextrose 5%. 1000 milliLiter(s) IV Continuous <Continuous>  dextrose 5%. 1000 milliLiter(s) IV Continuous <Continuous>  dextrose 50% Injectable 25 Gram(s) IV Push once  dextrose 50% Injectable 12.5 Gram(s) IV Push once  dextrose 50% Injectable 25 Gram(s) IV Push once  enoxaparin Injectable 40 milliGRAM(s) SubCutaneous every 12 hours  furosemide    Tablet 40 milliGRAM(s) Oral daily  glucagon  Injectable 1 milliGRAM(s) IntraMuscular once  guaiFENesin  milliGRAM(s) Oral every 12 hours  insulin glargine Injectable (LANTUS) 15 Unit(s) SubCutaneous at bedtime  insulin lispro (ADMELOG) corrective regimen sliding scale   SubCutaneous three times a day before meals  insulin lispro Injectable (ADMELOG) 5 Unit(s) SubCutaneous three times a day before meals  levoFLOXacin IVPB      levoFLOXacin IVPB 750 milliGRAM(s) IV Intermittent every 24 hours  methylPREDNISolone sodium succinate Injectable 60 milliGRAM(s) IV Push every 8 hours  metoprolol tartrate 25 milliGRAM(s) Oral every 12 hours  pantoprazole    Tablet 40 milliGRAM(s) Oral before breakfast  senna 2 Tablet(s) Oral at bedtime  voriconazole IVPB 330 milliGRAM(s) IV Intermittent <User Schedule>    PRN MEDICATIONS      VITAL SIGNS: Last 24 Hours  T(C): 36.7 (10 Dec 2020 12:00), Max: 36.7 (10 Dec 2020 12:00)  T(F): 98.1 (10 Dec 2020 12:00), Max: 98.1 (10 Dec 2020 12:00)  HR: 96 (10 Dec 2020 13:00) (92 - 114)  BP: 118/81 (10 Dec 2020 13:00) (103/75 - 136/97)  BP(mean): 94 (10 Dec 2020 13:00) (83 - 109)  RR: 26 (10 Dec 2020 13:00) (15 - 30)  SpO2: 92% (10 Dec 2020 13:00) (92% - 99%)    LABS:                        13.8   14.47 )-----------( 307      ( 09 Dec 2020 04:53 )             42.1     12-09    136  |  100  |  15  ----------------------------<  311<H>  4.2   |  23  |  0.9    Ca    9.1      09 Dec 2020 04:53  Phos  3.1     12-09  Mg     2.1     12-09    TPro  6.2  /  Alb  3.3<L>  /  TBili  0.2  /  DBili  x   /  AST  13  /  ALT  35  /  AlkPhos  115  12-09    PT/INR - ( 09 Dec 2020 04:53 )   PT: 14.60 sec;   INR: 1.27 ratio                   Culture - Blood (collected 08 Dec 2020 12:01)  Source: .Blood None  Preliminary Report (09 Dec 2020 23:02):    No growth to date.    Culture - Fungal, Blood (collected 08 Dec 2020 12:01)  Source: .Blood Blood-Peripheral  Preliminary Report (09 Dec 2020 09:20):    Testing in progress    Culture - Blood (collected 07 Dec 2020 15:10)  Source: .Blood Blood  Preliminary Report (08 Dec 2020 23:02):    No growth to date.    Culture - Blood (collected 07 Dec 2020 15:10)  Source: .Blood Blood  Preliminary Report (08 Dec 2020 23:02):    No growth to date.          RADIOLOGY:      PHYSICAL EXAM:    GENERAL: NAD, well-developed, AAOx3  HEENT:  Atraumatic, Normocephalic. EOMI, PERRLA, conjunctiva and sclera clear, No JVD  PULMONARY: Clear to auscultation bilaterally; No wheeze  CARDIOVASCULAR: Regular rate and rhythm; No murmurs, rubs, or gallops  GASTROINTESTINAL: Soft, Nontender, Nondistended; Bowel sounds present  MUSCULOSKELETAL:  2+ Peripheral Pulses, No clubbing, cyanosis, or edema  NEUROLOGY: non-focal  SKIN: No rashes or lesions      ADMISSION SUMMARY  Patient is a 60y old Male who presents with a chief complaint of SOB (10 Dec 2020 09:06)

## 2020-12-10 NOTE — PROGRESS NOTE ADULT - ASSESSMENT
IMPRESSION:    Acute hypoxemic respiratory failure  HO COVID pneumonia   Worsening infiltrates RO opportunistic infection VS inflammatory / Fibrotic changes    HO RA Chronic steroid use     PLAN:    CNS:  No depressants     HEENT: Oral care    PULMONARY:  HOB @ 45 degrees.  Aspiration precautions.  Wean O2 as tolerated.  Solumedrol 60 mg Q8.      CARDIOVASCULAR:  I=O,  Avoid volume overload     GI: GI prophylaxis.  Feeding.  Bowel regimen     RENAL:  Follow up lytes.  Correct as needed    INFECTIOUS DISEASE: Follow up cultures. FU  Galactomannan and Fungitell.  Cefepime Levaquin, and Vori.  Urine strep and Legionella Ag.  Inflammatory markers     HEMATOLOGICAL:  DVT prophylaxis.     ENDOCRINE:  Follow up FS.  Insulin protocol if needed.    MUSCULOSKELETAL:  Bed Rest     SDU          IMPRESSION:    Acute hypoxemic respiratory failure  HO COVID pneumonia   Worsening infiltrates RO opportunistic infection VS inflammatory / Fibrotic changes    HO RA Chronic steroid use     PLAN:    CNS:  No depressants     HEENT: Oral care    PULMONARY:  HOB @ 45 degrees.  Aspiration precautions.  Wean O2 as tolerated.  Solumedrol 60 mg Q8.      CARDIOVASCULAR:  I=O,  Avoid volume overload.  Beta blockers     GI: GI prophylaxis.  Feeding.  Bowel regimen     RENAL:  Follow up lytes.  Correct as needed    INFECTIOUS DISEASE: Follow up cultures. FU  Galactomannan and Fungitell.  Cefepime Levaquin, and Vori.  Urine strep and Legionella Ag.  Inflammatory markers     HEMATOLOGICAL:  DVT prophylaxis.     ENDOCRINE:  Follow up FS.  Insulin protocol if needed.    MUSCULOSKELETAL:  Bed Rest     SDU

## 2020-12-11 LAB
ALBUMIN SERPL ELPH-MCNC: 3.1 G/DL — LOW (ref 3.5–5.2)
ALP SERPL-CCNC: 92 U/L — SIGNIFICANT CHANGE UP (ref 30–115)
ALT FLD-CCNC: 26 U/L — SIGNIFICANT CHANGE UP (ref 0–41)
ANION GAP SERPL CALC-SCNC: 12 MMOL/L — SIGNIFICANT CHANGE UP (ref 7–14)
AST SERPL-CCNC: 14 U/L — SIGNIFICANT CHANGE UP (ref 0–41)
BASOPHILS # BLD AUTO: 0.02 K/UL — SIGNIFICANT CHANGE UP (ref 0–0.2)
BASOPHILS NFR BLD AUTO: 0.1 % — SIGNIFICANT CHANGE UP (ref 0–1)
BILIRUB SERPL-MCNC: <0.2 MG/DL — SIGNIFICANT CHANGE UP (ref 0.2–1.2)
BUN SERPL-MCNC: 30 MG/DL — HIGH (ref 10–20)
CALCIUM SERPL-MCNC: 8.9 MG/DL — SIGNIFICANT CHANGE UP (ref 8.5–10.1)
CHLORIDE SERPL-SCNC: 99 MMOL/L — SIGNIFICANT CHANGE UP (ref 98–110)
CO2 SERPL-SCNC: 26 MMOL/L — SIGNIFICANT CHANGE UP (ref 17–32)
CREAT SERPL-MCNC: 1.1 MG/DL — SIGNIFICANT CHANGE UP (ref 0.7–1.5)
CRP SERPL-MCNC: 6.57 MG/DL — HIGH (ref 0–0.4)
EOSINOPHIL # BLD AUTO: 0 K/UL — SIGNIFICANT CHANGE UP (ref 0–0.7)
EOSINOPHIL NFR BLD AUTO: 0 % — SIGNIFICANT CHANGE UP (ref 0–8)
FERRITIN SERPL-MCNC: 819 NG/ML — HIGH (ref 30–400)
GLUCOSE BLDC GLUCOMTR-MCNC: 238 MG/DL — HIGH (ref 70–99)
GLUCOSE BLDC GLUCOMTR-MCNC: 301 MG/DL — HIGH (ref 70–99)
GLUCOSE BLDC GLUCOMTR-MCNC: 303 MG/DL — HIGH (ref 70–99)
GLUCOSE SERPL-MCNC: 260 MG/DL — HIGH (ref 70–99)
HCT VFR BLD CALC: 38.6 % — LOW (ref 42–52)
HGB BLD-MCNC: 12.7 G/DL — LOW (ref 14–18)
IMM GRANULOCYTES NFR BLD AUTO: 0.9 % — HIGH (ref 0.1–0.3)
LYMPHOCYTES # BLD AUTO: 0.96 K/UL — LOW (ref 1.2–3.4)
LYMPHOCYTES # BLD AUTO: 4.8 % — LOW (ref 20.5–51.1)
MAGNESIUM SERPL-MCNC: 2.1 MG/DL — SIGNIFICANT CHANGE UP (ref 1.8–2.4)
MCHC RBC-ENTMCNC: 30 PG — SIGNIFICANT CHANGE UP (ref 27–31)
MCHC RBC-ENTMCNC: 32.9 G/DL — SIGNIFICANT CHANGE UP (ref 32–37)
MCV RBC AUTO: 91.3 FL — SIGNIFICANT CHANGE UP (ref 80–94)
MONOCYTES # BLD AUTO: 0.92 K/UL — HIGH (ref 0.1–0.6)
MONOCYTES NFR BLD AUTO: 4.6 % — SIGNIFICANT CHANGE UP (ref 1.7–9.3)
NEUTROPHILS # BLD AUTO: 18.07 K/UL — HIGH (ref 1.4–6.5)
NEUTROPHILS NFR BLD AUTO: 89.6 % — HIGH (ref 42.2–75.2)
NRBC # BLD: 0 /100 WBCS — SIGNIFICANT CHANGE UP (ref 0–0)
PHOSPHATE SERPL-MCNC: 4 MG/DL — SIGNIFICANT CHANGE UP (ref 2.1–4.9)
PLATELET # BLD AUTO: 391 K/UL — SIGNIFICANT CHANGE UP (ref 130–400)
POTASSIUM SERPL-MCNC: 3.9 MMOL/L — SIGNIFICANT CHANGE UP (ref 3.5–5)
POTASSIUM SERPL-SCNC: 3.9 MMOL/L — SIGNIFICANT CHANGE UP (ref 3.5–5)
PROCALCITONIN SERPL-MCNC: 0.04 NG/ML — SIGNIFICANT CHANGE UP (ref 0.02–0.1)
PROT SERPL-MCNC: 5.7 G/DL — LOW (ref 6–8)
RBC # BLD: 4.23 M/UL — LOW (ref 4.7–6.1)
RBC # FLD: 16.5 % — HIGH (ref 11.5–14.5)
S PNEUM AG UR QL: NEGATIVE — SIGNIFICANT CHANGE UP
SODIUM SERPL-SCNC: 137 MMOL/L — SIGNIFICANT CHANGE UP (ref 135–146)
WBC # BLD: 20.16 K/UL — HIGH (ref 4.8–10.8)
WBC # FLD AUTO: 20.16 K/UL — HIGH (ref 4.8–10.8)

## 2020-12-11 PROCEDURE — 71045 X-RAY EXAM CHEST 1 VIEW: CPT | Mod: 26

## 2020-12-11 RX ORDER — POLYETHYLENE GLYCOL 3350 17 G/17G
17 POWDER, FOR SOLUTION ORAL DAILY
Refills: 0 | Status: DISCONTINUED | OUTPATIENT
Start: 2020-12-11 | End: 2020-12-21

## 2020-12-11 RX ADMIN — POLYETHYLENE GLYCOL 3350 17 GRAM(S): 17 POWDER, FOR SOLUTION ORAL at 11:25

## 2020-12-11 RX ADMIN — Medication 60 MILLIGRAM(S): at 13:10

## 2020-12-11 RX ADMIN — Medication 2: at 07:30

## 2020-12-11 RX ADMIN — Medication 60 MILLIGRAM(S): at 23:04

## 2020-12-11 RX ADMIN — Medication 4: at 11:25

## 2020-12-11 RX ADMIN — Medication 600 MILLIGRAM(S): at 17:02

## 2020-12-11 RX ADMIN — Medication 5 UNIT(S): at 11:24

## 2020-12-11 RX ADMIN — CEFEPIME 100 MILLIGRAM(S): 1 INJECTION, POWDER, FOR SOLUTION INTRAMUSCULAR; INTRAVENOUS at 23:06

## 2020-12-11 RX ADMIN — INSULIN GLARGINE 15 UNIT(S): 100 INJECTION, SOLUTION SUBCUTANEOUS at 23:06

## 2020-12-11 RX ADMIN — CHLORHEXIDINE GLUCONATE 15 MILLILITER(S): 213 SOLUTION TOPICAL at 05:25

## 2020-12-11 RX ADMIN — CHLORHEXIDINE GLUCONATE 15 MILLILITER(S): 213 SOLUTION TOPICAL at 17:02

## 2020-12-11 RX ADMIN — ENOXAPARIN SODIUM 40 MILLIGRAM(S): 100 INJECTION SUBCUTANEOUS at 05:26

## 2020-12-11 RX ADMIN — CEFEPIME 100 MILLIGRAM(S): 1 INJECTION, POWDER, FOR SOLUTION INTRAMUSCULAR; INTRAVENOUS at 05:25

## 2020-12-11 RX ADMIN — Medication 5 UNIT(S): at 07:30

## 2020-12-11 RX ADMIN — Medication 25 MILLIGRAM(S): at 17:02

## 2020-12-11 RX ADMIN — Medication 25 MILLIGRAM(S): at 05:26

## 2020-12-11 RX ADMIN — Medication 600 MILLIGRAM(S): at 05:26

## 2020-12-11 RX ADMIN — Medication 60 MILLIGRAM(S): at 05:26

## 2020-12-11 RX ADMIN — ENOXAPARIN SODIUM 40 MILLIGRAM(S): 100 INJECTION SUBCUTANEOUS at 17:01

## 2020-12-11 RX ADMIN — Medication 40 MILLIGRAM(S): at 05:26

## 2020-12-11 RX ADMIN — Medication 4: at 17:01

## 2020-12-11 RX ADMIN — SENNA PLUS 2 TABLET(S): 8.6 TABLET ORAL at 23:05

## 2020-12-11 RX ADMIN — CEFEPIME 100 MILLIGRAM(S): 1 INJECTION, POWDER, FOR SOLUTION INTRAMUSCULAR; INTRAVENOUS at 13:09

## 2020-12-11 RX ADMIN — PANTOPRAZOLE SODIUM 40 MILLIGRAM(S): 20 TABLET, DELAYED RELEASE ORAL at 05:26

## 2020-12-11 RX ADMIN — Medication 5 UNIT(S): at 17:01

## 2020-12-11 NOTE — CHART NOTE - NSCHARTNOTEFT_GEN_A_CORE
60 yr M with recent COVID pneumonia discharged 3 weeks ago on 2LNC, s/p steroid course and plasma Tx, GIST (s/p resection), DM type II , RA (on prednisone), HTN, HLD and herniated disc  presented to ED for SOB and desaturation to 70s while walking in his house, pt was did=charged from the [facility] for covid pneumonia 3 weeks ago, on 2LNC, s/p steroid course (currently at 10mg which is his dose for RA) and plasma Tx, was doing Ok at his baseline, uses O2 at rest and on exertion, pt was not able to walk today due to chest pain, SOB and his saturation was in 70s with HR 120s, so pt calleld 911, pt denies any fever, cough, abd pain, diarrhea, N/V, urinary symptoms or legs swelling in ED; tachycardia 130, tachypnea 28, was placed on HFNC, CTA chest showed no PE, but  worsening diffuse bilateral patchy ground-glass opacities with new areas of patchy consolidations and new bronchiectasis, compatible with an infectious/inflammatory etiology. Findings likely represent sequela of known previous Covid-19 infection. Superimposed bacterial infection is a possibility. Since being in the ICU, he has been doing well and requiring less oxygen each day.    #Acute hypoxemic respiratory failure  -HOB @ 45 degrees  -Aspiration precautions  -Wean O2 as tolerated  -walking     #history of COVID pneumonia   #Worsening infiltrates RO opportunistic infection VS inflammatory / Fibrotic changes    -Cefepime and levaquin  -Urine strep and Legionella Ag negative  -Inflammatory markers   -dimer 388  -CRP 20.70   --ferritin 674   --LDH up from 300 to 390   -fungitell and galactoman negative    #HO RA Chronic steroid use   -Solumedrol 60 mg Q8    #hyperglycemia  -, insulin protocol    #hypertension  -lasix 40mg  -lopressor 25mg daily  HR has been down from 120s to 80s since starting lopressor, doing well    I=O>  Avoid volume overload   GI prophylaxis.  Feeding.  Bowel regimen   Follow up lytes.  Correct as needed  DVT prophylaxis.   Bed Rest     code status: full code  dispo: stepdown unit or transfer to [facility]

## 2020-12-11 NOTE — PROGRESS NOTE ADULT - ASSESSMENT
60 yr M with recent COVID pneumonia discharged 3 weeks ago on 2LNC, s/p steroid course and plasma Tx, GIST (s/p resection), DM type II , RA (on prednisone), HTN, HLD and herniated disc    IMPRESSION;  COVID-19 positive 11/5. S/p dexamethason/plasma. CT with sequelae of COVID-19 .  PNA bacterial ? vs progressive lung patology secondary to COVID-19 ? Unclear given the very abnormal CT chest but will recommend treating for an ongoing infectious process  DDx : CAPA but doubt given NG GM  procalcitonin 0.10  , not suggestive of a bacterial PNA.  Ferritin 1039  CRP 15.05  Ddimers 279  Nares ORSA NG  BCx 12/7,8 NGTD  legionella NG  Fungitel < 31 ( excludes PCJ )  Galactommanan NT ( excludes CAPA )          RECOMMENDATIONS;  Zosyn 4.5 gm iv q6h  Tocilizumab 400 mg iv x once. Ferritin 48h later 12/11

## 2020-12-11 NOTE — PROGRESS NOTE ADULT - SUBJECTIVE AND OBJECTIVE BOX
Patient is a 60y old  Male who presents with a chief complaint of SOB (10 Dec 2020 14:39)        Over Night Events:  Remains on HFNCO2 60 liters 50%.  off pressors          ROS:     All ROS are negative except HPI         PHYSICAL EXAM    ICU Vital Signs Last 24 Hrs  T(C): 36.2 (11 Dec 2020 08:00), Max: 36.7 (10 Dec 2020 12:00)  T(F): 97.1 (11 Dec 2020 08:00), Max: 98.1 (10 Dec 2020 12:00)  HR: 76 (11 Dec 2020 08:00) (62 - 114)  BP: 121/89 (11 Dec 2020 08:00) (101/72 - 123/88)  BP(mean): 101 (11 Dec 2020 08:00) (79 - 101)  ABP: --  ABP(mean): --  RR: 18 (11 Dec 2020 08:00) (16 - 30)  SpO2: 97% (11 Dec 2020 08:00) (92% - 98%)      CONSTITUTIONAL:  Well nourished.  NAD    ENT:   Airway patent,   Mouth with normal mucosa.   No thrush    EYES:   Pupils equal,   Round and reactive to light.    CARDIAC:   Normal rate,   Regular rhythm.    No edema      Vascular:  Normal systolic impulse  No Carotid bruits    RESPIRATORY:   No wheezing  Bilateral crackles   Normal chest expansion  Not tachypneic,  No use of accessory muscles    GASTROINTESTINAL:  Abdomen soft,   Non-tender,   No guarding,   + BS    MUSCULOSKELETAL:   Range of motion is not limited,  No clubbing, cyanosis    NEUROLOGICAL:   Alert and oriented   No motor  deficits.    SKIN:   Skin normal color for race,   Warm and dry and intact.   No evidence of rash.    PSYCHIATRIC:   Normal mood and affect.   No apparent risk to self or others.    HEMATOLOGICAL:  No cervical  lymphadenopathy.  no inguinal lymphadenopathy      12-10-20 @ 07:01  -  12-11-20 @ 07:00  --------------------------------------------------------  IN:    IV PiggyBack: 500 mL    Oral Fluid: 480 mL  Total IN: 980 mL    OUT:    Voided (mL): 1400 mL  Total OUT: 1400 mL    Total NET: -420 mL          LABS:                            12.7   20.16 )-----------( 391      ( 11 Dec 2020 04:25 )             38.6                                               12-11    137  |  99  |  30<H>  ----------------------------<  260<H>  3.9   |  26  |  1.1    Ca    8.9      11 Dec 2020 04:25  Phos  4.0     12-11  Mg     2.1     12-11    TPro  5.7<L>  /  Alb  3.1<L>  /  TBili  <0.2  /  DBili  x   /  AST  14  /  ALT  26  /  AlkPhos  92  12-11                                                                                           LIVER FUNCTIONS - ( 11 Dec 2020 04:25 )  Alb: 3.1 g/dL / Pro: 5.7 g/dL / ALK PHOS: 92 U/L / ALT: 26 U/L / AST: 14 U/L / GGT: x                                                  Culture - Blood (collected 08 Dec 2020 12:01)  Source: .Blood None  Preliminary Report (09 Dec 2020 23:02):    No growth to date.    Culture - Fungal, Blood (collected 08 Dec 2020 12:01)  Source: .Blood Blood-Peripheral  Preliminary Report (09 Dec 2020 09:20):    Testing in progress                                                                                           MEDICATIONS  (STANDING):  cefepime   IVPB      cefepime   IVPB 2000 milliGRAM(s) IV Intermittent every 8 hours  chlorhexidine 0.12% Liquid 15 milliLiter(s) Swish and Spit two times a day  chlorhexidine 4% Liquid 1 Application(s) Topical <User Schedule>  dextrose 40% Gel 15 Gram(s) Oral once  dextrose 5%. 1000 milliLiter(s) (50 mL/Hr) IV Continuous <Continuous>  dextrose 5%. 1000 milliLiter(s) (100 mL/Hr) IV Continuous <Continuous>  dextrose 50% Injectable 25 Gram(s) IV Push once  dextrose 50% Injectable 12.5 Gram(s) IV Push once  dextrose 50% Injectable 25 Gram(s) IV Push once  enoxaparin Injectable 40 milliGRAM(s) SubCutaneous every 12 hours  furosemide    Tablet 40 milliGRAM(s) Oral daily  glucagon  Injectable 1 milliGRAM(s) IntraMuscular once  guaiFENesin  milliGRAM(s) Oral every 12 hours  insulin glargine Injectable (LANTUS) 15 Unit(s) SubCutaneous at bedtime  insulin lispro (ADMELOG) corrective regimen sliding scale   SubCutaneous three times a day before meals  insulin lispro Injectable (ADMELOG) 5 Unit(s) SubCutaneous three times a day before meals  levoFLOXacin IVPB      levoFLOXacin IVPB 750 milliGRAM(s) IV Intermittent every 24 hours  methylPREDNISolone sodium succinate Injectable 60 milliGRAM(s) IV Push every 8 hours  metoprolol tartrate 25 milliGRAM(s) Oral every 12 hours  pantoprazole    Tablet 40 milliGRAM(s) Oral before breakfast  senna 2 Tablet(s) Oral at bedtime  voriconazole IVPB 330 milliGRAM(s) IV Intermittent <User Schedule>    MEDICATIONS  (PRN):      New X-rays reviewed:                                                                                  ECHO    CXR interpreted by me:  Bilateral infiltrates

## 2020-12-11 NOTE — PROGRESS NOTE ADULT - ASSESSMENT
ADMISSION SUMMARY  Patient is a 60y old Male who presents with a chief complaint of SOB (10 Dec 2020 09:06)      Assessment and Plan:   · Assessment	  ADMISSION SUMMARY  Patient is a 60y old Male who presents with a chief complaint of SOB (09 Dec 2020 09:53)    #Acute hypoxemic respiratory failure    -HOB @ 45 degrees  -Aspiration precautions  -Wean O2 as tolerated-  -walking trial      #history of COVID pneumonia   #Worsening infiltrates RO opportunistic infection VS inflammatory / Fibrotic changes    -Follow up cultures  -Cefepime and levaquin  -Urine strep and Legionella Ag negative  -Inflammatory markers   --dimer 493 decreased to 388  --CRP 20.70 from yesterday  --ferritin 674 from yesterday  --LDH downtrending  -fungitell and galactoman negative  d/c voriconazole    #HO RA Chronic steroid use   -Solumedrol 60 mg Q8    #hyperglycemia  -, insulin protocol    LASIX 40mg  START LOPRESSOR 25MG DAILY  HR has been down from 120s to 80s since starting lopressor, doing well    I=O>  Avoid volume overload   GI prophylaxis.  Feeding.  Bowel regimen   Follow up lytes.  Correct as needed  DVT prophylaxis.   Bed Rest     code status: full code  dispo: stepdown unit or transfer to [facility]

## 2020-12-11 NOTE — CHART NOTE - NSCHARTNOTEFT_GEN_A_CORE
RK: Updated the patient's daughter, Jodi, about the patient, including his negative lab culture for fungitell and aspergillus gallactoman.

## 2020-12-11 NOTE — PROGRESS NOTE ADULT - SUBJECTIVE AND OBJECTIVE BOX
JOSÉ LUIS AREVALO  60y, Male    All available historical data reviewed    OVERNIGHT EVENTS:  no fevers  feels well and has no complaints  HFNC    ROS:  General: Denies rigors, nightsweats  HEENT: Denies headache, rhinorrhea, sore throat, eye pain  CV: Denies CP, palpitations  PULM: Denies wheezing, hemoptysis  GI: Denies hematemesis, hematochezia, melena  : Denies discharge, hematuria  MSK: Denies arthralgias, myalgias  SKIN: Denies rash, lesions  NEURO: Denies paresthesias, weakness  PSYCH: Denies depression, anxiety    VITALS:  T(F): 97.1, Max: 98.1 (12-10-20 @ 12:00)  HR: 76  BP: 121/89  RR: 18Vital Signs Last 24 Hrs  T(C): 36.2 (11 Dec 2020 08:00), Max: 36.7 (10 Dec 2020 12:00)  T(F): 97.1 (11 Dec 2020 08:00), Max: 98.1 (10 Dec 2020 12:00)  HR: 76 (11 Dec 2020 08:00) (62 - 108)  BP: 121/89 (11 Dec 2020 08:00) (101/72 - 123/88)  BP(mean): 101 (11 Dec 2020 08:00) (79 - 101)  RR: 18 (11 Dec 2020 08:00) (16 - 30)  SpO2: 97% (11 Dec 2020 08:00) (92% - 98%)    TESTS & MEASUREMENTS:                        12.7   20.16 )-----------( 391      ( 11 Dec 2020 04:25 )             38.6     12-11    137  |  99  |  30<H>  ----------------------------<  260<H>  3.9   |  26  |  1.1    Ca    8.9      11 Dec 2020 04:25  Phos  4.0     12-11  Mg     2.1     12-11    TPro  5.7<L>  /  Alb  3.1<L>  /  TBili  <0.2  /  DBili  x   /  AST  14  /  ALT  26  /  AlkPhos  92  12-11    LIVER FUNCTIONS - ( 11 Dec 2020 04:25 )  Alb: 3.1 g/dL / Pro: 5.7 g/dL / ALK PHOS: 92 U/L / ALT: 26 U/L / AST: 14 U/L / GGT: x             Culture - Blood (collected 12-08-20 @ 12:01)  Source: .Blood None  Preliminary Report (12-09-20 @ 23:02):    No growth to date.    Culture - Fungal, Blood (collected 12-08-20 @ 12:01)  Source: .Blood Blood-Peripheral  Preliminary Report (12-09-20 @ 09:20):    Testing in progress    Culture - Blood (collected 12-07-20 @ 15:10)  Source: .Blood Blood  Preliminary Report (12-08-20 @ 23:02):    No growth to date.    Culture - Blood (collected 12-07-20 @ 15:10)  Source: .Blood Blood  Preliminary Report (12-08-20 @ 23:02):    No growth to date.            RADIOLOGY & ADDITIONAL TESTS:  Personal review of radiological diagnostics performed  Echo and EKG results noted when applicable.     MEDICATIONS:  cefepime   IVPB      cefepime   IVPB 2000 milliGRAM(s) IV Intermittent every 8 hours  chlorhexidine 0.12% Liquid 15 milliLiter(s) Swish and Spit two times a day  chlorhexidine 4% Liquid 1 Application(s) Topical <User Schedule>  dextrose 40% Gel 15 Gram(s) Oral once  dextrose 5%. 1000 milliLiter(s) IV Continuous <Continuous>  dextrose 5%. 1000 milliLiter(s) IV Continuous <Continuous>  dextrose 50% Injectable 25 Gram(s) IV Push once  dextrose 50% Injectable 12.5 Gram(s) IV Push once  dextrose 50% Injectable 25 Gram(s) IV Push once  enoxaparin Injectable 40 milliGRAM(s) SubCutaneous every 12 hours  furosemide    Tablet 40 milliGRAM(s) Oral daily  glucagon  Injectable 1 milliGRAM(s) IntraMuscular once  guaiFENesin  milliGRAM(s) Oral every 12 hours  insulin glargine Injectable (LANTUS) 15 Unit(s) SubCutaneous at bedtime  insulin lispro (ADMELOG) corrective regimen sliding scale   SubCutaneous three times a day before meals  insulin lispro Injectable (ADMELOG) 5 Unit(s) SubCutaneous three times a day before meals  levoFLOXacin IVPB      levoFLOXacin IVPB 750 milliGRAM(s) IV Intermittent every 24 hours  methylPREDNISolone sodium succinate Injectable 60 milliGRAM(s) IV Push every 8 hours  metoprolol tartrate 25 milliGRAM(s) Oral every 12 hours  pantoprazole    Tablet 40 milliGRAM(s) Oral before breakfast  polyethylene glycol 3350 17 Gram(s) Oral daily  senna 2 Tablet(s) Oral at bedtime      ANTIBIOTICS:  cefepime   IVPB      cefepime   IVPB 2000 milliGRAM(s) IV Intermittent every 8 hours  levoFLOXacin IVPB      levoFLOXacin IVPB 750 milliGRAM(s) IV Intermittent every 24 hours

## 2020-12-11 NOTE — PROGRESS NOTE ADULT - ASSESSMENT
IMPRESSION:    Acute hypoxemic respiratory failure  HO COVID pneumonia   Worsening infiltrates doubt opportunistic infection.  Inflammatory / Fibrotic changes    HO RA Chronic steroid use     PLAN:    CNS:  No depressants     HEENT: Oral care    PULMONARY:  HOB @ 45 degrees.  Aspiration precautions.  Wean O2 as tolerated.  Solumedrol 60 mg Q8.      CARDIOVASCULAR:  I=O,  Avoid volume overload.  Beta blockers     GI: GI prophylaxis.  Feeding.  Bowel regimen     RENAL:  Follow up lytes.  Correct as needed    INFECTIOUS DISEASE: Follow up cultures. FU  Galactomannan and Fungitell.  Cefepime and Levaquin.  DC Vori.   Inflammatory markers     HEMATOLOGICAL:  DVT prophylaxis.     ENDOCRINE:  Follow up FS.  Insulin protocol if needed.    MUSCULOSKELETAL:  Bed Rest     SDU

## 2020-12-11 NOTE — PROGRESS NOTE ADULT - SUBJECTIVE AND OBJECTIVE BOX
JOSÉ LUIS AREVALO 60y Male  MRN#: [id number]       SUBJECTIVE  Patient is a 60y old Male who presents with a chief complaint of SOB (11 Dec 2020 08:49)      ***    Today is hospital day 4d, and this morning he is speaking in full sentences and making urine. He is passing gas but has not have bowel movements in 4 days.    No acute overnight events.     OBJECTIVE  PAST MEDICAL & SURGICAL HISTORY  Pneumonia due to COVID-19 virus    Hyperlipidemia    Lumbago    Hypertension    H/O lymph node biopsy  Neck by Dr Case      ALLERGIES:  penicillin (Unknown)  shellfish (Pruritus)    MEDICATIONS:  STANDING MEDICATIONS  cefepime   IVPB      cefepime   IVPB 2000 milliGRAM(s) IV Intermittent every 8 hours  chlorhexidine 0.12% Liquid 15 milliLiter(s) Swish and Spit two times a day  chlorhexidine 4% Liquid 1 Application(s) Topical <User Schedule>  dextrose 40% Gel 15 Gram(s) Oral once  dextrose 5%. 1000 milliLiter(s) IV Continuous <Continuous>  dextrose 5%. 1000 milliLiter(s) IV Continuous <Continuous>  dextrose 50% Injectable 25 Gram(s) IV Push once  dextrose 50% Injectable 12.5 Gram(s) IV Push once  dextrose 50% Injectable 25 Gram(s) IV Push once  enoxaparin Injectable 40 milliGRAM(s) SubCutaneous every 12 hours  furosemide    Tablet 40 milliGRAM(s) Oral daily  glucagon  Injectable 1 milliGRAM(s) IntraMuscular once  guaiFENesin  milliGRAM(s) Oral every 12 hours  insulin glargine Injectable (LANTUS) 15 Unit(s) SubCutaneous at bedtime  insulin lispro (ADMELOG) corrective regimen sliding scale   SubCutaneous three times a day before meals  insulin lispro Injectable (ADMELOG) 5 Unit(s) SubCutaneous three times a day before meals  levoFLOXacin IVPB      levoFLOXacin IVPB 750 milliGRAM(s) IV Intermittent every 24 hours  methylPREDNISolone sodium succinate Injectable 60 milliGRAM(s) IV Push every 8 hours  metoprolol tartrate 25 milliGRAM(s) Oral every 12 hours  pantoprazole    Tablet 40 milliGRAM(s) Oral before breakfast  polyethylene glycol 3350 17 Gram(s) Oral daily  senna 2 Tablet(s) Oral at bedtime    PRN MEDICATIONS      VITAL SIGNS: Last 24 Hours  T(C): 36.2 (11 Dec 2020 08:00), Max: 36.7 (10 Dec 2020 12:00)  T(F): 97.1 (11 Dec 2020 08:00), Max: 98.1 (10 Dec 2020 12:00)  HR: 76 (11 Dec 2020 08:00) (62 - 112)  BP: 121/89 (11 Dec 2020 08:00) (101/72 - 123/88)  BP(mean): 101 (11 Dec 2020 08:00) (79 - 101)  RR: 18 (11 Dec 2020 08:00) (16 - 30)  SpO2: 97% (11 Dec 2020 08:00) (92% - 98%)    LABS:                        12.7   20.16 )-----------( 391      ( 11 Dec 2020 04:25 )             38.6     12-11    137  |  99  |  30<H>  ----------------------------<  260<H>  3.9   |  26  |  1.1    Ca    8.9      11 Dec 2020 04:25  Phos  4.0     12-11  Mg     2.1     12-11    TPro  5.7<L>  /  Alb  3.1<L>  /  TBili  <0.2  /  DBili  x   /  AST  14  /  ALT  26  /  AlkPhos  92  12-11              Culture - Blood (collected 08 Dec 2020 12:01)  Source: .Blood None  Preliminary Report (09 Dec 2020 23:02):    No growth to date.    Culture - Fungal, Blood (collected 08 Dec 2020 12:01)  Source: .Blood Blood-Peripheral  Preliminary Report (09 Dec 2020 09:20):    Testing in progress          RADIOLOGY:  reviewed    PHYSICAL EXAM:    GENERAL: NAD, well-developed, AAOx3  HEENT:  Atraumatic, Normocephalic. EOMI, PERRLA, conjunctiva and sclera clear, No JVD  PULMONARY: Clear to auscultation bilaterally; No wheeze  CARDIOVASCULAR: Regular rate and rhythm; No murmurs, rubs, or gallops  GASTROINTESTINAL: Soft, Nontender, Nondistended; Bowel sounds present  MUSCULOSKELETAL:  2+ Peripheral Pulses, No clubbing, cyanosis, or edema  NEUROLOGY: non-focal  SKIN: No rashes or lesions      ADMISSION SUMMARY  Patient is a 60y old Male who presents with a chief complaint of SOB (11 Dec 2020 08:49)

## 2020-12-12 LAB
ALBUMIN SERPL ELPH-MCNC: 3 G/DL — LOW (ref 3.5–5.2)
ALP SERPL-CCNC: 95 U/L — SIGNIFICANT CHANGE UP (ref 30–115)
ALT FLD-CCNC: 25 U/L — SIGNIFICANT CHANGE UP (ref 0–41)
ANION GAP SERPL CALC-SCNC: 9 MMOL/L — SIGNIFICANT CHANGE UP (ref 7–14)
AST SERPL-CCNC: 13 U/L — SIGNIFICANT CHANGE UP (ref 0–41)
BASOPHILS # BLD AUTO: 0.03 K/UL — SIGNIFICANT CHANGE UP (ref 0–0.2)
BASOPHILS NFR BLD AUTO: 0.2 % — SIGNIFICANT CHANGE UP (ref 0–1)
BILIRUB SERPL-MCNC: 0.2 MG/DL — SIGNIFICANT CHANGE UP (ref 0.2–1.2)
BLD GP AB SCN SERPL QL: SIGNIFICANT CHANGE UP
BUN SERPL-MCNC: 36 MG/DL — HIGH (ref 10–20)
CALCIUM SERPL-MCNC: 8.7 MG/DL — SIGNIFICANT CHANGE UP (ref 8.5–10.1)
CHLORIDE SERPL-SCNC: 99 MMOL/L — SIGNIFICANT CHANGE UP (ref 98–110)
CO2 SERPL-SCNC: 29 MMOL/L — SIGNIFICANT CHANGE UP (ref 17–32)
CREAT SERPL-MCNC: 1.2 MG/DL — SIGNIFICANT CHANGE UP (ref 0.7–1.5)
CULTURE RESULTS: SIGNIFICANT CHANGE UP
CULTURE RESULTS: SIGNIFICANT CHANGE UP
EOSINOPHIL # BLD AUTO: 0 K/UL — SIGNIFICANT CHANGE UP (ref 0–0.7)
EOSINOPHIL NFR BLD AUTO: 0 % — SIGNIFICANT CHANGE UP (ref 0–8)
GLUCOSE BLDC GLUCOMTR-MCNC: 201 MG/DL — HIGH (ref 70–99)
GLUCOSE BLDC GLUCOMTR-MCNC: 329 MG/DL — HIGH (ref 70–99)
GLUCOSE BLDC GLUCOMTR-MCNC: 355 MG/DL — HIGH (ref 70–99)
GLUCOSE BLDC GLUCOMTR-MCNC: 355 MG/DL — HIGH (ref 70–99)
GLUCOSE SERPL-MCNC: 206 MG/DL — HIGH (ref 70–99)
HCT VFR BLD CALC: 38.6 % — LOW (ref 42–52)
HGB BLD-MCNC: 12.7 G/DL — LOW (ref 14–18)
IMM GRANULOCYTES NFR BLD AUTO: 1.3 % — HIGH (ref 0.1–0.3)
LYMPHOCYTES # BLD AUTO: 1.24 K/UL — SIGNIFICANT CHANGE UP (ref 1.2–3.4)
LYMPHOCYTES # BLD AUTO: 7 % — LOW (ref 20.5–51.1)
MAGNESIUM SERPL-MCNC: 2.3 MG/DL — SIGNIFICANT CHANGE UP (ref 1.8–2.4)
MCHC RBC-ENTMCNC: 29.9 PG — SIGNIFICANT CHANGE UP (ref 27–31)
MCHC RBC-ENTMCNC: 32.9 G/DL — SIGNIFICANT CHANGE UP (ref 32–37)
MCV RBC AUTO: 90.8 FL — SIGNIFICANT CHANGE UP (ref 80–94)
MONOCYTES # BLD AUTO: 1.02 K/UL — HIGH (ref 0.1–0.6)
MONOCYTES NFR BLD AUTO: 5.7 % — SIGNIFICANT CHANGE UP (ref 1.7–9.3)
NEUTROPHILS # BLD AUTO: 15.3 K/UL — HIGH (ref 1.4–6.5)
NEUTROPHILS NFR BLD AUTO: 85.8 % — HIGH (ref 42.2–75.2)
NRBC # BLD: 0 /100 WBCS — SIGNIFICANT CHANGE UP (ref 0–0)
PHOSPHATE SERPL-MCNC: 4.1 MG/DL — SIGNIFICANT CHANGE UP (ref 2.1–4.9)
PLATELET # BLD AUTO: 423 K/UL — HIGH (ref 130–400)
POTASSIUM SERPL-MCNC: 4.3 MMOL/L — SIGNIFICANT CHANGE UP (ref 3.5–5)
POTASSIUM SERPL-SCNC: 4.3 MMOL/L — SIGNIFICANT CHANGE UP (ref 3.5–5)
PROT SERPL-MCNC: 5.8 G/DL — LOW (ref 6–8)
RBC # BLD: 4.25 M/UL — LOW (ref 4.7–6.1)
RBC # FLD: 16.3 % — HIGH (ref 11.5–14.5)
SODIUM SERPL-SCNC: 137 MMOL/L — SIGNIFICANT CHANGE UP (ref 135–146)
SPECIMEN SOURCE: SIGNIFICANT CHANGE UP
SPECIMEN SOURCE: SIGNIFICANT CHANGE UP
WBC # BLD: 17.82 K/UL — HIGH (ref 4.8–10.8)
WBC # FLD AUTO: 17.82 K/UL — HIGH (ref 4.8–10.8)

## 2020-12-12 PROCEDURE — 71045 X-RAY EXAM CHEST 1 VIEW: CPT | Mod: 26

## 2020-12-12 RX ORDER — PIPERACILLIN AND TAZOBACTAM 4; .5 G/20ML; G/20ML
4.5 INJECTION, POWDER, LYOPHILIZED, FOR SOLUTION INTRAVENOUS ONCE
Refills: 0 | Status: COMPLETED | OUTPATIENT
Start: 2020-12-12 | End: 2020-12-12

## 2020-12-12 RX ORDER — PIPERACILLIN AND TAZOBACTAM 4; .5 G/20ML; G/20ML
4.5 INJECTION, POWDER, LYOPHILIZED, FOR SOLUTION INTRAVENOUS EVERY 6 HOURS
Refills: 0 | Status: DISCONTINUED | OUTPATIENT
Start: 2020-12-12 | End: 2020-12-15

## 2020-12-12 RX ORDER — INSULIN LISPRO 100/ML
4 VIAL (ML) SUBCUTANEOUS ONCE
Refills: 0 | Status: COMPLETED | OUTPATIENT
Start: 2020-12-12 | End: 2020-12-12

## 2020-12-12 RX ORDER — TOCILIZUMAB 20 MG/ML
400 INJECTION, SOLUTION, CONCENTRATE INTRAVENOUS ONCE
Refills: 0 | Status: COMPLETED | OUTPATIENT
Start: 2020-12-12 | End: 2020-12-12

## 2020-12-12 RX ADMIN — Medication 25 MILLIGRAM(S): at 05:46

## 2020-12-12 RX ADMIN — Medication 5 UNIT(S): at 12:05

## 2020-12-12 RX ADMIN — ENOXAPARIN SODIUM 40 MILLIGRAM(S): 100 INJECTION SUBCUTANEOUS at 17:41

## 2020-12-12 RX ADMIN — Medication 2: at 08:00

## 2020-12-12 RX ADMIN — Medication 60 MILLIGRAM(S): at 05:46

## 2020-12-12 RX ADMIN — Medication 600 MILLIGRAM(S): at 05:46

## 2020-12-12 RX ADMIN — TOCILIZUMAB 100 MILLIGRAM(S): 20 INJECTION, SOLUTION, CONCENTRATE INTRAVENOUS at 18:38

## 2020-12-12 RX ADMIN — Medication 40 MILLIGRAM(S): at 05:46

## 2020-12-12 RX ADMIN — PIPERACILLIN AND TAZOBACTAM 25 GRAM(S): 4; .5 INJECTION, POWDER, LYOPHILIZED, FOR SOLUTION INTRAVENOUS at 23:17

## 2020-12-12 RX ADMIN — INSULIN GLARGINE 15 UNIT(S): 100 INJECTION, SOLUTION SUBCUTANEOUS at 22:12

## 2020-12-12 RX ADMIN — Medication 4: at 17:07

## 2020-12-12 RX ADMIN — Medication 25 MILLIGRAM(S): at 17:41

## 2020-12-12 RX ADMIN — PIPERACILLIN AND TAZOBACTAM 25 GRAM(S): 4; .5 INJECTION, POWDER, LYOPHILIZED, FOR SOLUTION INTRAVENOUS at 17:37

## 2020-12-12 RX ADMIN — POLYETHYLENE GLYCOL 3350 17 GRAM(S): 17 POWDER, FOR SOLUTION ORAL at 11:50

## 2020-12-12 RX ADMIN — Medication 4 UNIT(S): at 22:30

## 2020-12-12 RX ADMIN — Medication 60 MILLIGRAM(S): at 22:12

## 2020-12-12 RX ADMIN — Medication 600 MILLIGRAM(S): at 17:41

## 2020-12-12 RX ADMIN — Medication 5 UNIT(S): at 07:59

## 2020-12-12 RX ADMIN — CEFEPIME 100 MILLIGRAM(S): 1 INJECTION, POWDER, FOR SOLUTION INTRAMUSCULAR; INTRAVENOUS at 05:48

## 2020-12-12 RX ADMIN — CHLORHEXIDINE GLUCONATE 1 APPLICATION(S): 213 SOLUTION TOPICAL at 04:11

## 2020-12-12 RX ADMIN — PIPERACILLIN AND TAZOBACTAM 200 GRAM(S): 4; .5 INJECTION, POWDER, LYOPHILIZED, FOR SOLUTION INTRAVENOUS at 10:52

## 2020-12-12 RX ADMIN — CHLORHEXIDINE GLUCONATE 15 MILLILITER(S): 213 SOLUTION TOPICAL at 17:40

## 2020-12-12 RX ADMIN — Medication 60 MILLIGRAM(S): at 14:05

## 2020-12-12 RX ADMIN — SENNA PLUS 2 TABLET(S): 8.6 TABLET ORAL at 22:12

## 2020-12-12 RX ADMIN — ENOXAPARIN SODIUM 40 MILLIGRAM(S): 100 INJECTION SUBCUTANEOUS at 05:46

## 2020-12-12 RX ADMIN — Medication 5 UNIT(S): at 17:07

## 2020-12-12 RX ADMIN — PANTOPRAZOLE SODIUM 40 MILLIGRAM(S): 20 TABLET, DELAYED RELEASE ORAL at 05:49

## 2020-12-12 RX ADMIN — Medication 5: at 12:06

## 2020-12-12 NOTE — PROGRESS NOTE ADULT - ASSESSMENT
60 yr M with recent COVID pneumonia discharged 3 weeks ago on 2LNC, s/p steroid course and plasma Tx, GIST (s/p resection), DM type II , RA (on prednisone), HTN, HLD and herniated disc    IMPRESSION;  Clinically improved  COVID-19 positive 11/5. S/p dexamethason/plasma. CT with sequelae of COVID-19 .  PNA bacterial ? vs progressive lung patology secondary to COVID-19 ?   procalcitonin 0.10  , not suggestive of a bacterial PNA.  Ferritin 1039>819  CRP 15.05>6.57  Ddimers 279  Nares ORSA NG  BCx 12/7,8 NGTD  legionella NG  Fungitel < 31 ( excludes PCJ )  Galactommanan NT ( excludes CAPA )          RECOMMENDATIONS;  NO NEED TO REPEAT INFLAMMATORY MARKERS  Zosyn 4.5 gm iv q6h  S/p Tocilizumab 400 mg iv x once. 12/11

## 2020-12-12 NOTE — PROGRESS NOTE ADULT - ATTENDING COMMENTS
Attending Statement: I have personally performed a face to face diagnostic evaluation on this patient. The patient is suffering from respiratory failure from COVID -19.   I have reviewed the above note and agree with the history, exam and suggestions for care, except as I have noted in the text.

## 2020-12-12 NOTE — PROGRESS NOTE ADULT - SUBJECTIVE AND OBJECTIVE BOX
Patient is a 60y old  Male who presents with a chief complaint of SOB (11 Dec 2020 11:21)      Over Night Events: none. Has bed CEU. on HHFNC 60 L/50%    I&O's Detail    11 Dec 2020 07:01  -  12 Dec 2020 07:00  --------------------------------------------------------  IN:  Total IN: 0 mL    OUT:    Voided (mL): 2600 mL  Total OUT: 2600 mL    Total NET: -2600 mL      12 Dec 2020 07:01  -  12 Dec 2020 09:12  --------------------------------------------------------  IN:    Oral Fluid: 300 mL  Total IN: 300 mL    OUT:    Voided (mL): 700 mL  Total OUT: 700 mL    Total NET: -400 mL          PHYSICAL EXAM    ICU Vital Signs Last 24 Hrs  T(C): 36.6 (12 Dec 2020 08:00), Max: 36.7 (11 Dec 2020 20:00)  T(F): 97.8 (12 Dec 2020 08:00), Max: 98 (11 Dec 2020 20:00)  HR: 82 (12 Dec 2020 09:00) (60 - 104)  BP: 114/75 (12 Dec 2020 09:00) (96/67 - 128/94)  BP(mean): 92 (12 Dec 2020 09:00) (72 - 111)  ABP: --  ABP(mean): --  RR: 17 (12 Dec 2020 09:00) (14 - 36)  SpO2: 96% (12 Dec 2020 09:00) (89% - 98%)      CONSTITUTIONAL:   Ill appearing.      ENT:   Airway patent,   Mouth with normal mucosa.   No thrush    EYES:   Pupils equal,   Round and reactive to light.    CARDIAC:   Normal rate,   Regular rhythm.    No edema    Vascular:  Normal systolic impulse  No Carotid bruits    RESPIRATORY:   No wheezing  Bilateral BS  Normal chest expansion  Not tachypneic,  No use of accessory muscles    GASTROINTESTINAL:  Abdomen soft,   Non-tender,   No guarding,   + BS    GENITOURINARY  normal genitalia for sex  no edema    MUSCULOSKELETAL:   Range of motion is not limited,  No clubbing, cyanosis    NEUROLOGICAL:   Alert and oriented   No motor  deficits.    SKIN:   Skin normal color for race,   Warm and dry  No evidence of rash.    PSYCHIATRIC:   Normal mood and affect.   No apparent risk to self or others.    HEMATOLOGICAL:  No cervical  lymphadenopathy.  no inguinal lymphadenopathy      LABS:                          12.7   17.82 )-----------( 423      ( 12 Dec 2020 05:03 )             38.6                                               12-12    137  |  99  |  36<H>  ----------------------------<  206<H>  4.3   |  29  |  1.2    Ca    8.7      12 Dec 2020 05:03  Phos  4.1     12-12  Mg     2.3     12-12    TPro  5.8<L>  /  Alb  3.0<L>  /  TBili  0.2  /  DBili  x   /  AST  13  /  ALT  25  /  AlkPhos  95  12-12                                                                                           LIVER FUNCTIONS - ( 12 Dec 2020 05:03 )  Alb: 3.0 g/dL / Pro: 5.8 g/dL / ALK PHOS: 95 U/L / ALT: 25 U/L / AST: 13 U/L / GGT: x                                                             Ferritin, Serum: 819 ng/mL (12-10-20 @ 20:59)  Ferritin, Serum: 1039 ng/mL (12-09-20 @ 21:00)  Ferritin, Serum: 674 ng/mL (12-08-20 @ 12:01)  Ferritin, Serum: 576 ng/mL (12-07-20 @ 21:10)      D-Dimer Assay, Quantitative: 279 ng/mL DDU (12-10-20 @ 20:59)  D-Dimer Assay, Quantitative: 294 ng/mL DDU (12-09-20 @ 21:00)  D-Dimer Assay, Quantitative: 388 ng/mL DDU (12-09-20 @ 04:53)  D-Dimer Assay, Quantitative: 493 ng/mL DDU (12-08-20 @ 12:01)  D-Dimer Assay, Quantitative: 428 ng/mL DDU (12-07-20 @ 21:10)      Procalcitonin, Serum: 0.04 ng/mL (12-10-20 @ 20:59)  Procalcitonin, Serum: 0.10 ng/mL (12-08-20 @ 12:01)  Procalcitonin, Serum: 0.12 ng/mL (12-07-20 @ 21:10)      12-09-20 @ 21:00  390<H>  12-08-20 @ 12:01  300<H>      Serum Pro-Brain Natriuretic Peptide: 60 pg/mL (12-07-20 @ 21:10)                                                                                  MEDICATIONS  (STANDING):  cefepime   IVPB      cefepime   IVPB 2000 milliGRAM(s) IV Intermittent every 8 hours  chlorhexidine 0.12% Liquid 15 milliLiter(s) Swish and Spit two times a day  chlorhexidine 4% Liquid 1 Application(s) Topical <User Schedule>  dextrose 40% Gel 15 Gram(s) Oral once  dextrose 5%. 1000 milliLiter(s) (50 mL/Hr) IV Continuous <Continuous>  dextrose 5%. 1000 milliLiter(s) (100 mL/Hr) IV Continuous <Continuous>  dextrose 50% Injectable 25 Gram(s) IV Push once  dextrose 50% Injectable 12.5 Gram(s) IV Push once  dextrose 50% Injectable 25 Gram(s) IV Push once  enoxaparin Injectable 40 milliGRAM(s) SubCutaneous every 12 hours  furosemide    Tablet 40 milliGRAM(s) Oral daily  glucagon  Injectable 1 milliGRAM(s) IntraMuscular once  guaiFENesin  milliGRAM(s) Oral every 12 hours  insulin glargine Injectable (LANTUS) 15 Unit(s) SubCutaneous at bedtime  insulin lispro (ADMELOG) corrective regimen sliding scale   SubCutaneous three times a day before meals  insulin lispro Injectable (ADMELOG) 5 Unit(s) SubCutaneous three times a day before meals  levoFLOXacin IVPB      levoFLOXacin IVPB 750 milliGRAM(s) IV Intermittent every 24 hours  methylPREDNISolone sodium succinate Injectable 60 milliGRAM(s) IV Push every 8 hours  metoprolol tartrate 25 milliGRAM(s) Oral every 12 hours  pantoprazole    Tablet 40 milliGRAM(s) Oral before breakfast  polyethylene glycol 3350 17 Gram(s) Oral daily  senna 2 Tablet(s) Oral at bedtime    MEDICATIONS  (PRN):      CXR interpreted by me:  supa dee

## 2020-12-12 NOTE — PROGRESS NOTE ADULT - ASSESSMENT
IMPRESSION:    Acute hypoxemic respiratory failure  HO COVID pneumonia   Worsening infiltrates doubt opportunistic infection.  Inflammatory / Fibrotic changes    HO RA Chronic steroid use     PLAN:    CNS:  No depressants     HEENT: Oral care    PULMONARY:  HOB @ 45 degrees.  Aspiration precautions.  Wean O2 as tolerated.  Solumedrol 60 mg Q8.      CARDIOVASCULAR:  I=O,  Avoid volume overload.  Beta blockers     GI: GI prophylaxis.  Feeding.  Bowel regimen     RENAL:  Follow up lytes.  Correct as needed    INFECTIOUS DISEASE: Follow up cultures. FU  Galactomannan and Fungitell.  fup ID  Inflammatory markers     HEMATOLOGICAL:  DVT prophylaxis. lmwh 40mg q12h    ENDOCRINE:  Follow up FS.  Insulin protocol if needed.    MUSCULOSKELETAL:  oob to chair     transfer SDU

## 2020-12-12 NOTE — PROGRESS NOTE ADULT - SUBJECTIVE AND OBJECTIVE BOX
JOSÉ LUIS AREVALO  60y, Male    All available historical data reviewed    OVERNIGHT EVENTS:  no fevers  HFNC  feels well and has no complaints     ROS:  General: Denies rigors, nightsweats  HEENT: Denies headache, rhinorrhea, sore throat, eye pain  CV: Denies CP, palpitations  PULM: Denies wheezing, hemoptysis  GI: Denies hematemesis, hematochezia, melena  : Denies discharge, hematuria  MSK: Denies arthralgias, myalgias  SKIN: Denies rash, lesions  NEURO: Denies paresthesias, weakness  PSYCH: Denies depression, anxiety    VITALS:  T(F): 97.8, Max: 98 (12-11-20 @ 20:00)  HR: 82  BP: 116/73  RR: 18Vital Signs Last 24 Hrs  T(C): 36.6 (12 Dec 2020 08:00), Max: 36.7 (11 Dec 2020 20:00)  T(F): 97.8 (12 Dec 2020 08:00), Max: 98 (11 Dec 2020 20:00)  HR: 82 (12 Dec 2020 10:09) (60 - 104)  BP: 116/73 (12 Dec 2020 10:09) (96/67 - 128/94)  BP(mean): 96 (12 Dec 2020 10:09) (72 - 111)  RR: 18 (12 Dec 2020 10:09) (14 - 36)  SpO2: 94% (12 Dec 2020 10:09) (89% - 98%)    TESTS & MEASUREMENTS:                        12.7   17.82 )-----------( 423      ( 12 Dec 2020 05:03 )             38.6     12-12    137  |  99  |  36<H>  ----------------------------<  206<H>  4.3   |  29  |  1.2    Ca    8.7      12 Dec 2020 05:03  Phos  4.1     12-12  Mg     2.3     12-12    TPro  5.8<L>  /  Alb  3.0<L>  /  TBili  0.2  /  DBili  x   /  AST  13  /  ALT  25  /  AlkPhos  95  12-12    LIVER FUNCTIONS - ( 12 Dec 2020 05:03 )  Alb: 3.0 g/dL / Pro: 5.8 g/dL / ALK PHOS: 95 U/L / ALT: 25 U/L / AST: 13 U/L / GGT: x             Culture - Blood (collected 12-08-20 @ 12:01)  Source: .Blood None  Preliminary Report (12-09-20 @ 23:02):    No growth to date.    Culture - Fungal, Blood (collected 12-08-20 @ 12:01)  Source: .Blood Blood-Peripheral  Preliminary Report (12-09-20 @ 09:20):    Testing in progress    Culture - Blood (collected 12-07-20 @ 15:10)  Source: .Blood Blood  Preliminary Report (12-08-20 @ 23:02):    No growth to date.    Culture - Blood (collected 12-07-20 @ 15:10)  Source: .Blood Blood  Preliminary Report (12-08-20 @ 23:02):    No growth to date.            RADIOLOGY & ADDITIONAL TESTS:  Personal review of radiological diagnostics performed  Echo and EKG results noted when applicable.     MEDICATIONS:  chlorhexidine 0.12% Liquid 15 milliLiter(s) Swish and Spit two times a day  chlorhexidine 4% Liquid 1 Application(s) Topical <User Schedule>  dextrose 40% Gel 15 Gram(s) Oral once  dextrose 5%. 1000 milliLiter(s) IV Continuous <Continuous>  dextrose 5%. 1000 milliLiter(s) IV Continuous <Continuous>  dextrose 50% Injectable 25 Gram(s) IV Push once  dextrose 50% Injectable 12.5 Gram(s) IV Push once  dextrose 50% Injectable 25 Gram(s) IV Push once  enoxaparin Injectable 40 milliGRAM(s) SubCutaneous every 12 hours  furosemide    Tablet 40 milliGRAM(s) Oral daily  glucagon  Injectable 1 milliGRAM(s) IntraMuscular once  guaiFENesin  milliGRAM(s) Oral every 12 hours  insulin glargine Injectable (LANTUS) 15 Unit(s) SubCutaneous at bedtime  insulin lispro (ADMELOG) corrective regimen sliding scale   SubCutaneous three times a day before meals  insulin lispro Injectable (ADMELOG) 5 Unit(s) SubCutaneous three times a day before meals  methylPREDNISolone sodium succinate Injectable 60 milliGRAM(s) IV Push every 8 hours  metoprolol tartrate 25 milliGRAM(s) Oral every 12 hours  pantoprazole    Tablet 40 milliGRAM(s) Oral before breakfast  piperacillin/tazobactam IVPB.. 4.5 Gram(s) IV Intermittent every 6 hours  polyethylene glycol 3350 17 Gram(s) Oral daily  senna 2 Tablet(s) Oral at bedtime  tocilizumab IVPB 400 milliGRAM(s) IV Intermittent once      ANTIBIOTICS:  piperacillin/tazobactam IVPB.. 4.5 Gram(s) IV Intermittent every 6 hours

## 2020-12-13 LAB
ALBUMIN SERPL ELPH-MCNC: 3.1 G/DL — LOW (ref 3.5–5.2)
ALP SERPL-CCNC: 90 U/L — SIGNIFICANT CHANGE UP (ref 30–115)
ALT FLD-CCNC: 30 U/L — SIGNIFICANT CHANGE UP (ref 0–41)
ANION GAP SERPL CALC-SCNC: 10 MMOL/L — SIGNIFICANT CHANGE UP (ref 7–14)
AST SERPL-CCNC: 16 U/L — SIGNIFICANT CHANGE UP (ref 0–41)
BASOPHILS # BLD AUTO: 0.02 K/UL — SIGNIFICANT CHANGE UP (ref 0–0.2)
BASOPHILS NFR BLD AUTO: 0.1 % — SIGNIFICANT CHANGE UP (ref 0–1)
BILIRUB SERPL-MCNC: 0.3 MG/DL — SIGNIFICANT CHANGE UP (ref 0.2–1.2)
BUN SERPL-MCNC: 38 MG/DL — HIGH (ref 10–20)
CALCIUM SERPL-MCNC: 8.8 MG/DL — SIGNIFICANT CHANGE UP (ref 8.5–10.1)
CHLORIDE SERPL-SCNC: 97 MMOL/L — LOW (ref 98–110)
CO2 SERPL-SCNC: 29 MMOL/L — SIGNIFICANT CHANGE UP (ref 17–32)
CREAT SERPL-MCNC: 1.2 MG/DL — SIGNIFICANT CHANGE UP (ref 0.7–1.5)
CULTURE RESULTS: SIGNIFICANT CHANGE UP
EOSINOPHIL # BLD AUTO: 0 K/UL — SIGNIFICANT CHANGE UP (ref 0–0.7)
EOSINOPHIL NFR BLD AUTO: 0 % — SIGNIFICANT CHANGE UP (ref 0–8)
GLUCOSE BLDC GLUCOMTR-MCNC: 278 MG/DL — HIGH (ref 70–99)
GLUCOSE BLDC GLUCOMTR-MCNC: 318 MG/DL — HIGH (ref 70–99)
GLUCOSE BLDC GLUCOMTR-MCNC: 329 MG/DL — HIGH (ref 70–99)
GLUCOSE BLDC GLUCOMTR-MCNC: 330 MG/DL — HIGH (ref 70–99)
GLUCOSE SERPL-MCNC: 307 MG/DL — HIGH (ref 70–99)
HCT VFR BLD CALC: 38.9 % — LOW (ref 42–52)
HGB BLD-MCNC: 13.1 G/DL — LOW (ref 14–18)
IMM GRANULOCYTES NFR BLD AUTO: 1.3 % — HIGH (ref 0.1–0.3)
LYMPHOCYTES # BLD AUTO: 0.98 K/UL — LOW (ref 1.2–3.4)
LYMPHOCYTES # BLD AUTO: 6.2 % — LOW (ref 20.5–51.1)
MAGNESIUM SERPL-MCNC: 2.4 MG/DL — SIGNIFICANT CHANGE UP (ref 1.8–2.4)
MCHC RBC-ENTMCNC: 30.3 PG — SIGNIFICANT CHANGE UP (ref 27–31)
MCHC RBC-ENTMCNC: 33.7 G/DL — SIGNIFICANT CHANGE UP (ref 32–37)
MCV RBC AUTO: 90 FL — SIGNIFICANT CHANGE UP (ref 80–94)
MONOCYTES # BLD AUTO: 0.81 K/UL — HIGH (ref 0.1–0.6)
MONOCYTES NFR BLD AUTO: 5.1 % — SIGNIFICANT CHANGE UP (ref 1.7–9.3)
NEUTROPHILS # BLD AUTO: 13.9 K/UL — HIGH (ref 1.4–6.5)
NEUTROPHILS NFR BLD AUTO: 87.3 % — HIGH (ref 42.2–75.2)
NRBC # BLD: 0 /100 WBCS — SIGNIFICANT CHANGE UP (ref 0–0)
PHOSPHATE SERPL-MCNC: 3.9 MG/DL — SIGNIFICANT CHANGE UP (ref 2.1–4.9)
PLATELET # BLD AUTO: 457 K/UL — HIGH (ref 130–400)
POTASSIUM SERPL-MCNC: 4 MMOL/L — SIGNIFICANT CHANGE UP (ref 3.5–5)
POTASSIUM SERPL-SCNC: 4 MMOL/L — SIGNIFICANT CHANGE UP (ref 3.5–5)
PROT SERPL-MCNC: 5.7 G/DL — LOW (ref 6–8)
RBC # BLD: 4.32 M/UL — LOW (ref 4.7–6.1)
RBC # FLD: 15.9 % — HIGH (ref 11.5–14.5)
SODIUM SERPL-SCNC: 136 MMOL/L — SIGNIFICANT CHANGE UP (ref 135–146)
SPECIMEN SOURCE: SIGNIFICANT CHANGE UP
WBC # BLD: 15.92 K/UL — HIGH (ref 4.8–10.8)
WBC # FLD AUTO: 15.92 K/UL — HIGH (ref 4.8–10.8)

## 2020-12-13 PROCEDURE — 71045 X-RAY EXAM CHEST 1 VIEW: CPT | Mod: 26

## 2020-12-13 PROCEDURE — 99232 SBSQ HOSP IP/OBS MODERATE 35: CPT

## 2020-12-13 RX ORDER — INSULIN LISPRO 100/ML
7 VIAL (ML) SUBCUTANEOUS
Refills: 0 | Status: DISCONTINUED | OUTPATIENT
Start: 2020-12-13 | End: 2020-12-14

## 2020-12-13 RX ADMIN — CHLORHEXIDINE GLUCONATE 1 APPLICATION(S): 213 SOLUTION TOPICAL at 05:54

## 2020-12-13 RX ADMIN — PIPERACILLIN AND TAZOBACTAM 25 GRAM(S): 4; .5 INJECTION, POWDER, LYOPHILIZED, FOR SOLUTION INTRAVENOUS at 05:54

## 2020-12-13 RX ADMIN — Medication 4: at 11:24

## 2020-12-13 RX ADMIN — Medication 7 UNIT(S): at 11:24

## 2020-12-13 RX ADMIN — ENOXAPARIN SODIUM 40 MILLIGRAM(S): 100 INJECTION SUBCUTANEOUS at 05:56

## 2020-12-13 RX ADMIN — CHLORHEXIDINE GLUCONATE 15 MILLILITER(S): 213 SOLUTION TOPICAL at 05:55

## 2020-12-13 RX ADMIN — INSULIN GLARGINE 15 UNIT(S): 100 INJECTION, SOLUTION SUBCUTANEOUS at 21:22

## 2020-12-13 RX ADMIN — Medication 40 MILLIGRAM(S): at 05:56

## 2020-12-13 RX ADMIN — Medication 5 UNIT(S): at 08:06

## 2020-12-13 RX ADMIN — PIPERACILLIN AND TAZOBACTAM 25 GRAM(S): 4; .5 INJECTION, POWDER, LYOPHILIZED, FOR SOLUTION INTRAVENOUS at 11:24

## 2020-12-13 RX ADMIN — PIPERACILLIN AND TAZOBACTAM 25 GRAM(S): 4; .5 INJECTION, POWDER, LYOPHILIZED, FOR SOLUTION INTRAVENOUS at 17:30

## 2020-12-13 RX ADMIN — Medication 7 UNIT(S): at 16:23

## 2020-12-13 RX ADMIN — Medication 600 MILLIGRAM(S): at 05:56

## 2020-12-13 RX ADMIN — Medication 25 MILLIGRAM(S): at 17:41

## 2020-12-13 RX ADMIN — Medication 25 MILLIGRAM(S): at 05:56

## 2020-12-13 RX ADMIN — CHLORHEXIDINE GLUCONATE 15 MILLILITER(S): 213 SOLUTION TOPICAL at 17:40

## 2020-12-13 RX ADMIN — Medication 600 MILLIGRAM(S): at 17:40

## 2020-12-13 RX ADMIN — ENOXAPARIN SODIUM 40 MILLIGRAM(S): 100 INJECTION SUBCUTANEOUS at 17:30

## 2020-12-13 RX ADMIN — Medication 4: at 16:23

## 2020-12-13 RX ADMIN — Medication 60 MILLIGRAM(S): at 15:54

## 2020-12-13 RX ADMIN — Medication 3: at 08:06

## 2020-12-13 RX ADMIN — POLYETHYLENE GLYCOL 3350 17 GRAM(S): 17 POWDER, FOR SOLUTION ORAL at 11:25

## 2020-12-13 RX ADMIN — Medication 60 MILLIGRAM(S): at 05:57

## 2020-12-13 RX ADMIN — PANTOPRAZOLE SODIUM 40 MILLIGRAM(S): 20 TABLET, DELAYED RELEASE ORAL at 06:12

## 2020-12-13 NOTE — PROGRESS NOTE ADULT - ASSESSMENT
IMPRESSION:    Acute hypoxemic respiratory failure  HO COVID pneumonia   Worsening infiltrates doubt opportunistic infection.  Likely Inflammatory / Fibrotic changes    HO RA Chronic steroid use     PLAN:    CNS:  No depressants     HEENT: Oral care    PULMONARY:  HOB @ 45 degrees.  Aspiration precautions.  Wean O2 as tolerated.  Solumedrol 60 mg Q8.      CARDIOVASCULAR:  I=O,  Avoid volume overload.  Beta blockers     GI: GI prophylaxis.  Feeding.  Bowel regimen     RENAL:  Follow up lytes.  Correct as needed    INFECTIOUS DISEASE: Follow up cultures. Finish ABX course.      HEMATOLOGICAL:  DVT prophylaxis. LMWH 40mg q12h    ENDOCRINE:  Follow up FS.  Insulin protocol if needed.    MUSCULOSKELETAL:  oob to chair      SDU

## 2020-12-13 NOTE — PROGRESS NOTE ADULT - SUBJECTIVE AND OBJECTIVE BOX
Patient is a 60y old  Male who presents with a chief complaint of SOB (12 Dec 2020 10:56)        Over Night Events:  on HFNCO2 60 / 60.  Off pressors.  Tolerating PO         ROS:     All ROS are negative except HPI         PHYSICAL EXAM    ICU Vital Signs Last 24 Hrs  T(C): 36.1 (13 Dec 2020 01:46), Max: 36.4 (12 Dec 2020 12:00)  T(F): 97 (13 Dec 2020 01:46), Max: 97.5 (12 Dec 2020 12:00)  HR: 79 (13 Dec 2020 05:00) (56 - 84)  BP: 124/62 (13 Dec 2020 05:00) (112/76 - 137/95)  BP(mean): 92 (13 Dec 2020 00:00) (89 - 108)  ABP: --  ABP(mean): --  RR: 18 (13 Dec 2020 05:00) (15 - 28)  SpO2: 95% (13 Dec 2020 06:36) (94% - 98%)      CONSTITUTIONAL:  Well nourished.  NAD    ENT:   Airway patent,   Mouth with normal mucosa.   No thrush    EYES:   Pupils equal,   Round and reactive to light.    CARDIAC:   Normal rate,   Regular rhythm.    No edema      Vascular:  Normal systolic impulse  No Carotid bruits    RESPIRATORY:   No wheezing  Bilateral crackles   Normal chest expansion  Not tachypneic,  No use of accessory muscles    GASTROINTESTINAL:  Abdomen soft,   Non-tender,   No guarding,   + BS    MUSCULOSKELETAL:   Range of motion is not limited,  No clubbing, cyanosis    NEUROLOGICAL:   Alert and oriented   No motor  deficits.    SKIN:   Skin normal color for race,   Warm and dry and intact.   No evidence of rash.    PSYCHIATRIC:   Normal mood and affect.   No apparent risk to self or others.    HEMATOLOGICAL:  No cervical  lymphadenopathy.  no inguinal lymphadenopathy      12-12-20 @ 07:01  -  12-13-20 @ 07:00  --------------------------------------------------------  IN:    IV PiggyBack: 400 mL    Oral Fluid: 1030 mL  Total IN: 1430 mL    OUT:    Voided (mL): 2825 mL  Total OUT: 2825 mL    Total NET: -1395 mL          LABS:                            13.1   15.92 )-----------( 457      ( 13 Dec 2020 05:42 )             38.9                                               12-13    136  |  97<L>  |  38<H>  ----------------------------<  307<H>  4.0   |  29  |  1.2    Ca    8.8      13 Dec 2020 05:42  Phos  3.9     12-13  Mg     2.4     12-13    TPro  5.7<L>  /  Alb  3.1<L>  /  TBili  0.3  /  DBili  x   /  AST  16  /  ALT  30  /  AlkPhos  90  12-13                                                                                           LIVER FUNCTIONS - ( 13 Dec 2020 05:42 )  Alb: 3.1 g/dL / Pro: 5.7 g/dL / ALK PHOS: 90 U/L / ALT: 30 U/L / AST: 16 U/L / GGT: x                                                                                                                                       MEDICATIONS  (STANDING):  chlorhexidine 0.12% Liquid 15 milliLiter(s) Swish and Spit two times a day  chlorhexidine 4% Liquid 1 Application(s) Topical <User Schedule>  dextrose 40% Gel 15 Gram(s) Oral once  dextrose 5%. 1000 milliLiter(s) (50 mL/Hr) IV Continuous <Continuous>  dextrose 5%. 1000 milliLiter(s) (100 mL/Hr) IV Continuous <Continuous>  dextrose 50% Injectable 25 Gram(s) IV Push once  dextrose 50% Injectable 12.5 Gram(s) IV Push once  dextrose 50% Injectable 25 Gram(s) IV Push once  enoxaparin Injectable 40 milliGRAM(s) SubCutaneous every 12 hours  furosemide    Tablet 40 milliGRAM(s) Oral daily  glucagon  Injectable 1 milliGRAM(s) IntraMuscular once  guaiFENesin  milliGRAM(s) Oral every 12 hours  insulin glargine Injectable (LANTUS) 15 Unit(s) SubCutaneous at bedtime  insulin lispro (ADMELOG) corrective regimen sliding scale   SubCutaneous three times a day before meals  insulin lispro Injectable (ADMELOG) 5 Unit(s) SubCutaneous three times a day before meals  methylPREDNISolone sodium succinate Injectable 60 milliGRAM(s) IV Push every 8 hours  metoprolol tartrate 25 milliGRAM(s) Oral every 12 hours  pantoprazole    Tablet 40 milliGRAM(s) Oral before breakfast  piperacillin/tazobactam IVPB.. 4.5 Gram(s) IV Intermittent every 6 hours  polyethylene glycol 3350 17 Gram(s) Oral daily  senna 2 Tablet(s) Oral at bedtime    MEDICATIONS  (PRN):      New X-rays reviewed:                                                                                  ECHO    CXR interpreted by me:

## 2020-12-13 NOTE — PROGRESS NOTE ADULT - ATTENDING COMMENTS
I have personally seen and examined this patient.  I have fully participated in the care of this patient.  I have reviewed pertinent clinical information, including history, physical exam, plan and note.   I have reviewed relevant imaging and diagnostic studies personally. I agree with resident note above and plan of care, edited and corrected where applicable.     Patient in good spirit today and notes improvement that is tangible over the past 48 hours     Management and plan of care as per note above.     Case discussed with resident assigned.

## 2020-12-13 NOTE — PROGRESS NOTE ADULT - SUBJECTIVE AND OBJECTIVE BOX
JOSÉ LUIS AREVALO 60y Male  MRN#: [id number]       SUBJECTIVE  Patient is a 60y old Male who presents with a chief complaint of SOB (13 Dec 2020 08:09)  Currently admitted to medicine with the primary diagnosis of COVID-19    no events. no complaints. on HF NC        OBJECTIVE  PAST MEDICAL & SURGICAL HISTORY  Pneumonia due to COVID-19 virus    Hyperlipidemia    Lumbago    Hypertension    H/O lymph node biopsy  Neck by Dr Case      ALLERGIES:  penicillin (Unknown)  shellfish (Pruritus)    MEDICATIONS:  STANDING MEDICATIONS  chlorhexidine 0.12% Liquid 15 milliLiter(s) Swish and Spit two times a day  chlorhexidine 4% Liquid 1 Application(s) Topical <User Schedule>  dextrose 40% Gel 15 Gram(s) Oral once  dextrose 5%. 1000 milliLiter(s) IV Continuous <Continuous>  dextrose 5%. 1000 milliLiter(s) IV Continuous <Continuous>  dextrose 50% Injectable 25 Gram(s) IV Push once  dextrose 50% Injectable 12.5 Gram(s) IV Push once  dextrose 50% Injectable 25 Gram(s) IV Push once  enoxaparin Injectable 40 milliGRAM(s) SubCutaneous every 12 hours  furosemide    Tablet 40 milliGRAM(s) Oral daily  glucagon  Injectable 1 milliGRAM(s) IntraMuscular once  guaiFENesin  milliGRAM(s) Oral every 12 hours  insulin glargine Injectable (LANTUS) 15 Unit(s) SubCutaneous at bedtime  insulin lispro (ADMELOG) corrective regimen sliding scale   SubCutaneous three times a day before meals  insulin lispro Injectable (ADMELOG) 7 Unit(s) SubCutaneous three times a day before meals  methylPREDNISolone sodium succinate Injectable 60 milliGRAM(s) IV Push every 8 hours  metoprolol tartrate 25 milliGRAM(s) Oral every 12 hours  pantoprazole    Tablet 40 milliGRAM(s) Oral before breakfast  piperacillin/tazobactam IVPB.. 4.5 Gram(s) IV Intermittent every 6 hours  polyethylene glycol 3350 17 Gram(s) Oral daily  senna 2 Tablet(s) Oral at bedtime    PRN MEDICATIONS      VITAL SIGNS: Last 24 Hours  T(C): 36.1 (13 Dec 2020 01:46), Max: 36.1 (12 Dec 2020 16:00)  T(F): 97 (13 Dec 2020 01:46), Max: 97 (12 Dec 2020 16:00)  HR: 82 (13 Dec 2020 09:08) (56 - 84)  BP: 124/60 (13 Dec 2020 09:08) (121/77 - 137/95)  BP(mean): 92 (13 Dec 2020 00:00) (89 - 108)  RR: 18 (13 Dec 2020 05:00) (17 - 28)  SpO2: 97% (13 Dec 2020 09:55) (95% - 98%)    LABS:                        13.1   15.92 )-----------( 457      ( 13 Dec 2020 05:42 )             38.9     12-13    136  |  97<L>  |  38<H>  ----------------------------<  307<H>  4.0   |  29  |  1.2    Ca    8.8      13 Dec 2020 05:42  Phos  3.9     12-13  Mg     2.4     12-13    TPro  5.7<L>  /  Alb  3.1<L>  /  TBili  0.3  /  DBili  x   /  AST  16  /  ALT  30  /  AlkPhos  90  12-13                  RADIOLOGY:      PHYSICAL EXAM:    GENERAL: on HF NC, comfortable  HEENT:  Atraumatic, Normocephalic. EOMI, PERRLA, conjunctiva and sclera clear, No JVD  PULMONARY: bilaterla crackles  CARDIOVASCULAR: Regular rate and rhythm; No murmurs, rubs, or gallops  GASTROINTESTINAL: Soft, Nontender, Nondistended; Bowel sounds present  MUSCULOSKELETAL:  2+ Peripheral Pulses, No clubbing, cyanosis, or edema  NEUROLOGY: non-focal  SKIN: No rashes or lesions        ASSESSMENT & PLAN  60 yr M with recent COVID pneumonia discharged 3 weeks ago on 2LNC, s/p steroid course and plasma Tx, GIST (s/p resection), DM type II , RA (on prednisone), HTN, HLD and herniated disc  presented to ED for SOB and desaturation to 70s while walking in his house, pt was did=charged from the [facility] for covid pneumonia 3 weeks ago, on 2LNC, s/p steroid course (currently at 10mg which is his dose for RA) and plasma Tx, was doing Ok at his baseline, uses O2 at rest and on exertion, pt was not able to walk today due to chest pain, SOB and his saturation was in 70s with HR 120s, so pt calleld 911, pt denies any fever, cough, abd pain, diarrhea, N/V, urinary symptoms or legs swelling in ED; tachycardia 130, tachypnea 28, was placed on HFNC, CTA chest showed no PE, but  worsening diffuse bilateral patchy ground-glass opacities with new areas of patchy consolidations and new bronchiectasis, compatible with an infectious/inflammatory etiology. Findings likely represent sequela of known previous Covid-19 infection. Superimposed bacterial infection is a possibility. Since being in the ICU, he has been doing well and requiring less oxygen each day. downgraded to CEU    #Acute hypoxemic respiratory failure  #history of COVID pneumonia   - Worsening infiltrates RO opportunistic infection VS inflammatory / Fibrotic changes    - s/p Tocilizumab 12/11  - on solumedrol 60 q8hrs  -on zosyn per ID  -Urine strep and Legionella Ag negative  -Inflammatory markers (no need to repeat per ID)  -dimer 388  -CRP 20.70   --ferritin 674   --LDH up from 300 to 390   -fungitell and galactoman negative  - on HF NC. titrate oxygen down as needed      #HO RA Chronic steroid use   -Solumedrol 60 mg Q8    #hyperglycemia  -, insulin protocol    #hypertension  -lasix 40mg  -lopressor 25mg daily  HR has been down from 120s to 80s since starting lopressor, doing well    I=O>  Avoid volume overload   GI prophylaxis.  Feeding.  Bowel regimen   Follow up lytes.  Correct as needed  DVT prophylaxis.   Bed Rest     code status: full code  dispo: stepdown unit

## 2020-12-14 ENCOUNTER — APPOINTMENT (OUTPATIENT)
Dept: RHEUMATOLOGY | Facility: CLINIC | Age: 60
End: 2020-12-14

## 2020-12-14 LAB
ALBUMIN SERPL ELPH-MCNC: 3.1 G/DL — LOW (ref 3.5–5.2)
ALP SERPL-CCNC: 92 U/L — SIGNIFICANT CHANGE UP (ref 30–115)
ALT FLD-CCNC: 87 U/L — HIGH (ref 0–41)
ANION GAP SERPL CALC-SCNC: 10 MMOL/L — SIGNIFICANT CHANGE UP (ref 7–14)
AST SERPL-CCNC: 53 U/L — HIGH (ref 0–41)
BASOPHILS # BLD AUTO: 0.03 K/UL — SIGNIFICANT CHANGE UP (ref 0–0.2)
BASOPHILS NFR BLD AUTO: 0.2 % — SIGNIFICANT CHANGE UP (ref 0–1)
BILIRUB SERPL-MCNC: 0.7 MG/DL — SIGNIFICANT CHANGE UP (ref 0.2–1.2)
BUN SERPL-MCNC: 38 MG/DL — HIGH (ref 10–20)
CALCIUM SERPL-MCNC: 8.6 MG/DL — SIGNIFICANT CHANGE UP (ref 8.5–10.1)
CHLORIDE SERPL-SCNC: 94 MMOL/L — LOW (ref 98–110)
CO2 SERPL-SCNC: 31 MMOL/L — SIGNIFICANT CHANGE UP (ref 17–32)
CREAT SERPL-MCNC: 1.2 MG/DL — SIGNIFICANT CHANGE UP (ref 0.7–1.5)
EOSINOPHIL # BLD AUTO: 0 K/UL — SIGNIFICANT CHANGE UP (ref 0–0.7)
EOSINOPHIL NFR BLD AUTO: 0 % — SIGNIFICANT CHANGE UP (ref 0–8)
GLUCOSE BLDC GLUCOMTR-MCNC: 256 MG/DL — HIGH (ref 70–99)
GLUCOSE BLDC GLUCOMTR-MCNC: 261 MG/DL — HIGH (ref 70–99)
GLUCOSE BLDC GLUCOMTR-MCNC: 286 MG/DL — HIGH (ref 70–99)
GLUCOSE BLDC GLUCOMTR-MCNC: 301 MG/DL — HIGH (ref 70–99)
GLUCOSE BLDC GLUCOMTR-MCNC: 392 MG/DL — HIGH (ref 70–99)
GLUCOSE SERPL-MCNC: 452 MG/DL — CRITICAL HIGH (ref 70–99)
HCT VFR BLD CALC: 38.9 % — LOW (ref 42–52)
HGB BLD-MCNC: 13.4 G/DL — LOW (ref 14–18)
IMM GRANULOCYTES NFR BLD AUTO: 1.2 % — HIGH (ref 0.1–0.3)
LYMPHOCYTES # BLD AUTO: 0.72 K/UL — LOW (ref 1.2–3.4)
LYMPHOCYTES # BLD AUTO: 4.1 % — LOW (ref 20.5–51.1)
MAGNESIUM SERPL-MCNC: 2.2 MG/DL — SIGNIFICANT CHANGE UP (ref 1.8–2.4)
MCHC RBC-ENTMCNC: 30.8 PG — SIGNIFICANT CHANGE UP (ref 27–31)
MCHC RBC-ENTMCNC: 34.4 G/DL — SIGNIFICANT CHANGE UP (ref 32–37)
MCV RBC AUTO: 89.4 FL — SIGNIFICANT CHANGE UP (ref 80–94)
MONOCYTES # BLD AUTO: 0.85 K/UL — HIGH (ref 0.1–0.6)
MONOCYTES NFR BLD AUTO: 4.8 % — SIGNIFICANT CHANGE UP (ref 1.7–9.3)
NEUTROPHILS # BLD AUTO: 15.95 K/UL — HIGH (ref 1.4–6.5)
NEUTROPHILS NFR BLD AUTO: 89.7 % — HIGH (ref 42.2–75.2)
NRBC # BLD: 0 /100 WBCS — SIGNIFICANT CHANGE UP (ref 0–0)
PHOSPHATE SERPL-MCNC: 3.8 MG/DL — SIGNIFICANT CHANGE UP (ref 2.1–4.9)
PLATELET # BLD AUTO: 472 K/UL — HIGH (ref 130–400)
POTASSIUM SERPL-MCNC: 4.1 MMOL/L — SIGNIFICANT CHANGE UP (ref 3.5–5)
POTASSIUM SERPL-SCNC: 4.1 MMOL/L — SIGNIFICANT CHANGE UP (ref 3.5–5)
PROT SERPL-MCNC: 6.1 G/DL — SIGNIFICANT CHANGE UP (ref 6–8)
RBC # BLD: 4.35 M/UL — LOW (ref 4.7–6.1)
RBC # FLD: 15.7 % — HIGH (ref 11.5–14.5)
SODIUM SERPL-SCNC: 135 MMOL/L — SIGNIFICANT CHANGE UP (ref 135–146)
WBC # BLD: 17.77 K/UL — HIGH (ref 4.8–10.8)
WBC # FLD AUTO: 17.77 K/UL — HIGH (ref 4.8–10.8)

## 2020-12-14 PROCEDURE — 99233 SBSQ HOSP IP/OBS HIGH 50: CPT

## 2020-12-14 PROCEDURE — 71045 X-RAY EXAM CHEST 1 VIEW: CPT | Mod: 26

## 2020-12-14 PROCEDURE — 99232 SBSQ HOSP IP/OBS MODERATE 35: CPT

## 2020-12-14 RX ORDER — INSULIN LISPRO 100/ML
8 VIAL (ML) SUBCUTANEOUS
Refills: 0 | Status: DISCONTINUED | OUTPATIENT
Start: 2020-12-14 | End: 2020-12-15

## 2020-12-14 RX ORDER — INSULIN GLARGINE 100 [IU]/ML
20 INJECTION, SOLUTION SUBCUTANEOUS ONCE
Refills: 0 | Status: COMPLETED | OUTPATIENT
Start: 2020-12-14 | End: 2020-12-14

## 2020-12-14 RX ORDER — METOPROLOL TARTRATE 50 MG
25 TABLET ORAL
Refills: 0 | Status: DISCONTINUED | OUTPATIENT
Start: 2020-12-14 | End: 2020-12-15

## 2020-12-14 RX ORDER — INSULIN GLARGINE 100 [IU]/ML
24 INJECTION, SOLUTION SUBCUTANEOUS AT BEDTIME
Refills: 0 | Status: DISCONTINUED | OUTPATIENT
Start: 2020-12-14 | End: 2020-12-15

## 2020-12-14 RX ORDER — LACTULOSE 10 G/15ML
20 SOLUTION ORAL ONCE
Refills: 0 | Status: COMPLETED | OUTPATIENT
Start: 2020-12-14 | End: 2020-12-14

## 2020-12-14 RX ADMIN — ENOXAPARIN SODIUM 40 MILLIGRAM(S): 100 INJECTION SUBCUTANEOUS at 17:26

## 2020-12-14 RX ADMIN — LACTULOSE 20 GRAM(S): 10 SOLUTION ORAL at 11:05

## 2020-12-14 RX ADMIN — Medication 60 MILLIGRAM(S): at 00:59

## 2020-12-14 RX ADMIN — PIPERACILLIN AND TAZOBACTAM 25 GRAM(S): 4; .5 INJECTION, POWDER, LYOPHILIZED, FOR SOLUTION INTRAVENOUS at 23:05

## 2020-12-14 RX ADMIN — Medication 60 MILLIGRAM(S): at 22:07

## 2020-12-14 RX ADMIN — Medication 60 MILLIGRAM(S): at 05:16

## 2020-12-14 RX ADMIN — INSULIN GLARGINE 20 UNIT(S): 100 INJECTION, SOLUTION SUBCUTANEOUS at 13:24

## 2020-12-14 RX ADMIN — Medication 25 MILLIGRAM(S): at 17:26

## 2020-12-14 RX ADMIN — Medication 600 MILLIGRAM(S): at 05:13

## 2020-12-14 RX ADMIN — Medication 3: at 07:50

## 2020-12-14 RX ADMIN — Medication 60 MILLIGRAM(S): at 13:26

## 2020-12-14 RX ADMIN — PIPERACILLIN AND TAZOBACTAM 25 GRAM(S): 4; .5 INJECTION, POWDER, LYOPHILIZED, FOR SOLUTION INTRAVENOUS at 11:05

## 2020-12-14 RX ADMIN — Medication 600 MILLIGRAM(S): at 17:26

## 2020-12-14 RX ADMIN — Medication 5: at 11:06

## 2020-12-14 RX ADMIN — CHLORHEXIDINE GLUCONATE 15 MILLILITER(S): 213 SOLUTION TOPICAL at 05:13

## 2020-12-14 RX ADMIN — Medication 25 MILLIGRAM(S): at 05:13

## 2020-12-14 RX ADMIN — SENNA PLUS 2 TABLET(S): 8.6 TABLET ORAL at 22:07

## 2020-12-14 RX ADMIN — Medication 8 UNIT(S): at 11:06

## 2020-12-14 RX ADMIN — Medication 3: at 16:33

## 2020-12-14 RX ADMIN — ENOXAPARIN SODIUM 40 MILLIGRAM(S): 100 INJECTION SUBCUTANEOUS at 05:49

## 2020-12-14 RX ADMIN — INSULIN GLARGINE 24 UNIT(S): 100 INJECTION, SOLUTION SUBCUTANEOUS at 22:06

## 2020-12-14 RX ADMIN — CHLORHEXIDINE GLUCONATE 15 MILLILITER(S): 213 SOLUTION TOPICAL at 17:26

## 2020-12-14 RX ADMIN — Medication 40 MILLIGRAM(S): at 05:49

## 2020-12-14 RX ADMIN — PIPERACILLIN AND TAZOBACTAM 25 GRAM(S): 4; .5 INJECTION, POWDER, LYOPHILIZED, FOR SOLUTION INTRAVENOUS at 05:13

## 2020-12-14 RX ADMIN — PIPERACILLIN AND TAZOBACTAM 25 GRAM(S): 4; .5 INJECTION, POWDER, LYOPHILIZED, FOR SOLUTION INTRAVENOUS at 00:59

## 2020-12-14 RX ADMIN — POLYETHYLENE GLYCOL 3350 17 GRAM(S): 17 POWDER, FOR SOLUTION ORAL at 11:05

## 2020-12-14 RX ADMIN — PANTOPRAZOLE SODIUM 40 MILLIGRAM(S): 20 TABLET, DELAYED RELEASE ORAL at 07:50

## 2020-12-14 RX ADMIN — PIPERACILLIN AND TAZOBACTAM 25 GRAM(S): 4; .5 INJECTION, POWDER, LYOPHILIZED, FOR SOLUTION INTRAVENOUS at 17:29

## 2020-12-14 RX ADMIN — Medication 8 UNIT(S): at 16:33

## 2020-12-14 NOTE — DIETITIAN INITIAL EVALUATION ADULT. - OTHER CALCULATIONS
Energy: 7750-5701 kcal/day (MSJx1.2-1.3 AF). Protein: 83-99 g/day (1-1.2 g/kg ABW). Fluids: 1 mL/kcal or per lIP.

## 2020-12-14 NOTE — DIETITIAN INITIAL EVALUATION ADULT. - ADD RECOMMEND
Recommendation: Order Glucerna q12hrs. RD to monitor po intake & glucose trends and consider diet liberalization if po intake remains inadequate.

## 2020-12-14 NOTE — DIETITIAN INITIAL EVALUATION ADULT. - OTHER INFO
Pertinent Medical Information: p/w SOB. Acute hypoxemic respiratory failure 2/2 COVID PNA. Noted admitted for acute hypoxemic respiratory failure, admitted to ICU, s/p downgrade to stepdown remains on HFNC. DMII noted.    Pertinent Subjective Information: Per family, fair appetite & po intake PTP. Low sugar diet PTP. No preferences reported. Declined to discuss heart healthy & DM nutrition therapy concepts. Allergy to shellfish. No supplements. No known h/o unintentional wt loss reported.    Unable to conduct nutrition focused physical assessment d/t contact precautions in setting of COVID.    Alert. Last BM reported 12/12. No chewing/swallowing difficulty reported. Skin: intact.    Ht: 182.9 cm. Wt: 82.5 kg. BMI: 24.7. IBW: 80.9 kg.    Receives DASH/TLC + Consistent Carbohydrate (evening snack) diet order - consumes 50% of meals at this time d/t reduced appetite per RN.

## 2020-12-14 NOTE — PROGRESS NOTE ADULT - ATTENDING COMMENTS
Pt seen and examined. Currently on HFNC 60L/50% saturating at 95%. Downtitrate oxygen requirements as tolerated.

## 2020-12-14 NOTE — DIETITIAN INITIAL EVALUATION ADULT. - PERTINENT LABORATORY DATA
12/14: RBC-4.35, H/H-13.4/38.9, Cl-94, BUN-38, gluc-452 (up from 307 12/13), POCT-261 (16:23) vs 392 (10:53) vs 256 (7:45) vs 286 (5:34); 12/9: [id number]-7.5%

## 2020-12-14 NOTE — PROGRESS NOTE ADULT - SUBJECTIVE AND OBJECTIVE BOX
Patient is a 60y old  Male who presents with a chief complaint of SOB (13 Dec 2020 13:24)        Over Night Events:  Feels the same.  on HFNCO2 60 liters 45 %.  No SOB at rest.  ZAMUDIO.  Off pressors         ROS:     All ROS are negative except HPI         PHYSICAL EXAM    ICU Vital Signs Last 24 Hrs  T(C): 36.4 (14 Dec 2020 04:49), Max: 36.4 (13 Dec 2020 14:08)  T(F): 97.6 (14 Dec 2020 04:49), Max: 97.6 (14 Dec 2020 00:15)  HR: 52 (14 Dec 2020 04:49) (52 - 84)  BP: 123/76 (14 Dec 2020 04:49) (109/75 - 124/60)  BP(mean): --  ABP: --  ABP(mean): --  RR: 18 (14 Dec 2020 04:49) (18 - 18)  SpO2: 96% (13 Dec 2020 21:05) (93% - 98%)      CONSTITUTIONAL:  Well nourished.  NAD    ENT:   Airway patent,   Mouth with normal mucosa.   No thrush    EYES:   Pupils equal,   Round and reactive to light.    CARDIAC:   Normal rate,   Regular rhythm.    No edema      Vascular:  Normal systolic impulse  No Carotid bruits    RESPIRATORY:   No wheezing  Bilateral crackles   Normal chest expansion  Not tachypneic,  No use of accessory muscles    GASTROINTESTINAL:  Abdomen soft,   Non-tender,   No guarding,   + BS    MUSCULOSKELETAL:   Range of motion is not limited,  No clubbing, cyanosis    NEUROLOGICAL:   Alert and oriented   No motor  deficits.    SKIN:   Skin normal color for race,   Warm and dry and intact.   No evidence of rash.    PSYCHIATRIC:   Normal mood and affect.   No apparent risk to self or others.    HEMATOLOGICAL:  No cervical  lymphadenopathy.  no inguinal lymphadenopathy      12-13-20 @ 07:01  -  12-14-20 @ 07:00  --------------------------------------------------------  IN:    Oral Fluid: 600 mL  Total IN: 600 mL    OUT:    Voided (mL): 700 mL  Total OUT: 700 mL    Total NET: -100 mL          LABS:                            13.1   15.92 )-----------( 457      ( 13 Dec 2020 05:42 )             38.9                                               12-13    136  |  97<L>  |  38<H>  ----------------------------<  307<H>  4.0   |  29  |  1.2    Ca    8.8      13 Dec 2020 05:42  Phos  3.9     12-13  Mg     2.4     12-13    TPro  5.7<L>  /  Alb  3.1<L>  /  TBili  0.3  /  DBili  x   /  AST  16  /  ALT  30  /  AlkPhos  90  12-13                                                                                           LIVER FUNCTIONS - ( 13 Dec 2020 05:42 )  Alb: 3.1 g/dL / Pro: 5.7 g/dL / ALK PHOS: 90 U/L / ALT: 30 U/L / AST: 16 U/L / GGT: x                                                                                                                                       MEDICATIONS  (STANDING):  chlorhexidine 0.12% Liquid 15 milliLiter(s) Swish and Spit two times a day  chlorhexidine 4% Liquid 1 Application(s) Topical <User Schedule>  dextrose 40% Gel 15 Gram(s) Oral once  dextrose 5%. 1000 milliLiter(s) (50 mL/Hr) IV Continuous <Continuous>  dextrose 5%. 1000 milliLiter(s) (100 mL/Hr) IV Continuous <Continuous>  dextrose 50% Injectable 25 Gram(s) IV Push once  dextrose 50% Injectable 12.5 Gram(s) IV Push once  dextrose 50% Injectable 25 Gram(s) IV Push once  enoxaparin Injectable 40 milliGRAM(s) SubCutaneous every 12 hours  furosemide    Tablet 40 milliGRAM(s) Oral daily  glucagon  Injectable 1 milliGRAM(s) IntraMuscular once  guaiFENesin  milliGRAM(s) Oral every 12 hours  insulin glargine Injectable (LANTUS) 15 Unit(s) SubCutaneous at bedtime  insulin lispro (ADMELOG) corrective regimen sliding scale   SubCutaneous three times a day before meals  insulin lispro Injectable (ADMELOG) 7 Unit(s) SubCutaneous three times a day before meals  methylPREDNISolone sodium succinate Injectable 60 milliGRAM(s) IV Push every 8 hours  metoprolol tartrate 25 milliGRAM(s) Oral every 12 hours  pantoprazole    Tablet 40 milliGRAM(s) Oral before breakfast  piperacillin/tazobactam IVPB.. 4.5 Gram(s) IV Intermittent every 6 hours  polyethylene glycol 3350 17 Gram(s) Oral daily  senna 2 Tablet(s) Oral at bedtime    MEDICATIONS  (PRN):      New X-rays reviewed:                                                                                  ECHO    CXR interpreted by me:

## 2020-12-14 NOTE — PROGRESS NOTE ADULT - ASSESSMENT
IMPRESSION:    Acute hypoxemic respiratory failure  HO COVID pneumonia   Worsening infiltrates doubt opportunistic infection.  Likely Inflammatory / Fibrotic changes    HO RA Chronic steroid use     PLAN:    CNS:  No depressants     HEENT: Oral care    PULMONARY:  HOB @ 45 degrees.  Aspiration precautions.  Decrease Flow to 50 liters.  Wean O2 as tolerated.  Continue Solumedrol 60 mg Q8.      CARDIOVASCULAR:  I=O,  Avoid volume overload.  Beta blockers     GI: GI prophylaxis.  Feeding.  Bowel regimen     RENAL:  Follow up lytes.  Correct as needed    INFECTIOUS DISEASE: Follow up cultures. Finish ABX course.      HEMATOLOGICAL:  DVT prophylaxis. LMWH 40mg q12h    ENDOCRINE:  Follow up FS.  Insulin protocol if needed.    MUSCULOSKELETAL:  oob to chair      SDU

## 2020-12-14 NOTE — DIETITIAN INITIAL EVALUATION ADULT. - RD TO REMAIN AVAILABLE
Nutrition Intervention: Meals & Snacks, Medical food Supplements. Monitor: Energy intake, glucose profile, renal profile, nutrition focused physical findings, body composition./yes
standing/walking

## 2020-12-14 NOTE — DIETITIAN INITIAL EVALUATION ADULT. - PERTINENT MEDS FT
enoxaparin, insulin glargine, insulin lispro, metoprolol, miralax, protonix, lasix, senna, methylprednisolone

## 2020-12-14 NOTE — PROGRESS NOTE ADULT - SUBJECTIVE AND OBJECTIVE BOX
Patient Information:  JOSÉ LUIS AREVALO / 60y / Male / MRN#:[id number]    Hospital Day: 7d    Interval History:  Patient seen and examined at bedside. No acute events overnight.  Pt is sitting up in bed, on HFNC. Reports he feels well, denies any complaints.     Past Medical History:  Pneumonia due to COVID-19 virus    Hyperlipidemia    Lumbago    Hypertension      Past Surgical History:  H/O lymph node biopsy      Allergies:  penicillin (Unknown)  shellfish (Pruritus)    Medications:  PRN:    Standing:  chlorhexidine 0.12% Liquid 15 milliLiter(s) Swish and Spit two times a day  chlorhexidine 4% Liquid 1 Application(s) Topical <User Schedule>  dextrose 40% Gel 15 Gram(s) Oral once  dextrose 5%. 1000 milliLiter(s) (50 mL/Hr) IV Continuous <Continuous>  dextrose 5%. 1000 milliLiter(s) (100 mL/Hr) IV Continuous <Continuous>  dextrose 50% Injectable 25 Gram(s) IV Push once  dextrose 50% Injectable 12.5 Gram(s) IV Push once  dextrose 50% Injectable 25 Gram(s) IV Push once  enoxaparin Injectable 40 milliGRAM(s) SubCutaneous every 12 hours  furosemide    Tablet 40 milliGRAM(s) Oral daily  glucagon  Injectable 1 milliGRAM(s) IntraMuscular once  guaiFENesin  milliGRAM(s) Oral every 12 hours  insulin glargine Injectable (LANTUS) 24 Unit(s) SubCutaneous at bedtime  insulin lispro (ADMELOG) corrective regimen sliding scale   SubCutaneous three times a day before meals  insulin lispro Injectable (ADMELOG) 8 Unit(s) SubCutaneous three times a day before meals  lactulose Syrup 20 Gram(s) Oral once  methylPREDNISolone sodium succinate Injectable 60 milliGRAM(s) IV Push every 8 hours  metoprolol tartrate 25 milliGRAM(s) Oral every 12 hours  pantoprazole    Tablet 40 milliGRAM(s) Oral before breakfast  piperacillin/tazobactam IVPB.. 4.5 Gram(s) IV Intermittent every 6 hours  polyethylene glycol 3350 17 Gram(s) Oral daily  senna 2 Tablet(s) Oral at bedtime    Home:  gabapentin enacarbil 300 mg oral tablet, extended release: 1 tab(s) orally 3 times a day  lisinopril 40 mg oral tablet: 1 tab(s) orally once a day    Vitals:  T(C): 36.1, Max: 36.4 (12-13-20 @ 14:08)  T(F): 97, Max: 97.6 (12-14-20 @ 00:15)  HR: 20 (20 - 84)  BP: 111/77 (109/75 - 123/76)  RR: 18 (18 - 18)  SpO2: 93% (93% - 96%)    Physical Exam:  General: Awake, alert, NAD, resting comfortably in bed, on HFNC  HEENT: Head NC/AT  Heart: RRR; S1/S2; no rubs, murmurs appreciated  Lungs: Crackles appreciated up to scapular region bilaterally  Abdomen: Soft, nontender, nondistended, BS+  Extremities: No edema, clubbing, cyanosis in upper or lower extremities  Neuro: AAOx3, NFD    Labs:  CBC (12-13 @ 05:42)                        Hgb: 13.1   WBC: 15.92 )-----------------( Plts: 457                              Hct: 38.9     Chem (12-13 @ 05:42)  Na: 136  |     Cl: 97     |  BUN: 38  -----------------------------------------< Gluc: 307    K: 4.0   |    HCO3: 29  |  Cr: 1.2    Ca 8.8 (12-13 @ 05:42)  Phos 3.9 (12-13 @ 05:42)  Mg 2.4 (12-13 @ 05:42)    LFTs (12-13 @ 05:42)  TPro 5.7  /  Alb 3.1  TBili 0.3  /  DBili     AST 16  /  ALT 30  /  AlkPhos 90            Microbiology:  Culture - Blood (collected 12-08 @ 12:01)  Source: .Blood None  Final Report (12-13 @ 23:01):    No Growth Final    Culture - Fungal, Blood (collected 12-08 @ 12:01)  Source: .Blood Blood-Peripheral  Preliminary Report (12-09 @ 09:20):    Testing in progress    Culture - Blood (collected 12-07 @ 15:10)  Source: .Blood Blood  Final Report (12-12 @ 23:01):    No Growth Final    Culture - Blood (collected 12-07 @ 15:10)  Source: .Blood Blood  Final Report (12-12 @ 23:01):    No Growth Final        Radiology:

## 2020-12-14 NOTE — PROGRESS NOTE ADULT - ASSESSMENT
60 yr M with recent COVID pneumonia discharged 3 weeks ago on 2LNC, s/p steroid course and plasma Tx, GIST (s/p resection), DM type II , RA (on prednisone), HTN, HLD and herniated disc presented to ED for SOB and desaturation, recently discharged with home O2 after admission for COVID pneumonia, admitted for acute hypoxemic respiratory failure, admitted to ICU, s/p downgrade to stepdown remains on HFNC.     #Acute hypoxemic respiratory failure due to COVID pna  #Recently discharged on home O2 after admission for COVID pna  - CXR on admission revealed worsening infiltrates, RUL consolidation - now improving  - Unlikely has underlying bacterial pneumonia, procalc wnl, inflammatory markers trending down, legionella, fungitell, galactommanan neg  - C/w Zosyn 4.5 G q6 for 7 day course  - S/p Toci 12/11, c/w Solumedrol 60 mg IV q8  - Wean off HFNC, encourage incentive spirometer    #Hx of RA, on chronic steroids  - C/w Solumedrol 60 mg q8  - Takes Prednisone 10 mg qd    #HTN - well controlled  - Pt has been bradycardic overnight, will place Metoprolol parameters    #DMII  - HbA1c 7.55  - FS elevated, insulin inc to 24U Lantus, 8U Lispro    DVT ppx: Lovenox  GI ppx: Pantoprazole  Diet: DASH  Activity: IAT  Full code  Dispo: pending improved oxygen requirements 60 yr M with recent COVID pneumonia discharged 3 weeks ago on 2LNC, s/p steroid course and plasma Tx, GIST (s/p resection), DM type II , RA (on prednisone), HTN, HLD and herniated disc presented to ED for SOB and desaturation, recently discharged with home O2 after admission for COVID pneumonia, admitted for acute hypoxemic respiratory failure, admitted to ICU, s/p downgrade to stepdown remains on HFNC.     #Acute hypoxemic respiratory failure due to COVID pna  #Recently discharged on home O2 after admission for COVID pna  - CXR on admission revealed worsening infiltrates, RUL consolidation - now improving  - Unlikely has underlying bacterial pneumonia, procalc wnl, inflammatory markers trending down, legionella, fungitell, galactommanan neg  - C/w Zosyn 4.5 G q6 for 7 day course  - S/p Toci 12/11, c/w Solumedrol 60 mg IV q8  - Wean off HFNC, encourage incentive spirometer    #Hx of RA, on chronic steroids  - C/w Solumedrol 60 mg q8  - Takes Prednisone 10 mg qd    #HTN - well controlled  - Pt has been bradycardic overnight, will place Metoprolol parameters    #DMII  - HbA1c 7.55  - FS elevated, insulin inc to 24U Lantus, 8U Lispro    DVT ppx: Lovenox  GI ppx: Pantoprazole  Diet: DASH  Activity: IAT  Full code  Dispo: pending improved oxygen requirements    * Updated daughter Jodi [phone] *

## 2020-12-15 LAB
ALBUMIN SERPL ELPH-MCNC: 3 G/DL — LOW (ref 3.5–5.2)
ALP SERPL-CCNC: 81 U/L — SIGNIFICANT CHANGE UP (ref 30–115)
ALT FLD-CCNC: 137 U/L — HIGH (ref 0–41)
ANION GAP SERPL CALC-SCNC: 9 MMOL/L — SIGNIFICANT CHANGE UP (ref 7–14)
AST SERPL-CCNC: 49 U/L — HIGH (ref 0–41)
BASOPHILS # BLD AUTO: 0.04 K/UL — SIGNIFICANT CHANGE UP (ref 0–0.2)
BASOPHILS NFR BLD AUTO: 0.2 % — SIGNIFICANT CHANGE UP (ref 0–1)
BILIRUB SERPL-MCNC: 0.3 MG/DL — SIGNIFICANT CHANGE UP (ref 0.2–1.2)
BUN SERPL-MCNC: 36 MG/DL — HIGH (ref 10–20)
CALCIUM SERPL-MCNC: 8.9 MG/DL — SIGNIFICANT CHANGE UP (ref 8.5–10.1)
CHLORIDE SERPL-SCNC: 97 MMOL/L — LOW (ref 98–110)
CO2 SERPL-SCNC: 32 MMOL/L — SIGNIFICANT CHANGE UP (ref 17–32)
CREAT SERPL-MCNC: 1.2 MG/DL — SIGNIFICANT CHANGE UP (ref 0.7–1.5)
EOSINOPHIL # BLD AUTO: 0 K/UL — SIGNIFICANT CHANGE UP (ref 0–0.7)
EOSINOPHIL NFR BLD AUTO: 0 % — SIGNIFICANT CHANGE UP (ref 0–8)
GLUCOSE BLDC GLUCOMTR-MCNC: 253 MG/DL — HIGH (ref 70–99)
GLUCOSE BLDC GLUCOMTR-MCNC: 283 MG/DL — HIGH (ref 70–99)
GLUCOSE BLDC GLUCOMTR-MCNC: 290 MG/DL — HIGH (ref 70–99)
GLUCOSE BLDC GLUCOMTR-MCNC: 330 MG/DL — HIGH (ref 70–99)
GLUCOSE BLDC GLUCOMTR-MCNC: 374 MG/DL — HIGH (ref 70–99)
GLUCOSE SERPL-MCNC: 316 MG/DL — HIGH (ref 70–99)
HCT VFR BLD CALC: 38.2 % — LOW (ref 42–52)
HGB BLD-MCNC: 12.7 G/DL — LOW (ref 14–18)
IMM GRANULOCYTES NFR BLD AUTO: 2.1 % — HIGH (ref 0.1–0.3)
LYMPHOCYTES # BLD AUTO: 0.99 K/UL — LOW (ref 1.2–3.4)
LYMPHOCYTES # BLD AUTO: 6.1 % — LOW (ref 20.5–51.1)
MAGNESIUM SERPL-MCNC: 2.4 MG/DL — SIGNIFICANT CHANGE UP (ref 1.8–2.4)
MCHC RBC-ENTMCNC: 29.5 PG — SIGNIFICANT CHANGE UP (ref 27–31)
MCHC RBC-ENTMCNC: 33.2 G/DL — SIGNIFICANT CHANGE UP (ref 32–37)
MCV RBC AUTO: 88.6 FL — SIGNIFICANT CHANGE UP (ref 80–94)
MONOCYTES # BLD AUTO: 0.91 K/UL — HIGH (ref 0.1–0.6)
MONOCYTES NFR BLD AUTO: 5.6 % — SIGNIFICANT CHANGE UP (ref 1.7–9.3)
NEUTROPHILS # BLD AUTO: 13.92 K/UL — HIGH (ref 1.4–6.5)
NEUTROPHILS NFR BLD AUTO: 86 % — HIGH (ref 42.2–75.2)
NRBC # BLD: 0 /100 WBCS — SIGNIFICANT CHANGE UP (ref 0–0)
PLATELET # BLD AUTO: 454 K/UL — HIGH (ref 130–400)
POTASSIUM SERPL-MCNC: 4.2 MMOL/L — SIGNIFICANT CHANGE UP (ref 3.5–5)
POTASSIUM SERPL-SCNC: 4.2 MMOL/L — SIGNIFICANT CHANGE UP (ref 3.5–5)
PROT SERPL-MCNC: 5.4 G/DL — LOW (ref 6–8)
RBC # BLD: 4.31 M/UL — LOW (ref 4.7–6.1)
RBC # FLD: 15.2 % — HIGH (ref 11.5–14.5)
SODIUM SERPL-SCNC: 138 MMOL/L — SIGNIFICANT CHANGE UP (ref 135–146)
WBC # BLD: 16.2 K/UL — HIGH (ref 4.8–10.8)
WBC # FLD AUTO: 16.2 K/UL — HIGH (ref 4.8–10.8)

## 2020-12-15 PROCEDURE — 99232 SBSQ HOSP IP/OBS MODERATE 35: CPT

## 2020-12-15 PROCEDURE — 71045 X-RAY EXAM CHEST 1 VIEW: CPT | Mod: 26

## 2020-12-15 PROCEDURE — 99233 SBSQ HOSP IP/OBS HIGH 50: CPT

## 2020-12-15 RX ORDER — INSULIN GLARGINE 100 [IU]/ML
25 INJECTION, SOLUTION SUBCUTANEOUS EVERY MORNING
Refills: 0 | Status: DISCONTINUED | OUTPATIENT
Start: 2020-12-15 | End: 2020-12-16

## 2020-12-15 RX ORDER — INSULIN LISPRO 100/ML
12 VIAL (ML) SUBCUTANEOUS
Refills: 0 | Status: DISCONTINUED | OUTPATIENT
Start: 2020-12-15 | End: 2020-12-21

## 2020-12-15 RX ORDER — METOPROLOL TARTRATE 50 MG
12.5 TABLET ORAL
Refills: 0 | Status: DISCONTINUED | OUTPATIENT
Start: 2020-12-15 | End: 2020-12-16

## 2020-12-15 RX ORDER — INSULIN GLARGINE 100 [IU]/ML
25 INJECTION, SOLUTION SUBCUTANEOUS AT BEDTIME
Refills: 0 | Status: DISCONTINUED | OUTPATIENT
Start: 2020-12-15 | End: 2020-12-16

## 2020-12-15 RX ADMIN — Medication 600 MILLIGRAM(S): at 17:00

## 2020-12-15 RX ADMIN — ENOXAPARIN SODIUM 40 MILLIGRAM(S): 100 INJECTION SUBCUTANEOUS at 17:00

## 2020-12-15 RX ADMIN — CHLORHEXIDINE GLUCONATE 15 MILLILITER(S): 213 SOLUTION TOPICAL at 17:01

## 2020-12-15 RX ADMIN — Medication 4: at 08:43

## 2020-12-15 RX ADMIN — INSULIN GLARGINE 25 UNIT(S): 100 INJECTION, SOLUTION SUBCUTANEOUS at 12:26

## 2020-12-15 RX ADMIN — Medication 40 MILLIGRAM(S): at 06:21

## 2020-12-15 RX ADMIN — Medication 60 MILLIGRAM(S): at 06:23

## 2020-12-15 RX ADMIN — CHLORHEXIDINE GLUCONATE 1 APPLICATION(S): 213 SOLUTION TOPICAL at 06:20

## 2020-12-15 RX ADMIN — Medication 60 MILLIGRAM(S): at 14:37

## 2020-12-15 RX ADMIN — Medication 12 UNIT(S): at 11:42

## 2020-12-15 RX ADMIN — PIPERACILLIN AND TAZOBACTAM 25 GRAM(S): 4; .5 INJECTION, POWDER, LYOPHILIZED, FOR SOLUTION INTRAVENOUS at 06:23

## 2020-12-15 RX ADMIN — Medication 60 MILLIGRAM(S): at 21:33

## 2020-12-15 RX ADMIN — Medication 3: at 16:44

## 2020-12-15 RX ADMIN — Medication 12.5 MILLIGRAM(S): at 17:00

## 2020-12-15 RX ADMIN — SENNA PLUS 2 TABLET(S): 8.6 TABLET ORAL at 21:33

## 2020-12-15 RX ADMIN — Medication 5: at 11:43

## 2020-12-15 RX ADMIN — Medication 8 UNIT(S): at 08:42

## 2020-12-15 RX ADMIN — Medication 12 UNIT(S): at 16:43

## 2020-12-15 RX ADMIN — PANTOPRAZOLE SODIUM 40 MILLIGRAM(S): 20 TABLET, DELAYED RELEASE ORAL at 06:21

## 2020-12-15 RX ADMIN — Medication 600 MILLIGRAM(S): at 06:23

## 2020-12-15 RX ADMIN — INSULIN GLARGINE 25 UNIT(S): 100 INJECTION, SOLUTION SUBCUTANEOUS at 21:33

## 2020-12-15 RX ADMIN — ENOXAPARIN SODIUM 40 MILLIGRAM(S): 100 INJECTION SUBCUTANEOUS at 06:21

## 2020-12-15 NOTE — PROGRESS NOTE ADULT - SUBJECTIVE AND OBJECTIVE BOX
JOSÉ LUIS AREVALO  60y, Male    All available historical data reviewed    OVERNIGHT EVENTS:  no fevers  feels well and has no complaints  HFNC  no cough    ROS:  General: Denies rigors, nightsweats  HEENT: Denies headache, rhinorrhea, sore throat, eye pain  CV: Denies CP, palpitations  PULM: Denies wheezing, hemoptysis  GI: Denies hematemesis, hematochezia, melena  : Denies discharge, hematuria  MSK: Denies arthralgias, myalgias  SKIN: Denies rash, lesions  NEURO: Denies paresthesias, weakness  PSYCH: Denies depression, anxiety    VITALS:  T(F): 97.5, Max: 98.5 (12-14-20 @ 20:00)  HR: 57  BP: 122/72  RR: 18Vital Signs Last 24 Hrs  T(C): 36.4 (15 Dec 2020 06:14), Max: 36.9 (14 Dec 2020 20:00)  T(F): 97.5 (15 Dec 2020 06:14), Max: 98.5 (14 Dec 2020 20:00)  HR: 57 (15 Dec 2020 06:14) (57 - 78)  BP: 122/72 (15 Dec 2020 06:14) (111/- - 131/77)  BP(mean): --  RR: 18 (15 Dec 2020 06:14) (18 - 18)  SpO2: 92% (15 Dec 2020 08:32) (90% - 96%)    TESTS & MEASUREMENTS:                        12.7   16.20 )-----------( 454      ( 15 Dec 2020 05:01 )             38.2     12-15    138  |  97<L>  |  36<H>  ----------------------------<  316<H>  4.2   |  32  |  1.2    Ca    8.9      15 Dec 2020 05:01  Phos  3.8     12-14  Mg     2.4     12-15    TPro  5.4<L>  /  Alb  3.0<L>  /  TBili  0.3  /  DBili  x   /  AST  49<H>  /  ALT  137<H>  /  AlkPhos  81  12-15    LIVER FUNCTIONS - ( 15 Dec 2020 05:01 )  Alb: 3.0 g/dL / Pro: 5.4 g/dL / ALK PHOS: 81 U/L / ALT: 137 U/L / AST: 49 U/L / GGT: x             Culture - Blood (collected 12-08-20 @ 12:01)  Source: .Blood None  Final Report (12-13-20 @ 23:01):    No Growth Final    Culture - Fungal, Blood (collected 12-08-20 @ 12:01)  Source: .Blood Blood-Peripheral  Preliminary Report (12-09-20 @ 09:20):    Testing in progress            RADIOLOGY & ADDITIONAL TESTS:  Personal review of radiological diagnostics performed  Echo and EKG results noted when applicable.     MEDICATIONS:  chlorhexidine 0.12% Liquid 15 milliLiter(s) Swish and Spit two times a day  chlorhexidine 4% Liquid 1 Application(s) Topical <User Schedule>  dextrose 40% Gel 15 Gram(s) Oral once  dextrose 5%. 1000 milliLiter(s) IV Continuous <Continuous>  dextrose 5%. 1000 milliLiter(s) IV Continuous <Continuous>  dextrose 50% Injectable 25 Gram(s) IV Push once  dextrose 50% Injectable 12.5 Gram(s) IV Push once  dextrose 50% Injectable 25 Gram(s) IV Push once  enoxaparin Injectable 40 milliGRAM(s) SubCutaneous every 12 hours  furosemide    Tablet 40 milliGRAM(s) Oral daily  glucagon  Injectable 1 milliGRAM(s) IntraMuscular once  guaiFENesin  milliGRAM(s) Oral every 12 hours  insulin glargine Injectable (LANTUS) 25 Unit(s) SubCutaneous at bedtime  insulin glargine Injectable (LANTUS) 25 Unit(s) SubCutaneous every morning  insulin lispro (ADMELOG) corrective regimen sliding scale   SubCutaneous three times a day before meals  insulin lispro Injectable (ADMELOG) 12 Unit(s) SubCutaneous three times a day before meals  methylPREDNISolone sodium succinate Injectable 60 milliGRAM(s) IV Push every 8 hours  metoprolol tartrate 12.5 milliGRAM(s) Oral two times a day  pantoprazole    Tablet 40 milliGRAM(s) Oral before breakfast  piperacillin/tazobactam IVPB.. 4.5 Gram(s) IV Intermittent every 6 hours  polyethylene glycol 3350 17 Gram(s) Oral daily  senna 2 Tablet(s) Oral at bedtime      ANTIBIOTICS:  piperacillin/tazobactam IVPB.. 4.5 Gram(s) IV Intermittent every 6 hours

## 2020-12-15 NOTE — PROGRESS NOTE ADULT - SUBJECTIVE AND OBJECTIVE BOX
Patient is a 60y old  Male who presents with a chief complaint of SOB (15 Dec 2020 10:43)        Over Night Events:        ROS:     All ROS are negative except HPI         PHYSICAL EXAM    ICU Vital Signs Last 24 Hrs  T(C): 36.4 (15 Dec 2020 13:28), Max: 36.9 (14 Dec 2020 20:00)  T(F): 97.6 (15 Dec 2020 13:28), Max: 98.5 (14 Dec 2020 20:00)  HR: 82 (15 Dec 2020 13:28) (57 - 82)  BP: 115/80 (15 Dec 2020 13:28) (115/80 - 131/77)  BP(mean): --  ABP: --  ABP(mean): --  RR: 18 (15 Dec 2020 06:14) (18 - 18)  SpO2: 97% (15 Dec 2020 14:35) (90% - 97%)      CONSTITUTIONAL:  Well nourished.  NAD    ENT:   Airway patent,   Mouth with normal mucosa.   No thrush    EYES:   Pupils equal,   Round and reactive to light.    CARDIAC:   Normal rate,   Regular rhythm.    No edema      Vascular:  Normal systolic impulse  No Carotid bruits    RESPIRATORY:   No wheezing  Bilateral BS  Normal chest expansion  Not tachypneic,  No use of accessory muscles    GASTROINTESTINAL:  Abdomen soft,   Non-tender,   No guarding,   + BS    MUSCULOSKELETAL:   Range of motion is not limited,  No clubbing, cyanosis    NEUROLOGICAL:   Alert and oriented   No motor  deficits.    SKIN:   Skin normal color for race,   Warm and dry and intact.   No evidence of rash.    PSYCHIATRIC:   Normal mood and affect.   No apparent risk to self or others.    HEMATOLOGICAL:  No cervical  lymphadenopathy.  no inguinal lymphadenopathy      12-14-20 @ 07:01  -  12-15-20 @ 07:00  --------------------------------------------------------  IN:    IV PiggyBack: 125 mL    Oral Fluid: 525 mL  Total IN: 650 mL    OUT:    Stool (mL): 1 mL    Voided (mL): 1520 mL  Total OUT: 1521 mL    Total NET: -871 mL      12-15-20 @ 07:01  -  12-15-20 @ 15:39  --------------------------------------------------------  IN:  Total IN: 0 mL    OUT:    Stool (mL): 1 mL    Voided (mL): 900 mL  Total OUT: 901 mL    Total NET: -901 mL          LABS:                            12.7   16.20 )-----------( 454      ( 15 Dec 2020 05:01 )             38.2                                               12-15    138  |  97<L>  |  36<H>  ----------------------------<  316<H>  4.2   |  32  |  1.2    Ca    8.9      15 Dec 2020 05:01  Phos  3.8     12-14  Mg     2.4     12-15    TPro  5.4<L>  /  Alb  3.0<L>  /  TBili  0.3  /  DBili  x   /  AST  49<H>  /  ALT  137<H>  /  AlkPhos  81  12-15                                                                                           LIVER FUNCTIONS - ( 15 Dec 2020 05:01 )  Alb: 3.0 g/dL / Pro: 5.4 g/dL / ALK PHOS: 81 U/L / ALT: 137 U/L / AST: 49 U/L / GGT: x                                                                                                                                       MEDICATIONS  (STANDING):  chlorhexidine 0.12% Liquid 15 milliLiter(s) Swish and Spit two times a day  chlorhexidine 4% Liquid 1 Application(s) Topical <User Schedule>  dextrose 40% Gel 15 Gram(s) Oral once  dextrose 5%. 1000 milliLiter(s) (50 mL/Hr) IV Continuous <Continuous>  dextrose 5%. 1000 milliLiter(s) (100 mL/Hr) IV Continuous <Continuous>  dextrose 50% Injectable 25 Gram(s) IV Push once  dextrose 50% Injectable 12.5 Gram(s) IV Push once  dextrose 50% Injectable 25 Gram(s) IV Push once  enoxaparin Injectable 40 milliGRAM(s) SubCutaneous every 12 hours  furosemide    Tablet 40 milliGRAM(s) Oral daily  glucagon  Injectable 1 milliGRAM(s) IntraMuscular once  guaiFENesin  milliGRAM(s) Oral every 12 hours  insulin glargine Injectable (LANTUS) 25 Unit(s) SubCutaneous at bedtime  insulin glargine Injectable (LANTUS) 25 Unit(s) SubCutaneous every morning  insulin lispro (ADMELOG) corrective regimen sliding scale   SubCutaneous three times a day before meals  insulin lispro Injectable (ADMELOG) 12 Unit(s) SubCutaneous three times a day before meals  methylPREDNISolone sodium succinate Injectable 60 milliGRAM(s) IV Push every 8 hours  metoprolol tartrate 12.5 milliGRAM(s) Oral two times a day  pantoprazole    Tablet 40 milliGRAM(s) Oral before breakfast  polyethylene glycol 3350 17 Gram(s) Oral daily  senna 2 Tablet(s) Oral at bedtime    MEDICATIONS  (PRN):      New X-rays reviewed:                                                                                  ECHO    CXR interpreted by me:

## 2020-12-15 NOTE — PROGRESS NOTE ADULT - ASSESSMENT
60 yr M with recent COVID pneumonia discharged 3 weeks ago on 2LNC, s/p steroid course and plasma Tx, GIST (s/p resection), DM type II , RA (on prednisone), HTN, HLD and herniated disc    IMPRESSION;  Clinically improved  CXR improved   COVID-19 positive 11/5. S/p dexamethason/plasma. CT with sequelae of COVID-19 .  PNA bacterial ? vs progressive lung patology secondary to COVID-19 ?   procalcitonin 0.10  , not suggestive of a bacterial PNA.  Ferritin 1039>819  CRP 15.05>6.57  Ddimers 279  Nares ORSA NG  BCx 12/7,8 NGTD  legionella NG  Fungitel < 31 ( excludes PCJ )  Galactommanan NG ( excludes CAPA )          RECOMMENDATIONS;  NO NEED TO REPEAT INFLAMMATORY MARKERS  Zosyn 4.5 gm iv q6h. Will hold for now  S/p Tocilizumab 400 mg iv x once. 12/11

## 2020-12-15 NOTE — PROGRESS NOTE ADULT - ASSESSMENT
60 yr M with recent COVID pneumonia discharged 3 weeks ago on 2LNC, s/p steroid course and plasma Tx, GIST (s/p resection), DM type II , RA (on prednisone), HTN, HLD and herniated disc presented to ED for SOB and desaturation, recently discharged with home O2 after admission for COVID pneumonia, admitted for acute hypoxemic respiratory failure, admitted to ICU, s/p downgrade to stepdown remains on HFNC, improving.    #Acute hypoxemic respiratory failure due to COVID pna - improving  #Recently discharged on home O2 after admission for COVID pna  - CXR on admission revealed worsening infiltrates, RUL consolidation - now improving  - Unlikely has underlying bacterial pneumonia, procalc wnl, inflammatory markers trending down, legionella, fungitell, galactommanan neg  - C/w Zosyn 4.5 G q6 for 7 day course (today is day #4)  - S/p Toci 12/11, c/w Solumedrol 60 mg IV q8  - Wean off HFNC as tolerated, encourage incentive spirometer    #Hx of RA, on chronic steroids  - C/w Solumedrol 60 mg q8  - Takes Prednisone 10 mg qd    #HTN - well controlled  - C/w Metoprolol, HR ranging from 50s to 70s, will dec dose to 12.5 BID    #DMII  - HbA1c 7.55  - FS elevated, Lantus 25 U BID, Lispro 12 U     DVT ppx: Lovenox  GI ppx: Pantoprazole  Diet: DASH  Activity: IAT  Full code  Dispo: pending improved oxygen requirements   60 yr M with recent COVID pneumonia discharged 3 weeks ago on 2LNC, s/p steroid course and plasma Tx, GIST (s/p resection), DM type II , RA (on prednisone), HTN, HLD and herniated disc presented to ED for SOB and desaturation, recently discharged with home O2 after admission for COVID pneumonia, admitted for acute hypoxemic respiratory failure, admitted to ICU, s/p downgrade to stepdown remains on HFNC, improving.    #Acute hypoxemic respiratory failure due to COVID pna - improving  #Recently discharged on home O2 after admission for COVID pna  - CXR on admission revealed worsening infiltrates, RUL consolidation - now improving  - Unlikely has underlying bacterial pneumonia, procalc wnl, inflammatory markers trended down, legionella, fungitell, galactommanan neg  - S/p Zosyn, will hold for now per ID   - S/p Toci 12/11, c/w Solumedrol 60 mg IV q8  - Wean off HFNC as tolerated, encourage incentive spirometer    #Hx of RA, on chronic steroids  - C/w Solumedrol 60 mg q8  - Takes Prednisone 10 mg qd    #HTN - well controlled  - C/w Metoprolol, HR ranging from 50s to 70s, will dec dose to 12.5 BID    #DMII  - HbA1c 7.55  - FS elevated, Lantus 25 U BID, Lispro 12 U     DVT ppx: Lovenox  GI ppx: Pantoprazole  Diet: DASH  Activity: IAT  Full code  Dispo: pending improved oxygen requirements      * Updated daughter Jodi [phone] *

## 2020-12-15 NOTE — PROGRESS NOTE ADULT - ATTENDING COMMENTS
#Acute Hypoxic Respiratory Failure  #COVID PNA  Pt was admitted from Nov 5 - Nov 20 for COVID PNA, tx with AMADO and plasma. Readmitted for dyspnea and supected cytokine inflammatory syndrome  Currently saturating 91-92% on HFNC 40L/40%. Sometimes desaturates to 88 when speaking.  s/p TOCI x12/11  - Cont IV solumedrol 60 q8h (Start 12/08)  - Cont zosyn 4.5mg q6h (Start 12/12)  - Downtitrate oxygen requirements as tolerated    #DM2  - Cont lantus 25 BID, lispro 12 TID   - ISS    #HTN  - Cont metoprolol 12.5mg BID    DVT PPX, Lovenox

## 2020-12-15 NOTE — PROGRESS NOTE ADULT - SUBJECTIVE AND OBJECTIVE BOX
Patient Information:  JOSÉ LUIS AREVALO / 60y / Male / MRN#:[id number]    Hospital Day: 8d    Interval History:  Patient seen and examined at bedside. No acute events overnight.  Pt reports he feels well, remains on HFNC. Is saturating about 91-92%, goes down to 89% at times especially when moving.    Past Medical History:  Pneumonia due to COVID-19 virus    Hyperlipidemia    Lumbago    Hypertension      Past Surgical History:  H/O lymph node biopsy      Allergies:  penicillin (Unknown)  shellfish (Pruritus)    Medications:  PRN:    Standing:  chlorhexidine 0.12% Liquid 15 milliLiter(s) Swish and Spit two times a day  chlorhexidine 4% Liquid 1 Application(s) Topical <User Schedule>  dextrose 40% Gel 15 Gram(s) Oral once  dextrose 5%. 1000 milliLiter(s) (50 mL/Hr) IV Continuous <Continuous>  dextrose 5%. 1000 milliLiter(s) (100 mL/Hr) IV Continuous <Continuous>  dextrose 50% Injectable 25 Gram(s) IV Push once  dextrose 50% Injectable 12.5 Gram(s) IV Push once  dextrose 50% Injectable 25 Gram(s) IV Push once  enoxaparin Injectable 40 milliGRAM(s) SubCutaneous every 12 hours  furosemide    Tablet 40 milliGRAM(s) Oral daily  glucagon  Injectable 1 milliGRAM(s) IntraMuscular once  guaiFENesin  milliGRAM(s) Oral every 12 hours  insulin glargine Injectable (LANTUS) 24 Unit(s) SubCutaneous at bedtime  insulin lispro (ADMELOG) corrective regimen sliding scale   SubCutaneous three times a day before meals  insulin lispro Injectable (ADMELOG) 8 Unit(s) SubCutaneous three times a day before meals  methylPREDNISolone sodium succinate Injectable 60 milliGRAM(s) IV Push every 8 hours  metoprolol tartrate 25 milliGRAM(s) Oral two times a day  pantoprazole    Tablet 40 milliGRAM(s) Oral before breakfast  piperacillin/tazobactam IVPB.. 4.5 Gram(s) IV Intermittent every 6 hours  polyethylene glycol 3350 17 Gram(s) Oral daily  senna 2 Tablet(s) Oral at bedtime    Home:  gabapentin enacarbil 300 mg oral tablet, extended release: 1 tab(s) orally 3 times a day  lisinopril 40 mg oral tablet: 1 tab(s) orally once a day    Vitals:  T(C): 36.4, Max: 36.9 (12-14-20 @ 20:00)  T(F): 97.5, Max: 98.5 (12-14-20 @ 20:00)  HR: 57 (57 - 78)  BP: 122/72 (111/- - 131/77)  RR: 18 (18 - 18)  SpO2: 90% (90% - 96%)    Physical Exam:  General: Awake, alert, NAD, resting comfortably in bed, on HFNC  HEENT: Head NC/AT  Heart: RRR; S1/S2; no rubs, murmurs appreciated  Lungs: Crackles appreciated up to scapular region bilaterally  Abdomen: Soft, nontender, nondistended, BS+  Extremities: No edema, clubbing, cyanosis in upper or lower extremities  Neuro: AAOx3, NFD    Labs:  CBC (12-15 @ 05:01)                        Hgb: 12.7   WBC: 16.20 )-----------------( Plts: 454                              Hct: 38.2     Chem (12-15 @ 05:01)  Na: 138  |     Cl: 97     |  BUN: 36  -----------------------------------------< Gluc: 316    K: 4.2   |    HCO3: 32  |  Cr: 1.2    Ca 8.9 (12-15 @ 05:01)  Phos 3.8 (12-14 @ 09:41)  Mg 2.4 (12-15 @ 05:01)    LFTs (12-15 @ 05:01)  TPro 5.4  /  Alb 3.0  TBili 0.3  /  DBili     AST 49  /    /  AlkPhos 81            Microbiology:  Culture - Blood (collected 12-08 @ 12:01)  Source: .Blood None  Final Report (12-13 @ 23:01):    No Growth Final    Culture - Fungal, Blood (collected 12-08 @ 12:01)  Source: .Blood Blood-Peripheral  Preliminary Report (12-09 @ 09:20):    Testing in progress

## 2020-12-16 LAB
ALBUMIN SERPL ELPH-MCNC: 3 G/DL — LOW (ref 3.5–5.2)
ALP SERPL-CCNC: 78 U/L — SIGNIFICANT CHANGE UP (ref 30–115)
ALT FLD-CCNC: 125 U/L — HIGH (ref 0–41)
ANION GAP SERPL CALC-SCNC: 10 MMOL/L — SIGNIFICANT CHANGE UP (ref 7–14)
AST SERPL-CCNC: 26 U/L — SIGNIFICANT CHANGE UP (ref 0–41)
BASOPHILS # BLD AUTO: 0.03 K/UL — SIGNIFICANT CHANGE UP (ref 0–0.2)
BASOPHILS NFR BLD AUTO: 0.2 % — SIGNIFICANT CHANGE UP (ref 0–1)
BILIRUB SERPL-MCNC: 0.4 MG/DL — SIGNIFICANT CHANGE UP (ref 0.2–1.2)
BUN SERPL-MCNC: 35 MG/DL — HIGH (ref 10–20)
CALCIUM SERPL-MCNC: 8.6 MG/DL — SIGNIFICANT CHANGE UP (ref 8.5–10.1)
CHLORIDE SERPL-SCNC: 96 MMOL/L — LOW (ref 98–110)
CO2 SERPL-SCNC: 32 MMOL/L — SIGNIFICANT CHANGE UP (ref 17–32)
CREAT SERPL-MCNC: 1 MG/DL — SIGNIFICANT CHANGE UP (ref 0.7–1.5)
EOSINOPHIL # BLD AUTO: 0 K/UL — SIGNIFICANT CHANGE UP (ref 0–0.7)
EOSINOPHIL NFR BLD AUTO: 0 % — SIGNIFICANT CHANGE UP (ref 0–8)
GLUCOSE BLDC GLUCOMTR-MCNC: 153 MG/DL — HIGH (ref 70–99)
GLUCOSE BLDC GLUCOMTR-MCNC: 238 MG/DL — HIGH (ref 70–99)
GLUCOSE BLDC GLUCOMTR-MCNC: 243 MG/DL — HIGH (ref 70–99)
GLUCOSE BLDC GLUCOMTR-MCNC: 342 MG/DL — HIGH (ref 70–99)
GLUCOSE SERPL-MCNC: 159 MG/DL — HIGH (ref 70–99)
HCT VFR BLD CALC: 39.9 % — LOW (ref 42–52)
HGB BLD-MCNC: 13.2 G/DL — LOW (ref 14–18)
IMM GRANULOCYTES NFR BLD AUTO: 2.2 % — HIGH (ref 0.1–0.3)
LYMPHOCYTES # BLD AUTO: 1.39 K/UL — SIGNIFICANT CHANGE UP (ref 1.2–3.4)
LYMPHOCYTES # BLD AUTO: 7.9 % — LOW (ref 20.5–51.1)
MAGNESIUM SERPL-MCNC: 2.3 MG/DL — SIGNIFICANT CHANGE UP (ref 1.8–2.4)
MCHC RBC-ENTMCNC: 29.3 PG — SIGNIFICANT CHANGE UP (ref 27–31)
MCHC RBC-ENTMCNC: 33.1 G/DL — SIGNIFICANT CHANGE UP (ref 32–37)
MCV RBC AUTO: 88.7 FL — SIGNIFICANT CHANGE UP (ref 80–94)
MONOCYTES # BLD AUTO: 0.91 K/UL — HIGH (ref 0.1–0.6)
MONOCYTES NFR BLD AUTO: 5.2 % — SIGNIFICANT CHANGE UP (ref 1.7–9.3)
NEUTROPHILS # BLD AUTO: 14.88 K/UL — HIGH (ref 1.4–6.5)
NEUTROPHILS NFR BLD AUTO: 84.5 % — HIGH (ref 42.2–75.2)
NRBC # BLD: 0 /100 WBCS — SIGNIFICANT CHANGE UP (ref 0–0)
PLATELET # BLD AUTO: 457 K/UL — HIGH (ref 130–400)
POTASSIUM SERPL-MCNC: 3.7 MMOL/L — SIGNIFICANT CHANGE UP (ref 3.5–5)
POTASSIUM SERPL-SCNC: 3.7 MMOL/L — SIGNIFICANT CHANGE UP (ref 3.5–5)
PROT SERPL-MCNC: 5.6 G/DL — LOW (ref 6–8)
RBC # BLD: 4.5 M/UL — LOW (ref 4.7–6.1)
RBC # FLD: 15.3 % — HIGH (ref 11.5–14.5)
SODIUM SERPL-SCNC: 138 MMOL/L — SIGNIFICANT CHANGE UP (ref 135–146)
WBC # BLD: 17.6 K/UL — HIGH (ref 4.8–10.8)
WBC # FLD AUTO: 17.6 K/UL — HIGH (ref 4.8–10.8)

## 2020-12-16 PROCEDURE — 99233 SBSQ HOSP IP/OBS HIGH 50: CPT

## 2020-12-16 PROCEDURE — 99232 SBSQ HOSP IP/OBS MODERATE 35: CPT

## 2020-12-16 PROCEDURE — 71045 X-RAY EXAM CHEST 1 VIEW: CPT | Mod: 26

## 2020-12-16 RX ORDER — INSULIN GLARGINE 100 [IU]/ML
35 INJECTION, SOLUTION SUBCUTANEOUS EVERY MORNING
Refills: 0 | Status: DISCONTINUED | OUTPATIENT
Start: 2020-12-16 | End: 2020-12-18

## 2020-12-16 RX ORDER — INSULIN GLARGINE 100 [IU]/ML
35 INJECTION, SOLUTION SUBCUTANEOUS AT BEDTIME
Refills: 0 | Status: DISCONTINUED | OUTPATIENT
Start: 2020-12-16 | End: 2020-12-18

## 2020-12-16 RX ADMIN — Medication 12.5 MILLIGRAM(S): at 06:37

## 2020-12-16 RX ADMIN — Medication 60 MILLIGRAM(S): at 17:03

## 2020-12-16 RX ADMIN — Medication 40 MILLIGRAM(S): at 06:34

## 2020-12-16 RX ADMIN — POLYETHYLENE GLYCOL 3350 17 GRAM(S): 17 POWDER, FOR SOLUTION ORAL at 12:03

## 2020-12-16 RX ADMIN — CHLORHEXIDINE GLUCONATE 15 MILLILITER(S): 213 SOLUTION TOPICAL at 17:04

## 2020-12-16 RX ADMIN — CHLORHEXIDINE GLUCONATE 1 APPLICATION(S): 213 SOLUTION TOPICAL at 06:37

## 2020-12-16 RX ADMIN — SENNA PLUS 2 TABLET(S): 8.6 TABLET ORAL at 21:26

## 2020-12-16 RX ADMIN — Medication 600 MILLIGRAM(S): at 06:34

## 2020-12-16 RX ADMIN — ENOXAPARIN SODIUM 40 MILLIGRAM(S): 100 INJECTION SUBCUTANEOUS at 06:35

## 2020-12-16 RX ADMIN — Medication 12 UNIT(S): at 12:02

## 2020-12-16 RX ADMIN — INSULIN GLARGINE 35 UNIT(S): 100 INJECTION, SOLUTION SUBCUTANEOUS at 21:26

## 2020-12-16 RX ADMIN — ENOXAPARIN SODIUM 40 MILLIGRAM(S): 100 INJECTION SUBCUTANEOUS at 17:03

## 2020-12-16 RX ADMIN — INSULIN GLARGINE 35 UNIT(S): 100 INJECTION, SOLUTION SUBCUTANEOUS at 08:18

## 2020-12-16 RX ADMIN — Medication 12 UNIT(S): at 08:20

## 2020-12-16 RX ADMIN — Medication 2: at 12:02

## 2020-12-16 RX ADMIN — Medication 12 UNIT(S): at 17:02

## 2020-12-16 RX ADMIN — Medication 60 MILLIGRAM(S): at 06:38

## 2020-12-16 RX ADMIN — CHLORHEXIDINE GLUCONATE 15 MILLILITER(S): 213 SOLUTION TOPICAL at 06:36

## 2020-12-16 RX ADMIN — Medication 600 MILLIGRAM(S): at 17:04

## 2020-12-16 RX ADMIN — PANTOPRAZOLE SODIUM 40 MILLIGRAM(S): 20 TABLET, DELAYED RELEASE ORAL at 06:34

## 2020-12-16 RX ADMIN — Medication 2: at 17:02

## 2020-12-16 NOTE — PROGRESS NOTE ADULT - SUBJECTIVE AND OBJECTIVE BOX
Patient is a 60y old  Male who presents with a chief complaint of SOB (15 Dec 2020 15:39)        Over Night Events:  on NC. Off pressors         ROS:     All ROS are negative except HPI         PHYSICAL EXAM    ICU Vital Signs Last 24 Hrs  T(C): 36.2 (16 Dec 2020 05:51), Max: 36.4 (15 Dec 2020 13:28)  T(F): 97.2 (16 Dec 2020 05:51), Max: 97.6 (15 Dec 2020 13:28)  HR: 50 (16 Dec 2020 05:51) (50 - 82)  BP: 114/70 (16 Dec 2020 05:51) (111/71 - 115/80)  BP(mean): --  ABP: --  ABP(mean): --  RR: --  SpO2: 92% (16 Dec 2020 05:51) (92% - 97%)      CONSTITUTIONAL:  Well nourished.  NAD    ENT:   Airway patent,   Mouth with normal mucosa.   No thrush    EYES:   Pupils equal,   Round and reactive to light.    CARDIAC:   Normal rate,   Regular rhythm.    No edema      Vascular:  Normal systolic impulse  No Carotid bruits    RESPIRATORY:   No wheezing  Bilateral crackles   Normal chest expansion  Not tachypneic,  No use of accessory muscles    GASTROINTESTINAL:  Abdomen soft,   Non-tender,   No guarding,   + BS    MUSCULOSKELETAL:   Range of motion is not limited,  No clubbing, cyanosis    NEUROLOGICAL:   Alert and oriented   No motor  deficits.    SKIN:   Skin normal color for race,   Warm and dry and intact.   No evidence of rash.    PSYCHIATRIC:   Normal mood and affect.   No apparent risk to self or others.    HEMATOLOGICAL:  No cervical  lymphadenopathy.  no inguinal lymphadenopathy      12-15-20 @ 07:01  -  12-16-20 @ 07:00  --------------------------------------------------------  IN:  Total IN: 0 mL    OUT:    Stool (mL): 1 mL    Voided (mL): 1700 mL  Total OUT: 1701 mL    Total NET: -1701 mL          LABS:                            12.7   16.20 )-----------( 454      ( 15 Dec 2020 05:01 )             38.2                                               12-15    138  |  97<L>  |  36<H>  ----------------------------<  316<H>  4.2   |  32  |  1.2    Ca    8.9      15 Dec 2020 05:01  Phos  3.8     12-14  Mg     2.4     12-15    TPro  5.4<L>  /  Alb  3.0<L>  /  TBili  0.3  /  DBili  x   /  AST  49<H>  /  ALT  137<H>  /  AlkPhos  81  12-15                                                                                           LIVER FUNCTIONS - ( 15 Dec 2020 05:01 )  Alb: 3.0 g/dL / Pro: 5.4 g/dL / ALK PHOS: 81 U/L / ALT: 137 U/L / AST: 49 U/L / GGT: x                                                                                                                                       MEDICATIONS  (STANDING):  chlorhexidine 0.12% Liquid 15 milliLiter(s) Swish and Spit two times a day  chlorhexidine 4% Liquid 1 Application(s) Topical <User Schedule>  dextrose 40% Gel 15 Gram(s) Oral once  dextrose 5%. 1000 milliLiter(s) (50 mL/Hr) IV Continuous <Continuous>  dextrose 5%. 1000 milliLiter(s) (100 mL/Hr) IV Continuous <Continuous>  dextrose 50% Injectable 25 Gram(s) IV Push once  dextrose 50% Injectable 12.5 Gram(s) IV Push once  dextrose 50% Injectable 25 Gram(s) IV Push once  enoxaparin Injectable 40 milliGRAM(s) SubCutaneous every 12 hours  furosemide    Tablet 40 milliGRAM(s) Oral daily  glucagon  Injectable 1 milliGRAM(s) IntraMuscular once  guaiFENesin  milliGRAM(s) Oral every 12 hours  insulin glargine Injectable (LANTUS) 35 Unit(s) SubCutaneous at bedtime  insulin glargine Injectable (LANTUS) 35 Unit(s) SubCutaneous every morning  insulin lispro (ADMELOG) corrective regimen sliding scale   SubCutaneous three times a day before meals  insulin lispro Injectable (ADMELOG) 12 Unit(s) SubCutaneous three times a day before meals  methylPREDNISolone sodium succinate Injectable 60 milliGRAM(s) IV Push every 8 hours  metoprolol tartrate 12.5 milliGRAM(s) Oral two times a day  pantoprazole    Tablet 40 milliGRAM(s) Oral before breakfast  polyethylene glycol 3350 17 Gram(s) Oral daily  senna 2 Tablet(s) Oral at bedtime    MEDICATIONS  (PRN):      New X-rays reviewed:                                                                                  ECHO    CXR interpreted by me:  Improving infiltrates

## 2020-12-16 NOTE — PROGRESS NOTE ADULT - SUBJECTIVE AND OBJECTIVE BOX
Patient Information:  JOSÉ LUIS AREVALO / 60y / Male / MRN#:[id number]    Hospital Day: 9d    Interval History:  Patient seen and examined at bedside. No acute events overnight.  Pt on O2 NC 6L, saturating well, denies any complaints.    Past Medical History:  Pneumonia due to COVID-19 virus    Hyperlipidemia    Lumbago    Hypertension      Past Surgical History:  H/O lymph node biopsy      Allergies:  penicillin (Unknown)  shellfish (Pruritus)    Medications:  PRN:    Standing:  chlorhexidine 0.12% Liquid 15 milliLiter(s) Swish and Spit two times a day  chlorhexidine 4% Liquid 1 Application(s) Topical <User Schedule>  dextrose 40% Gel 15 Gram(s) Oral once  dextrose 5%. 1000 milliLiter(s) (50 mL/Hr) IV Continuous <Continuous>  dextrose 5%. 1000 milliLiter(s) (100 mL/Hr) IV Continuous <Continuous>  dextrose 50% Injectable 25 Gram(s) IV Push once  dextrose 50% Injectable 12.5 Gram(s) IV Push once  dextrose 50% Injectable 25 Gram(s) IV Push once  enoxaparin Injectable 40 milliGRAM(s) SubCutaneous every 12 hours  furosemide    Tablet 40 milliGRAM(s) Oral daily  glucagon  Injectable 1 milliGRAM(s) IntraMuscular once  guaiFENesin  milliGRAM(s) Oral every 12 hours  insulin glargine Injectable (LANTUS) 35 Unit(s) SubCutaneous at bedtime  insulin glargine Injectable (LANTUS) 35 Unit(s) SubCutaneous every morning  insulin lispro (ADMELOG) corrective regimen sliding scale   SubCutaneous three times a day before meals  insulin lispro Injectable (ADMELOG) 12 Unit(s) SubCutaneous three times a day before meals  methylPREDNISolone sodium succinate Injectable 60 milliGRAM(s) IV Push every 8 hours  pantoprazole    Tablet 40 milliGRAM(s) Oral before breakfast  polyethylene glycol 3350 17 Gram(s) Oral daily  senna 2 Tablet(s) Oral at bedtime    Home:  gabapentin enacarbil 300 mg oral tablet, extended release: 1 tab(s) orally 3 times a day  lisinopril 40 mg oral tablet: 1 tab(s) orally once a day    Vitals:  T(C): 36.2, Max: 36.4 (12-15-20 @ 13:28)  T(F): 97.2, Max: 97.6 (12-15-20 @ 13:28)  HR: 45 (45 - 82)  BP: 108/68 (108/68 - 115/80)  RR: --  SpO2: 95% (92% - 97%)    Physical Exam:  General: Awake, alert, NAD, resting comfortably in bed, on O2 NC 6L  HEENT: Head NC/AT  Heart: RRR; S1/S2; no rubs, murmurs appreciated  Lungs: Crackles appreciated up to scapular region bilaterally  Abdomen: Soft, nontender, nondistended, BS+  Extremities: No edema, clubbing, cyanosis in upper or lower extremities  Neuro: AAOx3, NFD    Labs:  CBC (12-16 @ 08:24)                        Hgb: 13.2   WBC: 17.60 )-----------------( Plts: 457                              Hct: 39.9     Chem (12-16 @ 08:24)  Na: 138  |     Cl: 96     |  BUN: 35  -----------------------------------------< Gluc: 159    K: 3.7   |    HCO3: 32  |  Cr: 1.0    Ca 8.6 (12-16 @ 08:24)  Mg 2.3 (12-16 @ 08:24)    LFTs (12-16 @ 08:24)  TPro 5.6  /  Alb 3.0  TBili 0.4  /  DBili     AST 26  /    /  AlkPhos 78            Microbiology:    Radiology:     Patient Information:  JOSÉ LUIS AREVALO / 60y / Male / MRN#:[id number]    Hospital Day: 9d    Interval History:  Patient seen and examined at bedside. No acute events overnight.  Pt on O2 NC 6L, saturating well, denies any complaints.    Past Medical History:  Pneumonia due to COVID-19 virus  Hyperlipidemia  Lumbago  Hypertension    Past Surgical History:  H/O lymph node biopsy      Allergies:  penicillin (Unknown)  shellfish (Pruritus)    Medications:  PRN:    Standing:  chlorhexidine 0.12% Liquid 15 milliLiter(s) Swish and Spit two times a day  chlorhexidine 4% Liquid 1 Application(s) Topical <User Schedule>  dextrose 40% Gel 15 Gram(s) Oral once  dextrose 5%. 1000 milliLiter(s) (50 mL/Hr) IV Continuous <Continuous>  dextrose 5%. 1000 milliLiter(s) (100 mL/Hr) IV Continuous <Continuous>  dextrose 50% Injectable 25 Gram(s) IV Push once  dextrose 50% Injectable 12.5 Gram(s) IV Push once  dextrose 50% Injectable 25 Gram(s) IV Push once  enoxaparin Injectable 40 milliGRAM(s) SubCutaneous every 12 hours  furosemide    Tablet 40 milliGRAM(s) Oral daily  glucagon  Injectable 1 milliGRAM(s) IntraMuscular once  guaiFENesin  milliGRAM(s) Oral every 12 hours  insulin glargine Injectable (LANTUS) 35 Unit(s) SubCutaneous at bedtime  insulin glargine Injectable (LANTUS) 35 Unit(s) SubCutaneous every morning  insulin lispro (ADMELOG) corrective regimen sliding scale   SubCutaneous three times a day before meals  insulin lispro Injectable (ADMELOG) 12 Unit(s) SubCutaneous three times a day before meals  methylPREDNISolone sodium succinate Injectable 60 milliGRAM(s) IV Push every 8 hours  pantoprazole    Tablet 40 milliGRAM(s) Oral before breakfast  polyethylene glycol 3350 17 Gram(s) Oral daily  senna 2 Tablet(s) Oral at bedtime    Home:  gabapentin enacarbil 300 mg oral tablet, extended release: 1 tab(s) orally 3 times a day  lisinopril 40 mg oral tablet: 1 tab(s) orally once a day    Vitals:  T(C): 36.2, Max: 36.4 (12-15-20 @ 13:28)  T(F): 97.2, Max: 97.6 (12-15-20 @ 13:28)  HR: 45 (45 - 82)  BP: 108/68 (108/68 - 115/80)  RR: --  SpO2: 95% (92% - 97%)    Physical Exam:  General: Awake, alert, NAD, resting comfortably in bed, on O2 NC 6L  HEENT: Head NC/AT  Heart: RRR; S1/S2; no rubs, murmurs appreciated  Lungs: Crackles appreciated up to scapular region bilaterally  Abdomen: Soft, nontender, nondistended, BS+  Extremities: No edema, clubbing, cyanosis in upper or lower extremities  Neuro: AAOx3, NFD    Labs:  CBC (12-16 @ 08:24)                        Hgb: 13.2   WBC: 17.60 )-----------------( Plts: 457                              Hct: 39.9     Chem (12-16 @ 08:24)  Na: 138  |     Cl: 96     |  BUN: 35  -----------------------------------------< Gluc: 159    K: 3.7   |    HCO3: 32  |  Cr: 1.0    Ca 8.6 (12-16 @ 08:24)  Mg 2.3 (12-16 @ 08:24)    LFTs (12-16 @ 08:24)  TPro 5.6  /  Alb 3.0  TBili 0.4  /  DBili     AST 26  /    /  AlkPhos 78    Radiology:

## 2020-12-16 NOTE — PROGRESS NOTE ADULT - SUBJECTIVE AND OBJECTIVE BOX
JOSÉ LUIS AREVALO  60y, Male    All available historical data reviewed    OVERNIGHT EVENTS:  no fevers  95 % NC 6 lit    ROS:  General: Denies rigors, nightsweats  HEENT: Denies headache, rhinorrhea, sore throat, eye pain  CV: Denies CP, palpitations  PULM: Denies wheezing, hemoptysis  GI: Denies hematemesis, hematochezia, melena  : Denies discharge, hematuria  MSK: Denies arthralgias, myalgias  SKIN: Denies rash, lesions  NEURO: Denies paresthesias, weakness  PSYCH: Denies depression, anxiety    VITALS:  T(F): 97.2, Max: 97.6 (12-15-20 @ 13:28)  HR: 45  BP: 108/68  RR: --Vital Signs Last 24 Hrs  T(C): 36.2 (16 Dec 2020 05:51), Max: 36.4 (15 Dec 2020 13:28)  T(F): 97.2 (16 Dec 2020 05:51), Max: 97.6 (15 Dec 2020 13:28)  HR: 45 (16 Dec 2020 08:58) (45 - 82)  BP: 108/68 (16 Dec 2020 08:58) (108/68 - 115/80)  BP(mean): --  RR: --  SpO2: 95% (16 Dec 2020 08:58) (92% - 97%)    TESTS & MEASUREMENTS:                        13.2   17.60 )-----------( 457      ( 16 Dec 2020 08:24 )             39.9     12-16    138  |  96<L>  |  35<H>  ----------------------------<  159<H>  3.7   |  32  |  1.0    Ca    8.6      16 Dec 2020 08:24  Mg     2.3     12-16    TPro  5.6<L>  /  Alb  3.0<L>  /  TBili  0.4  /  DBili  x   /  AST  26  /  ALT  125<H>  /  AlkPhos  78  12-16    LIVER FUNCTIONS - ( 16 Dec 2020 08:24 )  Alb: 3.0 g/dL / Pro: 5.6 g/dL / ALK PHOS: 78 U/L / ALT: 125 U/L / AST: 26 U/L / GGT: x                   RADIOLOGY & ADDITIONAL TESTS:  Personal review of radiological diagnostics performed  Echo and EKG results noted when applicable.     MEDICATIONS:  chlorhexidine 0.12% Liquid 15 milliLiter(s) Swish and Spit two times a day  chlorhexidine 4% Liquid 1 Application(s) Topical <User Schedule>  dextrose 40% Gel 15 Gram(s) Oral once  dextrose 5%. 1000 milliLiter(s) IV Continuous <Continuous>  dextrose 5%. 1000 milliLiter(s) IV Continuous <Continuous>  dextrose 50% Injectable 25 Gram(s) IV Push once  dextrose 50% Injectable 12.5 Gram(s) IV Push once  dextrose 50% Injectable 25 Gram(s) IV Push once  enoxaparin Injectable 40 milliGRAM(s) SubCutaneous every 12 hours  furosemide    Tablet 40 milliGRAM(s) Oral daily  glucagon  Injectable 1 milliGRAM(s) IntraMuscular once  guaiFENesin  milliGRAM(s) Oral every 12 hours  insulin glargine Injectable (LANTUS) 35 Unit(s) SubCutaneous at bedtime  insulin glargine Injectable (LANTUS) 35 Unit(s) SubCutaneous every morning  insulin lispro (ADMELOG) corrective regimen sliding scale   SubCutaneous three times a day before meals  insulin lispro Injectable (ADMELOG) 12 Unit(s) SubCutaneous three times a day before meals  methylPREDNISolone sodium succinate Injectable 60 milliGRAM(s) IV Push every 8 hours  pantoprazole    Tablet 40 milliGRAM(s) Oral before breakfast  polyethylene glycol 3350 17 Gram(s) Oral daily  senna 2 Tablet(s) Oral at bedtime      ANTIBIOTICS:

## 2020-12-16 NOTE — PROGRESS NOTE ADULT - ASSESSMENT
IMPRESSION:    Acute hypoxemic respiratory failure  HO COVID pneumonia   Worsening infiltrates doubt opportunistic infection.  Likely Inflammatory / Fibrotic changes    HO RA Chronic steroid use     PLAN:    CNS:  No depressants     HEENT: Oral care    PULMONARY:  HOB @ 45 degrees.  Aspiration precautions.  Decrease Flow to 50 liters.  Wean O2 as tolerated.  Solumedrol 60 mg Q12.      CARDIOVASCULAR:  I=O,  Avoid volume overload.  DC Beta blockers     GI: GI prophylaxis.  Feeding.  Bowel regimen     RENAL:  Follow up lytes.  Correct as needed    INFECTIOUS DISEASE: Follow up cultures.      HEMATOLOGICAL:  DVT prophylaxis. LMWH 40mg q12h    ENDOCRINE:  Follow up FS.  Insulin protocol if needed.    MUSCULOSKELETAL:  oob to chair      SDU

## 2020-12-16 NOTE — CDI QUERY NOTE - NSCDIOTHERTXTBX_GEN_ALL_CORE_HH
Documentation:  ** ID PN 12/15: COVID-19 positive 11/5. S/p dexamethason / plasma. CT with sequelae of COVID-19 . PNA bacterial ? vs progressive lung pathology secondary to COVID-19 ?   ** DR. Sauceda PN 12/15: Acute hypoxemic respiratory failure due to COVID pna - improving  ** Dr. Stewart PN 12/15: HO COVID pneumonia. Worsening infiltrates doubt opportunistic infection.  Likely Inflammatory / Fibrotic changes.  ** chart note 12/11:  CTA chest showed diffuse bilateral patchy ground-glass opacities with new areas of patchy consolidations and new bronchiectasis, compatible with an infectious/inflammatory etiology. Findings likely represent sequela of known previous Covid-19 infection.    Lab:  SARS-CoV-2 result: positive (12/7/2020)    CT 12/7: Since November 5, 2020, worsening diffuse bilateral patchy groundglass opacities with new areas of patchy consolidations and new bronchiectasis, compatible with an infectious/inflammatory etiology. Findings likely represent sequela of known previous Covid-19 infection.    Treatment:   Methylprednisolone 60 mg IV / 8 Hr (12/8 - 12/16)  Methylprednisolone 60 mg IV / 12 Hr (12/16 - current)                                                    Tocilizumab 400 mg IV once (12/12/20)    Based on your clinical evaluation of the patient, can you please clarify regrading pt presentation:  • Pt has current COVID PNA under treatment  • Pt has lung pathology as a sequela of previous COVID infection	  • Other (please specify).  • Unable to determine.

## 2020-12-16 NOTE — PROGRESS NOTE ADULT - ASSESSMENT
60 yr M with recent COVID pneumonia discharged 3 weeks ago on 2LNC, s/p steroid course and plasma Tx, GIST (s/p resection), DM type II , RA (on prednisone), HTN, HLD and herniated disc    IMPRESSION;  Clinically improved. Off HFNC  CXR improved   COVID-19 positive 11/5. S/p dexamethason/plasma. CT with sequelae of COVID-19 .  PNA bacterial ? vs progressive lung patology secondary to COVID-19 ?   procalcitonin 0.10  , not suggestive of a bacterial PNA.  Ferritin 1039>819  CRP 15.05>6.57  Ddimers 279  Nares ORSA NG  BCx 12/7,8 NGTD  legionella NG  Fungitel < 31 ( excludes PCJ )  Galactommanan NG ( excludes CAPA )          RECOMMENDATIONS;  NO NEED TO REPEAT INFLAMMATORY MARKERS  Off ABx  S/p Tocilizumab 400 mg iv x once. 12/11  Could transfer to [facility] for further management.  recall prn please

## 2020-12-16 NOTE — PROGRESS NOTE ADULT - ASSESSMENT
60 yr M with recent COVID pneumonia discharged 3 weeks ago on 2LNC, s/p steroid course and plasma Tx, GIST (s/p resection), DM type II , RA (on prednisone), HTN, HLD and herniated disc presented to ED for SOB and desaturation, recently discharged with home O2 after admission for COVID pneumonia, admitted for acute hypoxemic respiratory failure, admitted to ICU, s/p downgrade to stepdown, improved on HFNC, now on O2 NC 6L.    #Acute hypoxemic respiratory failure due to COVID pna - resolved  #Recently discharged on home O2 after admission for COVID pna  - CXR on admission revealed worsening infiltrates, RUL consolidation - now improving  - Unlikely has underlying bacterial pneumonia, procalc wnl, inflammatory markers trended down, legionella, fungitell, galactommanan neg  - S/p Zosyn, no need for more abx per ID  - S/p Toci 12/11, c/w Solumedrol 60 mg IV q12  - Weaned off HFNC, now on O2 NC, will wean down as tolerated    #Hx of RA, on chronic steroids  - C/w Solumedrol 60 mg q12  - Takes Prednisone 10 mg qd    #HTN - well controlled  - Pt was on Metoprolol due to tachycardia, now resolved, will d/c    #DMII  - HbA1c 7.55  - FS elevated, Lantus 25 U BID, Lispro 12 U     DVT ppx: Lovenox  GI ppx: Pantoprazole  Diet: DASH  Activity: IAT  Full code  Dispo: improving, likely downgrade tomorrow   60 yr M with recent COVID pneumonia discharged 3 weeks ago on 2LNC, s/p steroid course and plasma Tx, GIST (s/p resection), DM type II , RA (on prednisone), HTN, HLD and herniated disc presented to ED for SOB and desaturation, recently discharged with home O2 after admission for COVID pneumonia, admitted for acute hypoxemic respiratory failure, admitted to ICU, s/p downgrade to stepdown, improved on HFNC, now on O2 NC 6L.    #Acute hypoxemic respiratory failure due to COVID pna - resolved  #Recently discharged on home O2 after admission for COVID pna  - CXR on admission revealed worsening infiltrates, RUL consolidation - now improving  - Unlikely has underlying bacterial pneumonia, procalc wnl, inflammatory markers trended down, legionella, fungitell, galactommanan neg  - S/p Zosyn, no need for more abx per ID  - S/p Toci 12/11, c/w Solumedrol 60 mg IV q12  - Weaned off HFNC, now on O2 NC, will wean down as tolerated    #Hx of RA, on chronic steroids  - C/w Solumedrol 60 mg q12  - Takes Prednisone 10 mg qd    #HTN - well controlled  - Pt was on Metoprolol due to tachycardia, now resolved, will d/c    #DMII  - HbA1c 7.55  - FS elevated, Lantus inc to 35 U BID, Lispro 12 U    DVT ppx: Lovenox  GI ppx: Pantoprazole  Diet: DASH  Activity: IAT  Full code  Dispo: improving, likely downgrade tomorrow   60 yr M with recent COVID pneumonia discharged 3 weeks ago on 2LNC, s/p steroid course and plasma Tx, GIST (s/p resection), DM type II , RA (on prednisone), HTN, HLD and herniated disc presented to ED for SOB and desaturation, recently discharged with home O2 after admission for COVID pneumonia, admitted for acute hypoxemic respiratory failure, admitted to ICU, s/p downgrade to stepdown, improved on HFNC, now on O2 NC 6L.    #Acute hypoxemic respiratory failure due to COVID pna - resolved  #Recently discharged on home O2 after admission for COVID pna  - CXR on admission revealed worsening infiltrates, RUL consolidation - now improving  - Unlikely has underlying bacterial pneumonia, procalc wnl, inflammatory markers trended down, legionella, fungitell, galactommanan neg  - S/p Zosyn, no need for more abx per ID  - S/p Toci 12/11, c/w Solumedrol 60 mg IV q12  - Weaned off HFNC, now on O2 NC, will wean down as tolerated    #Hx of RA, on chronic steroids  - C/w Solumedrol 60 mg q12  - Takes Prednisone 10 mg qd    #HTN - well controlled  - Pt was on Metoprolol due to tachycardia, now resolved, will d/c    #DMII  - HbA1c 7.55  - FS elevated, Lantus inc to 35 U BID, Lispro 12 U    DVT ppx: Lovenox  GI ppx: Pantoprazole  Diet: DASH  Activity: IAT  Full code  Dispo: improving, likely downgrade tomorrow      * Updated daughter Jodi [phone]

## 2020-12-16 NOTE — PROGRESS NOTE ADULT - ATTENDING COMMENTS
Patient is seen and examined independently at bedside. I agree with the resident's note, history and plan as above. Corrections made where appropriate    All labs, radiologic studies, vitals, orders and medications list reviewed.     Acute Hypoxic Respiratory Failure secondary to COVID PNA  Suspected Cytokine inflammatory syndrome  Pt was admitted from Nov 5 - Nov 20 for COVID PNA, tx with AMADO and plasma  Currently saturating 93-34% on 6L NC  s/p TOCI x12/11, s/p Zosyn  continue with Solumedrol 60 Q12H  taper down oxygen as tolerated    DM  - increase lantus 30 BID, lispro 12 TID, monitor FS    RA  on Prednisone 10mg at home    HTN? - BP well controlled  metoprolol dced due to bradycardia    Patient requires continuous monitoring on CEU/SDU  possible downgrade to medical floor tomorrow if continues to improve Patient is seen and examined independently at bedside. I agree with the resident's note, history and plan as above. Corrections made where appropriate    All labs, radiologic studies, vitals, orders and medications list reviewed.     Acute Hypoxic Respiratory Failure secondary to COVID Pneumonia  Suspected Cytokine inflammatory syndrome  Pt was admitted from Nov 5 - Nov 20 for COVID PNA, tx with AMADO and plasma  Currently saturating 93-34% on 6L NC  s/p TOCI x12/11, s/p Zosyn  continue with Solumedrol 60 Q12H  taper down oxygen as tolerated    DM  - increase lantus 30 BID, lispro 12 TID, monitor FS    RA  on Prednisone 10mg at home    HTN? - BP well controlled  metoprolol dced due to bradycardia    Patient requires continuous monitoring on CEU/SDU  possible downgrade to medical floor tomorrow if continues to improve Patient is seen and examined independently at bedside. I agree with the resident's note, history and plan as above. Corrections made where appropriate    All labs, radiologic studies, vitals, orders and medications list reviewed.     Acute Hypoxic Respiratory Failure secondary to COVID Pneumonia  likely a combination of PNA with post COVID inflammatory/fibrotic changes  Suspected Cytokine inflammatory syndrome  Pt was admitted from Nov 5 - Nov 20 for COVID PNA, tx with AMADO and plasma  Currently saturating 93-34% on 6L NC  s/p TOCI x12/11, s/p Zosyn  continue with Solumedrol 60 Q12H  taper down oxygen as tolerated    DM  - increase lantus 30 BID, lispro 12 TID, monitor FS    RA  on Prednisone 10mg at home    HTN? - BP well controlled  metoprolol dced due to bradycardia    Patient requires continuous monitoring on CEU/SDU  possible downgrade to medical floor tomorrow if continues to improve

## 2020-12-17 LAB
ALBUMIN SERPL ELPH-MCNC: 3.2 G/DL — LOW (ref 3.5–5.2)
ALP SERPL-CCNC: 95 U/L — SIGNIFICANT CHANGE UP (ref 30–115)
ALT FLD-CCNC: 136 U/L — HIGH (ref 0–41)
ANION GAP SERPL CALC-SCNC: 13 MMOL/L — SIGNIFICANT CHANGE UP (ref 7–14)
AST SERPL-CCNC: 36 U/L — SIGNIFICANT CHANGE UP (ref 0–41)
BASOPHILS # BLD AUTO: 0.06 K/UL — SIGNIFICANT CHANGE UP (ref 0–0.2)
BASOPHILS NFR BLD AUTO: 0.3 % — SIGNIFICANT CHANGE UP (ref 0–1)
BILIRUB SERPL-MCNC: 0.3 MG/DL — SIGNIFICANT CHANGE UP (ref 0.2–1.2)
BUN SERPL-MCNC: 32 MG/DL — HIGH (ref 10–20)
CALCIUM SERPL-MCNC: 8.5 MG/DL — SIGNIFICANT CHANGE UP (ref 8.5–10.1)
CHLORIDE SERPL-SCNC: 95 MMOL/L — LOW (ref 98–110)
CO2 SERPL-SCNC: 30 MMOL/L — SIGNIFICANT CHANGE UP (ref 17–32)
CREAT SERPL-MCNC: 1.1 MG/DL — SIGNIFICANT CHANGE UP (ref 0.7–1.5)
EOSINOPHIL # BLD AUTO: 0.02 K/UL — SIGNIFICANT CHANGE UP (ref 0–0.7)
EOSINOPHIL NFR BLD AUTO: 0.1 % — SIGNIFICANT CHANGE UP (ref 0–8)
GLUCOSE BLDC GLUCOMTR-MCNC: 149 MG/DL — HIGH (ref 70–99)
GLUCOSE BLDC GLUCOMTR-MCNC: 183 MG/DL — HIGH (ref 70–99)
GLUCOSE BLDC GLUCOMTR-MCNC: 283 MG/DL — HIGH (ref 70–99)
GLUCOSE BLDC GLUCOMTR-MCNC: 355 MG/DL — HIGH (ref 70–99)
GLUCOSE SERPL-MCNC: 136 MG/DL — HIGH (ref 70–99)
HCT VFR BLD CALC: 42.7 % — SIGNIFICANT CHANGE UP (ref 42–52)
HGB BLD-MCNC: 14.3 G/DL — SIGNIFICANT CHANGE UP (ref 14–18)
IMM GRANULOCYTES NFR BLD AUTO: 3.7 % — HIGH (ref 0.1–0.3)
LYMPHOCYTES # BLD AUTO: 1.64 K/UL — SIGNIFICANT CHANGE UP (ref 1.2–3.4)
LYMPHOCYTES # BLD AUTO: 8.6 % — LOW (ref 20.5–51.1)
MAGNESIUM SERPL-MCNC: 2.4 MG/DL — SIGNIFICANT CHANGE UP (ref 1.8–2.4)
MCHC RBC-ENTMCNC: 29.9 PG — SIGNIFICANT CHANGE UP (ref 27–31)
MCHC RBC-ENTMCNC: 33.5 G/DL — SIGNIFICANT CHANGE UP (ref 32–37)
MCV RBC AUTO: 89.3 FL — SIGNIFICANT CHANGE UP (ref 80–94)
MONOCYTES # BLD AUTO: 1.12 K/UL — HIGH (ref 0.1–0.6)
MONOCYTES NFR BLD AUTO: 5.9 % — SIGNIFICANT CHANGE UP (ref 1.7–9.3)
NEUTROPHILS # BLD AUTO: 15.41 K/UL — HIGH (ref 1.4–6.5)
NEUTROPHILS NFR BLD AUTO: 81.4 % — HIGH (ref 42.2–75.2)
NRBC # BLD: 0 /100 WBCS — SIGNIFICANT CHANGE UP (ref 0–0)
PLATELET # BLD AUTO: 498 K/UL — HIGH (ref 130–400)
POTASSIUM SERPL-MCNC: 3.7 MMOL/L — SIGNIFICANT CHANGE UP (ref 3.5–5)
POTASSIUM SERPL-SCNC: 3.7 MMOL/L — SIGNIFICANT CHANGE UP (ref 3.5–5)
PROT SERPL-MCNC: 5.6 G/DL — LOW (ref 6–8)
RBC # BLD: 4.78 M/UL — SIGNIFICANT CHANGE UP (ref 4.7–6.1)
RBC # FLD: 15.3 % — HIGH (ref 11.5–14.5)
SODIUM SERPL-SCNC: 138 MMOL/L — SIGNIFICANT CHANGE UP (ref 135–146)
WBC # BLD: 18.96 K/UL — HIGH (ref 4.8–10.8)
WBC # FLD AUTO: 18.96 K/UL — HIGH (ref 4.8–10.8)

## 2020-12-17 PROCEDURE — 99233 SBSQ HOSP IP/OBS HIGH 50: CPT

## 2020-12-17 PROCEDURE — 99232 SBSQ HOSP IP/OBS MODERATE 35: CPT

## 2020-12-17 PROCEDURE — 71045 X-RAY EXAM CHEST 1 VIEW: CPT | Mod: 26

## 2020-12-17 PROCEDURE — 93010 ELECTROCARDIOGRAM REPORT: CPT

## 2020-12-17 RX ORDER — IBUPROFEN 200 MG
600 TABLET ORAL EVERY 6 HOURS
Refills: 0 | Status: DISCONTINUED | OUTPATIENT
Start: 2020-12-17 | End: 2020-12-21

## 2020-12-17 RX ORDER — METOPROLOL TARTRATE 50 MG
12.5 TABLET ORAL DAILY
Refills: 0 | Status: DISCONTINUED | OUTPATIENT
Start: 2020-12-17 | End: 2020-12-21

## 2020-12-17 RX ORDER — AZATHIOPRINE 100 MG/1
50 TABLET ORAL DAILY
Refills: 0 | Status: DISCONTINUED | OUTPATIENT
Start: 2020-12-17 | End: 2020-12-21

## 2020-12-17 RX ADMIN — Medication 12 UNIT(S): at 09:06

## 2020-12-17 RX ADMIN — Medication 600 MILLIGRAM(S): at 05:42

## 2020-12-17 RX ADMIN — INSULIN GLARGINE 35 UNIT(S): 100 INJECTION, SOLUTION SUBCUTANEOUS at 21:28

## 2020-12-17 RX ADMIN — POLYETHYLENE GLYCOL 3350 17 GRAM(S): 17 POWDER, FOR SOLUTION ORAL at 12:28

## 2020-12-17 RX ADMIN — Medication 600 MILLIGRAM(S): at 17:02

## 2020-12-17 RX ADMIN — Medication 12.5 MILLIGRAM(S): at 13:13

## 2020-12-17 RX ADMIN — Medication 40 MILLIGRAM(S): at 05:42

## 2020-12-17 RX ADMIN — Medication 3: at 12:27

## 2020-12-17 RX ADMIN — AZATHIOPRINE 50 MILLIGRAM(S): 100 TABLET ORAL at 13:13

## 2020-12-17 RX ADMIN — CHLORHEXIDINE GLUCONATE 15 MILLILITER(S): 213 SOLUTION TOPICAL at 17:02

## 2020-12-17 RX ADMIN — Medication 5: at 16:56

## 2020-12-17 RX ADMIN — Medication 12 UNIT(S): at 16:55

## 2020-12-17 RX ADMIN — Medication 600 MILLIGRAM(S): at 21:28

## 2020-12-17 RX ADMIN — Medication 60 MILLIGRAM(S): at 05:42

## 2020-12-17 RX ADMIN — SENNA PLUS 2 TABLET(S): 8.6 TABLET ORAL at 21:28

## 2020-12-17 RX ADMIN — PANTOPRAZOLE SODIUM 40 MILLIGRAM(S): 20 TABLET, DELAYED RELEASE ORAL at 06:48

## 2020-12-17 RX ADMIN — ENOXAPARIN SODIUM 40 MILLIGRAM(S): 100 INJECTION SUBCUTANEOUS at 05:42

## 2020-12-17 RX ADMIN — INSULIN GLARGINE 35 UNIT(S): 100 INJECTION, SOLUTION SUBCUTANEOUS at 09:06

## 2020-12-17 RX ADMIN — Medication 30 MILLILITER(S): at 21:28

## 2020-12-17 RX ADMIN — CHLORHEXIDINE GLUCONATE 15 MILLILITER(S): 213 SOLUTION TOPICAL at 05:42

## 2020-12-17 RX ADMIN — ENOXAPARIN SODIUM 40 MILLIGRAM(S): 100 INJECTION SUBCUTANEOUS at 17:02

## 2020-12-17 RX ADMIN — Medication 60 MILLIGRAM(S): at 17:02

## 2020-12-17 RX ADMIN — Medication 12 UNIT(S): at 12:27

## 2020-12-17 NOTE — PROGRESS NOTE ADULT - SUBJECTIVE AND OBJECTIVE BOX
Patient Information:  JOSÉ LUIS AREVALO / 60y / Male / MRN#:[id number]    Hospital Day: 10d    Interval History:  Patient seen and examined at bedside. No acute events overnight. Pt is doing well, on O2 NC 4L, saturating 92-96%.    Past Medical History:  Pneumonia due to COVID-19 virus    Hyperlipidemia    Lumbago    Hypertension      Past Surgical History:  H/O lymph node biopsy      Allergies:  penicillin (Unknown)  shellfish (Pruritus)    Medications:  PRN:    Standing:  azaTHIOprine 50 milliGRAM(s) Oral daily  chlorhexidine 0.12% Liquid 15 milliLiter(s) Swish and Spit two times a day  chlorhexidine 4% Liquid 1 Application(s) Topical <User Schedule>  dextrose 40% Gel 15 Gram(s) Oral once  dextrose 5%. 1000 milliLiter(s) (50 mL/Hr) IV Continuous <Continuous>  dextrose 5%. 1000 milliLiter(s) (100 mL/Hr) IV Continuous <Continuous>  dextrose 50% Injectable 25 Gram(s) IV Push once  dextrose 50% Injectable 12.5 Gram(s) IV Push once  dextrose 50% Injectable 25 Gram(s) IV Push once  enoxaparin Injectable 40 milliGRAM(s) SubCutaneous every 12 hours  furosemide    Tablet 40 milliGRAM(s) Oral daily  glucagon  Injectable 1 milliGRAM(s) IntraMuscular once  guaiFENesin  milliGRAM(s) Oral every 12 hours  insulin glargine Injectable (LANTUS) 35 Unit(s) SubCutaneous at bedtime  insulin glargine Injectable (LANTUS) 35 Unit(s) SubCutaneous every morning  insulin lispro (ADMELOG) corrective regimen sliding scale   SubCutaneous three times a day before meals  insulin lispro Injectable (ADMELOG) 12 Unit(s) SubCutaneous three times a day before meals  methylPREDNISolone sodium succinate Injectable 60 milliGRAM(s) IV Push every 12 hours  metoprolol tartrate 12.5 milliGRAM(s) Oral daily  pantoprazole    Tablet 40 milliGRAM(s) Oral before breakfast  polyethylene glycol 3350 17 Gram(s) Oral daily  senna 2 Tablet(s) Oral at bedtime    Home:  gabapentin enacarbil 300 mg oral tablet, extended release: 1 tab(s) orally 3 times a day  lisinopril 40 mg oral tablet: 1 tab(s) orally once a day    Vitals:  T(C): 36.1, Max: 36.8 (12-16-20 @ 20:00)  T(F): 97, Max: 98.2 (12-16-20 @ 20:00)  HR: 114 (52 - 114)  BP: 125/84 (122/79 - 138/82)  RR: 20 (18 - 20)  SpO2: 92% (92% - 98%)    Physical Exam:  General: Awake, alert, NAD, resting comfortably in bed, on O2 NC 4L  HEENT: Head NC/AT  Heart: RRR; S1/S2; no rubs, murmurs appreciated  Lungs: Crackles appreciated up to scapular region bilaterally  Abdomen: Soft, nontender, nondistended, BS+  Extremities: No edema, clubbing, cyanosis in upper or lower extremities  Neuro: AAOx3, NFD    Labs:  CBC (12-17 @ 08:08)                        Hgb: 14.3   WBC: 18.96 )-----------------( Plts: 498                              Hct: 42.7     Chem (12-17 @ 08:08)  Na: 138  |     Cl: 95     |  BUN: 32  -----------------------------------------< Gluc: 136    K: 3.7   |    HCO3: 30  |  Cr: 1.1    Ca 8.5 (12-17 @ 08:08)  Mg 2.4 (12-17 @ 08:08)    LFTs (12-17 @ 08:08)  TPro 5.6  /  Alb 3.2  TBili 0.3  /  DBili     AST 36  /    /  AlkPhos 95

## 2020-12-17 NOTE — PROGRESS NOTE ADULT - ATTENDING COMMENTS
Patient is seen and examined independently at bedside. I agree with the resident's note, history and plan as above. Corrections made where appropriate    All labs, radiologic studies, vitals, orders and medications list reviewed.     Acute Hypoxic Respiratory Failure secondary to COVID Pneumonia  likely a combination of PNA with post COVID inflammatory/fibrotic changes  Suspected Cytokine inflammatory syndrome  Pt was admitted from Nov 5 - Nov 20 for COVID PNA, tx with AMADO and plasma  Currently saturating 92% on 4L NC  s/p TOCI x12/11, s/p Zosyn  continue with Solumedrol 60 Q12H, started on Azathioprine 50mg today   taper down oxygen as tolerated    DM  - increase lantus 35 BID, lispro 12 TID, monitor FS    RA  on Prednisone 10mg at home    HTN? - BP well controlled  off metoprolol due to bradycardia    Patient requires continuous monitoring on CEU/SDU    Plan discussed with HS.

## 2020-12-17 NOTE — PROGRESS NOTE ADULT - SUBJECTIVE AND OBJECTIVE BOX
Patient is a 60y old  Male who presents with a chief complaint of SOB (16 Dec 2020 10:37)        Over Night Events:        ROS:     All ROS are negative except HPI         PHYSICAL EXAM    ICU Vital Signs Last 24 Hrs  T(C): 36.1 (17 Dec 2020 07:00), Max: 36.8 (16 Dec 2020 20:00)  T(F): 97 (17 Dec 2020 07:00), Max: 98.2 (16 Dec 2020 20:00)  HR: 85 (17 Dec 2020 07:00) (52 - 85)  BP: 125/84 (17 Dec 2020 07:00) (122/79 - 138/82)  BP(mean): --  ABP: --  ABP(mean): --  RR: 20 (17 Dec 2020 07:00) (18 - 20)  SpO2: 96% (17 Dec 2020 04:20) (95% - 98%)      CONSTITUTIONAL:  Well nourished.  NAD    ENT:   Airway patent,   Mouth with normal mucosa.   No thrush    EYES:   Pupils equal,   Round and reactive to light.    CARDIAC:   Normal rate,   Regular rhythm.    No edema      Vascular:  Normal systolic impulse  No Carotid bruits    RESPIRATORY:   No wheezing  Bilateral BS  Normal chest expansion  Not tachypneic,  No use of accessory muscles    GASTROINTESTINAL:  Abdomen soft,   Non-tender,   No guarding,   + BS    MUSCULOSKELETAL:   Range of motion is not limited,  No clubbing, cyanosis    NEUROLOGICAL:   Alert and oriented   No motor  deficits.    SKIN:   Skin normal color for race,   Warm and dry and intact.   No evidence of rash.    PSYCHIATRIC:   Normal mood and affect.   No apparent risk to self or others.    HEMATOLOGICAL:  No cervical  lymphadenopathy.  no inguinal lymphadenopathy      12-16-20 @ 07:01  -  12-17-20 @ 07:00  --------------------------------------------------------  IN:    Oral Fluid: 400 mL  Total IN: 400 mL    OUT:    Stool (mL): 1 mL    Voided (mL): 850 mL  Total OUT: 851 mL    Total NET: -451 mL          LABS:                            14.3   18.96 )-----------( 498      ( 17 Dec 2020 08:08 )             42.7                                               12-17    138  |  95<L>  |  32<H>  ----------------------------<  136<H>  3.7   |  30  |  1.1    Ca    8.5      17 Dec 2020 08:08  Mg     2.4     12-17    TPro  5.6<L>  /  Alb  3.2<L>  /  TBili  0.3  /  DBili  x   /  AST  36  /  ALT  136<H>  /  AlkPhos  95  12-17                                                                                           LIVER FUNCTIONS - ( 17 Dec 2020 08:08 )  Alb: 3.2 g/dL / Pro: 5.6 g/dL / ALK PHOS: 95 U/L / ALT: 136 U/L / AST: 36 U/L / GGT: x                                                                                                                                       MEDICATIONS  (STANDING):  chlorhexidine 0.12% Liquid 15 milliLiter(s) Swish and Spit two times a day  chlorhexidine 4% Liquid 1 Application(s) Topical <User Schedule>  dextrose 40% Gel 15 Gram(s) Oral once  dextrose 5%. 1000 milliLiter(s) (50 mL/Hr) IV Continuous <Continuous>  dextrose 5%. 1000 milliLiter(s) (100 mL/Hr) IV Continuous <Continuous>  dextrose 50% Injectable 25 Gram(s) IV Push once  dextrose 50% Injectable 12.5 Gram(s) IV Push once  dextrose 50% Injectable 25 Gram(s) IV Push once  enoxaparin Injectable 40 milliGRAM(s) SubCutaneous every 12 hours  furosemide    Tablet 40 milliGRAM(s) Oral daily  glucagon  Injectable 1 milliGRAM(s) IntraMuscular once  guaiFENesin  milliGRAM(s) Oral every 12 hours  insulin glargine Injectable (LANTUS) 35 Unit(s) SubCutaneous at bedtime  insulin glargine Injectable (LANTUS) 35 Unit(s) SubCutaneous every morning  insulin lispro (ADMELOG) corrective regimen sliding scale   SubCutaneous three times a day before meals  insulin lispro Injectable (ADMELOG) 12 Unit(s) SubCutaneous three times a day before meals  methylPREDNISolone sodium succinate Injectable 60 milliGRAM(s) IV Push every 12 hours  pantoprazole    Tablet 40 milliGRAM(s) Oral before breakfast  polyethylene glycol 3350 17 Gram(s) Oral daily  senna 2 Tablet(s) Oral at bedtime    MEDICATIONS  (PRN):      New X-rays reviewed:                                                                                  ECHO    CXR interpreted by me:

## 2020-12-17 NOTE — PROGRESS NOTE ADULT - ASSESSMENT
IMPRESSION:    Acute hypoxemic respiratory failure improving   HO COVID pneumonia   Worsening infiltrates doubt opportunistic infection.  Likely Inflammatory / Fibrotic changes    HO RA Chronic steroid use     PLAN:    CNS:  No depressants     HEENT: Oral care    PULMONARY:  HOB @ 45 degrees.  Aspiration precautions. Wean O2 as tolerated.  Solumedrol 60 mg Q12.      CARDIOVASCULAR:  I=O,  Avoid volume overload.  Lopressors 12.5 daily      GI: GI prophylaxis.  Feeding.  Bowel regimen     RENAL:  Follow up lytes.  Correct as needed    INFECTIOUS DISEASE: Follow up cultures.      HEMATOLOGICAL:  DVT prophylaxis. LMWH 40mg q12h    ENDOCRINE:  Follow up FS.  Insulin protocol if needed.    MUSCULOSKELETAL:  oob to chair      SDU     Possibly adding Imuran 50 mg daily

## 2020-12-17 NOTE — CHART NOTE - NSCHARTNOTEFT_GEN_A_CORE
Registered Dietitian Follow-Up     Patient Profile Reviewed                           Yes [x]   No []     Nutrition History Previously Obtained        Yes [x]  No []  Unable to complete face to face assessment d/t COVID precautions.      Pertinent Medical Interventions:  1. Acute hypoxemic respiratory failure 2/2 h/o COVID PNA--improving; now on 6L O2 NC; improvement in infiltrates & RUL consolidation per CXR  2. HTN--well controlled; tachycardia now resolved      Diet order: DASH/TLC CHO Consistent + Ensure Enlive 3x/day. Pt reports appetite so/so but is eating as he is aware of importance of good nutrition at  this time. Reports best intae at breafast with % meal consumption but lunch & dinner vary 60-75%. No difficulty chewing/swallowing reported. Pt requesting Glucerna as opposed to Ensure which was recommended by RD at initial assessment but not ordered. Recommendations d/w MD (Dr. Shannon).      Anthropometrics:  - Ht. 182.9cm   - Wt.  86kg (12/17) ?possible bedscale error. no edema noted. will continue to monitor   82.5kg (12/8) dosing   - BMI 24.7 (dosing wt)   - IBW     Pertinent Lab Data: (12/17/2020) WBC 18.96 POCT 149  (12/16/2020) BUN 35 glucose 159      Pertinent Meds: lovenox lantus admelog solumedrol miralax protonix senna lasix     Physical Findings:  - Appearance: AAOx4    - GI function: none reported LBM 12/14   - Tubes: n/a   - Oral/Mouth cavity: none reported   - Skin: intact; no edema      Nutrition Requirements  Weight Used: 82.5kg     Energy: 0351-2414 kcal/day (MSJx1.2-1.3 AF).   Protein: 83-99 g/day (1-1.2 g/kg ABW).   Fluids: 1 mL/kcal or per lIP.     Nutrient Intake: improving %      [x] Previous Nutrition Diagnosis: inadequate oral intake             [x] Ongoing  but resolving        [] Resolved     Nutrition Intervention: meals & snacks medical food supplements   Recommend:  1. Adjust supplement order to Glucerna (vanilla) 3x/day.  2. Continue CHO Consistent DASH/TLC diet order.     Goal/Expected Outcome: Pt to consistently consumes >75% meals & supplements upon f/u in 4 days.      Indicator/Monitoring: diet order energy intae nutrition related labs body composition NFPF

## 2020-12-17 NOTE — PROGRESS NOTE ADULT - ASSESSMENT
60 yr M with recent COVID pneumonia discharged 3 weeks ago on 2LNC, s/p steroid course and plasma Tx, GIST (s/p resection), DM type II , RA (on prednisone), HTN, HLD and herniated disc presented to ED for SOB and desaturation, recently discharged with home O2 after admission for COVID pneumonia, admitted for acute hypoxemic respiratory failure, admitted to ICU, s/p downgrade to stepdown, improved on HFNC, now on O2 NC 4L.    #Acute hypoxemic respiratory failure due to COVID pna - resolved  #Recently discharged on home O2 after admission for COVID pna  - CXR on admission revealed worsening infiltrates, RUL consolidation - now improving  - Unlikely has underlying bacterial pneumonia, procalc wnl, inflammatory markers trended down, legionella, fungitell, galactommanan neg  - S/p Zosyn, no need for more abx per ID  - S/p Toci 12/11, c/w Solumedrol 60 mg IV q12  - Weaned off HFNC, now on O2 NC 4L, wean off as tolerated  - Initiated on Azithromycin 50 mg qd    #Hx of RA, on chronic steroids  - C/w Solumedrol 60 mg q12  - Takes Prednisone 10 mg qd    #HTN - well controlled  - C/w Metoprolol 12.5 mg qd for tachycardia    #DMII  - HbA1c 7.55  - FS elevated, Lantus inc to 35 U BID, Lispro 12 U    DVT ppx: Lovenox  GI ppx: Pantoprazole  Diet: DASH  Activity: IAT  Full code  Dispo: improving    * Updated daughter Jodi [phone] * 60 yr M with recent COVID pneumonia discharged 3 weeks ago on 2LNC, s/p steroid course and plasma Tx, GIST (s/p resection), DM type II , RA (on prednisone), HTN, HLD and herniated disc presented to ED for SOB and desaturation, recently discharged with home O2 after admission for COVID pneumonia, admitted for acute hypoxemic respiratory failure, admitted to ICU, s/p downgrade to stepdown, improved on HFNC, now on O2 NC 4L.    #Acute hypoxemic respiratory failure due to COVID pna - resolved  #Recently discharged on home O2 after admission for COVID pna  - CXR on admission revealed worsening infiltrates, RUL consolidation - now improving  - Unlikely has underlying bacterial pneumonia, procalc wnl, inflammatory markers trended down, legionella, fungitell, galactommanan neg  - S/p Zosyn, no need for more abx per ID  - S/p Toci 12/11, c/w Solumedrol 60 mg IV q12  - Weaned off HFNC, now on O2 NC 4L, wean off as tolerated  - Initiated on Imuran 50 mg qd    #Hx of RA, on chronic steroids  - C/w Solumedrol 60 mg q12  - Takes Prednisone 10 mg qd    #HTN - well controlled  - C/w Metoprolol 12.5 mg qd for tachycardia    #DMII  - HbA1c 7.55  - FS elevated, Lantus inc to 35 U BID, Lispro 12 U    DVT ppx: Lovenox  GI ppx: Pantoprazole  Diet: DASH  Activity: IAT  Full code  Dispo: improving    * Updated daughter Jodi [phone] *

## 2020-12-18 LAB
ALBUMIN SERPL ELPH-MCNC: 3.4 G/DL — LOW (ref 3.5–5.2)
ALP SERPL-CCNC: 104 U/L — SIGNIFICANT CHANGE UP (ref 30–115)
ALT FLD-CCNC: 186 U/L — HIGH (ref 0–41)
ANION GAP SERPL CALC-SCNC: 15 MMOL/L — HIGH (ref 7–14)
AST SERPL-CCNC: 60 U/L — HIGH (ref 0–41)
BASOPHILS # BLD AUTO: 0 K/UL — SIGNIFICANT CHANGE UP (ref 0–0.2)
BASOPHILS NFR BLD AUTO: 0 % — SIGNIFICANT CHANGE UP (ref 0–1)
BILIRUB SERPL-MCNC: 0.4 MG/DL — SIGNIFICANT CHANGE UP (ref 0.2–1.2)
BUN SERPL-MCNC: 35 MG/DL — HIGH (ref 10–20)
CALCIUM SERPL-MCNC: 8.7 MG/DL — SIGNIFICANT CHANGE UP (ref 8.5–10.1)
CHLORIDE SERPL-SCNC: 94 MMOL/L — LOW (ref 98–110)
CO2 SERPL-SCNC: 28 MMOL/L — SIGNIFICANT CHANGE UP (ref 17–32)
CREAT SERPL-MCNC: 1 MG/DL — SIGNIFICANT CHANGE UP (ref 0.7–1.5)
EOSINOPHIL # BLD AUTO: 0 K/UL — SIGNIFICANT CHANGE UP (ref 0–0.7)
EOSINOPHIL NFR BLD AUTO: 0 % — SIGNIFICANT CHANGE UP (ref 0–8)
GLUCOSE BLDC GLUCOMTR-MCNC: 102 MG/DL — HIGH (ref 70–99)
GLUCOSE BLDC GLUCOMTR-MCNC: 180 MG/DL — HIGH (ref 70–99)
GLUCOSE BLDC GLUCOMTR-MCNC: 184 MG/DL — HIGH (ref 70–99)
GLUCOSE BLDC GLUCOMTR-MCNC: 225 MG/DL — HIGH (ref 70–99)
GLUCOSE SERPL-MCNC: 199 MG/DL — HIGH (ref 70–99)
HCT VFR BLD CALC: 45.5 % — SIGNIFICANT CHANGE UP (ref 42–52)
HGB BLD-MCNC: 15.1 G/DL — SIGNIFICANT CHANGE UP (ref 14–18)
LYMPHOCYTES # BLD AUTO: 0.5 K/UL — LOW (ref 1.2–3.4)
LYMPHOCYTES # BLD AUTO: 2.6 % — LOW (ref 20.5–51.1)
MAGNESIUM SERPL-MCNC: 2.3 MG/DL — SIGNIFICANT CHANGE UP (ref 1.8–2.4)
MCHC RBC-ENTMCNC: 29.5 PG — SIGNIFICANT CHANGE UP (ref 27–31)
MCHC RBC-ENTMCNC: 33.2 G/DL — SIGNIFICANT CHANGE UP (ref 32–37)
MCV RBC AUTO: 88.9 FL — SIGNIFICANT CHANGE UP (ref 80–94)
MONOCYTES # BLD AUTO: 0.32 K/UL — SIGNIFICANT CHANGE UP (ref 0.1–0.6)
MONOCYTES NFR BLD AUTO: 1.7 % — SIGNIFICANT CHANGE UP (ref 1.7–9.3)
NEUTROPHILS # BLD AUTO: 17.41 K/UL — HIGH (ref 1.4–6.5)
NEUTROPHILS NFR BLD AUTO: 91.3 % — HIGH (ref 42.2–75.2)
PLATELET # BLD AUTO: 527 K/UL — HIGH (ref 130–400)
POTASSIUM SERPL-MCNC: 3.6 MMOL/L — SIGNIFICANT CHANGE UP (ref 3.5–5)
POTASSIUM SERPL-SCNC: 3.6 MMOL/L — SIGNIFICANT CHANGE UP (ref 3.5–5)
PROT SERPL-MCNC: 6 G/DL — SIGNIFICANT CHANGE UP (ref 6–8)
RBC # BLD: 5.12 M/UL — SIGNIFICANT CHANGE UP (ref 4.7–6.1)
RBC # FLD: 15.1 % — HIGH (ref 11.5–14.5)
SODIUM SERPL-SCNC: 137 MMOL/L — SIGNIFICANT CHANGE UP (ref 135–146)
TROPONIN T SERPL-MCNC: <0.01 NG/ML — SIGNIFICANT CHANGE UP
TROPONIN T SERPL-MCNC: <0.01 NG/ML — SIGNIFICANT CHANGE UP
WBC # BLD: 19.07 K/UL — HIGH (ref 4.8–10.8)
WBC # FLD AUTO: 19.07 K/UL — HIGH (ref 4.8–10.8)

## 2020-12-18 PROCEDURE — 99233 SBSQ HOSP IP/OBS HIGH 50: CPT

## 2020-12-18 PROCEDURE — 71045 X-RAY EXAM CHEST 1 VIEW: CPT | Mod: 26

## 2020-12-18 RX ORDER — INSULIN GLARGINE 100 [IU]/ML
30 INJECTION, SOLUTION SUBCUTANEOUS AT BEDTIME
Refills: 0 | Status: DISCONTINUED | OUTPATIENT
Start: 2020-12-18 | End: 2020-12-19

## 2020-12-18 RX ORDER — INSULIN GLARGINE 100 [IU]/ML
30 INJECTION, SOLUTION SUBCUTANEOUS EVERY MORNING
Refills: 0 | Status: DISCONTINUED | OUTPATIENT
Start: 2020-12-18 | End: 2020-12-20

## 2020-12-18 RX ADMIN — Medication 2: at 11:17

## 2020-12-18 RX ADMIN — CHLORHEXIDINE GLUCONATE 1 APPLICATION(S): 213 SOLUTION TOPICAL at 05:20

## 2020-12-18 RX ADMIN — Medication 600 MILLIGRAM(S): at 05:20

## 2020-12-18 RX ADMIN — POLYETHYLENE GLYCOL 3350 17 GRAM(S): 17 POWDER, FOR SOLUTION ORAL at 11:17

## 2020-12-18 RX ADMIN — Medication 12 UNIT(S): at 08:19

## 2020-12-18 RX ADMIN — Medication 12 UNIT(S): at 17:16

## 2020-12-18 RX ADMIN — Medication 600 MILLIGRAM(S): at 17:15

## 2020-12-18 RX ADMIN — INSULIN GLARGINE 30 UNIT(S): 100 INJECTION, SOLUTION SUBCUTANEOUS at 21:54

## 2020-12-18 RX ADMIN — Medication 12 UNIT(S): at 11:17

## 2020-12-18 RX ADMIN — Medication 60 MILLIGRAM(S): at 17:15

## 2020-12-18 RX ADMIN — ENOXAPARIN SODIUM 40 MILLIGRAM(S): 100 INJECTION SUBCUTANEOUS at 17:16

## 2020-12-18 RX ADMIN — INSULIN GLARGINE 35 UNIT(S): 100 INJECTION, SOLUTION SUBCUTANEOUS at 08:19

## 2020-12-18 RX ADMIN — AZATHIOPRINE 50 MILLIGRAM(S): 100 TABLET ORAL at 11:18

## 2020-12-18 RX ADMIN — Medication 12.5 MILLIGRAM(S): at 05:20

## 2020-12-18 RX ADMIN — PANTOPRAZOLE SODIUM 40 MILLIGRAM(S): 20 TABLET, DELAYED RELEASE ORAL at 05:20

## 2020-12-18 RX ADMIN — CHLORHEXIDINE GLUCONATE 15 MILLILITER(S): 213 SOLUTION TOPICAL at 05:20

## 2020-12-18 RX ADMIN — Medication 40 MILLIGRAM(S): at 05:20

## 2020-12-18 RX ADMIN — CHLORHEXIDINE GLUCONATE 15 MILLILITER(S): 213 SOLUTION TOPICAL at 17:15

## 2020-12-18 RX ADMIN — SENNA PLUS 2 TABLET(S): 8.6 TABLET ORAL at 21:54

## 2020-12-18 RX ADMIN — Medication 60 MILLIGRAM(S): at 05:20

## 2020-12-18 RX ADMIN — ENOXAPARIN SODIUM 40 MILLIGRAM(S): 100 INJECTION SUBCUTANEOUS at 05:21

## 2020-12-18 RX ADMIN — Medication 1: at 17:17

## 2020-12-18 NOTE — PROGRESS NOTE ADULT - ASSESSMENT
60 yr M with recent COVID pneumonia discharged 3 weeks ago on 2LNC, s/p steroid course and plasma Tx, GIST (s/p resection), DM type II , RA (on prednisone), HTN, HLD and herniated disc presented to ED for SOB and desaturation, recently discharged with home O2 after admission for COVID pneumonia, admitted for acute hypoxemic respiratory failure, admitted to ICU, s/p downgrade to stepdown, improved on HFNC, now on O2 NC 3L.    #Acute hypoxemic respiratory failure due to COVID pna - resolved  #Recently discharged on home O2 after admission for COVID pna  - CXR on admission revealed worsening infiltrates, RUL consolidation - now improving  - Unlikely has underlying bacterial pneumonia, procalc wnl, inflammatory markers trended down, legionella, fungitell, galactommanan neg  - S/p Zosyn, no need for more abx per ID  - S/p Toci 12/11, c/w Solumedrol 60 mg IV q12  - Weaned off HFNC, now on O2 NC 3L, wean off as tolerated  - C/w Imuran 50 mg qd    #Hx of RA, on chronic steroids  - C/w Solumedrol 60 mg q12  - Takes Prednisone 10 mg qd    #HTN - well controlled  - C/w Metoprolol 12.5 mg qd for tachycardia    #DMII  - HbA1c 7.55  - C/w Lantus inc to 30 U BID, Lispro 12 U    DVT ppx: Lovenox  GI ppx: Pantoprazole  Diet: DASH  Activity: IAT  Full code  Dispo: improving     60 yr M with recent COVID pneumonia discharged 3 weeks ago on 2LNC, s/p steroid course and plasma Tx, GIST (s/p resection), DM type II , RA (on prednisone), HTN, HLD and herniated disc presented to ED for SOB and desaturation, recently discharged with home O2 after admission for COVID pneumonia, admitted for acute hypoxemic respiratory failure, admitted to ICU, s/p downgrade to stepdown, improved on HFNC, now on O2 NC 3L.    #Acute hypoxemic respiratory failure due to COVID pna - resolved  #Recently discharged on home O2 after admission for COVID pna  - CXR on admission revealed worsening infiltrates, RUL consolidation - now improving  - Unlikely has underlying bacterial pneumonia, procalc wnl, inflammatory markers trended down, legionella, fungitell, galactommanan neg  - S/p Zosyn, no need for more abx per ID  - S/p Toci 12/11, c/w Solumedrol 60 mg IV q12  - Weaned off HFNC, now on O2 NC 3L, wean off as tolerated  - C/w Imuran 50 mg qd    #Hx of RA, on chronic steroids  - C/w Solumedrol 60 mg q12  - Takes Prednisone 10 mg qd    #HTN - well controlled  - C/w Metoprolol 12.5 mg qd for tachycardia    #DMII  - HbA1c 7.55  - C/w Lantus inc to 30 U BID, Lispro 12 U    DVT ppx: Lovenox  GI ppx: Pantoprazole  Diet: DASH  Activity: IAT  Full code  Dispo: downgrade to floor     60 yr M with recent COVID pneumonia discharged 3 weeks ago on 2LNC, s/p steroid course and plasma Tx, GIST (s/p resection), DM type II , RA (on prednisone), HTN, HLD and herniated disc presented to ED for SOB and desaturation, recently discharged with home O2 after admission for COVID pneumonia, admitted for acute hypoxemic respiratory failure, admitted to ICU, s/p downgrade to stepdown, improved on HFNC, now on O2 NC 3L.    #Acute hypoxemic respiratory failure due to COVID pna - resolved  #Recently discharged on home O2 after admission for COVID pna  - CXR on admission revealed worsening infiltrates, RUL consolidation - now improving  - Unlikely has underlying bacterial pneumonia, procalc wnl, inflammatory markers trended down, legionella, fungitell, galactommanan neg  - S/p Zosyn, no need for more abx per ID  - S/p Toci 12/11, c/w Solumedrol 60 mg IV q12  - Weaned off HFNC, now on O2 NC 3L, wean off as tolerated  - C/w Imuran 50 mg qd    #Hx of RA, on chronic steroids  - C/w Solumedrol 60 mg q12  - Takes Prednisone 10 mg qd    #HTN - well controlled  - C/w Metoprolol 12.5 mg qd for tachycardia    #DMII  - HbA1c 7.55  - C/w Lantus inc to 30 U BID, Lispro 12 U    DVT ppx: Lovenox  GI ppx: Pantoprazole  Diet: DASH  Activity: IAT  Full code  Dispo: downgrade to floor    * Updated daughter Jodi [phone] *

## 2020-12-18 NOTE — PROGRESS NOTE ADULT - ATTENDING COMMENTS
Patient is seen and examined independently at bedside. I agree with the resident's note, history and plan as above. Corrections made where appropriate    All labs, radiologic studies, vitals, orders and medications list reviewed.     Acute Hypoxic Respiratory Failure secondary to COVID Pneumonia - improved  likely a combination of PNA with post COVID inflammatory/fibrotic changes  Suspected Cytokine inflammatory syndrome  Pt was admitted from Nov 5 - Nov 20 for COVID PNA, tx with AMADO and plasma  Currently saturating 93% on 3L NC  s/p TOCI x12/11, s/p Zosyn  start prednisone 40mg to complete 10 days of steroids, then begin tapering  Patient finished course of steroids/RDV/Plasma in November  c/w Azathioprine 50mg daily  taper down oxygen as tolerated    DM  - FS better controlled on lantus 35 BID, lispro 12 TID, monitor FS    RA  on Prednisone 10mg at home    HTN? - BP well controlled  resumed home Metoprolol 12.5     Patient to be downgraded to medical floor    Plan discussed with HS.

## 2020-12-18 NOTE — PROGRESS NOTE ADULT - SUBJECTIVE AND OBJECTIVE BOX
Patient Information:  JOSÉ LUIS AREVALO / 60y / Male / MRN#:[id number]    Hospital Day: 11d    Interval History:  Patient seen and examined at bedside. No acute events overnight.  Pt sitting up in bed, on O2 NC 3L, saturating 95-96%. Denies any complaints. Overnight, pt developed mild R sided chest pain on palpation. EKG revealed no abnormalities CXR appears stable. Pain now resolved.    Past Medical History:  Pneumonia due to COVID-19 virus    Hyperlipidemia    Lumbago    Hypertension      Past Surgical History:  H/O lymph node biopsy      Allergies:  penicillin (Unknown)  shellfish (Pruritus)    Medications:  PRN:  aluminum hydroxide/magnesium hydroxide/simethicone Suspension 30 milliLiter(s) Oral every 6 hours PRN Dyspepsia  ibuprofen  Tablet. 600 milliGRAM(s) Oral every 6 hours PRN Moderate Pain (4 - 6)    Standing:  azaTHIOprine 50 milliGRAM(s) Oral daily  chlorhexidine 0.12% Liquid 15 milliLiter(s) Swish and Spit two times a day  chlorhexidine 4% Liquid 1 Application(s) Topical <User Schedule>  dextrose 40% Gel 15 Gram(s) Oral once  dextrose 5%. 1000 milliLiter(s) (50 mL/Hr) IV Continuous <Continuous>  dextrose 5%. 1000 milliLiter(s) (100 mL/Hr) IV Continuous <Continuous>  dextrose 50% Injectable 25 Gram(s) IV Push once  dextrose 50% Injectable 12.5 Gram(s) IV Push once  dextrose 50% Injectable 25 Gram(s) IV Push once  enoxaparin Injectable 40 milliGRAM(s) SubCutaneous every 12 hours  furosemide    Tablet 40 milliGRAM(s) Oral daily  glucagon  Injectable 1 milliGRAM(s) IntraMuscular once  guaiFENesin  milliGRAM(s) Oral every 12 hours  insulin glargine Injectable (LANTUS) 35 Unit(s) SubCutaneous at bedtime  insulin glargine Injectable (LANTUS) 35 Unit(s) SubCutaneous every morning  insulin lispro (ADMELOG) corrective regimen sliding scale   SubCutaneous three times a day before meals  insulin lispro Injectable (ADMELOG) 12 Unit(s) SubCutaneous three times a day before meals  methylPREDNISolone sodium succinate Injectable 60 milliGRAM(s) IV Push every 12 hours  metoprolol tartrate 12.5 milliGRAM(s) Oral daily  pantoprazole    Tablet 40 milliGRAM(s) Oral before breakfast  polyethylene glycol 3350 17 Gram(s) Oral daily  senna 2 Tablet(s) Oral at bedtime    Home:  gabapentin enacarbil 300 mg oral tablet, extended release: 1 tab(s) orally 3 times a day  lisinopril 40 mg oral tablet: 1 tab(s) orally once a day    Vitals:  T(C): 36.3, Max: 36.3 (12-17-20 @ 16:00)  T(F): 97.4, Max: 97.4 (12-17-20 @ 16:00)  HR: 55 (52 - 114)  BP: 116/76 (102/70 - 121/73)  RR: 18 (18 - 20)  SpO2: 96% (92% - 97%)    Physical Exam:  General: Awake, alert, NAD, resting comfortably in bed, on O2 NC 3L  HEENT: Head NC/AT  Heart: RRR; S1/S2; no rubs, murmurs appreciated  Lungs: Crackles appreciated up to scapular region bilaterally  Abdomen: Soft, nontender, nondistended, BS+  Extremities: No edema, clubbing, cyanosis in upper or lower extremities  Neuro: AAOx3, NFD    Labs:  CBC (12-17 @ 08:08)                        Hgb: 14.3   WBC: 18.96 )-----------------( Plts: 498                              Hct: 42.7     Chem (12-17 @ 08:08)  Na: 138  |     Cl: 95     |  BUN: 32  -----------------------------------------< Gluc: 136    K: 3.7   |    HCO3: 30  |  Cr: 1.1    Ca 8.5 (12-17 @ 08:08)  Mg 2.4 (12-17 @ 08:08)    LFTs (12-17 @ 08:08)  TPro 5.6  /  Alb 3.2  TBili 0.3  /  DBili     AST 36  /    /  AlkPhos 95    Cardiac Markers (12-18 @ 01:47)  Troponin I X  Troponin T <0.01  CK X  CKMB X  CKMB Units X  Myoglobin X  Lactate X  ESR X      Radiology:    < from: Xray Chest 1 View- PORTABLE-Routine (Xray Chest 1 View- PORTABLE-Routine in AM.) (12.18.20 @ 06:16) >  Impression:    Low lung volumes. Unchanged bilateralopacities.    < end of copied text >     Patient Information:  JOSÉ LUIS AREVALO / 60y / Male / MRN#:[id number]    Hospital Day: 11d    Interval History:  Patient seen and examined at bedside. No acute events overnight.  Pt sitting up in bed, on O2 NC 3L, saturating 95-96%. Denies any complaints. Overnight, pt developed mild R sided chest pain on palpation. EKG revealed no abnormalities CXR appears stable. Pain now resolved.    Past Medical History:  Pneumonia due to COVID-19 virus  Hyperlipidemia  Lumbago  Hypertension    Past Surgical History:  H/O lymph node biopsy    Allergies:  penicillin (Unknown)  shellfish (Pruritus)    Medications:  PRN:  aluminum hydroxide/magnesium hydroxide/simethicone Suspension 30 milliLiter(s) Oral every 6 hours PRN Dyspepsia  ibuprofen  Tablet. 600 milliGRAM(s) Oral every 6 hours PRN Moderate Pain (4 - 6)    Standing:  azaTHIOprine 50 milliGRAM(s) Oral daily  chlorhexidine 0.12% Liquid 15 milliLiter(s) Swish and Spit two times a day  chlorhexidine 4% Liquid 1 Application(s) Topical <User Schedule>  dextrose 40% Gel 15 Gram(s) Oral once  dextrose 5%. 1000 milliLiter(s) (50 mL/Hr) IV Continuous <Continuous>  dextrose 5%. 1000 milliLiter(s) (100 mL/Hr) IV Continuous <Continuous>  dextrose 50% Injectable 25 Gram(s) IV Push once  dextrose 50% Injectable 12.5 Gram(s) IV Push once  dextrose 50% Injectable 25 Gram(s) IV Push once  enoxaparin Injectable 40 milliGRAM(s) SubCutaneous every 12 hours  furosemide    Tablet 40 milliGRAM(s) Oral daily  glucagon  Injectable 1 milliGRAM(s) IntraMuscular once  guaiFENesin  milliGRAM(s) Oral every 12 hours  insulin glargine Injectable (LANTUS) 35 Unit(s) SubCutaneous at bedtime  insulin glargine Injectable (LANTUS) 35 Unit(s) SubCutaneous every morning  insulin lispro (ADMELOG) corrective regimen sliding scale   SubCutaneous three times a day before meals  insulin lispro Injectable (ADMELOG) 12 Unit(s) SubCutaneous three times a day before meals  methylPREDNISolone sodium succinate Injectable 60 milliGRAM(s) IV Push every 12 hours  metoprolol tartrate 12.5 milliGRAM(s) Oral daily  pantoprazole    Tablet 40 milliGRAM(s) Oral before breakfast  polyethylene glycol 3350 17 Gram(s) Oral daily  senna 2 Tablet(s) Oral at bedtime    Home:  gabapentin enacarbil 300 mg oral tablet, extended release: 1 tab(s) orally 3 times a day  lisinopril 40 mg oral tablet: 1 tab(s) orally once a day    Vitals:  T(C): 36.3, Max: 36.3 (12-17-20 @ 16:00)  T(F): 97.4, Max: 97.4 (12-17-20 @ 16:00)  HR: 55 (52 - 114)  BP: 116/76 (102/70 - 121/73)  RR: 18 (18 - 20)  SpO2: 96% (92% - 97%)    Physical Exam:  General: Awake, alert, NAD, resting comfortably in bed, on O2 NC 3L  HEENT: Head NC/AT  Heart: RRR; S1/S2; no rubs or murmurs appreciated  Lungs: Crackles appreciated up to scapular region bilaterally  Abdomen: Soft, nontender, nondistended, BS+  Extremities: No edema, clubbing, cyanosis in upper or lower extremities  Neuro: AAOx3, NFD    Labs:  CBC (12-17 @ 08:08)                        Hgb: 14.3   WBC: 18.96 )-----------------( Plts: 498                              Hct: 42.7     Chem (12-17 @ 08:08)  Na: 138  |     Cl: 95     |  BUN: 32  -----------------------------------------< Gluc: 136    K: 3.7   |    HCO3: 30  |  Cr: 1.1    Ca 8.5 (12-17 @ 08:08)  Mg 2.4 (12-17 @ 08:08)    LFTs (12-17 @ 08:08)  TPro 5.6  /  Alb 3.2  TBili 0.3  /  DBili     AST 36  /    /  AlkPhos 95    Cardiac Markers (12-18 @ 01:47)  Troponin I X  Troponin T <0.01  CK X  CKMB X  CKMB Units X  Myoglobin X  Lactate X  ESR X      Radiology:    < from: Xray Chest 1 View- PORTABLE-Routine (Xray Chest 1 View- PORTABLE-Routine in AM.) (12.18.20 @ 06:16) >  Impression:    Low lung volumes. Unchanged bilateralopacities.    < end of copied text >

## 2020-12-18 NOTE — CHART NOTE - NSCHARTNOTEFT_GEN_A_CORE
CEU Transfer Note    Transfer from: CEU  Transfer to:  ( x ) Medicine    (  ) Telemetry    (  ) RCU    (  ) Palliative    (  ) Stroke Unit    (  ) _______________      CEU COURSE:  60 yr M with recent COVID pneumonia discharged 3 weeks ago on 2LNC, s/p steroid course and plasma Tx, GIST (s/p resection), DM type II , RA (on prednisone), HTN, HLD and herniated disc presented to ED for SOB and desaturation to 70s while walking in his house, pt was did=charged from the [facility] for covid pneumonia 3 weeks ago, on 2LNC, s/p steroid course (currently at 10mg which is his dose for RA) and plasma Tx, was doing Ok at his baseline, uses O2 at rest and on exertion, pt was not able to walk today due to chest pain, SOB and his saturation was in 70s with HR 120s, so pt called 911, pt denies any fever, cough, abd pain, diarrhea, N/V, urinary symptoms or legs swelling. In ED; tachycardia 130, tachypnea 28, was placed on HFNC, CTA chest showed no PE, but worsening diffuse bilateral patchy ground-glass opacities with new areas of patchy consolidations and new bronchiectasis, compatible with an infectious/inflammatory etiology. Findings likely represent sequela of known previous Covid-19 infection, possible bacterial infection. Pt administered Toci on admission. Initiated on Zosyn. Blood Cx, legionella, fungitel, galactommanan negative and ABx d/c'd. Pt remained on HFNC and improved, downgraded to CEU. Was weaned off HFNC to NC, now on 3L, stable for downgrade to floor.    ASSESSMENT & PLAN:     60 yr M with recent COVID pneumonia discharged 3 weeks ago on 2LNC, s/p steroid course and plasma Tx, GIST (s/p resection), DM type II , RA (on prednisone), HTN, HLD and herniated disc presented to ED for SOB and desaturation, recently discharged with home O2 after admission for COVID pneumonia, admitted for acute hypoxemic respiratory failure, admitted to ICU, s/p downgrade to stepdown, improved on HFNC, now on O2 NC 3L.    #Acute hypoxemic respiratory failure due to COVID pna - resolved  #Recently discharged on home O2 after admission for COVID pna  - CXR on admission revealed worsening infiltrates, RUL consolidation - now improving  - Unlikely has underlying bacterial pneumonia, procalc wnl, inflammatory markers trended down, legionella, fungitell, galactommanan neg  - S/p Zosyn, no need for more abx per ID  - S/p Toci 12/11, c/w Solumedrol 60 mg IV q12  - Weaned off HFNC, now on O2 NC 3L, wean off as tolerated  - C/w Imuran 50 mg qd    #Hx of RA, on chronic steroids  - C/w Solumedrol 60 mg q12  - Takes Prednisone 10 mg qd    #HTN - well controlled  - C/w Metoprolol 12.5 mg qd for tachycardia    #DMII  - HbA1c 7.55  - C/w Lantus inc to 30 U BID, Lispro 12 U    DVT ppx: Lovenox  GI ppx: Pantoprazole  Diet: DASH  Activity: IAT  Full code  Dispo: downgrade to floor    * Updated daughter Jodi [phone] *      For Follow-Up: CEU Transfer Note    Transfer from: CEU  Transfer to:  ( x ) Medicine    (  ) Telemetry    (  ) RCU    (  ) Palliative    (  ) Stroke Unit    (  ) _______________      CEU COURSE:  60 yr M with recent COVID pneumonia discharged 3 weeks ago on 2LNC, s/p steroid course and plasma Tx, GIST (s/p resection), DM type II , RA (on prednisone), HTN, HLD and herniated disc presented to ED for SOB and desaturation to 70s while walking in his house, pt was did=charged from the [facility] for covid pneumonia 3 weeks ago, on 2LNC, s/p steroid course (currently at 10mg which is his dose for RA) and plasma Tx, was doing Ok at his baseline, uses O2 at rest and on exertion, pt was not able to walk today due to chest pain, SOB and his saturation was in 70s with HR 120s, so pt called 911, pt denies any fever, cough, abd pain, diarrhea, N/V, urinary symptoms or legs swelling. In ED; tachycardia 130, tachypnea 28, was placed on HFNC, CTA chest showed no PE, but worsening diffuse bilateral patchy ground-glass opacities with new areas of patchy consolidations and new bronchiectasis, compatible with an infectious/inflammatory etiology. Findings likely represent sequela of known previous Covid-19 infection, possible bacterial infection. Pt administered Toci on admission. Initiated on Zosyn. Blood Cx, legionella, fungitel, galactommanan negative and ABx d/c'd. Pt remained on HFNC and improved, downgraded to CEU. Was weaned off HFNC to NC, now on 3L, stable for downgrade to floor.    ASSESSMENT & PLAN:     60 yr M with recent COVID pneumonia discharged 3 weeks ago on 2LNC, s/p steroid course and plasma Tx, GIST (s/p resection), DM type II , RA (on prednisone), HTN, HLD and herniated disc presented to ED for SOB and desaturation, recently discharged with home O2 after admission for COVID pneumonia, admitted for acute hypoxemic respiratory failure, admitted to ICU, s/p downgrade to stepdown, improved on HFNC, now on O2 NC 3L.    #Acute hypoxemic respiratory failure due to COVID pna - resolved  #Recently discharged on home O2 after admission for COVID pna  - CXR on admission revealed worsening infiltrates, RUL consolidation - now improving  - Unlikely has underlying bacterial pneumonia, procalc wnl, inflammatory markers trended down, legionella, fungitell, galactommanan neg  - S/p Zosyn, no need for more abx per ID  - S/p Toci 12/11, c/w Solumedrol 60 mg IV q12  - Weaned off HFNC, now on O2 NC 3L, wean off as tolerated  - C/w Imuran 50 mg qd    #Hx of RA, on chronic steroids  - C/w Solumedrol 60 mg q12  - Takes Prednisone 10 mg qd    #HTN - well controlled  - C/w Metoprolol 12.5 mg qd for tachycardia    #DMII  - HbA1c 7.55  - C/w Lantus inc to 30 U BID, Lispro 12 U    DVT ppx: Lovenox  GI ppx: Pantoprazole  Diet: DASH  Activity: IAT  Full code  Dispo: downgrade to floor    * Updated daughter Jodi [phone] *

## 2020-12-19 LAB
ALBUMIN SERPL ELPH-MCNC: 3.1 G/DL — LOW (ref 3.5–5.2)
ALP SERPL-CCNC: 92 U/L — SIGNIFICANT CHANGE UP (ref 30–115)
ALT FLD-CCNC: 150 U/L — HIGH (ref 0–41)
ANION GAP SERPL CALC-SCNC: 9 MMOL/L — SIGNIFICANT CHANGE UP (ref 7–14)
AST SERPL-CCNC: 30 U/L — SIGNIFICANT CHANGE UP (ref 0–41)
BASOPHILS # BLD AUTO: 0.12 K/UL — SIGNIFICANT CHANGE UP (ref 0–0.2)
BASOPHILS NFR BLD AUTO: 0.5 % — SIGNIFICANT CHANGE UP (ref 0–1)
BILIRUB SERPL-MCNC: 0.9 MG/DL — SIGNIFICANT CHANGE UP (ref 0.2–1.2)
BUN SERPL-MCNC: 36 MG/DL — HIGH (ref 10–20)
CALCIUM SERPL-MCNC: 8.7 MG/DL — SIGNIFICANT CHANGE UP (ref 8.5–10.1)
CHLORIDE SERPL-SCNC: 98 MMOL/L — SIGNIFICANT CHANGE UP (ref 98–110)
CO2 SERPL-SCNC: 31 MMOL/L — SIGNIFICANT CHANGE UP (ref 17–32)
CREAT SERPL-MCNC: 0.9 MG/DL — SIGNIFICANT CHANGE UP (ref 0.7–1.5)
EOSINOPHIL # BLD AUTO: 0.03 K/UL — SIGNIFICANT CHANGE UP (ref 0–0.7)
EOSINOPHIL NFR BLD AUTO: 0.1 % — SIGNIFICANT CHANGE UP (ref 0–8)
GLUCOSE BLDC GLUCOMTR-MCNC: 194 MG/DL — HIGH (ref 70–99)
GLUCOSE BLDC GLUCOMTR-MCNC: 199 MG/DL — HIGH (ref 70–99)
GLUCOSE BLDC GLUCOMTR-MCNC: 329 MG/DL — HIGH (ref 70–99)
GLUCOSE BLDC GLUCOMTR-MCNC: 85 MG/DL — SIGNIFICANT CHANGE UP (ref 70–99)
GLUCOSE SERPL-MCNC: 91 MG/DL — SIGNIFICANT CHANGE UP (ref 70–99)
HCT VFR BLD CALC: 41.1 % — LOW (ref 42–52)
HGB BLD-MCNC: 13.7 G/DL — LOW (ref 14–18)
IMM GRANULOCYTES NFR BLD AUTO: 4.4 % — HIGH (ref 0.1–0.3)
LYMPHOCYTES # BLD AUTO: 10.7 % — LOW (ref 20.5–51.1)
LYMPHOCYTES # BLD AUTO: 2.6 K/UL — SIGNIFICANT CHANGE UP (ref 1.2–3.4)
MAGNESIUM SERPL-MCNC: 2.4 MG/DL — SIGNIFICANT CHANGE UP (ref 1.8–2.4)
MCHC RBC-ENTMCNC: 29.8 PG — SIGNIFICANT CHANGE UP (ref 27–31)
MCHC RBC-ENTMCNC: 33.3 G/DL — SIGNIFICANT CHANGE UP (ref 32–37)
MCV RBC AUTO: 89.5 FL — SIGNIFICANT CHANGE UP (ref 80–94)
MONOCYTES # BLD AUTO: 1.42 K/UL — HIGH (ref 0.1–0.6)
MONOCYTES NFR BLD AUTO: 5.8 % — SIGNIFICANT CHANGE UP (ref 1.7–9.3)
NEUTROPHILS # BLD AUTO: 19.09 K/UL — HIGH (ref 1.4–6.5)
NEUTROPHILS NFR BLD AUTO: 78.5 % — HIGH (ref 42.2–75.2)
NRBC # BLD: 0 /100 WBCS — SIGNIFICANT CHANGE UP (ref 0–0)
PLATELET # BLD AUTO: 483 K/UL — HIGH (ref 130–400)
POTASSIUM SERPL-MCNC: 3.7 MMOL/L — SIGNIFICANT CHANGE UP (ref 3.5–5)
POTASSIUM SERPL-SCNC: 3.7 MMOL/L — SIGNIFICANT CHANGE UP (ref 3.5–5)
PROT SERPL-MCNC: 5.8 G/DL — LOW (ref 6–8)
RBC # BLD: 4.59 M/UL — LOW (ref 4.7–6.1)
RBC # FLD: 15 % — HIGH (ref 11.5–14.5)
SODIUM SERPL-SCNC: 138 MMOL/L — SIGNIFICANT CHANGE UP (ref 135–146)
WBC # BLD: 24.32 K/UL — HIGH (ref 4.8–10.8)
WBC # FLD AUTO: 24.32 K/UL — HIGH (ref 4.8–10.8)

## 2020-12-19 PROCEDURE — 99233 SBSQ HOSP IP/OBS HIGH 50: CPT

## 2020-12-19 RX ORDER — INSULIN GLARGINE 100 [IU]/ML
25 INJECTION, SOLUTION SUBCUTANEOUS AT BEDTIME
Refills: 0 | Status: DISCONTINUED | OUTPATIENT
Start: 2020-12-19 | End: 2020-12-20

## 2020-12-19 RX ADMIN — CHLORHEXIDINE GLUCONATE 1 APPLICATION(S): 213 SOLUTION TOPICAL at 06:43

## 2020-12-19 RX ADMIN — INSULIN GLARGINE 30 UNIT(S): 100 INJECTION, SOLUTION SUBCUTANEOUS at 08:20

## 2020-12-19 RX ADMIN — Medication 600 MILLIGRAM(S): at 17:36

## 2020-12-19 RX ADMIN — CHLORHEXIDINE GLUCONATE 15 MILLILITER(S): 213 SOLUTION TOPICAL at 06:43

## 2020-12-19 RX ADMIN — AZATHIOPRINE 50 MILLIGRAM(S): 100 TABLET ORAL at 11:22

## 2020-12-19 RX ADMIN — ENOXAPARIN SODIUM 40 MILLIGRAM(S): 100 INJECTION SUBCUTANEOUS at 17:35

## 2020-12-19 RX ADMIN — Medication 12 UNIT(S): at 11:19

## 2020-12-19 RX ADMIN — Medication 1: at 17:33

## 2020-12-19 RX ADMIN — INSULIN GLARGINE 25 UNIT(S): 100 INJECTION, SOLUTION SUBCUTANEOUS at 21:38

## 2020-12-19 RX ADMIN — SENNA PLUS 2 TABLET(S): 8.6 TABLET ORAL at 21:37

## 2020-12-19 RX ADMIN — Medication 600 MILLIGRAM(S): at 06:43

## 2020-12-19 RX ADMIN — CHLORHEXIDINE GLUCONATE 15 MILLILITER(S): 213 SOLUTION TOPICAL at 17:35

## 2020-12-19 RX ADMIN — Medication 12 UNIT(S): at 17:33

## 2020-12-19 RX ADMIN — Medication 40 MILLIGRAM(S): at 06:45

## 2020-12-19 RX ADMIN — ENOXAPARIN SODIUM 40 MILLIGRAM(S): 100 INJECTION SUBCUTANEOUS at 06:44

## 2020-12-19 RX ADMIN — PANTOPRAZOLE SODIUM 40 MILLIGRAM(S): 20 TABLET, DELAYED RELEASE ORAL at 06:44

## 2020-12-19 RX ADMIN — Medication 12.5 MILLIGRAM(S): at 06:43

## 2020-12-19 RX ADMIN — Medication 4: at 11:20

## 2020-12-19 RX ADMIN — Medication 60 MILLIGRAM(S): at 06:45

## 2020-12-19 NOTE — PROGRESS NOTE ADULT - SUBJECTIVE AND OBJECTIVE BOX
JOSÉ LUIS AREVALO  60y  Male      Patient is a 60y old  Male who presents with a chief complaint of SOB (18 Dec 2020 09:27)      INTERVAL HPI/OVERNIGHT EVENTS: feeling much better. less sob. more energy. better appetite       REVIEW OF SYSTEMS:  limited as above  All other review of systems negative    T(C): 35.9 (12-19-20 @ 12:10), Max: 36.8 (12-18-20 @ 17:50)  HR: 80 (12-19-20 @ 12:10) (62 - 81)  BP: 121/80 (12-19-20 @ 12:10) (120/79 - 141/77)  RR: 18 (12-19-20 @ 12:10) (18 - 18)  SpO2: 92% (12-19-20 @ 12:10) (92% - 94%)  Wt(kg): --Vital Signs Last 24 Hrs  T(C): 35.9 (19 Dec 2020 12:10), Max: 36.8 (18 Dec 2020 17:50)  T(F): 96.6 (19 Dec 2020 12:10), Max: 98.2 (18 Dec 2020 17:50)  HR: 80 (19 Dec 2020 12:10) (62 - 81)  BP: 121/80 (19 Dec 2020 12:10) (120/79 - 141/77)  BP(mean): --  RR: 18 (19 Dec 2020 12:10) (18 - 18)  SpO2: 92% (19 Dec 2020 12:10) (92% - 94%)      12-18-20 @ 07:01  -  12-19-20 @ 07:00  --------------------------------------------------------  IN: 0 mL / OUT: 2000 mL / NET: -2000 mL    12-19-20 @ 07:01  -  12-19-20 @ 16:17  --------------------------------------------------------  IN: 450 mL / OUT: 1100 mL / NET: -650 mL        PHYSICAL EXAM:  GENERAL: NAD  PSYCH: no agitation, baseline mentation  NERVOUS SYSTEM:  Alert & Oriented X3, no new focal deficits  PULMONARY: bilateral rhonchi more predominant in upper lobes, on nasal canula, comfortable appearing  CARDIOVASCULAR: Regular rate and rhythm; No murmurs, rubs, or gallops  GI: Soft, Nontender, Nondistended; Bowel sounds present small surgical scar cdi  EXTREMITIES:  2+ Peripheral Pulses, No clubbing, cyanosis, or edema  SKIN euvolemic    Consultant(s) Notes Reviewed:  [x ] YES  [ ] NO    Discussed with Consultants/Other Providers [ x] YES     LABS                          13.7   24.32 )-----------( 483      ( 19 Dec 2020 06:04 )             41.1     12-19    138  |  98  |  36<H>  ----------------------------<  91  3.7   |  31  |  0.9    Ca    8.7      19 Dec 2020 06:04  Mg     2.4     12-19    TPro  5.8<L>  /  Alb  3.1<L>  /  TBili  0.9  /  DBili  x   /  AST  30  /  ALT  150<H>  /  AlkPhos  92  12-19          Lactate Trend    CARDIAC MARKERS ( 18 Dec 2020 09:23 )  x     / <0.01 ng/mL / x     / x     / x      CARDIAC MARKERS ( 18 Dec 2020 01:47 )  x     / <0.01 ng/mL / x     / x     / x          CAPILLARY BLOOD GLUCOSE      POCT Blood Glucose.: 329 mg/dL (19 Dec 2020 11:17)        RADIOLOGY & ADDITIONAL TESTS:    Imaging Personally Reviewed:  [ ] YES  [ ] NO    HEALTH ISSUES - PROBLEM Dx:

## 2020-12-19 NOTE — PROGRESS NOTE ADULT - ASSESSMENT
59yo M c PMHx GIST tumor s/p resection, HTN, DM2, RA on home steroid here with sepsis 2/2 covid 19 viral pneumonia with acute respiratory failure with hypoxia, and possible superimposed right upper lobe bacterial pneumonia    93% on 3L today, continue to deescalate as tolerated  wbc climbing, changing to po prednisone at 40mg q d- upon completion of 10 day of steroid will taper back down to home dosing of 10mgqd  s/p remdesivir, plasma, toci, empiric zosyn  c/w azathioprine for now  check quantiferon  lovenox bid extended vte ppx dosing      #Progress Note Handoff    Pending (specify):  decrease o2 as tolerated, repeat wbc tomorrow, quantiferon, possible transfer [facility] tomorrow if stable    Family discussion: see communications note    Disposition: acute but improving

## 2020-12-20 LAB
ALBUMIN SERPL ELPH-MCNC: 3.2 G/DL — LOW (ref 3.5–5.2)
ALP SERPL-CCNC: 93 U/L — SIGNIFICANT CHANGE UP (ref 30–115)
ALT FLD-CCNC: 148 U/L — HIGH (ref 0–41)
ANION GAP SERPL CALC-SCNC: 11 MMOL/L — SIGNIFICANT CHANGE UP (ref 7–14)
AST SERPL-CCNC: 40 U/L — SIGNIFICANT CHANGE UP (ref 0–41)
BASOPHILS # BLD AUTO: 0.16 K/UL — SIGNIFICANT CHANGE UP (ref 0–0.2)
BASOPHILS NFR BLD AUTO: 0.7 % — SIGNIFICANT CHANGE UP (ref 0–1)
BILIRUB SERPL-MCNC: 0.2 MG/DL — SIGNIFICANT CHANGE UP (ref 0.2–1.2)
BUN SERPL-MCNC: 29 MG/DL — HIGH (ref 10–20)
CALCIUM SERPL-MCNC: 8.6 MG/DL — SIGNIFICANT CHANGE UP (ref 8.5–10.1)
CHLORIDE SERPL-SCNC: 98 MMOL/L — SIGNIFICANT CHANGE UP (ref 98–110)
CO2 SERPL-SCNC: 30 MMOL/L — SIGNIFICANT CHANGE UP (ref 17–32)
CREAT SERPL-MCNC: 1 MG/DL — SIGNIFICANT CHANGE UP (ref 0.7–1.5)
EOSINOPHIL # BLD AUTO: 0.16 K/UL — SIGNIFICANT CHANGE UP (ref 0–0.7)
EOSINOPHIL NFR BLD AUTO: 0.7 % — SIGNIFICANT CHANGE UP (ref 0–8)
GLUCOSE BLDC GLUCOMTR-MCNC: 122 MG/DL — HIGH (ref 70–99)
GLUCOSE BLDC GLUCOMTR-MCNC: 171 MG/DL — HIGH (ref 70–99)
GLUCOSE BLDC GLUCOMTR-MCNC: 176 MG/DL — HIGH (ref 70–99)
GLUCOSE BLDC GLUCOMTR-MCNC: 190 MG/DL — HIGH (ref 70–99)
GLUCOSE BLDC GLUCOMTR-MCNC: 238 MG/DL — HIGH (ref 70–99)
GLUCOSE BLDC GLUCOMTR-MCNC: 58 MG/DL — LOW (ref 70–99)
GLUCOSE BLDC GLUCOMTR-MCNC: 65 MG/DL — LOW (ref 70–99)
GLUCOSE SERPL-MCNC: 52 MG/DL — CRITICAL LOW (ref 70–99)
HCT VFR BLD CALC: 42.7 % — SIGNIFICANT CHANGE UP (ref 42–52)
HGB BLD-MCNC: 14.2 G/DL — SIGNIFICANT CHANGE UP (ref 14–18)
IMM GRANULOCYTES NFR BLD AUTO: 5 % — HIGH (ref 0.1–0.3)
LYMPHOCYTES # BLD AUTO: 17.7 % — LOW (ref 20.5–51.1)
LYMPHOCYTES # BLD AUTO: 4.34 K/UL — HIGH (ref 1.2–3.4)
MAGNESIUM SERPL-MCNC: 2.3 MG/DL — SIGNIFICANT CHANGE UP (ref 1.8–2.4)
MCHC RBC-ENTMCNC: 30.2 PG — SIGNIFICANT CHANGE UP (ref 27–31)
MCHC RBC-ENTMCNC: 33.3 G/DL — SIGNIFICANT CHANGE UP (ref 32–37)
MCV RBC AUTO: 90.9 FL — SIGNIFICANT CHANGE UP (ref 80–94)
MONOCYTES # BLD AUTO: 1.97 K/UL — HIGH (ref 0.1–0.6)
MONOCYTES NFR BLD AUTO: 8 % — SIGNIFICANT CHANGE UP (ref 1.7–9.3)
NEUTROPHILS # BLD AUTO: 16.68 K/UL — HIGH (ref 1.4–6.5)
NEUTROPHILS NFR BLD AUTO: 67.9 % — SIGNIFICANT CHANGE UP (ref 42.2–75.2)
NRBC # BLD: 0 /100 WBCS — SIGNIFICANT CHANGE UP (ref 0–0)
PLATELET # BLD AUTO: 517 K/UL — HIGH (ref 130–400)
POTASSIUM SERPL-MCNC: 3.4 MMOL/L — LOW (ref 3.5–5)
POTASSIUM SERPL-SCNC: 3.4 MMOL/L — LOW (ref 3.5–5)
PROT SERPL-MCNC: 5.3 G/DL — LOW (ref 6–8)
RBC # BLD: 4.7 M/UL — SIGNIFICANT CHANGE UP (ref 4.7–6.1)
RBC # FLD: 15.3 % — HIGH (ref 11.5–14.5)
SODIUM SERPL-SCNC: 139 MMOL/L — SIGNIFICANT CHANGE UP (ref 135–146)
WBC # BLD: 24.54 K/UL — HIGH (ref 4.8–10.8)
WBC # FLD AUTO: 24.54 K/UL — HIGH (ref 4.8–10.8)

## 2020-12-20 PROCEDURE — 99233 SBSQ HOSP IP/OBS HIGH 50: CPT

## 2020-12-20 RX ORDER — SALICYLIC ACID 0.5 %
1 CLEANSER (GRAM) TOPICAL EVERY 8 HOURS
Refills: 0 | Status: DISCONTINUED | OUTPATIENT
Start: 2020-12-20 | End: 2020-12-21

## 2020-12-20 RX ORDER — DEXTROSE 50 % IN WATER 50 %
12.5 SYRINGE (ML) INTRAVENOUS ONCE
Refills: 0 | Status: COMPLETED | OUTPATIENT
Start: 2020-12-20 | End: 2020-12-20

## 2020-12-20 RX ORDER — INSULIN GLARGINE 100 [IU]/ML
20 INJECTION, SOLUTION SUBCUTANEOUS AT BEDTIME
Refills: 0 | Status: DISCONTINUED | OUTPATIENT
Start: 2020-12-20 | End: 2020-12-21

## 2020-12-20 RX ADMIN — Medication 12 UNIT(S): at 07:54

## 2020-12-20 RX ADMIN — POLYETHYLENE GLYCOL 3350 17 GRAM(S): 17 POWDER, FOR SOLUTION ORAL at 11:25

## 2020-12-20 RX ADMIN — Medication 1: at 17:02

## 2020-12-20 RX ADMIN — ENOXAPARIN SODIUM 40 MILLIGRAM(S): 100 INJECTION SUBCUTANEOUS at 17:03

## 2020-12-20 RX ADMIN — Medication 12 UNIT(S): at 17:02

## 2020-12-20 RX ADMIN — Medication 40 MILLIGRAM(S): at 06:04

## 2020-12-20 RX ADMIN — ENOXAPARIN SODIUM 40 MILLIGRAM(S): 100 INJECTION SUBCUTANEOUS at 06:04

## 2020-12-20 RX ADMIN — Medication 600 MILLIGRAM(S): at 06:04

## 2020-12-20 RX ADMIN — CHLORHEXIDINE GLUCONATE 15 MILLILITER(S): 213 SOLUTION TOPICAL at 17:04

## 2020-12-20 RX ADMIN — Medication 2: at 11:20

## 2020-12-20 RX ADMIN — CHLORHEXIDINE GLUCONATE 15 MILLILITER(S): 213 SOLUTION TOPICAL at 06:04

## 2020-12-20 RX ADMIN — Medication 12.5 GRAM(S): at 06:57

## 2020-12-20 RX ADMIN — Medication 12 UNIT(S): at 11:20

## 2020-12-20 RX ADMIN — Medication 1: at 07:54

## 2020-12-20 RX ADMIN — INSULIN GLARGINE 20 UNIT(S): 100 INJECTION, SOLUTION SUBCUTANEOUS at 21:57

## 2020-12-20 RX ADMIN — CHLORHEXIDINE GLUCONATE 1 APPLICATION(S): 213 SOLUTION TOPICAL at 06:04

## 2020-12-20 RX ADMIN — AZATHIOPRINE 50 MILLIGRAM(S): 100 TABLET ORAL at 11:22

## 2020-12-20 RX ADMIN — PANTOPRAZOLE SODIUM 40 MILLIGRAM(S): 20 TABLET, DELAYED RELEASE ORAL at 06:07

## 2020-12-20 RX ADMIN — Medication 600 MILLIGRAM(S): at 17:03

## 2020-12-20 RX ADMIN — SENNA PLUS 2 TABLET(S): 8.6 TABLET ORAL at 21:54

## 2020-12-20 RX ADMIN — Medication 1 APPLICATION(S): at 19:02

## 2020-12-20 NOTE — PROGRESS NOTE ADULT - ASSESSMENT
59yo M c PMHx GIST tumor s/p resection, HTN, DM2, RA on home steroid here with sepsis 2/2 covid 19 viral pneumonia with acute respiratory failure with hypoxia, and possible superimposed right upper lobe bacterial pneumonia, course complicated by hypoglycemia    continues to required 2-3L NC  continue slow taper back to home dose of 10mg prednisone  decreased insulin, patient has juice at the bedside in case/ subsequent FS's improved, no longer symptomatic  s/p remdesivir, plasma, toci, empiric zosyn  c/w azathioprine for now  check quantiferon- pending result  lovenox bid extended vte ppx dosing      #Progress Note Handoff    Pending (specify):  decrease o2 as tolerated, monitor FS's, f/u quantiferon when results. possible transfer [facility] tomorrow if sugars stable    Family discussion: see communications note    Disposition: acute but improving

## 2020-12-20 NOTE — PROGRESS NOTE ADULT - SUBJECTIVE AND OBJECTIVE BOX
JOSÉ LUIS AREVALO 60y Male  MRN#: [id number]   Hospital Day: 13d    SUBJECTIVE  Patient is a 60y old Male who presents with a chief complaint of SOB (19 Dec 2020 16:16)  Currently admitted to medicine with the primary diagnosis of COVID-19      INTERVAL HPI AND OVERNIGHT EVENTS:  Patient was examined and seen at bedside.   o/n events: in AM, pt hypoglycemic (and symptomatic), s/p oral glc and 1/2 amp D50 resolved issue.    REVIEW OF SYMPTOMS:  CONSTITUTIONAL: No weakness, fevers or chills; No headaches  EYES: No visual changes, eye pain, or discharge  ENT: No vertigo; No ear pain or change in hearing; No sore throat or difficulty swallowing  NECK: No pain or stiffness  RESPIRATORY: No cough, wheezing, or hemoptysis; No shortness of breath  CARDIOVASCULAR: No chest pain or palpitations  GASTROINTESTINAL: No abdominal or epigastric pain; No nausea, vomiting, or hematemesis; No diarrhea or constipation; No melena or hematochezia  GENITOURINARY: No dysuria, frequency or hematuria  MUSCULOSKELETAL: No joint pain, no muscle pain, no weakness  NEUROLOGICAL: No numbness or weakness  SKIN: No itching or rashes    OBJECTIVE  PAST MEDICAL & SURGICAL HISTORY  Pneumonia due to COVID-19 virus    Hyperlipidemia    Lumbago    Hypertension    H/O lymph node biopsy  Neck by Dr Case      ALLERGIES:  penicillin (Unknown)  shellfish (Pruritus)    MEDICATIONS:  STANDING MEDICATIONS  azaTHIOprine 50 milliGRAM(s) Oral daily  chlorhexidine 0.12% Liquid 15 milliLiter(s) Swish and Spit two times a day  chlorhexidine 4% Liquid 1 Application(s) Topical <User Schedule>  dextrose 40% Gel 15 Gram(s) Oral once  dextrose 5%. 1000 milliLiter(s) IV Continuous <Continuous>  dextrose 5%. 1000 milliLiter(s) IV Continuous <Continuous>  dextrose 50% Injectable 25 Gram(s) IV Push once  dextrose 50% Injectable 12.5 Gram(s) IV Push once  dextrose 50% Injectable 25 Gram(s) IV Push once  enoxaparin Injectable 40 milliGRAM(s) SubCutaneous every 12 hours  furosemide    Tablet 40 milliGRAM(s) Oral daily  glucagon  Injectable 1 milliGRAM(s) IntraMuscular once  guaiFENesin  milliGRAM(s) Oral every 12 hours  insulin glargine Injectable (LANTUS) 20 Unit(s) SubCutaneous at bedtime  insulin lispro (ADMELOG) corrective regimen sliding scale   SubCutaneous three times a day before meals  insulin lispro Injectable (ADMELOG) 12 Unit(s) SubCutaneous three times a day before meals  metoprolol tartrate 12.5 milliGRAM(s) Oral daily  pantoprazole    Tablet 40 milliGRAM(s) Oral before breakfast  polyethylene glycol 3350 17 Gram(s) Oral daily  predniSONE   Tablet 30 milliGRAM(s) Oral daily  senna 2 Tablet(s) Oral at bedtime    PRN MEDICATIONS  aluminum hydroxide/magnesium hydroxide/simethicone Suspension 30 milliLiter(s) Oral every 6 hours PRN  ibuprofen  Tablet. 600 milliGRAM(s) Oral every 6 hours PRN      VITAL SIGNS: Last 24 Hours  T(C): 36.1 (20 Dec 2020 08:25), Max: 36.3 (20 Dec 2020 01:00)  T(F): 96.9 (20 Dec 2020 08:25), Max: 97.4 (20 Dec 2020 01:00)  HR: 77 (20 Dec 2020 08:25) (72 - 86)  BP: 122/69 (20 Dec 2020 08:25) (102/64 - 124/69)  BP(mean): --  RR: 19 (20 Dec 2020 08:25) (18 - 19)  SpO2: 91% (20 Dec 2020 08:25) (91% - 94%)    LABS:                        14.2   24.54 )-----------( 517      ( 20 Dec 2020 06:21 )             42.7     12-20    139  |  98  |  29<H>  ----------------------------<  52<LL>  3.4<L>   |  30  |  1.0    Ca    8.6      20 Dec 2020 06:21  Mg     2.3     12-20    TPro  5.3<L>  /  Alb  3.2<L>  /  TBili  0.2  /  DBili  x   /  AST  40  /  ALT  148<H>  /  AlkPhos  93  12-20                  RADIOLOGY:      PHYSICAL EXAM:  CONSTITUTIONAL: No acute distress, well-developed, well-groomed, AAOx3  HEAD: Atraumatic, normocephalic  EYES: EOM intact, PERRLA, conjunctiva and sclera clear  ENT: Supple, no JVD; moist mucous membranes  PULMONARY: Trace crackles. Breathing non labored  CARDIOVASCULAR: Regular rate and rhythm; no murmurs, rubs, or gallops  GASTROINTESTINAL: Soft, non-tender, non-distended  MUSCULOSKELETAL: no cyanosis, no edema  NEUROLOGY: non-focal  SKIN: No rashes or lesions; warm and dry    ASSESSMENT & PLAN  60 yr M with recent COVID pneumonia discharged 3 weeks ago on 2LNC, s/p steroid course and plasma Tx, GIST (s/p resection), DM type II , RA (on prednisone), HTN, HLD and herniated disc presented to ED for SOB and desaturation, recently discharged with home O2 after admission for COVID pneumonia, admitted for acute hypoxemic respiratory failure, admitted to ICU, s/p downgrade to stepdown, improved on HFNC, now on O2 NC 3L.    #Acute hypoxemic respiratory failure due to COVID pna - resolved  #Recently discharged on home O2 after admission for COVID pna  - CXR on admission revealed worsening infiltrates, RUL consolidation - now improving  - Unlikely has underlying bacterial pneumonia, procalc wnl, inflammatory markers trended down, legionella, fungitell, galactommanan neg  - S/p Zosyn, no need for more abx per ID  - S/p Toci 12/11  - c/w Solumedrol 60 mg IV q12  - Weaned off HFNC, now on NC--wean as tolerated  - C/w Imuran 50 mg qd    #Hx of RA, on chronic steroids  - C/w Solumedrol 60 mg q12  - Takes Prednisone 10 mg qd    #HTN - well controlled  - C/w Metoprolol 12.5 mg qd for tachycardia    #DMII  - HbA1c 7.55  - Lantus morning dose dc'd (FS in AM 65 today and 85 yesterday)    DVT ppx: Lovenox  GI ppx: Pantoprazole  Diet: DASH  Activity: IAT  Full code  Dispo: downgrade to floor    PAST MEDICAL & SURGICAL HISTORY:  Pneumonia due to COVID-19 virus    Hyperlipidemia    Lumbago    Hypertension    H/O lymph node biopsy  Neck by Dr Case        #Misc  - DVT Prophylaxis:  - GI Prophylaxis:  - Diet:  - Activity:  - IV Fluids:  - Code Status:    Dispo:

## 2020-12-20 NOTE — PROGRESS NOTE ADULT - SUBJECTIVE AND OBJECTIVE BOX
JOSÉ LUIS AREVALO  60y  Male      Patient is a 60y old  Male who presents with a chief complaint of SOB (20 Dec 2020 10:59)      INTERVAL HPI/OVERNIGHT EVENTS: felt lightheaded this morning when blood sugars were low. a bit sob when ambulating back from the bathroom but was on room air. desaturated/ placed back on nasal canula with a quick recovery      REVIEW OF SYSTEMS:  as above  All other review of systems negative    T(C): 36.6 (12-20-20 @ 12:43), Max: 36.6 (12-20-20 @ 12:43)  HR: 105 (12-20-20 @ 12:43) (72 - 105)  BP: 125/78 (12-20-20 @ 12:43) (102/64 - 125/78)  RR: 19 (12-20-20 @ 12:43) (18 - 19)  SpO2: 94% (12-20-20 @ 12:43) (91% - 94%)  Wt(kg): --Vital Signs Last 24 Hrs  T(C): 36.6 (20 Dec 2020 12:43), Max: 36.6 (20 Dec 2020 12:43)  T(F): 97.9 (20 Dec 2020 12:43), Max: 97.9 (20 Dec 2020 12:43)  HR: 105 (20 Dec 2020 12:43) (72 - 105)  BP: 125/78 (20 Dec 2020 12:43) (102/64 - 125/78)  BP(mean): --  RR: 19 (20 Dec 2020 12:43) (18 - 19)  SpO2: 94% (20 Dec 2020 12:43) (91% - 94%)      12-19-20 @ 07:01  -  12-20-20 @ 07:00  --------------------------------------------------------  IN: 450 mL / OUT: 1100 mL / NET: -650 mL        PHYSICAL EXAM:  GENERAL: NAD  PSYCH: no agitation, baseline mentation  NERVOUS SYSTEM:  Alert & Oriented X3, no new focal deficits  PULMONARY: bilateral improving rhonchi, on nasal canula  CARDIOVASCULAR: Regular rate and rhythm; No murmurs, rubs, or gallops  GI: Soft, Nontender, Nondistended; Bowel sounds present rotund  EXTREMITIES:  2+ Peripheral Pulses, No clubbing, cyanosis, or edema    Consultant(s) Notes Reviewed:  [x ] YES  [ ] NO    Discussed with Consultants/Other Providers [ x] YES     LABS                          14.2   24.54 )-----------( 517      ( 20 Dec 2020 06:21 )             42.7     12-20    139  |  98  |  29<H>  ----------------------------<  52<LL>  3.4<L>   |  30  |  1.0    Ca    8.6      20 Dec 2020 06:21  Mg     2.3     12-20    TPro  5.3<L>  /  Alb  3.2<L>  /  TBili  0.2  /  DBili  x   /  AST  40  /  ALT  148<H>  /  AlkPhos  93  12-20          Lactate Trend        CAPILLARY BLOOD GLUCOSE      POCT Blood Glucose.: 238 mg/dL (20 Dec 2020 11:16)        RADIOLOGY & ADDITIONAL TESTS:    Imaging Personally Reviewed:  [ ] YES  [ ] NO    HEALTH ISSUES - PROBLEM Dx:

## 2020-12-20 NOTE — CHART NOTE - NSCHARTNOTEFT_GEN_A_CORE
Morning FS found to be 58, glucose given PO, FS increased to 65. 12.5g D50 IVP given.    Upon review of pt chart, found to have BID lantus orders (30 qAM and 25 qHS) and received full 55U yesterday. 30U AM order discontinued. Morning FS found to be 58, glucose given PO, FS increased to 65.   12.5g D50 IVP given, FS increased to 190    Upon review of pt chart, found to have BID lantus orders (30 qAM and 25 qHS) and received full 55U yesterday. 30U AM order discontinued.

## 2020-12-21 ENCOUNTER — TRANSCRIPTION ENCOUNTER (OUTPATIENT)
Age: 60
End: 2020-12-21

## 2020-12-21 VITALS
DIASTOLIC BLOOD PRESSURE: 78 MMHG | SYSTOLIC BLOOD PRESSURE: 123 MMHG | HEART RATE: 87 BPM | TEMPERATURE: 97 F | OXYGEN SATURATION: 93 % | RESPIRATION RATE: 18 BRPM

## 2020-12-21 LAB
ALBUMIN SERPL ELPH-MCNC: 3.2 G/DL — LOW (ref 3.5–5.2)
ALP SERPL-CCNC: 108 U/L — SIGNIFICANT CHANGE UP (ref 30–115)
ALT FLD-CCNC: 167 U/L — HIGH (ref 0–41)
ANION GAP SERPL CALC-SCNC: 12 MMOL/L — SIGNIFICANT CHANGE UP (ref 7–14)
AST SERPL-CCNC: 38 U/L — SIGNIFICANT CHANGE UP (ref 0–41)
BASOPHILS # BLD AUTO: 0.1 K/UL — SIGNIFICANT CHANGE UP (ref 0–0.2)
BASOPHILS NFR BLD AUTO: 0.5 % — SIGNIFICANT CHANGE UP (ref 0–1)
BILIRUB DIRECT SERPL-MCNC: <0.2 MG/DL — SIGNIFICANT CHANGE UP (ref 0–0.2)
BILIRUB INDIRECT FLD-MCNC: >0.1 MG/DL — LOW (ref 0.2–1.2)
BILIRUB SERPL-MCNC: 0.3 MG/DL — SIGNIFICANT CHANGE UP (ref 0.2–1.2)
BUN SERPL-MCNC: 27 MG/DL — HIGH (ref 10–20)
CALCIUM SERPL-MCNC: 8.8 MG/DL — SIGNIFICANT CHANGE UP (ref 8.5–10.1)
CHLORIDE SERPL-SCNC: 100 MMOL/L — SIGNIFICANT CHANGE UP (ref 98–110)
CO2 SERPL-SCNC: 27 MMOL/L — SIGNIFICANT CHANGE UP (ref 17–32)
CREAT SERPL-MCNC: 0.9 MG/DL — SIGNIFICANT CHANGE UP (ref 0.7–1.5)
EOSINOPHIL # BLD AUTO: 0.2 K/UL — SIGNIFICANT CHANGE UP (ref 0–0.7)
EOSINOPHIL NFR BLD AUTO: 1.1 % — SIGNIFICANT CHANGE UP (ref 0–8)
ERYTHROCYTE [SEDIMENTATION RATE] IN BLOOD: 15 MM/HR — HIGH (ref 0–10)
FERRITIN SERPL-MCNC: 931 NG/ML — HIGH (ref 30–400)
GLUCOSE BLDC GLUCOMTR-MCNC: 133 MG/DL — HIGH (ref 70–99)
GLUCOSE BLDC GLUCOMTR-MCNC: 233 MG/DL — HIGH (ref 70–99)
GLUCOSE BLDC GLUCOMTR-MCNC: 94 MG/DL — SIGNIFICANT CHANGE UP (ref 70–99)
GLUCOSE SERPL-MCNC: 84 MG/DL — SIGNIFICANT CHANGE UP (ref 70–99)
HCT VFR BLD CALC: 43.6 % — SIGNIFICANT CHANGE UP (ref 42–52)
HGB BLD-MCNC: 14.4 G/DL — SIGNIFICANT CHANGE UP (ref 14–18)
IMM GRANULOCYTES NFR BLD AUTO: 4.4 % — HIGH (ref 0.1–0.3)
LACTATE SERPL-SCNC: 2 MMOL/L — SIGNIFICANT CHANGE UP (ref 0.7–2)
LDH SERPL L TO P-CCNC: 374 U/L — HIGH (ref 50–242)
LYMPHOCYTES # BLD AUTO: 17.6 % — LOW (ref 20.5–51.1)
LYMPHOCYTES # BLD AUTO: 3.27 K/UL — SIGNIFICANT CHANGE UP (ref 1.2–3.4)
MAGNESIUM SERPL-MCNC: 2.1 MG/DL — SIGNIFICANT CHANGE UP (ref 1.8–2.4)
MCHC RBC-ENTMCNC: 30.1 PG — SIGNIFICANT CHANGE UP (ref 27–31)
MCHC RBC-ENTMCNC: 33 G/DL — SIGNIFICANT CHANGE UP (ref 32–37)
MCV RBC AUTO: 91.2 FL — SIGNIFICANT CHANGE UP (ref 80–94)
MONOCYTES # BLD AUTO: 1.45 K/UL — HIGH (ref 0.1–0.6)
MONOCYTES NFR BLD AUTO: 7.8 % — SIGNIFICANT CHANGE UP (ref 1.7–9.3)
NEUTROPHILS # BLD AUTO: 12.7 K/UL — HIGH (ref 1.4–6.5)
NEUTROPHILS NFR BLD AUTO: 68.6 % — SIGNIFICANT CHANGE UP (ref 42.2–75.2)
NRBC # BLD: 0 /100 WBCS — SIGNIFICANT CHANGE UP (ref 0–0)
PLATELET # BLD AUTO: 432 K/UL — HIGH (ref 130–400)
POTASSIUM SERPL-MCNC: 4.1 MMOL/L — SIGNIFICANT CHANGE UP (ref 3.5–5)
POTASSIUM SERPL-SCNC: 4.1 MMOL/L — SIGNIFICANT CHANGE UP (ref 3.5–5)
PROT SERPL-MCNC: 5.6 G/DL — LOW (ref 6–8)
RBC # BLD: 4.78 M/UL — SIGNIFICANT CHANGE UP (ref 4.7–6.1)
RBC # FLD: 15.9 % — HIGH (ref 11.5–14.5)
SODIUM SERPL-SCNC: 139 MMOL/L — SIGNIFICANT CHANGE UP (ref 135–146)
WBC # BLD: 18.54 K/UL — HIGH (ref 4.8–10.8)
WBC # FLD AUTO: 18.54 K/UL — HIGH (ref 4.8–10.8)

## 2020-12-21 PROCEDURE — 99239 HOSP IP/OBS DSCHRG MGMT >30: CPT

## 2020-12-21 RX ORDER — RIVAROXABAN 15 MG-20MG
1 KIT ORAL
Qty: 30 | Refills: 0
Start: 2020-12-21 | End: 2021-01-19

## 2020-12-21 RX ORDER — AZATHIOPRINE 100 MG/1
1 TABLET ORAL
Qty: 30 | Refills: 0
Start: 2020-12-21 | End: 2021-01-19

## 2020-12-21 RX ORDER — METOPROLOL TARTRATE 50 MG
1 TABLET ORAL
Qty: 30 | Refills: 0
Start: 2020-12-21 | End: 2021-01-19

## 2020-12-21 RX ORDER — SENNA PLUS 8.6 MG/1
2 TABLET ORAL
Qty: 0 | Refills: 0 | DISCHARGE
Start: 2020-12-21

## 2020-12-21 RX ORDER — LISINOPRIL 2.5 MG/1
1 TABLET ORAL
Qty: 0 | Refills: 0 | DISCHARGE

## 2020-12-21 RX ORDER — PANTOPRAZOLE SODIUM 20 MG/1
1 TABLET, DELAYED RELEASE ORAL
Qty: 30 | Refills: 0
Start: 2020-12-21 | End: 2021-01-19

## 2020-12-21 RX ADMIN — Medication 40 MILLIGRAM(S): at 06:10

## 2020-12-21 RX ADMIN — CHLORHEXIDINE GLUCONATE 15 MILLILITER(S): 213 SOLUTION TOPICAL at 06:10

## 2020-12-21 RX ADMIN — Medication 30 MILLIGRAM(S): at 06:12

## 2020-12-21 RX ADMIN — Medication 12.5 MILLIGRAM(S): at 06:10

## 2020-12-21 RX ADMIN — PANTOPRAZOLE SODIUM 40 MILLIGRAM(S): 20 TABLET, DELAYED RELEASE ORAL at 06:10

## 2020-12-21 RX ADMIN — CHLORHEXIDINE GLUCONATE 15 MILLILITER(S): 213 SOLUTION TOPICAL at 17:07

## 2020-12-21 RX ADMIN — Medication 12 UNIT(S): at 17:08

## 2020-12-21 RX ADMIN — Medication 600 MILLIGRAM(S): at 06:10

## 2020-12-21 RX ADMIN — Medication 600 MILLIGRAM(S): at 17:07

## 2020-12-21 RX ADMIN — ENOXAPARIN SODIUM 40 MILLIGRAM(S): 100 INJECTION SUBCUTANEOUS at 06:11

## 2020-12-21 RX ADMIN — CHLORHEXIDINE GLUCONATE 1 APPLICATION(S): 213 SOLUTION TOPICAL at 06:12

## 2020-12-21 RX ADMIN — Medication 12 UNIT(S): at 11:21

## 2020-12-21 RX ADMIN — Medication 2: at 11:21

## 2020-12-21 RX ADMIN — ENOXAPARIN SODIUM 40 MILLIGRAM(S): 100 INJECTION SUBCUTANEOUS at 17:07

## 2020-12-21 RX ADMIN — AZATHIOPRINE 50 MILLIGRAM(S): 100 TABLET ORAL at 11:11

## 2020-12-21 NOTE — DISCHARGE NOTE PROVIDER - NSDCFUADDINST_GEN_ALL_CORE_FT
Please continue to monitor your oxygen saturation and wean as tolerated.   Please take Xarelto 10mg daily for anticoagulation prophylaxis for COVID.

## 2020-12-21 NOTE — DISCHARGE NOTE PROVIDER - HOSPITAL COURSE
60 yr M with recent COVID pneumonia discharged 3 weeks ago on 2LNC, s/p steroid course and plasma Tx, GIST (s/p resection), DM type II , RA (on prednisone), HTN, HLD and herniated disc  presented to ED for SOB and desaturation to 70s while walking in his house, pt was did=charged from the [facility] for covid pneumonia 3 weeks ago, on 2LNC, s/p steroid course (currently at 10mg which is his dose for RA) and plasma Tx, was doing Ok at his baseline, uses O2 at rest and on exertion, pt was not able to walk today due to chest pain, SOB and his saturation was in 70s with HR 120s, so pt calleld 911,   pt denies any fever, cough, abd pain, diarrhea, N/V, urinary symptoms or legs swelling.    in ED; tachycardia 130, tachypnea 28, was placed on HFNC, CTA chest showed no PE, but  worsening diffuse bilateral patchy ground-glass opacities with new areas of patchy consolidations and new bronchiectasis, compatible with an infectious/inflammatory etiology positive for COVID. Unlikely has underlying bacterial pneumonia, procalc wnl, inflammatory markers trended down, legionella, fungitell, galactommanan neg. S/p Zosyn, no need for more abx per ID, S/p Toci 12/11, remdesivir, plasma. Patient is on prednisone taper to go back to baseline of prednisone 10mg qd for his RA. Patient is still requiring supplemental oxygen 2L on NC.

## 2020-12-21 NOTE — PROGRESS NOTE ADULT - SUBJECTIVE AND OBJECTIVE BOX
JOSÉ LUIS AREVALO 60y Male  MRN#: [id number]   CODE STATUS: FULL       SUBJECTIVE  Patient is a 60y old Male who presents with a chief complaint of SOB (20 Dec 2020 15:14)  Currently admitted to medicine with the primary diagnosis of COVID-19    Today is hospital day 14d, and this morning he is resting comfortably sitting in a chair. He reports he is feeling better and wants to go home.   He is still requiring 2L NC  No overnight events.         OBJECTIVE  PAST MEDICAL & SURGICAL HISTORY  Pneumonia due to COVID-19 virus    Hyperlipidemia    Lumbago    Hypertension    H/O lymph node biopsy  Neck by Dr Case      ALLERGIES:  penicillin (Unknown)  shellfish (Pruritus)    MEDICATIONS:  STANDING MEDICATIONS  azaTHIOprine 50 milliGRAM(s) Oral daily  chlorhexidine 0.12% Liquid 15 milliLiter(s) Swish and Spit two times a day  chlorhexidine 4% Liquid 1 Application(s) Topical <User Schedule>  dextrose 40% Gel 15 Gram(s) Oral once  dextrose 5%. 1000 milliLiter(s) IV Continuous <Continuous>  dextrose 5%. 1000 milliLiter(s) IV Continuous <Continuous>  dextrose 50% Injectable 25 Gram(s) IV Push once  dextrose 50% Injectable 12.5 Gram(s) IV Push once  dextrose 50% Injectable 25 Gram(s) IV Push once  enoxaparin Injectable 40 milliGRAM(s) SubCutaneous every 12 hours  furosemide    Tablet 40 milliGRAM(s) Oral daily  glucagon  Injectable 1 milliGRAM(s) IntraMuscular once  guaiFENesin  milliGRAM(s) Oral every 12 hours  insulin glargine Injectable (LANTUS) 20 Unit(s) SubCutaneous at bedtime  insulin lispro (ADMELOG) corrective regimen sliding scale   SubCutaneous three times a day before meals  insulin lispro Injectable (ADMELOG) 12 Unit(s) SubCutaneous three times a day before meals  metoprolol tartrate 12.5 milliGRAM(s) Oral daily  pantoprazole    Tablet 40 milliGRAM(s) Oral before breakfast  polyethylene glycol 3350 17 Gram(s) Oral daily  predniSONE   Tablet 30 milliGRAM(s) Oral daily  senna 2 Tablet(s) Oral at bedtime    PRN MEDICATIONS  aluminum hydroxide/magnesium hydroxide/simethicone Suspension 30 milliLiter(s) Oral every 6 hours PRN  ibuprofen  Tablet. 600 milliGRAM(s) Oral every 6 hours PRN  vitamin A &amp; D Ointment 1 Application(s) Topical every 8 hours PRN      VITAL SIGNS: Last 24 Hours  T(C): 36.1 (21 Dec 2020 05:00), Max: 36.6 (20 Dec 2020 12:43)  T(F): 96.9 (21 Dec 2020 05:00), Max: 97.9 (20 Dec 2020 12:43)  HR: 70 (21 Dec 2020 05:00) (70 - 105)  BP: 129/81 (21 Dec 2020 05:00) (103/61 - 133/84)  BP(mean): --  RR: 18 (21 Dec 2020 05:00) (18 - 19)  SpO2: 96% (21 Dec 2020 05:00) (94% - 96%)    LABS:                        14.4   18.54 )-----------( 432      ( 21 Dec 2020 07:05 )             43.6     12-20    139  |  98  |  29<H>  ----------------------------<  52<LL>  3.4<L>   |  30  |  1.0    Ca    8.6      20 Dec 2020 06:21  Mg     2.3     12-20    TPro  5.3<L>  /  Alb  3.2<L>  /  TBili  0.2  /  DBili  x   /  AST  40  /  ALT  148<H>  /  AlkPhos  93  12-20          Lactate, Blood: 2.0 mmol/L (12-21-20 @ 07:05)          RADIOLOGY:    < from: Xray Chest 1 View- PORTABLE-Routine (Xray Chest 1 View- PORTABLE-Routine in AM.) (12.18.20 @ 06:16) >  EXAM:  XR CHEST PORTABLE ROUTINE 1V        PROCEDURE DATE:  12/18/2020    INTERPRETATION:  Clinical History / Reason for exam: COVID.  Comparison : Chest radiograph 12/17/2020.  Technique/Positioning: Frontal portable.    Findings:  Support devices: None.  Cardiac/mediastinum/hilum: Unchanged.  Lung parenchyma/Pleura: Low lung volume. Unchanged bilateral opacities. No pneumothorax.  Skeleton/soft tissues: Unchanged.    Impression:  Low lung volumes. Unchanged bilateralopacities.    < end of copied text >      PHYSICAL EXAM:  GENERAL: NAD, well-developed, AAOx3  HEENT:  Atraumatic, Normocephalic. conjunctiva and sclera clear, No JVD  PULMONARY: decreased breath sounds bilaterally; No wheeze, on 2L NC   CARDIOVASCULAR: Regular rate and rhythm; No murmurs, rubs, or gallops  GASTROINTESTINAL: Soft, Nontender, Nondistended; Bowel sounds present  MUSCULOSKELETAL: No clubbing, cyanosis, or edema  NEUROLOGY: non-focal  SKIN: No rashes or lesions      ASSESSMENT & PLAN  60 yr M with recent COVID pneumonia discharged 3 weeks ago on 2LNC, s/p steroid course and plasma Tx, GIST (s/p resection), DM type II , RA (on prednisone), HTN, HLD and herniated disc presented to ED for SOB and desaturation, recently discharged with home O2 after admission for COVID pneumonia, admitted for acute hypoxemic respiratory failure, admitted to ICU, s/p downgrade to stepdown, improved on HFNC, now on O2 NC 3L.    #Acute hypoxemic respiratory failure due to COVID pna - resolved  #Recently discharged on home O2 after admission for COVID pna  - CXR on admission revealed worsening infiltrates, RUL consolidation - now improving  - Unlikely has underlying bacterial pneumonia, procalc wnl, inflammatory markers trended down, legionella, fungitell, galactommanan neg  - S/p Zosyn, no need for more abx per ID  - S/p Toci 12/11, remdesivir , plasma   - c/w Solumedrol 60 mg IV q12  - Weaned off HFNC, now on 2L NC--wean as tolerated  - C/w Imuran 50 mg qd   - F/u quantiferon     #Hx of RA, on chronic steroids  - C/w Solumedrol 60 mg q12  - Takes Prednisone 10 mg qd    #HTN - well controlled  - C/w Metoprolol 12.5 mg qd for tachycardia    #DMII  - HbA1c 7.55  - Insulin regiment: Lantus 20U, lispro 12U tid     DVT ppx: Lovenox 40 q 12  GI ppx: Pantoprazole  Diet: DASH  Activity: IAT  Full code     JOSÉ LUIS AREVALO 60y Male  MRN#: [id number]   CODE STATUS: FULL       SUBJECTIVE  Patient is a 60y old Male who presents with a chief complaint of SOB (20 Dec 2020 15:14)  Currently admitted to medicine with the primary diagnosis of COVID-19    Today is hospital day 14d, and this morning he is resting comfortably sitting in a chair. He reports he is feeling better and wants to go home.   He is still requiring 2L NC  No overnight events.         OBJECTIVE  PAST MEDICAL & SURGICAL HISTORY  Pneumonia due to COVID-19 virus    Hyperlipidemia    Lumbago    Hypertension    H/O lymph node biopsy  Neck by Dr Case      ALLERGIES:  penicillin (Unknown)  shellfish (Pruritus)    MEDICATIONS:  STANDING MEDICATIONS  azaTHIOprine 50 milliGRAM(s) Oral daily  chlorhexidine 0.12% Liquid 15 milliLiter(s) Swish and Spit two times a day  chlorhexidine 4% Liquid 1 Application(s) Topical <User Schedule>  dextrose 40% Gel 15 Gram(s) Oral once  dextrose 5%. 1000 milliLiter(s) IV Continuous <Continuous>  dextrose 5%. 1000 milliLiter(s) IV Continuous <Continuous>  dextrose 50% Injectable 25 Gram(s) IV Push once  dextrose 50% Injectable 12.5 Gram(s) IV Push once  dextrose 50% Injectable 25 Gram(s) IV Push once  enoxaparin Injectable 40 milliGRAM(s) SubCutaneous every 12 hours  furosemide    Tablet 40 milliGRAM(s) Oral daily  glucagon  Injectable 1 milliGRAM(s) IntraMuscular once  guaiFENesin  milliGRAM(s) Oral every 12 hours  insulin glargine Injectable (LANTUS) 20 Unit(s) SubCutaneous at bedtime  insulin lispro (ADMELOG) corrective regimen sliding scale   SubCutaneous three times a day before meals  insulin lispro Injectable (ADMELOG) 12 Unit(s) SubCutaneous three times a day before meals  metoprolol tartrate 12.5 milliGRAM(s) Oral daily  pantoprazole    Tablet 40 milliGRAM(s) Oral before breakfast  polyethylene glycol 3350 17 Gram(s) Oral daily  predniSONE   Tablet 30 milliGRAM(s) Oral daily  senna 2 Tablet(s) Oral at bedtime    PRN MEDICATIONS  aluminum hydroxide/magnesium hydroxide/simethicone Suspension 30 milliLiter(s) Oral every 6 hours PRN  ibuprofen  Tablet. 600 milliGRAM(s) Oral every 6 hours PRN  vitamin A &amp; D Ointment 1 Application(s) Topical every 8 hours PRN      VITAL SIGNS: Last 24 Hours  T(C): 36.1 (21 Dec 2020 05:00), Max: 36.6 (20 Dec 2020 12:43)  T(F): 96.9 (21 Dec 2020 05:00), Max: 97.9 (20 Dec 2020 12:43)  HR: 70 (21 Dec 2020 05:00) (70 - 105)  BP: 129/81 (21 Dec 2020 05:00) (103/61 - 133/84)  BP(mean): --  RR: 18 (21 Dec 2020 05:00) (18 - 19)  SpO2: 96% (21 Dec 2020 05:00) (94% - 96%)    LABS:                        14.4   18.54 )-----------( 432      ( 21 Dec 2020 07:05 )             43.6     12-20    139  |  98  |  29<H>  ----------------------------<  52<LL>  3.4<L>   |  30  |  1.0    Ca    8.6      20 Dec 2020 06:21  Mg     2.3     12-20    TPro  5.3<L>  /  Alb  3.2<L>  /  TBili  0.2  /  DBili  x   /  AST  40  /  ALT  148<H>  /  AlkPhos  93  12-20          Lactate, Blood: 2.0 mmol/L (12-21-20 @ 07:05)          RADIOLOGY:    < from: Xray Chest 1 View- PORTABLE-Routine (Xray Chest 1 View- PORTABLE-Routine in AM.) (12.18.20 @ 06:16) >  EXAM:  XR CHEST PORTABLE ROUTINE 1V        PROCEDURE DATE:  12/18/2020    INTERPRETATION:  Clinical History / Reason for exam: COVID.  Comparison : Chest radiograph 12/17/2020.  Technique/Positioning: Frontal portable.    Findings:  Support devices: None.  Cardiac/mediastinum/hilum: Unchanged.  Lung parenchyma/Pleura: Low lung volume. Unchanged bilateral opacities. No pneumothorax.  Skeleton/soft tissues: Unchanged.    Impression:  Low lung volumes. Unchanged bilateralopacities.    < end of copied text >      PHYSICAL EXAM:  GENERAL: NAD, well-developed, AAOx3  HEENT:  Atraumatic, Normocephalic. conjunctiva and sclera clear, No JVD  PULMONARY: decreased breath sounds bilaterally; No wheeze, on 2L NC   CARDIOVASCULAR: Regular rate and rhythm; No murmurs, rubs, or gallops  GASTROINTESTINAL: Soft, Nontender, Nondistended; Bowel sounds present  MUSCULOSKELETAL: No clubbing, cyanosis, or edema  NEUROLOGY: non-focal  SKIN: No rashes or lesions      ASSESSMENT & PLAN  60 yr M with recent COVID pneumonia discharged 3 weeks ago on 2LNC, s/p steroid course and plasma Tx, GIST (s/p resection), DM type II , RA (on prednisone), HTN, HLD and herniated disc presented to ED for SOB and desaturation, recently discharged with home O2 after admission for COVID pneumonia, admitted for acute hypoxemic respiratory failure, admitted to ICU, s/p downgrade to stepdown, improved on HFNC, now on O2 NC 3L.    #Acute hypoxemic respiratory failure due to COVID pna - resolved  #Recently discharged on home O2 after admission for COVID pna  - CXR on admission revealed worsening infiltrates, RUL consolidation - now improving  - Unlikely has underlying bacterial pneumonia, procalc wnl, inflammatory markers trended down, legionella, fungitell, galactommanan neg  - S/p Zosyn, no need for more abx per ID  - S/p Toci 12/11, remdesivir , plasma   - c/w Solumedrol 60 mg IV q12  - Weaned off HFNC, now on 2L NC--wean as tolerated  - C/w Imuran 50 mg qd   - F/u quantiferon     #Hx of RA, on chronic steroids  - Takes Prednisone 10 mg qd at home  - continue prednisone taper (pt received 30mg today)     #HTN - well controlled  - C/w Metoprolol 12.5 mg qd for tachycardia    #DMII  - HbA1c 7.55  - Insulin regiment: Lantus 20U, lispro 12U tid     DVT ppx: Lovenox 40 q 12  GI ppx: Pantoprazole  Diet: DASH  Activity: IAT  Full code  Dispo: [place]

## 2020-12-21 NOTE — DISCHARGE NOTE PROVIDER - NSDCMRMEDTOKEN_GEN_ALL_CORE_FT
aluminum hydroxide-magnesium hydroxide 200 mg-200 mg/5 mL oral suspension: 30 milliliter(s) orally every 6 hours, As needed, Dyspepsia  azaTHIOprine 50 mg oral tablet: 1 tab(s) orally once a day  Eliquis 2.5 mg oral tablet: 1 tab(s) orally 2 times a day   gabapentin enacarbil 300 mg oral tablet, extended release: 1 tab(s) orally 3 times a day  lisinopril 40 mg oral tablet: 1 tab(s) orally once a day  metFORMIN 500 mg oral tablet: 1 tab(s) orally 2 times a day   metoprolol succinate 25 mg oral capsule, extended release: 1 cap(s) orally once a day   oxyCODONE 5 mg oral tablet: 1 tab(s) orally every 6 hours, As Needed MDD:4 tabs   pantoprazole 40 mg oral delayed release tablet: 1 tab(s) orally once a day (before a meal)  predniSONE 10 mg oral tablet: Take 4 tabs daily for 1 day then take 2 tabs daily for 3 days the resume your home dose of prednisone 10 mg  daily   predniSONE 10 mg oral tablet: 3 tab(s) orally once a day for 3 days, then 2 tabs orally once a day for 3 days and finally 1 tab once a day (your home dose)   senna oral tablet: 2 tab(s) orally once a day (at bedtime)

## 2020-12-21 NOTE — DISCHARGE NOTE NURSING/CASE MANAGEMENT/SOCIAL WORK - PATIENT PORTAL LINK FT
You can access the FollowMyHealth Patient Portal offered by [facility] by registering at the following website: [email or web address]. By joining [facility]’s FollowMyHealth portal, you will also be able to view your health information using other applications (apps) compatible with our system.

## 2020-12-21 NOTE — PHYSICAL THERAPY INITIAL EVALUATION ADULT - LEVEL OF INDEPENDENCE: STAIR NEGOTIATION, REHAB EVAL
did not perform 2* to airborne precautions , in room as per hospital policy, however pt independent with amb without asst device, stairs not a  barrier for discharge.

## 2020-12-21 NOTE — CHART NOTE - NSCHARTNOTEFT_GEN_A_CORE
Registered Dietitian Follow-Up     Patient Profile Reviewed                           Yes [x]   No []     Nutrition History Previously Obtained        Yes [x]  No []     Pertinent Medical Interventions: Admitted with SOB. Acute hypoxemic respiratory failure noted d/t COVID PNA - resolved per progress notes.     Diet order: DASH/TLC diet + Consistent Carbohydrate diet (no snacks) + Glucerna q8hrs.     Anthropometrics:  - Ht. 182.9cm   - Wt. latest wt 83.1 kg (12/18) - wt has ranged from 82.5 kg (12/8) to 86 kg (12/17). Stated wt 79.8 kg (12/8) noted.  - BMI 24.7 (dosing wt 82.5 kg)   - IBW 80.9 kg.     Pertinent Lab Data: 12/21: BUN-27, POCT gluc-133 (17:05) vs 233 (11:15) vs 94 (7:28); 12/9: QozN0L-5.5%     Pertinent Meds: enoxaparin, insulin glargine, insulin lispro, prednisone, maalox, metoprolol, miralax, protonix, senna, lasix     Physical Findings:  - Appearance: Alert  - GI function: last BM 12/19 - no N/V/D/C reported  - Tubes: no feeding tubes  - Oral/Mouth cavity: no chewing/swallowing difficulty reported  - Skin: noted intact     Nutrition Requirements  Weight Used: 82.5kg ABW dosing wt per 12/14 RD assessment     Energy: 2600-3437 kcal/day (MSJx1.2-1.3 AF).       Protein: 83-99 g/day (1-1.2 g/kg ABW).       Fluids: 1 mL/kcal or per LIP.     Nutrient Intake: Reports fair appetite at this time; consuming 75% of most meals + po supplements.     [x] Previous Nutrition Diagnosis: inadequate oral intake             [x] Ongoing  but resolving        [] Resolved     Nutrition Intervention: Meals & Snacks     Goal/Expected Outcome: Pt to consistently consume at least 75% of meals over next 7 days.     Indicator/Monitoring: Energy intake, glucose profile, renal profile, nutrition focused physical findings, body composition.    Recommendation: Continue providing DASH/TLC diet + Consistent Carbohydrate diet (no snacks) + Glucerna q8hrs as tolerated.

## 2020-12-21 NOTE — PHYSICAL THERAPY INITIAL EVALUATION ADULT - GENERAL OBSERVATIONS, REHAB EVAL
11:46-12:10. Pt encounterd sitting in chair in NAD, + O2 2 lpm via NC. Pt reports he desaturated with exertion on RA. Pt agreeable to PT, stated he had O2 at home before ~ 2 months ago.

## 2020-12-21 NOTE — PHYSICAL THERAPY INITIAL EVALUATION ADULT - SPECIFY REASON(S)
pt still  has bedrest orders. discussed with Dr. Loredo and made him aware that the pt needs OOB orders. PT will f/u as appropriate to complete initial evaluation.
9:00. Hold PT pending OOB activity orders. Pt still has active bedrest orders. Will f/u with PT as appropriate.
Pt currently on bedrest, PT eval on hold pending OOB orders. PT will follow up as appropriately.

## 2020-12-21 NOTE — DISCHARGE NOTE PROVIDER - CARE PROVIDER_API CALL
Charles Hassan  INTERNAL MEDICINE  [address] [place] [address]  Phone: [phone]  Fax: [phone]  Follow Up Time:

## 2020-12-21 NOTE — PHYSICAL THERAPY INITIAL EVALUATION ADULT - CRITERIA FOR SKILLED THERAPEUTIC INTERVENTIONS
home. Pt d/c PT, does not need acute skilled PT, can ambulate in room indep without asst device./anticipated discharge recommendation

## 2020-12-21 NOTE — DISCHARGE NOTE PROVIDER - NSDCCPCAREPLAN_GEN_ALL_CORE_FT
PRINCIPAL DISCHARGE DIAGNOSIS  Diagnosis: COVID-19  Assessment and Plan of Treatment: Coronavirus disease 2019  is caused by severe acute respiratory syndrome coronavirus 2 (SARS-CoV-2). Symptoms of COVID-19 are variable, but often include fever, cough, fatigue, breathing difficulties, and loss of smell and taste. Symptoms begin one to fourteen days after exposure to the virus. Around one in five infected individuals do not develop any symptoms. While most people have mild symptoms, some people develop acute respiratory distress syndrome (ARDS). ARDS can be precipitated by cytokine storms, multi-organ failure, septic shock, and blood clots. Longer-term damage to organs (in particular, the lungs and heart) has been observed. There is concern about a significant number of patients who have recovered from the acute phase of the disease but continue to experience a range of effects—known as long COVID—for months afterwards. These effects include severe fatigue, memory loss and other cognitive issues, low-grade fever, muscle weakness, and breathlessness. You got plasma, remdesivir, and steroids to help treat your COVID. You are still requiring supplemental oxygen on 2L NC to keep your oxygen saturation above 94%.         SECONDARY DISCHARGE DIAGNOSES  Diagnosis: Hypoxia  Assessment and Plan of Treatment:

## 2020-12-21 NOTE — PROGRESS NOTE ADULT - PROVIDER SPECIALTY LIST ADULT
Critical Care
Infectious Disease
Internal Medicine
MICU
Internal Medicine
Hospitalist
Critical Care
Hospitalist

## 2020-12-22 LAB
GAMMA INTERFERON BACKGROUND BLD IA-ACNC: 0.01 IU/ML — SIGNIFICANT CHANGE UP
M TB IFN-G BLD-IMP: ABNORMAL
M TB IFN-G CD4+ BCKGRND COR BLD-ACNC: 0.01 IU/ML — SIGNIFICANT CHANGE UP
M TB IFN-G CD4+CD8+ BCKGRND COR BLD-ACNC: 0.01 IU/ML — SIGNIFICANT CHANGE UP
QUANT TB PLUS MITOGEN MINUS NIL: 0.43 IU/ML — SIGNIFICANT CHANGE UP

## 2020-12-28 DIAGNOSIS — J96.01 ACUTE RESPIRATORY FAILURE WITH HYPOXIA: ICD-10-CM

## 2020-12-28 DIAGNOSIS — Z99.81 DEPENDENCE ON SUPPLEMENTAL OXYGEN: ICD-10-CM

## 2020-12-28 DIAGNOSIS — U07.1 COVID-19: ICD-10-CM

## 2020-12-28 DIAGNOSIS — Z79.52 LONG TERM (CURRENT) USE OF SYSTEMIC STEROIDS: ICD-10-CM

## 2020-12-28 DIAGNOSIS — M06.9 RHEUMATOID ARTHRITIS, UNSPECIFIED: ICD-10-CM

## 2020-12-28 DIAGNOSIS — A41.9 SEPSIS, UNSPECIFIED ORGANISM: ICD-10-CM

## 2020-12-28 DIAGNOSIS — E11.649 TYPE 2 DIABETES MELLITUS WITH HYPOGLYCEMIA WITHOUT COMA: ICD-10-CM

## 2020-12-28 DIAGNOSIS — A49.9 BACTERIAL INFECTION, UNSPECIFIED: ICD-10-CM

## 2020-12-28 DIAGNOSIS — D89.839 CYTOKINE RELEASE SYNDROME, GRADE UNSPECIFIED: ICD-10-CM

## 2020-12-28 DIAGNOSIS — E78.5 HYPERLIPIDEMIA, UNSPECIFIED: ICD-10-CM

## 2020-12-28 DIAGNOSIS — R00.0 TACHYCARDIA, UNSPECIFIED: ICD-10-CM

## 2020-12-28 DIAGNOSIS — Z79.84 LONG TERM (CURRENT) USE OF ORAL HYPOGLYCEMIC DRUGS: ICD-10-CM

## 2020-12-28 DIAGNOSIS — J47.9 BRONCHIECTASIS, UNCOMPLICATED: ICD-10-CM

## 2020-12-28 DIAGNOSIS — B94.8 SEQUELAE OF OTHER SPECIFIED INFECTIOUS AND PARASITIC DISEASES: ICD-10-CM

## 2020-12-28 DIAGNOSIS — I10 ESSENTIAL (PRIMARY) HYPERTENSION: ICD-10-CM

## 2020-12-28 DIAGNOSIS — R07.9 CHEST PAIN, UNSPECIFIED: ICD-10-CM

## 2020-12-28 DIAGNOSIS — R00.1 BRADYCARDIA, UNSPECIFIED: ICD-10-CM

## 2020-12-28 DIAGNOSIS — Z87.19 PERSONAL HISTORY OF OTHER DISEASES OF THE DIGESTIVE SYSTEM: ICD-10-CM

## 2020-12-28 DIAGNOSIS — E11.65 TYPE 2 DIABETES MELLITUS WITH HYPERGLYCEMIA: ICD-10-CM

## 2020-12-28 DIAGNOSIS — Z79.2 LONG TERM (CURRENT) USE OF ANTIBIOTICS: ICD-10-CM

## 2020-12-28 DIAGNOSIS — J12.89 OTHER VIRAL PNEUMONIA: ICD-10-CM

## 2020-12-30 ENCOUNTER — APPOINTMENT (OUTPATIENT)
Dept: RHEUMATOLOGY | Facility: CLINIC | Age: 60
End: 2020-12-30
Payer: COMMERCIAL

## 2020-12-30 PROCEDURE — 99447 NTRPROF PH1/NTRNET/EHR 11-20: CPT

## 2021-01-06 LAB
CULTURE RESULTS: SIGNIFICANT CHANGE UP
SPECIMEN SOURCE: SIGNIFICANT CHANGE UP

## 2021-01-21 ENCOUNTER — RX RENEWAL (OUTPATIENT)
Age: 61
End: 2021-01-21

## 2021-01-26 ENCOUNTER — APPOINTMENT (OUTPATIENT)
Dept: RHEUMATOLOGY | Facility: CLINIC | Age: 61
End: 2021-01-26

## 2021-02-04 ENCOUNTER — APPOINTMENT (OUTPATIENT)
Dept: RHEUMATOLOGY | Facility: CLINIC | Age: 61
End: 2021-02-04
Payer: COMMERCIAL

## 2021-02-04 VITALS
SYSTOLIC BLOOD PRESSURE: 132 MMHG | BODY MASS INDEX: 23.84 KG/M2 | WEIGHT: 176 LBS | DIASTOLIC BLOOD PRESSURE: 80 MMHG | HEART RATE: 120 BPM | OXYGEN SATURATION: 96 % | TEMPERATURE: 97.8 F | HEIGHT: 72 IN

## 2021-02-04 PROCEDURE — 99215 OFFICE O/P EST HI 40 MIN: CPT

## 2021-02-04 PROCEDURE — 99072 ADDL SUPL MATRL&STAF TM PHE: CPT

## 2021-02-04 RX ORDER — AMLODIPINE BESYLATE 5 MG/1
5 TABLET ORAL
Refills: 0 | Status: DISCONTINUED | COMMUNITY
End: 2021-02-04

## 2021-02-04 RX ORDER — ROSUVASTATIN CALCIUM 5 MG/1
TABLET, FILM COATED ORAL
Refills: 0 | Status: DISCONTINUED | COMMUNITY
End: 2021-02-04

## 2021-02-04 RX ORDER — PREDNISONE 5 MG/1
5 TABLET ORAL
Qty: 70 | Refills: 0 | Status: DISCONTINUED | COMMUNITY
Start: 2020-10-14 | End: 2021-02-04

## 2021-02-04 RX ORDER — IMATINIB MESYLATE 400 MG/1
400 TABLET, FILM COATED ORAL
Qty: 30 | Refills: 1 | Status: DISCONTINUED | COMMUNITY
Start: 2020-09-29 | End: 2021-02-04

## 2021-02-04 RX ORDER — IBUPROFEN AND FAMOTIDINE 800; 26.6 MG/1; MG/1
800-26.6 TABLET, COATED ORAL
Refills: 0 | Status: DISCONTINUED | COMMUNITY
End: 2021-02-04

## 2021-02-04 NOTE — ASSESSMENT
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 RA seropositive (CCP and RF)\par \par - Symptomatically improved on steroids\par -Plan for tocilizumab, abatacept or tofacitinib. All three do not definitively increase mortality or make ILD worse. Literature is uncertain regarding this where literature on TNF, MTX, SSZ, Arava have showed worsening lung disease or inducing ILD/pneumonitis. I explained this to him, along with his frequent admissions for COVID. Prednisone has been shown to increase risk of hospitalizations for COVID more than biologic or conventional DMARDs. Ideally, he will come down on prednisone to decrease his risk, and add toci. Last negative COVID test was 1/21/2020. Since he's feeling better and his oxygen requirements are decreasing, I think it's reasonable to initiate toci after his post hospital follow ups in the next 2-4 weeks. He understands the risks and I gave him handouts on tocilizumab, tofacitinib and abatacept to read over.\par \par \par 2. ILD/chronic hypersensitivity pneumonitis\par \par -He will follow up with pulmonary at [place]. In the meantime, steroids should help\par \par 3. GIST tumor\par \par -S/p resection and following with Oncology. Deferred imatinib\par \par 4. HTN/HLD\par \par -As per PCP\par \par 5. Cervical spinal stenosis/lumbar disc herniation\par \par -Follow up with neurosurgery and PCP\par \par 6. Right shoulder pain\par \par -MRI suggested partial rotator cuff tear, subacromial bursits and going for PT\par \par 7. Right foot pain \par \par -Went to PT, saw ortho, used boot. Still with pains on top of foot. Also saw neuro for this. Xray suggested arthritis. Had steroid injection in past without much relief. Less likely related to RA. Could be tendonitis vs. OA vs. RA. Suggested ultrasound to rule out active hyperemia but he wishes to defer for now

## 2021-02-04 NOTE — HISTORY OF PRESENT ILLNESS
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 He was seen in the ED 6/28 where XR of the knee was normal. XR of the right shoulder suggested AC OA (mild), and x-ray of the pelvis suggested degenerative changes in his lumbar spine and bilateral hips. He was sent home with motrin.\par \par MRI right ankle 7/1:   "Mild tenosynovitis of the posterior tibialis, flexor digitorum longus, peroneus brevis, and peroneus longus. Mild Achilles tendinosis. Edema in the sinus tarsi (and small erosion at sinus tarsi roof) may be seen with sinus tarsi syndrome, correlate with physical exam for lateral hindfoot impingement/instability. Diffuse edema throughout the musculature, likely due to to neuropathy. Prior sprain of the anterior distal syndesmotic ligament, deep fibers of the deltoid ligament, and spring ligament."\par \par MRI cervical spine 7/2:  "Mild canal narrowing on a degenerative basis, more pronounced at C5-6. C5-6 mild foraminal narrowing. Possible T4-T5 left foraminal disc herniation. If there is concern consider a dedicated T-spine study."\par \par Repeat MRI C spine 9/16: "C5-6 moderate spinal stenosis due to disc bulging and ligamentum flavum infolding, stable since the prior exam.. Stable mild spinal cord abutment without cord deformity or signal abnormality. Stable moderate foraminal narrowing. Stable additional mild degenerative changes as described."\par \par MRI lumbar spine 7/6: "L5-S1 small disc herniation in the right lateral recess with impingement of the descending right S1 nerve root. L3-4 small disc bulge and facet hypertrophy with mild right foraminal narrowing and mild compression of the exiting right L3 nerve root.  Additional mild degenerative changes as described.Mass-like heterogeneous structure within the mid abdomen, incompletely imaged/evaluated on this study. Although this can reflect loops of small bowel, recommend a dedicated CT or MRI of the abdomen with contrast to exclude an underlying mass."\par \par Repeat MRI lumbar spine 9/16:  since prior study: L5-S1 trace disc herniation in the right lateral recess, slightly decreased in size since the prior study with decreased mass effect upon the descending right S1 nerve root. L3-4 stable degenerative change with mild right foraminal narrowing and mild compression of the exiting right L3 nerve root.\par \par MRI T spine 9/16: normal. Repeat XR shoulder 9/17: AC OA\par \par MRI right shoulder 9/18: "Partial-thickness tears of the supraspinatus and infraspinatus tendons as above. Synovitis in the axillary pouch and rotator interval indicative of adhesive capsulitis, correlate with patient range of motion. SLAP tear extending into the posterior superior labrum. Small subacromial/subdeltoid bursitis."\par \par Xray of hands and feet 9/19: suggested OA without erosions\par \par CT Abdomen pelvis 7/26: "A 7.0 x 6.2 x 7.7 cm heterogeneous mass in the mid abdomen contiguous with small bowel loops most likely represents a small bowel gastrointestinal stromal tumor (GIST)."\par \par CTA dissection 9/17:  "Normal caliber of the thoracoabdominal aorta without evidence for dissection or intramural hematoma.Interstitial lung disease. No honeycombing. Interval postsurgical changes within the small bowel."\par \par Pathology from OR 8/21 confirmed GIST\par Serologies include +CCP >250,  with negative lyme, SSA, SSB, ED, Scleroderma. \par Hepatitis B and C serologies negative\par ESR 12 CRP 6\par Patient was admitted 9/15-9/19at [facility] for intractable neck pain radiating to right shoulder/arm right arm and low back pain radiating to right leg. Neurosurgery advised steroids and no surgical intervention. Imaging and labs as above. Started on metformin inpatient due to elevated blood sugars from steroids\par \par \par \par Today's visit: \par Patient recently discharged from [place] where he was diagnosed with COVID pneumonia and found to have evidence of chronic hypersensitivity pneumonitis possibly from his  parakeet. He feels well today. He's on prednisone 30 mg, has AM stiffness for 1-2 hours, no obvious swelling, pain is mostly his bilateral wrists. Reports back pain as well. O2 he doesn't use at night or while at rest but when he ambulates 1-2 L. Overall feels 70% back to normal. \par

## 2021-02-04 NOTE — REASON FOR VISIT
[Home] : at home, [unfilled] , at the time of the visit. [Medical Office: ([facility])___] : at the medical office located in  [Verbal consent obtained from patient] : the patient, [unfilled] [Initial Evaluation] : an initial evaluation [Spouse] : spouse

## 2021-02-26 LAB
M TB IFN-G BLD-IMP: NEGATIVE
QUANTIFERON TB PLUS MITOGEN MINUS NIL: 4.12 IU/ML
QUANTIFERON TB PLUS NIL: 0.03 IU/ML
QUANTIFERON TB PLUS TB1 MINUS NIL: 0.01 IU/ML
QUANTIFERON TB PLUS TB2 MINUS NIL: 0.01 IU/ML

## 2021-03-09 ENCOUNTER — NON-APPOINTMENT (OUTPATIENT)
Age: 61
End: 2021-03-09

## 2021-03-17 ENCOUNTER — RX RENEWAL (OUTPATIENT)
Age: 61
End: 2021-03-17

## 2021-03-19 ENCOUNTER — NON-APPOINTMENT (OUTPATIENT)
Age: 61
End: 2021-03-19

## 2021-04-12 ENCOUNTER — APPOINTMENT (OUTPATIENT)
Dept: RHEUMATOLOGY | Facility: CLINIC | Age: 61
End: 2021-04-12
Payer: COMMERCIAL

## 2021-04-12 VITALS
HEART RATE: 98 BPM | HEIGHT: 72 IN | OXYGEN SATURATION: 96 % | TEMPERATURE: 97.2 F | WEIGHT: 185 LBS | BODY MASS INDEX: 25.06 KG/M2

## 2021-04-12 PROCEDURE — 99214 OFFICE O/P EST MOD 30 MIN: CPT

## 2021-04-12 PROCEDURE — 99072 ADDL SUPL MATRL&STAF TM PHE: CPT

## 2021-04-14 ENCOUNTER — APPOINTMENT (OUTPATIENT)
Dept: PULMONOLOGY | Facility: CLINIC | Age: 61
End: 2021-04-14

## 2021-04-23 ENCOUNTER — NON-APPOINTMENT (OUTPATIENT)
Age: 61
End: 2021-04-23

## 2021-04-27 DIAGNOSIS — K21.9 GASTRO-ESOPHAGEAL REFLUX DISEASE W/OUT ESOPHAGITIS: ICD-10-CM

## 2021-05-17 ENCOUNTER — APPOINTMENT (OUTPATIENT)
Dept: RHEUMATOLOGY | Facility: CLINIC | Age: 61
End: 2021-05-17
Payer: COMMERCIAL

## 2021-05-17 ENCOUNTER — OUTPATIENT (OUTPATIENT)
Dept: OUTPATIENT SERVICES | Facility: HOSPITAL | Age: 61
LOS: 1 days | Discharge: HOME | End: 2021-05-17
Payer: COMMERCIAL

## 2021-05-17 VITALS
WEIGHT: 190 LBS | BODY MASS INDEX: 25.73 KG/M2 | DIASTOLIC BLOOD PRESSURE: 80 MMHG | HEIGHT: 72 IN | OXYGEN SATURATION: 96 % | SYSTOLIC BLOOD PRESSURE: 142 MMHG | HEART RATE: 82 BPM | TEMPERATURE: 98.2 F

## 2021-05-17 DIAGNOSIS — Z98.890 OTHER SPECIFIED POSTPROCEDURAL STATES: Chronic | ICD-10-CM

## 2021-05-17 DIAGNOSIS — M25.551 PAIN IN RIGHT HIP: ICD-10-CM

## 2021-05-17 DIAGNOSIS — M46.1 SACROILIITIS, NOT ELSEWHERE CLASSIFIED: ICD-10-CM

## 2021-05-17 PROCEDURE — 99072 ADDL SUPL MATRL&STAF TM PHE: CPT

## 2021-05-17 PROCEDURE — 99215 OFFICE O/P EST HI 40 MIN: CPT

## 2021-05-17 PROCEDURE — 73521 X-RAY EXAM HIPS BI 2 VIEWS: CPT | Mod: 26

## 2021-05-19 PROBLEM — M46.1 SACROILIITIS: Status: ACTIVE | Noted: 2021-05-19

## 2021-05-21 ENCOUNTER — RX RENEWAL (OUTPATIENT)
Age: 61
End: 2021-05-21

## 2021-05-26 NOTE — ASSESSMENT
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 RA seropositive (CCP and RF)\par -Active disease in wrists, ankles \par -Failed Actemra\par -Discussed switching to Rituximab 1000 mg day 0 and 14 q 6 months. R/b/a discussed in detail including PML and he agrees\par -Stop steroids due to side effects - discussed that his swelling, weight gain, HTN, elevated sugars are side effects of prednisone. He is aware and understands the risks.\par -Tylenol 650 mg for pain. Discussed limit of 3000 mg/day\par -Ibuprofen PRN\par \par 2. ILD/chronic hypersensitivity pneumonitis\par -Breathing has been stable\par -Recent CT scan reviewed\par -Follow up with pulmonary at [place], Dr. Bashir - acute changes related to covid have been treated and fibrotic changes from HP are not likely amenable to immunosuppressive therapy\par \par 3. GIST tumor\par -S/p resection and following with Oncology. I asked him to follow up with Oncology \par \par 4. HTN/HLD\par -F/u with PCP, and Cardiology at [place]\par \par 5. Cervical spinal stenosis/lumbar disc herniation\par -Follow up with neurosurgery and PCP\par \par 6. Bilateral shoulder pain\par -MRI right shoulder suggested partial rotator cuff tear, subacromial bursitis and has went for PT. Advised PT but he will defer until after his RA is controlled on RTX\par \par 7. Facial swelling\par -Likely side effect of prednisone. No fevers, throat swelling, GERD, hoarseness or dysphagia. \par \par 8. Hip pains\par -Check x-ray to rule out OA vs. RA vs. AVN \par \par

## 2021-05-26 NOTE — HISTORY OF PRESENT ILLNESS
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 He was seen in the ED 6/28 where XR of the knee was normal. XR of the right shoulder suggested AC OA (mild), and x-ray of the pelvis suggested degenerative changes in his lumbar spine and bilateral hips. He was sent home with motrin.\par \par MRI right ankle 7/1:   "Mild tenosynovitis of the posterior tibialis, flexor digitorum longus, peroneus brevis, and peroneus longus. Mild Achilles tendinosis. Edema in the sinus tarsi (and small erosion at sinus tarsi roof) may be seen with sinus tarsi syndrome, correlate with physical exam for lateral hindfoot impingement/instability. Diffuse edema throughout the musculature, likely due to to neuropathy. Prior sprain of the anterior distal syndesmotic ligament, deep fibers of the deltoid ligament, and spring ligament."\par \par MRI cervical spine 7/2:  "Mild canal narrowing on a degenerative basis, more pronounced at C5-6. C5-6 mild foraminal narrowing. Possible T4-T5 left foraminal disc herniation. If there is concern consider a dedicated T-spine study."\par \par Repeat MRI C spine 9/16: "C5-6 moderate spinal stenosis due to disc bulging and ligamentum flavum infolding, stable since the prior exam.. Stable mild spinal cord abutment without cord deformity or signal abnormality. Stable moderate foraminal narrowing. Stable additional mild degenerative changes as described."\par \par MRI lumbar spine 7/6: "L5-S1 small disc herniation in the right lateral recess with impingement of the descending right S1 nerve root. L3-4 small disc bulge and facet hypertrophy with mild right foraminal narrowing and mild compression of the exiting right L3 nerve root.  Additional mild degenerative changes as described.Mass-like heterogeneous structure within the mid abdomen, incompletely imaged/evaluated on this study. Although this can reflect loops of small bowel, recommend a dedicated CT or MRI of the abdomen with contrast to exclude an underlying mass."\par \par Repeat MRI lumbar spine 9/16:  since prior study: L5-S1 trace disc herniation in the right lateral recess, slightly decreased in size since the prior study with decreased mass effect upon the descending right S1 nerve root. L3-4 stable degenerative change with mild right foraminal narrowing and mild compression of the exiting right L3 nerve root.\par \par MRI T spine 9/16: normal. Repeat XR shoulder 9/17: AC OA\par \par MRI right shoulder 9/18: "Partial-thickness tears of the supraspinatus and infraspinatus tendons as above. Synovitis in the axillary pouch and rotator interval indicative of adhesive capsulitis, correlate with patient range of motion. SLAP tear extending into the posterior superior labrum. Small subacromial/subdeltoid bursitis."\par \par Xray of hands and feet 9/19: suggested OA without erosions\par \par CT Abdomen pelvis 7/26: "A 7.0 x 6.2 x 7.7 cm heterogeneous mass in the mid abdomen contiguous with small bowel loops most likely represents a small bowel gastrointestinal stromal tumor (GIST)."\par \par CTA dissection 9/17:  "Normal caliber of the thoracoabdominal aorta without evidence for dissection or intramural hematoma.Interstitial lung disease. No honeycombing. Interval postsurgical changes within the small bowel."\par \par Pathology from OR 8/21 confirmed GIST\par Serologies include +CCP >250,  with negative lyme, SSA, SSB, ED, Scleroderma. \par Hepatitis B and C serologies negative\par ESR 12 CRP 6\par Patient was admitted 9/15-9/19at [facility] for intractable neck pain radiating to right shoulder/arm right arm and low back pain radiating to right leg. Neurosurgery advised steroids and no surgical intervention. Imaging and labs as above. Started on metformin inpatient due to elevated blood sugars from steroids\par \par \par \par Today's visit: \par Patient reports joint pain >10/10 without prednisone involving swelling in his bilateral wrists, AM stiffness for 2-3 hours after taking prednisone, and notices swelling in his fingers and wrists. Bilateral hips, collarbone and shoulders hurt without trauma that started 3 weeks ago. Also reports back pain but that has improved. Motrin 800 mg helps. Facial swelling increases in the afternoon then goes back down later on in the day. Tylenol arthritis doesn't help. He is taking prednisone 20 mg daily. BP is improved but still in 140-150's, and blood sugars have come down to 126 at home. When he stopped prednisone eyes and right ankle became swollen. He recently went to his Pulmonary appointment at [place] and they suggested Rituximab.\par \par He reports a few months of bilateral hip pain, worse with ambulating, sometimes it does get better with walking, worse with getting up after sitting for period of time. He reports low back pain for few months, now right thigh hurts. Reports low back stiffness relieved by prednisone after 2 hours, symptoms are worse after sitting for period of time, unable to bend down to  objects. Ibuprofen helps his back and hips. Prednisone helps hands and feet.

## 2021-05-26 NOTE — PHYSICAL EXAM
[General Appearance - Alert] : alert [General Appearance - In No Acute Distress] : in no acute distress [Sclera] : the sclera and conjunctiva were normal [Extraocular Movements] : extraocular movements were intact [Outer Ear] : the ears and nose were normal in appearance [Hearing Threshold Finger Rub Not [place]] : hearing was normal [Neck Appearance] : the appearance of the neck was normal [Neck Cervical Mass (___cm)] : no neck mass was observed [Respiration, Rhythm And Depth] : normal respiratory rhythm and effort [Heart Rate And Rhythm] : heart rate was normal and rhythm regular [Heart Sounds] : normal S1 and S2 [Abdomen Tenderness] : non-tender [Bowel Sounds] : normal bowel sounds [] : no rash [Skin Color & Pigmentation] : normal skin color and pigmentation [Sensation] : the sensory exam was normal to light touch and pinprick [Motor Exam] : the motor exam was normal [Affect] : the affect was normal [Mood] : the mood was normal [FreeTextEntry1] : right wrist mild swelling, no pain. Right ankle no swelling or tenderness. Otherwise no synovitis. Bilateral shoulders empty can and daniel positive otherwise full ROM in all directions. Bilateral hips tender with IR

## 2021-06-02 ENCOUNTER — RX RENEWAL (OUTPATIENT)
Age: 61
End: 2021-06-02

## 2021-06-11 ENCOUNTER — APPOINTMENT (OUTPATIENT)
Dept: HEMATOLOGY ONCOLOGY | Facility: CLINIC | Age: 61
End: 2021-06-11

## 2021-06-11 ENCOUNTER — APPOINTMENT (OUTPATIENT)
Dept: INFUSION THERAPY | Facility: CLINIC | Age: 61
End: 2021-06-11

## 2021-06-25 ENCOUNTER — APPOINTMENT (OUTPATIENT)
Dept: HEMATOLOGY ONCOLOGY | Facility: CLINIC | Age: 61
End: 2021-06-25
Payer: COMMERCIAL

## 2021-06-25 ENCOUNTER — LABORATORY RESULT (OUTPATIENT)
Age: 61
End: 2021-06-25

## 2021-06-25 ENCOUNTER — OUTPATIENT (OUTPATIENT)
Dept: OUTPATIENT SERVICES | Facility: HOSPITAL | Age: 61
LOS: 1 days | Discharge: HOME | End: 2021-06-25

## 2021-06-25 ENCOUNTER — APPOINTMENT (OUTPATIENT)
Dept: INFUSION THERAPY | Facility: CLINIC | Age: 61
End: 2021-06-25
Payer: COMMERCIAL

## 2021-06-25 VITALS
DIASTOLIC BLOOD PRESSURE: 78 MMHG | TEMPERATURE: 97.6 F | HEART RATE: 95 BPM | HEIGHT: 72 IN | WEIGHT: 187 LBS | SYSTOLIC BLOOD PRESSURE: 139 MMHG | RESPIRATION RATE: 14 BRPM | BODY MASS INDEX: 25.33 KG/M2

## 2021-06-25 DIAGNOSIS — Z98.890 OTHER SPECIFIED POSTPROCEDURAL STATES: Chronic | ICD-10-CM

## 2021-06-25 LAB
HCT VFR BLD CALC: 42.3 %
HGB BLD-MCNC: 13.9 G/DL
MCHC RBC-ENTMCNC: 30.5 PG
MCHC RBC-ENTMCNC: 32.9 G/DL
MCV RBC AUTO: 93 FL
PLATELET # BLD AUTO: 471 K/UL
PMV BLD: 10.2 FL
RBC # BLD: 4.55 M/UL
RBC # FLD: 14.6 %
WBC # FLD AUTO: 13.45 K/UL

## 2021-06-25 PROCEDURE — 99203 OFFICE O/P NEW LOW 30 MIN: CPT

## 2021-06-25 RX ORDER — ACETAMINOPHEN 500 MG
650 TABLET ORAL ONCE
Refills: 0 | Status: COMPLETED | OUTPATIENT
Start: 2021-06-25 | End: 2021-06-25

## 2021-06-25 RX ORDER — DIPHENHYDRAMINE HCL 50 MG
50 CAPSULE ORAL ONCE
Refills: 0 | Status: COMPLETED | OUTPATIENT
Start: 2021-06-25 | End: 2021-06-25

## 2021-06-25 RX ORDER — RITUXIMAB 10 MG/ML
1000 INJECTION, SOLUTION INTRAVENOUS ONCE
Refills: 0 | Status: COMPLETED | OUTPATIENT
Start: 2021-06-25 | End: 2021-06-25

## 2021-06-25 RX ADMIN — Medication 650 MILLIGRAM(S): at 12:33

## 2021-06-25 RX ADMIN — RITUXIMAB 250 MILLIGRAM(S): 10 INJECTION, SOLUTION INTRAVENOUS at 13:15

## 2021-06-25 RX ADMIN — Medication 50 MILLIGRAM(S): at 12:33

## 2021-06-28 ENCOUNTER — NON-APPOINTMENT (OUTPATIENT)
Age: 61
End: 2021-06-28

## 2021-06-28 ENCOUNTER — APPOINTMENT (OUTPATIENT)
Dept: RHEUMATOLOGY | Facility: CLINIC | Age: 61
End: 2021-06-28
Payer: COMMERCIAL

## 2021-06-28 LAB
ANION GAP SERPL CALC-SCNC: 11 MMOL/L
BUN SERPL-MCNC: 17 MG/DL
CALCIUM SERPL-MCNC: 9.6 MG/DL
CHLORIDE SERPL-SCNC: 100 MMOL/L
CO2 SERPL-SCNC: 28 MMOL/L
CREAT SERPL-MCNC: 0.9 MG/DL
GLUCOSE SERPL-MCNC: 117 MG/DL
HBV CORE IGG+IGM SER QL: NONREACTIVE
HBV SURFACE AB SER QL: ABNORMAL
HBV SURFACE AG SER QL: NONREACTIVE
HCV AB SER QL: NONREACTIVE
HCV S/CO RATIO: 0.3 S/CO
POTASSIUM SERPL-SCNC: 3.9 MMOL/L
SODIUM SERPL-SCNC: 139 MMOL/L

## 2021-06-28 PROCEDURE — 99072 ADDL SUPL MATRL&STAF TM PHE: CPT

## 2021-06-28 PROCEDURE — 99214 OFFICE O/P EST MOD 30 MIN: CPT

## 2021-07-09 ENCOUNTER — APPOINTMENT (OUTPATIENT)
Dept: HEMATOLOGY ONCOLOGY | Facility: CLINIC | Age: 61
End: 2021-07-09
Payer: COMMERCIAL

## 2021-07-09 ENCOUNTER — APPOINTMENT (OUTPATIENT)
Dept: INFUSION THERAPY | Facility: CLINIC | Age: 61
End: 2021-07-09
Payer: COMMERCIAL

## 2021-07-09 ENCOUNTER — OUTPATIENT (OUTPATIENT)
Dept: OUTPATIENT SERVICES | Facility: HOSPITAL | Age: 61
LOS: 1 days | Discharge: HOME | End: 2021-07-09

## 2021-07-09 VITALS
WEIGHT: 188 LBS | SYSTOLIC BLOOD PRESSURE: 148 MMHG | TEMPERATURE: 97.6 F | DIASTOLIC BLOOD PRESSURE: 96 MMHG | HEART RATE: 86 BPM | HEIGHT: 72 IN | BODY MASS INDEX: 25.47 KG/M2

## 2021-07-09 VITALS — OXYGEN SATURATION: 95 %

## 2021-07-09 DIAGNOSIS — Z98.890 OTHER SPECIFIED POSTPROCEDURAL STATES: Chronic | ICD-10-CM

## 2021-07-09 PROCEDURE — 99213 OFFICE O/P EST LOW 20 MIN: CPT

## 2021-07-09 RX ORDER — ACETAMINOPHEN 500 MG
650 TABLET ORAL ONCE
Refills: 0 | Status: COMPLETED | OUTPATIENT
Start: 2021-07-09 | End: 2021-07-09

## 2021-07-09 RX ORDER — DIPHENHYDRAMINE HCL 50 MG
50 CAPSULE ORAL ONCE
Refills: 0 | Status: COMPLETED | OUTPATIENT
Start: 2021-07-09 | End: 2021-07-09

## 2021-07-09 RX ORDER — RITUXIMAB 10 MG/ML
1000 INJECTION, SOLUTION INTRAVENOUS ONCE
Refills: 0 | Status: COMPLETED | OUTPATIENT
Start: 2021-07-09 | End: 2021-07-09

## 2021-07-09 RX ADMIN — Medication 102 MILLIGRAM(S): at 10:01

## 2021-07-09 RX ADMIN — Medication 650 MILLIGRAM(S): at 10:01

## 2021-07-09 RX ADMIN — RITUXIMAB 166.67 MILLIGRAM(S): 10 INJECTION, SOLUTION INTRAVENOUS at 10:38

## 2021-07-12 DIAGNOSIS — M05.9 RHEUMATOID ARTHRITIS WITH RHEUMATOID FACTOR, UNSPECIFIED: ICD-10-CM

## 2021-07-12 DIAGNOSIS — Z51.81 ENCOUNTER FOR THERAPEUTIC DRUG LEVEL MONITORING: ICD-10-CM

## 2021-07-19 DIAGNOSIS — M05.9 RHEUMATOID ARTHRITIS WITH RHEUMATOID FACTOR, UNSPECIFIED: ICD-10-CM

## 2021-07-29 ENCOUNTER — RX RENEWAL (OUTPATIENT)
Age: 61
End: 2021-07-29

## 2021-08-05 ENCOUNTER — RX RENEWAL (OUTPATIENT)
Age: 61
End: 2021-08-05

## 2021-08-13 ENCOUNTER — APPOINTMENT (OUTPATIENT)
Age: 61
End: 2021-08-13
Payer: COMMERCIAL

## 2021-08-13 VITALS
WEIGHT: 192 LBS | DIASTOLIC BLOOD PRESSURE: 94 MMHG | OXYGEN SATURATION: 93 % | HEART RATE: 84 BPM | RESPIRATION RATE: 14 BRPM | HEIGHT: 72 IN | BODY MASS INDEX: 26.01 KG/M2 | SYSTOLIC BLOOD PRESSURE: 150 MMHG

## 2021-08-13 PROCEDURE — 99214 OFFICE O/P EST MOD 30 MIN: CPT

## 2021-08-18 ENCOUNTER — APPOINTMENT (OUTPATIENT)
Dept: RHEUMATOLOGY | Facility: CLINIC | Age: 61
End: 2021-08-18
Payer: COMMERCIAL

## 2021-08-18 VITALS
BODY MASS INDEX: 25.06 KG/M2 | WEIGHT: 185 LBS | HEART RATE: 101 BPM | OXYGEN SATURATION: 95 % | TEMPERATURE: 98 F | SYSTOLIC BLOOD PRESSURE: 135 MMHG | HEIGHT: 72 IN | DIASTOLIC BLOOD PRESSURE: 85 MMHG

## 2021-08-18 PROCEDURE — 99214 OFFICE O/P EST MOD 30 MIN: CPT

## 2021-08-18 RX ORDER — DULOXETINE HYDROCHLORIDE 30 MG/1
30 CAPSULE, DELAYED RELEASE PELLETS ORAL
Qty: 60 | Refills: 2 | Status: DISCONTINUED | COMMUNITY
Start: 2021-06-16 | End: 2021-08-18

## 2021-08-20 LAB
ALBUMIN SERPL ELPH-MCNC: 3.9 G/DL
ALP BLD-CCNC: 169 U/L
ALT SERPL-CCNC: 30 U/L
ANION GAP SERPL CALC-SCNC: 13 MMOL/L
AST SERPL-CCNC: 18 U/L
BASOPHILS # BLD AUTO: 0.1 K/UL
BASOPHILS NFR BLD AUTO: 0.6 %
BILIRUB SERPL-MCNC: 0.2 MG/DL
BUN SERPL-MCNC: 13 MG/DL
CALCIUM SERPL-MCNC: 9.5 MG/DL
CHLORIDE SERPL-SCNC: 101 MMOL/L
CO2 SERPL-SCNC: 27 MMOL/L
CREAT SERPL-MCNC: 0.9 MG/DL
CRP SERPL-MCNC: 32 MG/L
EOSINOPHIL # BLD AUTO: 0.24 K/UL
EOSINOPHIL NFR BLD AUTO: 1.4 %
ERYTHROCYTE [SEDIMENTATION RATE] IN BLOOD BY WESTERGREN METHOD: 79 MM/HR
GLUCOSE SERPL-MCNC: 195 MG/DL
HCT VFR BLD CALC: 43.2 %
HGB BLD-MCNC: 13.4 G/DL
IMM GRANULOCYTES NFR BLD AUTO: 1 %
LYMPHOCYTES # BLD AUTO: 1.52 K/UL
LYMPHOCYTES NFR BLD AUTO: 9.1 %
MAN DIFF?: NORMAL
MCHC RBC-ENTMCNC: 29.3 PG
MCHC RBC-ENTMCNC: 31 G/DL
MCV RBC AUTO: 94.5 FL
MONOCYTES # BLD AUTO: 1.65 K/UL
MONOCYTES NFR BLD AUTO: 9.9 %
NEUTROPHILS # BLD AUTO: 13.03 K/UL
NEUTROPHILS NFR BLD AUTO: 78 %
PLATELET # BLD AUTO: 462 K/UL
POTASSIUM SERPL-SCNC: 4.5 MMOL/L
PROT SERPL-MCNC: 7.5 G/DL
RBC # BLD: 4.57 M/UL
RBC # FLD: 14 %
SODIUM SERPL-SCNC: 141 MMOL/L
WBC # FLD AUTO: 16.7 K/UL

## 2021-08-23 ENCOUNTER — RX RENEWAL (OUTPATIENT)
Age: 61
End: 2021-08-23

## 2021-08-23 RX ORDER — DICLOFENAC SODIUM 75 MG/1
75 TABLET, DELAYED RELEASE ORAL
Qty: 60 | Refills: 1 | Status: DISCONTINUED | COMMUNITY
Start: 2021-08-18 | End: 2021-08-23

## 2021-09-06 ENCOUNTER — LABORATORY RESULT (OUTPATIENT)
Age: 61
End: 2021-09-06

## 2021-09-06 ENCOUNTER — OUTPATIENT (OUTPATIENT)
Dept: OUTPATIENT SERVICES | Facility: HOSPITAL | Age: 61
LOS: 1 days | Discharge: HOME | End: 2021-09-06

## 2021-09-06 DIAGNOSIS — Z11.59 ENCOUNTER FOR SCREENING FOR OTHER VIRAL DISEASES: ICD-10-CM

## 2021-09-06 DIAGNOSIS — Z98.890 OTHER SPECIFIED POSTPROCEDURAL STATES: Chronic | ICD-10-CM

## 2021-09-07 NOTE — REASON FOR VISIT
[Follow-Up - From Hospitalization] : a follow-up visit after a recent hospitalization [Pneumonia] : pneumonia [Shortness of Breath] : shortness of breath [ILD] : ILD

## 2021-09-07 NOTE — HISTORY OF PRESENT ILLNESS
[Dyspnea on Exertion] : dyspnea on exertion [Dyspnea at Rest] : no dyspnea at rest [Dry Cough] : dry cough [Productive Cough] : no productive cough [Wheezing] : no wheezing [Hemoptysis] : no hemoptysis [None] : No associated symptoms are reported [Shortness of Breath] : Shortness of Breath [Follow-Up - From Hospitalization] : follow-up of a hospitalization for [Dyspnea] : dyspnea [Currently Experiencing] : The patient is currently experiencing symptoms.

## 2021-09-09 ENCOUNTER — OUTPATIENT (OUTPATIENT)
Dept: OUTPATIENT SERVICES | Facility: HOSPITAL | Age: 61
LOS: 1 days | Discharge: HOME | End: 2021-09-09
Payer: COMMERCIAL

## 2021-09-09 DIAGNOSIS — R06.02 SHORTNESS OF BREATH: ICD-10-CM

## 2021-09-09 DIAGNOSIS — Z98.890 OTHER SPECIFIED POSTPROCEDURAL STATES: Chronic | ICD-10-CM

## 2021-09-09 PROCEDURE — 94727 GAS DIL/WSHOT DETER LNG VOL: CPT | Mod: 26

## 2021-09-09 PROCEDURE — 94729 DIFFUSING CAPACITY: CPT | Mod: 26

## 2021-09-09 PROCEDURE — 94060 EVALUATION OF WHEEZING: CPT | Mod: 26

## 2021-10-14 ENCOUNTER — RESULT REVIEW (OUTPATIENT)
Age: 61
End: 2021-10-14

## 2021-10-14 ENCOUNTER — OUTPATIENT (OUTPATIENT)
Dept: OUTPATIENT SERVICES | Facility: HOSPITAL | Age: 61
LOS: 1 days | Discharge: HOME | End: 2021-10-14
Payer: COMMERCIAL

## 2021-10-14 DIAGNOSIS — J84.112 IDIOPATHIC PULMONARY FIBROSIS: ICD-10-CM

## 2021-10-14 DIAGNOSIS — Z98.890 OTHER SPECIFIED POSTPROCEDURAL STATES: Chronic | ICD-10-CM

## 2021-10-14 PROCEDURE — 71250 CT THORAX DX C-: CPT | Mod: 26

## 2021-10-21 ENCOUNTER — RX RENEWAL (OUTPATIENT)
Age: 61
End: 2021-10-21

## 2021-10-24 ENCOUNTER — OUTPATIENT (OUTPATIENT)
Dept: OUTPATIENT SERVICES | Facility: HOSPITAL | Age: 61
LOS: 1 days | Discharge: HOME | End: 2021-10-24
Payer: COMMERCIAL

## 2021-10-24 DIAGNOSIS — Z98.890 OTHER SPECIFIED POSTPROCEDURAL STATES: Chronic | ICD-10-CM

## 2021-10-24 PROCEDURE — 95810 POLYSOM 6/> YRS 4/> PARAM: CPT | Mod: 26

## 2021-10-25 ENCOUNTER — RX RENEWAL (OUTPATIENT)
Age: 61
End: 2021-10-25

## 2021-10-25 DIAGNOSIS — G47.33 OBSTRUCTIVE SLEEP APNEA (ADULT) (PEDIATRIC): ICD-10-CM

## 2021-11-11 ENCOUNTER — NON-APPOINTMENT (OUTPATIENT)
Age: 61
End: 2021-11-11

## 2021-11-11 DIAGNOSIS — G89.29 OTHER CHRONIC PAIN: ICD-10-CM

## 2021-11-15 ENCOUNTER — RX RENEWAL (OUTPATIENT)
Age: 61
End: 2021-11-15

## 2021-11-17 ENCOUNTER — APPOINTMENT (OUTPATIENT)
Age: 61
End: 2021-11-17
Payer: COMMERCIAL

## 2021-11-17 VITALS
HEIGHT: 72 IN | WEIGHT: 186 LBS | OXYGEN SATURATION: 96 % | RESPIRATION RATE: 15 BRPM | HEART RATE: 111 BPM | BODY MASS INDEX: 25.19 KG/M2 | DIASTOLIC BLOOD PRESSURE: 78 MMHG | SYSTOLIC BLOOD PRESSURE: 126 MMHG

## 2021-11-17 PROCEDURE — 99214 OFFICE O/P EST MOD 30 MIN: CPT

## 2021-11-17 NOTE — HISTORY OF PRESENT ILLNESS
[Follow-Up - Routine Clinic] : a routine clinic follow-up of [Excessive Daytime Sleepiness] : excessive daytime sleepiness [Snoring] : snoring [Unrefreshing Sleep] : unrefreshing sleep [Dyspnea on Exertion] : dyspnea on exertion [Dyspnea at Rest] : no dyspnea at rest [Dry Cough] : dry cough [Productive Cough] : no productive cough [Wheezing] : no wheezing [Hemoptysis] : no hemoptysis [None] : No associated symptoms are reported [Shortness of Breath] : Shortness of Breath [Follow-Up - From Hospitalization] : follow-up of a hospitalization for [Dyspnea] : dyspnea [Currently Experiencing] : The patient is currently experiencing symptoms.

## 2021-11-29 ENCOUNTER — APPOINTMENT (OUTPATIENT)
Dept: RHEUMATOLOGY | Facility: CLINIC | Age: 61
End: 2021-11-29
Payer: COMMERCIAL

## 2021-11-29 PROCEDURE — 99214 OFFICE O/P EST MOD 30 MIN: CPT

## 2021-11-29 RX ORDER — NABUMETONE 750 MG/1
750 TABLET, FILM COATED ORAL
Qty: 60 | Refills: 2 | Status: DISCONTINUED | COMMUNITY
Start: 2021-11-11 | End: 2021-11-29

## 2021-11-29 RX ORDER — TOCILIZUMAB 180 MG/ML
162 INJECTION, SOLUTION SUBCUTANEOUS
Qty: 4 | Refills: 5 | Status: DISCONTINUED | COMMUNITY
Start: 2021-02-19 | End: 2021-11-29

## 2021-11-29 NOTE — PHYSICAL EXAM
[General Appearance - Alert] : alert [General Appearance - In No Acute Distress] : in no acute distress [Sclera] : the sclera and conjunctiva were normal [Extraocular Movements] : extraocular movements were intact [Outer Ear] : the ears and nose were normal in appearance [Hearing Threshold Finger Rub Not [place]] : hearing was normal [Neck Appearance] : the appearance of the neck was normal [Neck Cervical Mass (___cm)] : no neck mass was observed [Respiration, Rhythm And Depth] : normal respiratory rhythm and effort [Heart Rate And Rhythm] : heart rate was normal and rhythm regular [Heart Sounds] : normal S1 and S2 [Bowel Sounds] : normal bowel sounds [Abdomen Tenderness] : non-tender [Abnormal Walk] : normal gait [Nail Clubbing] : no clubbing  or cyanosis of the fingernails [Involuntary Movements] : no involuntary movements were seen [Musculoskeletal - Swelling] : no joint swelling seen [Motor Tone] : muscle strength and tone were normal [Skin Color & Pigmentation] : normal skin color and pigmentation [] : no rash [Sensation] : the sensory exam was normal to light touch and pinprick [Motor Exam] : the motor exam was normal [Affect] : the affect was normal [Mood] : the mood was normal [FreeTextEntry1] : No joint swelling, tenderness or synovitis

## 2021-11-29 NOTE — ASSESSMENT
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 RA seropositive (CCP and RF)\par -Disease is quiescent \par -Failed Actemra\par -Reviewed labs\par -Continue Ruxience in 6 months (12/23/21 next dose)\par -Start  mg BID as steroid sparing agent. After 2 weeks on HCQ taper prednisone to 5 mg daily x 2 weeks and then stop\par -Continue celebrex, tramadol PRN\par -Check monitoring labs today\par \par 2. ILD/chronic hypersensitivity pneumonitis\par -Recent CT scan reviewed\par -Avoid DMARDs\par -Follow up Pulm\par \par 3. GIST tumor\par -S/p resection and following with Oncology.\par \par 4. HTN/HLD\par -F/u with PCP, and Cardiology \par \par 5. Cervical spinal stenosis/lumbar disc herniation\par -Follow up with PCP and neurosurger\par \par 6. Bilateral shoulder pain\par -MRI right shoulder suggested partial rotator cuff tear, subacromial bursitis and has went for PT. \par -Stable\par \par 7. Hip pains\par -Hip x-ray suggested enthesopathy and mild joint space narrowing with bilateral sacroiliitis \par -NSAIDs for treatment. \par \par F/u 3 months\par

## 2021-11-29 NOTE — HISTORY OF PRESENT ILLNESS
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 He  takes prednisone 10 mg nightly, Ibuprofen and Tramadol PRN that help. Says gabapentin doesn't help. Denies AM stiffness or swelling. He is going to [place] for 3 months starting in January\par \par \par \par Brief history:\par Patient reports his symptoms started 6/16 at work. 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 He was seen in the ED 6/28 where XR of the knee was normal. XR of the right shoulder suggested AC OA (mild), and x-ray of the pelvis suggested degenerative changes in his lumbar spine and bilateral hips. He was sent home with motrin.\par \par MRI right ankle 7/1:   "Mild tenosynovitis of the posterior tibialis, flexor digitorum longus, peroneus brevis, and peroneus longus. Mild Achilles tendinosis. Edema in the sinus tarsi (and small erosion at sinus tarsi roof) may be seen with sinus tarsi syndrome, correlate with physical exam for lateral hindfoot impingement/instability. Diffuse edema throughout the musculature, likely due to to neuropathy. Prior sprain of the anterior distal syndesmotic ligament, deep fibers of the deltoid ligament, and spring ligament."\par \par MRI cervical spine 7/2:  "Mild canal narrowing on a degenerative basis, more pronounced at C5-6. C5-6 mild foraminal narrowing. Possible T4-T5 left foraminal disc herniation. If there is concern consider a dedicated T-spine study."\par \par Repeat MRI C spine 9/16: "C5-6 moderate spinal stenosis due to disc bulging and ligamentum flavum infolding, stable since the prior exam.. Stable mild spinal cord abutment without cord deformity or signal abnormality. Stable moderate foraminal narrowing. Stable additional mild degenerative changes as described."\par \par MRI lumbar spine 7/6: "L5-S1 small disc herniation in the right lateral recess with impingement of the descending right S1 nerve root. L3-4 small disc bulge and facet hypertrophy with mild right foraminal narrowing and mild compression of the exiting right L3 nerve root.  Additional mild degenerative changes as described.Mass-like heterogeneous structure within the mid abdomen, incompletely imaged/evaluated on this study. Although this can reflect loops of small bowel, recommend a dedicated CT or MRI of the abdomen with contrast to exclude an underlying mass."\par \par Repeat MRI lumbar spine 9/16:  since prior study: L5-S1 trace disc herniation in the right lateral recess, slightly decreased in size since the prior study with decreased mass effect upon the descending right S1 nerve root. L3-4 stable degenerative change with mild right foraminal narrowing and mild compression of the exiting right L3 nerve root.\par \par MRI T spine 9/16: normal. Repeat XR shoulder 9/17: AC OA\par \par MRI right shoulder 9/18: "Partial-thickness tears of the supraspinatus and infraspinatus tendons as above. Synovitis in the axillary pouch and rotator interval indicative of adhesive capsulitis, correlate with patient range of motion. SLAP tear extending into the posterior superior labrum. Small subacromial/subdeltoid bursitis."\par \par Xray of hands and feet 9/19: suggested OA without erosions\par \par CT Abdomen pelvis 7/26: "A 7.0 x 6.2 x 7.7 cm heterogeneous mass in the mid abdomen contiguous with small bowel loops most likely represents a small bowel gastrointestinal stromal tumor (GIST)."\par \par CTA dissection 9/17:  "Normal caliber of the thoracoabdominal aorta without evidence for dissection or intramural hematoma.Interstitial lung disease. No honeycombing. Interval postsurgical changes within the small bowel."\par \par Pathology from OR 8/21 confirmed GIST\par Serologies include +CCP >250,  with negative lyme, SSA, SSB, ED, Scleroderma. \par Hepatitis B and C serologies negative\par ESR 12 CRP 6\par Patient was admitted 9/15-9/19at [facility] for intractable neck pain radiating to right shoulder/arm right arm and low back pain radiating to right leg. Neurosurgery advised steroids and no surgical intervention. Imaging and labs as above. Started on metformin inpatient due to elevated blood sugars from steroids\par \par \par \par Today's visit: \par Patient feels well today. He reports joint pains improved. Denies significant AM stiffness or swelling. He takes prednisone 10 mg daily, Celebrex PRN, and tramadol at PM. He is switching pulmonlogist to [facility]

## 2021-12-09 ENCOUNTER — EMERGENCY (EMERGENCY)
Facility: HOSPITAL | Age: 61
LOS: 0 days | Discharge: HOME | End: 2021-12-09
Attending: EMERGENCY MEDICINE | Admitting: EMERGENCY MEDICINE
Payer: COMMERCIAL

## 2021-12-09 VITALS
HEART RATE: 100 BPM | SYSTOLIC BLOOD PRESSURE: 119 MMHG | RESPIRATION RATE: 20 BRPM | TEMPERATURE: 98 F | OXYGEN SATURATION: 97 % | DIASTOLIC BLOOD PRESSURE: 78 MMHG

## 2021-12-09 VITALS
HEART RATE: 112 BPM | DIASTOLIC BLOOD PRESSURE: 89 MMHG | SYSTOLIC BLOOD PRESSURE: 157 MMHG | HEIGHT: 72 IN | WEIGHT: 184.97 LBS | TEMPERATURE: 100 F | OXYGEN SATURATION: 96 % | RESPIRATION RATE: 20 BRPM

## 2021-12-09 DIAGNOSIS — I10 ESSENTIAL (PRIMARY) HYPERTENSION: ICD-10-CM

## 2021-12-09 DIAGNOSIS — M06.9 RHEUMATOID ARTHRITIS, UNSPECIFIED: ICD-10-CM

## 2021-12-09 DIAGNOSIS — E11.9 TYPE 2 DIABETES MELLITUS WITHOUT COMPLICATIONS: ICD-10-CM

## 2021-12-09 DIAGNOSIS — Z99.81 DEPENDENCE ON SUPPLEMENTAL OXYGEN: ICD-10-CM

## 2021-12-09 DIAGNOSIS — E78.5 HYPERLIPIDEMIA, UNSPECIFIED: ICD-10-CM

## 2021-12-09 DIAGNOSIS — Z91.013 ALLERGY TO SEAFOOD: ICD-10-CM

## 2021-12-09 DIAGNOSIS — Z20.822 CONTACT WITH AND (SUSPECTED) EXPOSURE TO COVID-19: ICD-10-CM

## 2021-12-09 DIAGNOSIS — Z88.0 ALLERGY STATUS TO PENICILLIN: ICD-10-CM

## 2021-12-09 DIAGNOSIS — Z79.01 LONG TERM (CURRENT) USE OF ANTICOAGULANTS: ICD-10-CM

## 2021-12-09 DIAGNOSIS — B34.9 VIRAL INFECTION, UNSPECIFIED: ICD-10-CM

## 2021-12-09 DIAGNOSIS — Z86.16 PERSONAL HISTORY OF COVID-19: ICD-10-CM

## 2021-12-09 DIAGNOSIS — Z98.890 OTHER SPECIFIED POSTPROCEDURAL STATES: Chronic | ICD-10-CM

## 2021-12-09 DIAGNOSIS — R05.9 COUGH, UNSPECIFIED: ICD-10-CM

## 2021-12-09 DIAGNOSIS — Z79.84 LONG TERM (CURRENT) USE OF ORAL HYPOGLYCEMIC DRUGS: ICD-10-CM

## 2021-12-09 LAB
ALBUMIN SERPL ELPH-MCNC: 3.9 G/DL — SIGNIFICANT CHANGE UP (ref 3.5–5.2)
ALP SERPL-CCNC: 156 U/L — HIGH (ref 30–115)
ALT FLD-CCNC: 25 U/L — SIGNIFICANT CHANGE UP (ref 0–41)
ANION GAP SERPL CALC-SCNC: 16 MMOL/L — HIGH (ref 7–14)
AST SERPL-CCNC: 18 U/L — SIGNIFICANT CHANGE UP (ref 0–41)
BASOPHILS # BLD AUTO: 0.08 K/UL — SIGNIFICANT CHANGE UP (ref 0–0.2)
BASOPHILS NFR BLD AUTO: 0.7 % — SIGNIFICANT CHANGE UP (ref 0–1)
BILIRUB SERPL-MCNC: 0.3 MG/DL — SIGNIFICANT CHANGE UP (ref 0.2–1.2)
BUN SERPL-MCNC: 12 MG/DL — SIGNIFICANT CHANGE UP (ref 10–20)
CALCIUM SERPL-MCNC: 9.3 MG/DL — SIGNIFICANT CHANGE UP (ref 8.5–10.1)
CHLORIDE SERPL-SCNC: 102 MMOL/L — SIGNIFICANT CHANGE UP (ref 98–110)
CO2 SERPL-SCNC: 21 MMOL/L — SIGNIFICANT CHANGE UP (ref 17–32)
CREAT SERPL-MCNC: 1.1 MG/DL — SIGNIFICANT CHANGE UP (ref 0.7–1.5)
EOSINOPHIL # BLD AUTO: 0.42 K/UL — SIGNIFICANT CHANGE UP (ref 0–0.7)
EOSINOPHIL NFR BLD AUTO: 3.6 % — SIGNIFICANT CHANGE UP (ref 0–8)
GLUCOSE SERPL-MCNC: 142 MG/DL — HIGH (ref 70–99)
HCOV PNL SPEC NAA+PROBE: DETECTED
HCT VFR BLD CALC: 43.4 % — SIGNIFICANT CHANGE UP (ref 42–52)
HGB BLD-MCNC: 14.1 G/DL — SIGNIFICANT CHANGE UP (ref 14–18)
IMM GRANULOCYTES NFR BLD AUTO: 0.8 % — HIGH (ref 0.1–0.3)
LACTATE SERPL-SCNC: 1.3 MMOL/L — SIGNIFICANT CHANGE UP (ref 0.7–2)
LYMPHOCYTES # BLD AUTO: 0.87 K/UL — LOW (ref 1.2–3.4)
LYMPHOCYTES # BLD AUTO: 7.5 % — LOW (ref 20.5–51.1)
MCHC RBC-ENTMCNC: 28.4 PG — SIGNIFICANT CHANGE UP (ref 27–31)
MCHC RBC-ENTMCNC: 32.5 G/DL — SIGNIFICANT CHANGE UP (ref 32–37)
MCV RBC AUTO: 87.3 FL — SIGNIFICANT CHANGE UP (ref 80–94)
MONOCYTES # BLD AUTO: 1.07 K/UL — HIGH (ref 0.1–0.6)
MONOCYTES NFR BLD AUTO: 9.2 % — SIGNIFICANT CHANGE UP (ref 1.7–9.3)
NEUTROPHILS # BLD AUTO: 9.14 K/UL — HIGH (ref 1.4–6.5)
NEUTROPHILS NFR BLD AUTO: 78.2 % — HIGH (ref 42.2–75.2)
NRBC # BLD: 0 /100 WBCS — SIGNIFICANT CHANGE UP (ref 0–0)
PLATELET # BLD AUTO: 394 K/UL — SIGNIFICANT CHANGE UP (ref 130–400)
POTASSIUM SERPL-MCNC: 4.2 MMOL/L — SIGNIFICANT CHANGE UP (ref 3.5–5)
POTASSIUM SERPL-SCNC: 4.2 MMOL/L — SIGNIFICANT CHANGE UP (ref 3.5–5)
PROT SERPL-MCNC: 7.3 G/DL — SIGNIFICANT CHANGE UP (ref 6–8)
RAPID RVP RESULT: DETECTED
RBC # BLD: 4.97 M/UL — SIGNIFICANT CHANGE UP (ref 4.7–6.1)
RBC # FLD: 15.2 % — HIGH (ref 11.5–14.5)
SARS-COV-2 RNA SPEC QL NAA+PROBE: SIGNIFICANT CHANGE UP
SODIUM SERPL-SCNC: 139 MMOL/L — SIGNIFICANT CHANGE UP (ref 135–146)
WBC # BLD: 11.67 K/UL — HIGH (ref 4.8–10.8)
WBC # FLD AUTO: 11.67 K/UL — HIGH (ref 4.8–10.8)

## 2021-12-09 PROCEDURE — 71045 X-RAY EXAM CHEST 1 VIEW: CPT | Mod: 26

## 2021-12-09 PROCEDURE — 99284 EMERGENCY DEPT VISIT MOD MDM: CPT

## 2021-12-09 RX ORDER — IBUPROFEN 200 MG
400 TABLET ORAL ONCE
Refills: 0 | Status: DISCONTINUED | OUTPATIENT
Start: 2021-12-09 | End: 2021-12-09

## 2021-12-09 RX ORDER — ALBUTEROL 90 UG/1
1 AEROSOL, METERED ORAL ONCE
Refills: 0 | Status: COMPLETED | OUTPATIENT
Start: 2021-12-09 | End: 2021-12-09

## 2021-12-09 RX ORDER — GUAIFENESIN/DEXTROMETHORPHAN 600MG-30MG
10 TABLET, EXTENDED RELEASE 12 HR ORAL ONCE
Refills: 0 | Status: COMPLETED | OUTPATIENT
Start: 2021-12-09 | End: 2021-12-09

## 2021-12-09 RX ORDER — SODIUM CHLORIDE 9 MG/ML
500 INJECTION, SOLUTION INTRAVENOUS ONCE
Refills: 0 | Status: COMPLETED | OUTPATIENT
Start: 2021-12-09 | End: 2021-12-09

## 2021-12-09 RX ORDER — IBUPROFEN 200 MG
600 TABLET ORAL ONCE
Refills: 0 | Status: COMPLETED | OUTPATIENT
Start: 2021-12-09 | End: 2021-12-09

## 2021-12-09 RX ADMIN — ALBUTEROL 1 PUFF(S): 90 AEROSOL, METERED ORAL at 18:28

## 2021-12-09 RX ADMIN — Medication 600 MILLIGRAM(S): at 17:33

## 2021-12-09 RX ADMIN — Medication 600 MILLIGRAM(S): at 17:45

## 2021-12-09 RX ADMIN — Medication 100 MILLIGRAM(S): at 21:02

## 2021-12-09 RX ADMIN — Medication 10 MILLILITER(S): at 17:33

## 2021-12-09 RX ADMIN — SODIUM CHLORIDE 500 MILLILITER(S): 9 INJECTION, SOLUTION INTRAVENOUS at 17:34

## 2021-12-09 NOTE — ED PROVIDER NOTE - OBJECTIVE STATEMENT
61 yr M with recent COVID pneumonia discharged 3 weeks ago on 2LNC, GIST (s/p resection), DM type II , RA (on prednisone), HTN, HLD and herniated disc presenting to ER for cough x3 days and fevers. Multiple sick contacts at home who are positive for flu. Pt s/p flu shot and COvid booster last month. No n/v. Tolerating PO. SOB when he coughs. Cough is dry. No chest pain. No nasal congestion. No abdominal pain. Pt called PMD today who asked pt to come to the ED to r/o pneumonia. 61 yr M with hx of COVID pneumonia on 2LNC prn, GIST (s/p resection), DM type II , RA (on prednisone), HTN, HLD and herniated disc presenting to ER for cough x3 days and fevers. Multiple sick contacts at home who are positive for flu. Pt s/p flu shot and COvid booster last month. No n/v. Tolerating PO. SOB when he coughs. Cough is dry. No chest pain. No nasal congestion. No abdominal pain. Pt called PMD today who asked pt to come to the ED to r/o pneumonia. 61 yr M with hx of COVID pneumonia on 2LNC prn, GIST (s/p resection), DM type II , RA (on prednisone), HTN, HLD and herniated disc presenting to ER for cough x3 days and fevers. Multiple sick contacts at home who are positive for flu. Pt s/p flu shot and Covid-19 booster shot last month. No n/v. Tolerating PO. SOB when he coughs. Cough is dry. No chest pain. No nasal congestion. No abdominal pain. Pt called PMD today who asked pt to come to the ED to r/o pneumonia.

## 2021-12-09 NOTE — ED PROVIDER NOTE - NS ED ROS FT
Eyes:  No visual changes, eye pain or discharge.  ENMT:  No hearing changes, pain, no sore throat or runny nose, no difficulty swallowing  Cardiac:  No chest pain, SOB or edema. No chest pain with exertion.  Respiratory: see HPI  GI:  No nausea, vomiting, diarrhea or abdominal pain.  :  No dysuria, frequency or burning.  MS:  +myalgias  Neuro:  No headache or weakness.  No LOC.  Skin:  No skin rash.   Endocrine: No history of thyroid disease or diabetes.

## 2021-12-09 NOTE — ED ADULT NURSE NOTE - OBJECTIVE STATEMENT
Pt presents to ED c/o cough x 3 days. Pt states family members tested positive for flu. Pt denies fever at home. Denies chest pain. Pt is awake alert and not in any distress.

## 2021-12-09 NOTE — ED PROVIDER NOTE - PATIENT PORTAL LINK FT
You can access the FollowMyHealth Patient Portal offered by [facility] by registering at the following website: [email or web address]. By joining [employer]’s FollowMyHealth portal, you will also be able to view your health information using other applications (apps) compatible with our system.

## 2021-12-09 NOTE — ED PROVIDER NOTE - CARE PROVIDER_API CALL
Charles Hassan  INTERNAL MEDICINE  [address] [place] [address]  Phone: [phone]  Fax: [phone]  Follow Up Time: 1-3 Days

## 2021-12-09 NOTE — ED PROVIDER NOTE - PHYSICAL EXAMINATION
CONSTITUTIONAL: Well-developed; well-nourished; in no acute distress.   SKIN: warm, dry  HEAD: Normocephalic; atraumatic.  EYES: no conjunctival injection. PERRL.   ENT: No nasal discharge; airway clear.  NECK: Supple; non tender.  CARD: +tachycardic  RESP: No wheezes, rales or rhonchi.  ABD: soft ntnd  EXT: Normal ROM.  No clubbing, cyanosis or edema.   LYMPH: No acute cervical adenopathy.  NEURO: Alert, oriented, grossly unremarkable  PSYCH: Cooperative, appropriate.

## 2021-12-09 NOTE — ED PROVIDER NOTE - IV ALTEPLASE DOOR HIDDEN
Bedside and Verbal shift change report given to Ruthie (oncoming nurse) by self (offgoing nurse). Report included the following information SBAR, Kardex, MAR and Recent Results. show

## 2021-12-09 NOTE — ED PROVIDER NOTE - CLINICAL SUMMARY MEDICAL DECISION MAKING FREE TEXT BOX
61 yr M with hx of COVID pneumonia on 2LNC prn, GIST (s/p resection), DM type II , RA (on prednisone), HTN, HLD and herniated disc presenting to ER for cough x3 days and fevers. Multiple sick contacts at home who are positive for flu. Pt s/p flu shot and Covid-19 booster shot last month. No n/v. Tolerating PO. SOB when he coughs. Cough is dry. No chest pain. No nasal congestion. No abdominal pain. Pt called PMD today who asked pt to come to the ED to r/o pneumonia. On exam pt in no acute distress, and lungs with no wheezes/rhonchi. Labs, RVP swab, xray ordered. XR shows no ACUTE pathology  but has chronic interstitial lungs findings (likely from his previous and severe covid-19 infection for which he was hospitalized). RVP did come back + for infection with coronavirus (just not the Sars-cov-2 type). Results explained. Pt prescribed tessalon perles. Can take home albuterol MDI given in ER (1-2 puff [id number] prn) Already on prednisone and oxygen (prn) at home . Recommend follow up with his Pulm doctor/PMD. Return precautions given.

## 2021-12-09 NOTE — ED PROVIDER NOTE - PROGRESS NOTE DETAILS
Vj: received signout from Dr Garcia Vj: Discussed RVP panel findings with him, pt aware and states ready to go home, daughter is outside, will DC

## 2021-12-09 NOTE — ED PROVIDER NOTE - ATTENDING CONTRIBUTION TO CARE
61 yr M with hx of COVID pneumonia on 2LNC prn, GIST (s/p resection), DM type II , RA (on prednisone), HTN, HLD and herniated disc presenting to ER for cough x3 days and fevers. Multiple sick contacts at home who are positive for flu. Pt s/p flu shot and Covid-19 booster shot last month. No n/v. Tolerating PO. SOB when he coughs. Cough is dry. No chest pain. No nasal congestion. No abdominal pain. Pt called PMD today who asked pt to come to the ED to r/o pneumonia. On exam pt in no acute distress, and lungs with no wheezes/rhonchi. Labs, RVP swab, xray ordered. XR shows no ACUTE pathology  but has chronic interstitial lungs findings (likely from his previous and severe covid-19 infection for which he was hospitalized). RVP did come back + for infection with coronavirus (just not the Sars-cov-2 type). Results explained. Pt prescribed tessalon perles. Already on prednisone and oxygen (prn) at home . Recommend follow up with his Pulm doctor/PMD. Return precautions given.

## 2021-12-16 NOTE — PROGRESS NOTE ADULT - PROVIDER SPECIALTY LIST ADULT
Surgery no chest pain/no palpitations/no dyspnea on exertion/no orthopnea/no paroxysmal nocturnal dyspnea/no peripheral edema/no claudication

## 2021-12-23 ENCOUNTER — RX RENEWAL (OUTPATIENT)
Age: 61
End: 2021-12-23

## 2022-01-06 ENCOUNTER — APPOINTMENT (OUTPATIENT)
Age: 62
End: 2022-01-06
Payer: COMMERCIAL

## 2022-01-06 VITALS
RESPIRATION RATE: 14 BRPM | WEIGHT: 186 LBS | OXYGEN SATURATION: 96 % | DIASTOLIC BLOOD PRESSURE: 90 MMHG | SYSTOLIC BLOOD PRESSURE: 136 MMHG | HEART RATE: 111 BPM | BODY MASS INDEX: 25.19 KG/M2 | HEIGHT: 72 IN

## 2022-01-06 PROCEDURE — 71046 X-RAY EXAM CHEST 2 VIEWS: CPT

## 2022-01-06 PROCEDURE — 99214 OFFICE O/P EST MOD 30 MIN: CPT | Mod: 25

## 2022-01-06 RX ORDER — IPRATROPIUM BROMIDE AND ALBUTEROL SULFATE 2.5; .5 MG/3ML; MG/3ML
0.5-2.5 (3) SOLUTION RESPIRATORY (INHALATION)
Qty: 2 | Refills: 1 | Status: ACTIVE | COMMUNITY
Start: 2022-01-06 | End: 1900-01-01

## 2022-01-06 NOTE — ASSESSMENT
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

## 2022-01-06 NOTE — HISTORY OF PRESENT ILLNESS
[Follow-Up - Routine Clinic] : a routine clinic follow-up of [Excessive Daytime Sleepiness] : excessive daytime sleepiness [Snoring] : snoring [Unrefreshing Sleep] : unrefreshing sleep [Dyspnea on Exertion] : dyspnea on exertion [Dry Cough] : dry cough [None] : No associated symptoms are reported [Shortness of Breath] : Shortness of Breath [Follow-Up - From Hospitalization] : follow-up of a hospitalization for [Dyspnea] : dyspnea [Currently Experiencing] : The patient is currently experiencing symptoms. [Dyspnea at Rest] : no dyspnea at rest [Productive Cough] : no productive cough [Wheezing] : no wheezing [Hemoptysis] : no hemoptysis

## 2022-01-07 ENCOUNTER — LABORATORY RESULT (OUTPATIENT)
Age: 62
End: 2022-01-07

## 2022-01-07 ENCOUNTER — APPOINTMENT (OUTPATIENT)
Dept: INFUSION THERAPY | Facility: CLINIC | Age: 62
End: 2022-01-07
Payer: COMMERCIAL

## 2022-01-07 ENCOUNTER — OUTPATIENT (OUTPATIENT)
Dept: OUTPATIENT SERVICES | Facility: HOSPITAL | Age: 62
LOS: 1 days | Discharge: HOME | End: 2022-01-07

## 2022-01-07 ENCOUNTER — APPOINTMENT (OUTPATIENT)
Dept: HEMATOLOGY ONCOLOGY | Facility: CLINIC | Age: 62
End: 2022-01-07
Payer: COMMERCIAL

## 2022-01-07 VITALS
HEART RATE: 117 BPM | DIASTOLIC BLOOD PRESSURE: 90 MMHG | HEIGHT: 72 IN | BODY MASS INDEX: 25.06 KG/M2 | TEMPERATURE: 97.8 F | WEIGHT: 185 LBS | SYSTOLIC BLOOD PRESSURE: 166 MMHG

## 2022-01-07 DIAGNOSIS — Z51.81 ENCOUNTER FOR THERAPEUTIC DRUG LVL MONITORING: ICD-10-CM

## 2022-01-07 DIAGNOSIS — Z98.890 OTHER SPECIFIED POSTPROCEDURAL STATES: Chronic | ICD-10-CM

## 2022-01-07 DIAGNOSIS — Z79.899 ENCOUNTER FOR THERAPEUTIC DRUG LVL MONITORING: ICD-10-CM

## 2022-01-07 LAB
HCT VFR BLD CALC: 40.4 %
HGB BLD-MCNC: 13.2 G/DL
MCHC RBC-ENTMCNC: 28 PG
MCHC RBC-ENTMCNC: 32.7 G/DL
MCV RBC AUTO: 85.8 FL
PLATELET # BLD AUTO: 391 K/UL
PMV BLD: 10.1 FL
RBC # BLD: 4.71 M/UL
RBC # FLD: 15.7 %
WBC # FLD AUTO: 13.37 K/UL

## 2022-01-07 PROCEDURE — ZZZZZ: CPT

## 2022-01-07 RX ORDER — DIPHENHYDRAMINE HCL 50 MG
50 CAPSULE ORAL ONCE
Refills: 0 | Status: COMPLETED | OUTPATIENT
Start: 2022-01-07 | End: 2022-01-07

## 2022-01-07 RX ORDER — ACETAMINOPHEN 500 MG
650 TABLET ORAL ONCE
Refills: 0 | Status: COMPLETED | OUTPATIENT
Start: 2022-01-07 | End: 2022-01-07

## 2022-01-07 RX ORDER — RITUXIMAB 10 MG/ML
1000 INJECTION, SOLUTION INTRAVENOUS ONCE
Refills: 0 | Status: COMPLETED | OUTPATIENT
Start: 2022-01-07 | End: 2022-01-07

## 2022-01-07 RX ADMIN — Medication 102 MILLIGRAM(S): at 11:31

## 2022-01-07 RX ADMIN — RITUXIMAB 166.67 MILLIGRAM(S): 10 INJECTION, SOLUTION INTRAVENOUS at 12:30

## 2022-01-07 RX ADMIN — Medication 650 MILLIGRAM(S): at 11:31

## 2022-01-13 DIAGNOSIS — M05.9 RHEUMATOID ARTHRITIS WITH RHEUMATOID FACTOR, UNSPECIFIED: ICD-10-CM

## 2022-02-08 NOTE — PATIENT PROFILE ADULT - FALLEN IN THE PAST
Scheduled date of EGD(as of today): 04/08/22  Physician performing EGD: [facility] [place]  Location of EGD: [facility]  Instructions reviewed with patient by: [place]  Clearances: VACCINATED, PLAVIX no

## 2022-02-27 ENCOUNTER — FORM ENCOUNTER (OUTPATIENT)
Age: 62
End: 2022-02-27

## 2022-03-07 ENCOUNTER — APPOINTMENT (OUTPATIENT)
Age: 62
End: 2022-03-07

## 2022-03-07 ENCOUNTER — APPOINTMENT (OUTPATIENT)
Dept: RHEUMATOLOGY | Facility: CLINIC | Age: 62
End: 2022-03-07
Payer: COMMERCIAL

## 2022-03-07 VITALS
BODY MASS INDEX: 25.06 KG/M2 | HEIGHT: 72 IN | SYSTOLIC BLOOD PRESSURE: 120 MMHG | TEMPERATURE: 97.2 F | DIASTOLIC BLOOD PRESSURE: 80 MMHG | WEIGHT: 185 LBS | HEART RATE: 115 BPM | OXYGEN SATURATION: 97 %

## 2022-03-07 PROCEDURE — 99215 OFFICE O/P EST HI 40 MIN: CPT

## 2022-03-07 RX ORDER — GABAPENTIN 300 MG/1
300 CAPSULE ORAL
Qty: 60 | Refills: 0 | Status: DISCONTINUED | COMMUNITY
Start: 2021-11-11 | End: 2022-03-07

## 2022-03-07 RX ORDER — CELECOXIB 200 MG/1
200 CAPSULE ORAL
Qty: 60 | Refills: 2 | Status: DISCONTINUED | COMMUNITY
Start: 2021-04-27 | End: 2022-03-07

## 2022-03-07 RX ORDER — HYDROXYCHLOROQUINE SULFATE 200 MG/1
200 TABLET, FILM COATED ORAL TWICE DAILY
Qty: 60 | Refills: 0 | Status: DISCONTINUED | COMMUNITY
Start: 2021-11-29 | End: 2022-03-07

## 2022-03-08 LAB
ALBUMIN SERPL ELPH-MCNC: 4.5 G/DL
ALP BLD-CCNC: 152 U/L
ALT SERPL-CCNC: 24 U/L
ANION GAP SERPL CALC-SCNC: 17 MMOL/L
AST SERPL-CCNC: 17 U/L
BASOPHILS # BLD AUTO: 0.11 K/UL
BASOPHILS NFR BLD AUTO: 0.8 %
BILIRUB SERPL-MCNC: 0.3 MG/DL
BUN SERPL-MCNC: 18 MG/DL
CALCIUM SERPL-MCNC: 9.9 MG/DL
CHLORIDE SERPL-SCNC: 97 MMOL/L
CO2 SERPL-SCNC: 26 MMOL/L
CREAT SERPL-MCNC: 1.1 MG/DL
CRP SERPL-MCNC: 39 MG/L
EGFR: 76 ML/MIN/1.73M2
EOSINOPHIL # BLD AUTO: 0.39 K/UL
EOSINOPHIL NFR BLD AUTO: 2.7 %
ERYTHROCYTE [SEDIMENTATION RATE] IN BLOOD BY WESTERGREN METHOD: 67 MM/HR
GLUCOSE SERPL-MCNC: 191 MG/DL
HCT VFR BLD CALC: 45 %
HGB BLD-MCNC: 14.3 G/DL
IMM GRANULOCYTES NFR BLD AUTO: 0.6 %
LYMPHOCYTES # BLD AUTO: 2.9 K/UL
LYMPHOCYTES NFR BLD AUTO: 20.3 %
MAN DIFF?: NORMAL
MCHC RBC-ENTMCNC: 28.3 PG
MCHC RBC-ENTMCNC: 31.8 G/DL
MCV RBC AUTO: 89.1 FL
MONOCYTES # BLD AUTO: 1.61 K/UL
MONOCYTES NFR BLD AUTO: 11.3 %
NEUTROPHILS # BLD AUTO: 9.21 K/UL
NEUTROPHILS NFR BLD AUTO: 64.3 %
PLATELET # BLD AUTO: 477 K/UL
POTASSIUM SERPL-SCNC: 4 MMOL/L
PROT SERPL-MCNC: 7.7 G/DL
RBC # BLD: 5.05 M/UL
RBC # FLD: 15.3 %
SODIUM SERPL-SCNC: 140 MMOL/L
WBC # FLD AUTO: 14.31 K/UL

## 2022-03-15 ENCOUNTER — LABORATORY RESULT (OUTPATIENT)
Age: 62
End: 2022-03-15

## 2022-03-15 DIAGNOSIS — Z79.899 OTHER LONG TERM (CURRENT) DRUG THERAPY: ICD-10-CM

## 2022-03-15 DIAGNOSIS — M25.552 PAIN IN RIGHT HIP: ICD-10-CM

## 2022-03-15 DIAGNOSIS — M25.551 PAIN IN RIGHT HIP: ICD-10-CM

## 2022-03-15 RX ORDER — NAPROXEN 500 MG/1
500 TABLET ORAL
Qty: 60 | Refills: 2 | Status: DISCONTINUED | COMMUNITY
Start: 2022-03-07 | End: 2022-03-15

## 2022-03-15 NOTE — ASSESSMENT
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 RA seropositive (CCP and RF)\par -Failed Actemra and HCQ\par -Reviewed labs\par -Continue Ruxience in 6 months due in July 2022\par -Check monitoring labs today\par -Naproxen 500 mg BID PRN, tramadol 50 mg BID PRN, stop prednisone 5 mg daily\par -May need DMARD if pulmonary status improved\par \par 2. ILD/chronic hypersensitivity pneumonitis\par -Recent CT scan reviewed\par -Follow up Pulm\par \par 3. GIST tumor\par -S/p resection and following with Oncology.\par \par 4. HTN/HLD\par -F/u with PCP, and Cardiology \par \par 5. Cervical spinal stenosis/lumbar disc herniation\par -Follow up with PCP and neurosurgery\par \par 6. Bilateral shoulder pain\par -MRI right shoulder suggested partial rotator cuff tear, subacromial bursitis and has went for PT. \par -Stable\par \par 7. Hip pains\par -Hip x-ray suggested enthesopathy and mild joint space narrowing with bilateral sacroiliitis \par -NSAIDs for treatment\par -Check MRI right hip r/o inflammatory arthritis\par -PT he defers this visit\par \par 3/15/22: Patient has GI upset with naproxen. Will start leflunomide 10 mg daily r/b/a discussed in detail, needs labs in 1 month. \par \par \par F/u 3 months\par

## 2022-03-15 NOTE — PHYSICAL EXAM
[General Appearance - Alert] : alert [General Appearance - In No Acute Distress] : in no acute distress [Sclera] : the sclera and conjunctiva were normal [Extraocular Movements] : extraocular movements were intact [Outer Ear] : the ears and nose were normal in appearance [Hearing Threshold Finger Rub Not [place]] : hearing was normal [Neck Appearance] : the appearance of the neck was normal [Neck Cervical Mass (___cm)] : no neck mass was observed [Respiration, Rhythm And Depth] : normal respiratory rhythm and effort [Heart Rate And Rhythm] : heart rate was normal and rhythm regular [Heart Sounds] : normal S1 and S2 [Bowel Sounds] : normal bowel sounds [Abdomen Tenderness] : non-tender [Abnormal Walk] : normal gait [Nail Clubbing] : no clubbing  or cyanosis of the fingernails [Involuntary Movements] : no involuntary movements were seen [Musculoskeletal - Swelling] : no joint swelling seen [Motor Tone] : muscle strength and tone were normal [Skin Color & Pigmentation] : normal skin color and pigmentation [] : no rash [Sensation] : the sensory exam was normal to light touch and pinprick [Motor Exam] : the motor exam was normal [Affect] : the affect was normal [Mood] : the mood was normal [FreeTextEntry1] : Tender Bilatearl 2nd, 3rd, 4th MCP and PIPs

## 2022-03-15 NOTE — HISTORY OF PRESENT ILLNESS
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 He gets pain anterior thigh pain down leg for 2-3 weeks worse with ambulation. He has face swelling, right 2nd, 3rd, 4th MCP finger swelling and pain, took prednisone 10 mg last week that helped. His palms are pruritic last 3 weeks uses benadry and hydrocortisone cream\par \par 11/29/21:\par Patient stopped prednisone for 1 week and had joint pain and swelling in his fingers return. He  takes prednisone 10 mg nightly, Ibuprofen and Tramadol PRN that help. Says gabapentin doesn't help. Denies AM stiffness or swelling. He is going to [facility] for 3 months starting in January\par \par \par \par Brief history:\par Patient reports his symptoms started 6/16 at work. 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 He was seen in the ED 6/28 where XR of the knee was normal. XR of the right shoulder suggested AC OA (mild), and x-ray of the pelvis suggested degenerative changes in his lumbar spine and bilateral hips. He was sent home with motrin.\par \par MRI right ankle 7/1:   "Mild tenosynovitis of the posterior tibialis, flexor digitorum longus, peroneus brevis, and peroneus longus. Mild Achilles tendinosis. Edema in the sinus tarsi (and small erosion at sinus tarsi roof) may be seen with sinus tarsi syndrome, correlate with physical exam for lateral hindfoot impingement/instability. Diffuse edema throughout the musculature, likely due to to neuropathy. Prior sprain of the anterior distal syndesmotic ligament, deep fibers of the deltoid ligament, and spring ligament."\par \par MRI cervical spine 7/2:  "Mild canal narrowing on a degenerative basis, more pronounced at C5-6. C5-6 mild foraminal narrowing. Possible T4-T5 left foraminal disc herniation. If there is concern consider a dedicated T-spine study."\par \par Repeat MRI C spine 9/16: "C5-6 moderate spinal stenosis due to disc bulging and ligamentum flavum infolding, stable since the prior exam.. Stable mild spinal cord abutment without cord deformity or signal abnormality. Stable moderate foraminal narrowing. Stable additional mild degenerative changes as described."\par \par MRI lumbar spine 7/6: "L5-S1 small disc herniation in the right lateral recess with impingement of the descending right S1 nerve root. L3-4 small disc bulge and facet hypertrophy with mild right foraminal narrowing and mild compression of the exiting right L3 nerve root.  Additional mild degenerative changes as described.Mass-like heterogeneous structure within the mid abdomen, incompletely imaged/evaluated on this study. Although this can reflect loops of small bowel, recommend a dedicated CT or MRI of the abdomen with contrast to exclude an underlying mass."\par \par Repeat MRI lumbar spine 9/16:  since prior study: L5-S1 trace disc herniation in the right lateral recess, slightly decreased in size since the prior study with decreased mass effect upon the descending right S1 nerve root. L3-4 stable degenerative change with mild right foraminal narrowing and mild compression of the exiting right L3 nerve root.\par \par MRI T spine 9/16: normal. Repeat XR shoulder 9/17: AC OA\par \par MRI right shoulder 9/18: "Partial-thickness tears of the supraspinatus and infraspinatus tendons as above. Synovitis in the axillary pouch and rotator interval indicative of adhesive capsulitis, correlate with patient range of motion. SLAP tear extending into the posterior superior labrum. Small subacromial/subdeltoid bursitis."\par \par Xray of hands and feet 9/19: suggested OA without erosions\par \par CT Abdomen pelvis 7/26: "A 7.0 x 6.2 x 7.7 cm heterogeneous mass in the mid abdomen contiguous with small bowel loops most likely represents a small bowel gastrointestinal stromal tumor (GIST)."\par \par CTA dissection 9/17:  "Normal caliber of the thoracoabdominal aorta without evidence for dissection or intramural hematoma.Interstitial lung disease. No honeycombing. Interval postsurgical changes within the small bowel."\par \par Pathology from OR 8/21 confirmed GIST\par Serologies include +CCP >250,  with negative lyme, SSA, SSB, ED, Scleroderma. \par Hepatitis B and C serologies negative\par ESR 12 CRP 6\par Patient was admitted 9/15-9/19at [facility] for intractable neck pain radiating to right shoulder/arm right arm and low back pain radiating to right leg. Neurosurgery advised steroids and no surgical intervention. Imaging and labs as above. Started on metformin inpatient due to elevated blood sugars from steroids\par \par \par \par Today's visit: \par Patient feels well today. He reports joint pains improved. Denies significant AM stiffness or swelling. He takes prednisone 10 mg daily, Celebrex PRN, and tramadol at PM. He is switching pulmonlogist to [facility]

## 2022-03-16 ENCOUNTER — RX RENEWAL (OUTPATIENT)
Age: 62
End: 2022-03-16

## 2022-03-18 ENCOUNTER — OUTPATIENT (OUTPATIENT)
Dept: OUTPATIENT SERVICES | Facility: HOSPITAL | Age: 62
LOS: 1 days | Discharge: HOME | End: 2022-03-18
Payer: COMMERCIAL

## 2022-03-18 DIAGNOSIS — Z98.890 OTHER SPECIFIED POSTPROCEDURAL STATES: Chronic | ICD-10-CM

## 2022-03-18 PROCEDURE — 95811 POLYSOM 6/>YRS CPAP 4/> PARM: CPT | Mod: 26

## 2022-03-21 DIAGNOSIS — G47.33 OBSTRUCTIVE SLEEP APNEA (ADULT) (PEDIATRIC): ICD-10-CM

## 2022-06-15 ENCOUNTER — RX RENEWAL (OUTPATIENT)
Age: 62
End: 2022-06-15

## 2022-06-19 ENCOUNTER — FORM ENCOUNTER (OUTPATIENT)
Age: 62
End: 2022-06-19

## 2022-06-20 ENCOUNTER — APPOINTMENT (OUTPATIENT)
Age: 62
End: 2022-06-20
Payer: COMMERCIAL

## 2022-06-20 VITALS
BODY MASS INDEX: 23.98 KG/M2 | SYSTOLIC BLOOD PRESSURE: 150 MMHG | WEIGHT: 177 LBS | RESPIRATION RATE: 14 BRPM | OXYGEN SATURATION: 96 % | DIASTOLIC BLOOD PRESSURE: 90 MMHG | HEART RATE: 92 BPM | HEIGHT: 72 IN

## 2022-06-20 PROCEDURE — 99214 OFFICE O/P EST MOD 30 MIN: CPT

## 2022-06-20 NOTE — ASSESSMENT
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  Awaiting CPAP delivery

## 2022-06-22 ENCOUNTER — APPOINTMENT (OUTPATIENT)
Dept: RHEUMATOLOGY | Facility: CLINIC | Age: 62
End: 2022-06-22
Payer: COMMERCIAL

## 2022-06-22 VITALS
BODY MASS INDEX: 23.7 KG/M2 | SYSTOLIC BLOOD PRESSURE: 130 MMHG | DIASTOLIC BLOOD PRESSURE: 84 MMHG | HEIGHT: 72 IN | WEIGHT: 175 LBS | OXYGEN SATURATION: 97 % | TEMPERATURE: 98.4 F | HEART RATE: 94 BPM

## 2022-06-22 DIAGNOSIS — R11.2 NAUSEA WITH VOMITING, UNSPECIFIED: ICD-10-CM

## 2022-06-22 PROBLEM — Z51.81 VISIT FOR MONITORING RITUXAN THERAPY: Status: ACTIVE | Noted: 2021-06-29

## 2022-06-22 PROCEDURE — 99214 OFFICE O/P EST MOD 30 MIN: CPT

## 2022-06-22 RX ORDER — OMEPRAZOLE 20 MG/1
20 CAPSULE, DELAYED RELEASE ORAL DAILY
Qty: 30 | Refills: 2 | Status: DISCONTINUED | COMMUNITY
Start: 2021-04-27 | End: 2022-06-22

## 2022-06-27 DIAGNOSIS — R79.89 OTHER SPECIFIED ABNORMAL FINDINGS OF BLOOD CHEMISTRY: ICD-10-CM

## 2022-06-27 LAB
ALBUMIN SERPL ELPH-MCNC: 4 G/DL
ALP BLD-CCNC: 205 U/L
ALT SERPL-CCNC: 176 U/L
ANION GAP SERPL CALC-SCNC: 11 MMOL/L
AST SERPL-CCNC: 55 U/L
BASOPHILS # BLD AUTO: 0.1 K/UL
BASOPHILS NFR BLD AUTO: 0.9 %
BILIRUB SERPL-MCNC: 0.3 MG/DL
BUN SERPL-MCNC: 14 MG/DL
CALCIUM SERPL-MCNC: 9.6 MG/DL
CHLORIDE SERPL-SCNC: 100 MMOL/L
CO2 SERPL-SCNC: 29 MMOL/L
CREAT SERPL-MCNC: 0.9 MG/DL
CRP SERPL-MCNC: 21 MG/L
EGFR: 97 ML/MIN/1.73M2
EOSINOPHIL # BLD AUTO: 0.46 K/UL
EOSINOPHIL NFR BLD AUTO: 3.9 %
ERYTHROCYTE [SEDIMENTATION RATE] IN BLOOD BY WESTERGREN METHOD: 71 MM/HR
GLUCOSE SERPL-MCNC: 127 MG/DL
HBV CORE IGG+IGM SER QL: NONREACTIVE
HBV CORE IGM SER QL: NONREACTIVE
HBV SURFACE AB SER QL: REACTIVE
HBV SURFACE AG SER QL: NONREACTIVE
HCT VFR BLD CALC: 45.6 %
HCV AB SER QL: NONREACTIVE
HCV S/CO RATIO: 0.21 S/CO
HGB BLD-MCNC: 14.5 G/DL
IMM GRANULOCYTES NFR BLD AUTO: 0.7 %
LYMPHOCYTES # BLD AUTO: 2.16 K/UL
LYMPHOCYTES NFR BLD AUTO: 18.4 %
MAN DIFF?: NORMAL
MCHC RBC-ENTMCNC: 28.2 PG
MCHC RBC-ENTMCNC: 31.8 G/DL
MCV RBC AUTO: 88.5 FL
MONOCYTES # BLD AUTO: 1.16 K/UL
MONOCYTES NFR BLD AUTO: 9.9 %
NEUTROPHILS # BLD AUTO: 7.8 K/UL
NEUTROPHILS NFR BLD AUTO: 66.2 %
PLATELET # BLD AUTO: 415 K/UL
POTASSIUM SERPL-SCNC: 3.6 MMOL/L
PROT SERPL-MCNC: 7.3 G/DL
RBC # BLD: 5.15 M/UL
RBC # FLD: 16.7 %
SODIUM SERPL-SCNC: 140 MMOL/L
WBC # FLD AUTO: 11.76 K/UL

## 2022-07-07 ENCOUNTER — OUTPATIENT (OUTPATIENT)
Dept: OUTPATIENT SERVICES | Facility: HOSPITAL | Age: 62
LOS: 1 days | Discharge: HOME | End: 2022-07-07

## 2022-07-07 ENCOUNTER — APPOINTMENT (OUTPATIENT)
Dept: INFUSION THERAPY | Facility: CLINIC | Age: 62
End: 2022-07-07

## 2022-07-07 DIAGNOSIS — Z98.890 OTHER SPECIFIED POSTPROCEDURAL STATES: Chronic | ICD-10-CM

## 2022-07-07 RX ORDER — DIPHENHYDRAMINE HCL 50 MG
50 CAPSULE ORAL ONCE
Refills: 0 | Status: COMPLETED | OUTPATIENT
Start: 2022-07-07 | End: 2022-07-07

## 2022-07-07 RX ORDER — RITUXIMAB 10 MG/ML
1000 INJECTION, SOLUTION INTRAVENOUS ONCE
Refills: 0 | Status: COMPLETED | OUTPATIENT
Start: 2022-07-07 | End: 2022-07-07

## 2022-07-07 RX ORDER — ACETAMINOPHEN 500 MG
650 TABLET ORAL ONCE
Refills: 0 | Status: COMPLETED | OUTPATIENT
Start: 2022-07-07 | End: 2022-07-07

## 2022-07-07 RX ADMIN — RITUXIMAB 1000 MILLIGRAM(S): 10 INJECTION, SOLUTION INTRAVENOUS at 10:46

## 2022-07-07 RX ADMIN — Medication 102 MILLIGRAM(S): at 10:31

## 2022-07-07 RX ADMIN — Medication 650 MILLIGRAM(S): at 10:31

## 2022-07-08 DIAGNOSIS — M05.9 RHEUMATOID ARTHRITIS WITH RHEUMATOID FACTOR, UNSPECIFIED: ICD-10-CM

## 2022-07-19 ENCOUNTER — OUTPATIENT (OUTPATIENT)
Dept: OUTPATIENT SERVICES | Facility: HOSPITAL | Age: 62
LOS: 1 days | Discharge: HOME | End: 2022-07-19

## 2022-07-19 ENCOUNTER — RESULT REVIEW (OUTPATIENT)
Age: 62
End: 2022-07-19

## 2022-07-19 DIAGNOSIS — Z98.890 OTHER SPECIFIED POSTPROCEDURAL STATES: Chronic | ICD-10-CM

## 2022-07-19 DIAGNOSIS — J84.9 INTERSTITIAL PULMONARY DISEASE, UNSPECIFIED: ICD-10-CM

## 2022-07-19 PROCEDURE — 71250 CT THORAX DX C-: CPT | Mod: 26

## 2022-07-20 LAB
ALBUMIN SERPL ELPH-MCNC: 3.9 G/DL
ALP BLD-CCNC: 153 U/L
ALT SERPL-CCNC: 36 U/L
ANION GAP SERPL CALC-SCNC: 10 MMOL/L
AST SERPL-CCNC: 26 U/L
BASOPHILS # BLD AUTO: 0.07 K/UL
BASOPHILS NFR BLD AUTO: 0.6 %
BILIRUB SERPL-MCNC: 0.2 MG/DL
BUN SERPL-MCNC: 12 MG/DL
CALCIUM SERPL-MCNC: 9.3 MG/DL
CHLORIDE SERPL-SCNC: 103 MMOL/L
CO2 SERPL-SCNC: 28 MMOL/L
CREAT SERPL-MCNC: 1 MG/DL
EGFR: 85 ML/MIN/1.73M2
EOSINOPHIL # BLD AUTO: 0.39 K/UL
EOSINOPHIL NFR BLD AUTO: 3.1 %
GLUCOSE SERPL-MCNC: 121 MG/DL
HCT VFR BLD CALC: 42.7 %
HGB BLD-MCNC: 13.4 G/DL
IMM GRANULOCYTES NFR BLD AUTO: 0.3 %
LYMPHOCYTES # BLD AUTO: 2.23 K/UL
LYMPHOCYTES NFR BLD AUTO: 18 %
MAN DIFF?: NORMAL
MCHC RBC-ENTMCNC: 27.9 PG
MCHC RBC-ENTMCNC: 31.4 G/DL
MCV RBC AUTO: 89 FL
MONOCYTES # BLD AUTO: 1.21 K/UL
MONOCYTES NFR BLD AUTO: 9.8 %
NEUTROPHILS # BLD AUTO: 8.47 K/UL
NEUTROPHILS NFR BLD AUTO: 68.2 %
PLATELET # BLD AUTO: 408 K/UL
POTASSIUM SERPL-SCNC: 3.7 MMOL/L
PROT SERPL-MCNC: 7.4 G/DL
RBC # BLD: 4.8 M/UL
RBC # FLD: 15.9 %
SODIUM SERPL-SCNC: 141 MMOL/L
WBC # FLD AUTO: 12.41 K/UL

## 2022-08-08 PROBLEM — R11.2 NAUSEA AND VOMITING: Status: ACTIVE | Noted: 2022-08-08

## 2022-08-08 NOTE — HISTORY OF PRESENT ILLNESS
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 He went to [facility] and saw a rheumatologist and started patient on  mg BID. Also started on Nintedanib. He was off prednisone. He feels joint pains are much improved and feels well today. Feels respiratory status is improved as well. He is taking prednisone 5 mg BID attempting to taper off. He has pain and difficulty with ambulation due to low back pain, hip pain, foot pain and knee pain.\par \par 3/7/22:\par He has hip pain that started 2-3 weeks ago its hard to walk and hard to sit. He gets pain anterior thigh pain down leg for 2-3 weeks worse with ambulation. He has face swelling, right 2nd, 3rd, 4th MCP finger swelling and pain, took prednisone 10 mg last week that helped. His palms are pruritic last 3 weeks uses benadry and hydrocortisone cream\par \par 11/29/21:\par Patient stopped prednisone for 1 week and had joint pain and swelling in his fingers return. He  takes prednisone 10 mg nightly, Ibuprofen and Tramadol PRN that help. Says gabapentin doesn't help. Denies AM stiffness or swelling. He is going to [facility] for 3 months starting in January\par \par \par \par Brief history:\par Patient reports his symptoms started 6/16 at work. 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 He was seen in the ED 6/28 where XR of the knee was normal. XR of the right shoulder suggested AC OA (mild), and x-ray of the pelvis suggested degenerative changes in his lumbar spine and bilateral hips. He was sent home with motrin.\par \par MRI right ankle 7/1:   "Mild tenosynovitis of the posterior tibialis, flexor digitorum longus, peroneus brevis, and peroneus longus. Mild Achilles tendinosis. Edema in the sinus tarsi (and small erosion at sinus tarsi roof) may be seen with sinus tarsi syndrome, correlate with physical exam for lateral hindfoot impingement/instability. Diffuse edema throughout the musculature, likely due to to neuropathy. Prior sprain of the anterior distal syndesmotic ligament, deep fibers of the deltoid ligament, and spring ligament."\par \par MRI cervical spine 7/2:  "Mild canal narrowing on a degenerative basis, more pronounced at C5-6. C5-6 mild foraminal narrowing. Possible T4-T5 left foraminal disc herniation. If there is concern consider a dedicated T-spine study."\par \par Repeat MRI C spine 9/16: "C5-6 moderate spinal stenosis due to disc bulging and ligamentum flavum infolding, stable since the prior exam.. Stable mild spinal cord abutment without cord deformity or signal abnormality. Stable moderate foraminal narrowing. Stable additional mild degenerative changes as described."\par \par MRI lumbar spine 7/6: "L5-S1 small disc herniation in the right lateral recess with impingement of the descending right S1 nerve root. L3-4 small disc bulge and facet hypertrophy with mild right foraminal narrowing and mild compression of the exiting right L3 nerve root.  Additional mild degenerative changes as described.Mass-like heterogeneous structure within the mid abdomen, incompletely imaged/evaluated on this study. Although this can reflect loops of small bowel, recommend a dedicated CT or MRI of the abdomen with contrast to exclude an underlying mass."\par \par Repeat MRI lumbar spine 9/16:  since prior study: L5-S1 trace disc herniation in the right lateral recess, slightly decreased in size since the prior study with decreased mass effect upon the descending right S1 nerve root. L3-4 stable degenerative change with mild right foraminal narrowing and mild compression of the exiting right L3 nerve root.\par \par MRI T spine 9/16: normal. Repeat XR shoulder 9/17: AC OA\par \par MRI right shoulder 9/18: "Partial-thickness tears of the supraspinatus and infraspinatus tendons as above. Synovitis in the axillary pouch and rotator interval indicative of adhesive capsulitis, correlate with patient range of motion. SLAP tear extending into the posterior superior labrum. Small subacromial/subdeltoid bursitis."\par \par Xray of hands and feet 9/19: suggested OA without erosions\par \par CT Abdomen pelvis 7/26: "A 7.0 x 6.2 x 7.7 cm heterogeneous mass in the mid abdomen contiguous with small bowel loops most likely represents a small bowel gastrointestinal stromal tumor (GIST)."\par \par CTA dissection 9/17:  "Normal caliber of the thoracoabdominal aorta without evidence for dissection or intramural hematoma.Interstitial lung disease. No honeycombing. Interval postsurgical changes within the small bowel."\par \par Pathology from OR 8/21 confirmed GIST\par Serologies include +CCP >250,  with negative lyme, SSA, SSB, ED, Scleroderma. \par Hepatitis B and C serologies negative\par ESR 12 CRP 6\par Patient was admitted 9/15-9/19at [facility] for intractable neck pain radiating to right shoulder/arm right arm and low back pain radiating to right leg. Neurosurgery advised steroids and no surgical intervention. Imaging and labs as above. Started on metformin inpatient due to elevated blood sugars from steroids\par \par \par \par Today's visit: \par Patient feels well today. He reports joint pains improved. Denies significant AM stiffness or swelling. He takes prednisone 10 mg daily, Celebrex PRN, and tramadol at PM. He is switching pulmonlogist to [facility]

## 2022-08-08 NOTE — ASSESSMENT
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 RA seropositive (CCP and RF)\par -Failed Actemra and side effect of palpitations to HCQ\par -Reviewed labs\par -Check monitoring labs today \par -On prednisone 5 mg BID attempting to taper off\par -Continue Ruxience in 6 months due in July\par \par 2. ILD possible post covid\par -Recent CT scan reviewed\par -Follow up Pulm\par \par 3. GIST tumor\par -S/p resection and following with Oncology.\par \par 4. HTN/HLD\par -F/u with PCP, and Cardiology \par \par 5. Cervical spinal stenosis/lumbar disc herniation\par -Follow up with PCP and neurosurgery\par \par 6. Bilateral shoulder pain\par -MRI right shoulder suggested partial rotator cuff tear, subacromial bursitis and has went for PT. \par -Stable\par \par 7. Multiple joint pain with pain and difficulty ambulating\par -He has hip pain from OA and enthesopathy Hip x-ray suggested enthesopathy and mild joint space narrowing with bilateral inflammatory sacroiliitis\par -He has foot pain from OA and tenosynovitis, sinus tarsi syndrome, neuropathy\par -He has knee pain from OA \par -He has low back pain from both osteoarthritic and inflammatory sacroiliitis, lumbar disc herniation impinging on nerve roots and spondylosis \par -NSAIDs for treatment\par -I advised a handicap parking pass for patient to help with his symptoms with painful ambulation due to the above problems\par \par \par \par \par F/u 3 months\par \par 6/27/22: I discussed lab work results with patient showing elevated LFTs. Check abdominal ultrasound and refer to GI. He understands, agrees, all questions answered. \par \par 7/19/22: He called saying he has pruritus in hands and legs starting 5 days after RTX infusion along with nausea, vomiting. He has pain in legs he stopped prednisone. Start zofran 4 mg BID PRN, allegra 180 mg day for pruritus, and tramadol 50 mg BID PRN. Check labs CBC, CMP. All questions answered and he understands and agrees to plan. \par \par 8/8/22: I called patient back he reports continued vomiting 1-2x/daily white emesis no blood, no abdominal pain, fevers, chills, diarrhea. He is eating tolerating PO eating a little less. Zofran helps a little bit. He stopped HCQ 2 weeks ago. Tramadol he takes intermittently. Pruritis is much better. Increase zofran to 8 mg BID, advised to see PCP/GI if symptoms don't improve. All questions answered and he understands and agrees to plan. \par \par

## 2022-08-22 ENCOUNTER — APPOINTMENT (OUTPATIENT)
Age: 62
End: 2022-08-22
Payer: COMMERCIAL

## 2022-08-22 PROCEDURE — 99214 OFFICE O/P EST MOD 30 MIN: CPT

## 2022-08-22 NOTE — ASSESSMENT
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  \par SP influenza infection and probably post viral RADS, improving on Symbicort \par Severe EDVIN optimum CPAP 15 cm H2O.

## 2022-08-31 ENCOUNTER — NON-APPOINTMENT (OUTPATIENT)
Age: 62
End: 2022-08-31

## 2022-08-31 DIAGNOSIS — U07.1 COVID-19: ICD-10-CM

## 2022-08-31 RX ORDER — ASPIRIN ENTERIC COATED TABLETS 81 MG 81 MG/1
81 TABLET, DELAYED RELEASE ORAL
Refills: 0 | Status: ACTIVE | COMMUNITY

## 2022-09-14 ENCOUNTER — OUTPATIENT (OUTPATIENT)
Dept: OUTPATIENT SERVICES | Facility: HOSPITAL | Age: 62
LOS: 1 days | Discharge: HOME | End: 2022-09-14

## 2022-09-14 DIAGNOSIS — Z98.890 OTHER SPECIFIED POSTPROCEDURAL STATES: Chronic | ICD-10-CM

## 2022-09-14 DIAGNOSIS — R06.02 SHORTNESS OF BREATH: ICD-10-CM

## 2022-09-14 PROCEDURE — 94060 EVALUATION OF WHEEZING: CPT | Mod: 26

## 2022-09-14 PROCEDURE — 94727 GAS DIL/WSHOT DETER LNG VOL: CPT | Mod: 26

## 2022-09-14 PROCEDURE — 94729 DIFFUSING CAPACITY: CPT | Mod: 26

## 2022-09-19 ENCOUNTER — APPOINTMENT (OUTPATIENT)
Age: 62
End: 2022-09-19

## 2022-09-19 PROCEDURE — 99442: CPT

## 2022-09-19 NOTE — ASSESSMENT
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  \par SP influenza infection and probably post viral RADS, improving on Symbicort \par Severe EDVIN on CPAP 15 cm H2O.

## 2022-09-19 NOTE — HISTORY OF PRESENT ILLNESS
[Home] : at home, [unfilled] , at the time of the visit. [Medical Office: ([facility])___] : at the medical office located in  [Verbal consent obtained from patient] : the patient, [unfilled] [Follow-Up - Routine Clinic] : a routine clinic follow-up of [CPAP] : CPAP [Good Compliance] : good compliance with treatment [Good Tolerance] : good tolerance of treatment [Good Symptom Control] : good symptom control [Dyspnea on Exertion] : dyspnea on exertion [Dyspnea at Rest] : no dyspnea at rest [Dry Cough] : dry cough [Productive Cough] : no productive cough [Wheezing] : no wheezing [Hemoptysis] : no hemoptysis [None] : No associated symptoms are reported [Shortness of Breath] : Shortness of Breath [Follow-Up - From Hospitalization] : follow-up of a hospitalization for [Dyspnea] : dyspnea [Currently Experiencing] : The patient is currently experiencing symptoms.

## 2022-09-21 ENCOUNTER — APPOINTMENT (OUTPATIENT)
Dept: RHEUMATOLOGY | Facility: CLINIC | Age: 62
End: 2022-09-21

## 2022-09-21 VITALS
HEIGHT: 72 IN | BODY MASS INDEX: 23.98 KG/M2 | DIASTOLIC BLOOD PRESSURE: 96 MMHG | HEART RATE: 97 BPM | OXYGEN SATURATION: 96 % | SYSTOLIC BLOOD PRESSURE: 132 MMHG | WEIGHT: 177 LBS

## 2022-09-21 PROCEDURE — 99214 OFFICE O/P EST MOD 30 MIN: CPT

## 2022-09-21 RX ORDER — HYDROCHLOROTHIAZIDE 12.5 MG/1
12.5 TABLET ORAL DAILY
Refills: 0 | Status: DISCONTINUED | COMMUNITY
End: 2022-09-21

## 2022-09-21 RX ORDER — HYDROXYZINE HYDROCHLORIDE 50 MG/1
50 TABLET ORAL 3 TIMES DAILY
Qty: 30 | Refills: 0 | Status: DISCONTINUED | COMMUNITY
Start: 2022-07-20 | End: 2022-09-21

## 2022-09-21 RX ORDER — CELECOXIB 200 MG/1
200 CAPSULE ORAL
Refills: 0 | Status: DISCONTINUED | COMMUNITY
End: 2022-09-21

## 2022-09-22 LAB
ALBUMIN SERPL ELPH-MCNC: 4.2 G/DL
ALP BLD-CCNC: 161 U/L
ALT SERPL-CCNC: 22 U/L
ANION GAP SERPL CALC-SCNC: 13 MMOL/L
AST SERPL-CCNC: 18 U/L
BASOPHILS # BLD AUTO: 0.11 K/UL
BASOPHILS NFR BLD AUTO: 1 %
BILIRUB SERPL-MCNC: 0.2 MG/DL
BUN SERPL-MCNC: 17 MG/DL
CALCIUM SERPL-MCNC: 9.6 MG/DL
CHLORIDE SERPL-SCNC: 105 MMOL/L
CO2 SERPL-SCNC: 24 MMOL/L
CREAT SERPL-MCNC: 0.9 MG/DL
CRP SERPL-MCNC: 39 MG/L
EGFR: 97 ML/MIN/1.73M2
EOSINOPHIL # BLD AUTO: 0.33 K/UL
EOSINOPHIL NFR BLD AUTO: 3 %
ERYTHROCYTE [SEDIMENTATION RATE] IN BLOOD BY WESTERGREN METHOD: 62 MM/HR
GLUCOSE SERPL-MCNC: 125 MG/DL
HCT VFR BLD CALC: 43.2 %
HGB BLD-MCNC: 13.6 G/DL
IMM GRANULOCYTES NFR BLD AUTO: 0.6 %
LYMPHOCYTES # BLD AUTO: 2.14 K/UL
LYMPHOCYTES NFR BLD AUTO: 19.7 %
MAN DIFF?: NORMAL
MCHC RBC-ENTMCNC: 28.7 PG
MCHC RBC-ENTMCNC: 31.5 G/DL
MCV RBC AUTO: 91.1 FL
MONOCYTES # BLD AUTO: 1.09 K/UL
MONOCYTES NFR BLD AUTO: 10 %
NEUTROPHILS # BLD AUTO: 7.12 K/UL
NEUTROPHILS NFR BLD AUTO: 65.7 %
PLATELET # BLD AUTO: 394 K/UL
POTASSIUM SERPL-SCNC: 4.3 MMOL/L
PROT SERPL-MCNC: 7.4 G/DL
RBC # BLD: 4.74 M/UL
RBC # FLD: 15.5 %
SODIUM SERPL-SCNC: 142 MMOL/L
WBC # FLD AUTO: 10.85 K/UL

## 2022-10-03 ENCOUNTER — RESULT REVIEW (OUTPATIENT)
Age: 62
End: 2022-10-03

## 2022-10-03 ENCOUNTER — OUTPATIENT (OUTPATIENT)
Dept: OUTPATIENT SERVICES | Facility: HOSPITAL | Age: 62
LOS: 1 days | Discharge: HOME | End: 2022-10-03

## 2022-10-03 DIAGNOSIS — Z98.890 OTHER SPECIFIED POSTPROCEDURAL STATES: Chronic | ICD-10-CM

## 2022-10-03 DIAGNOSIS — M54.50 LOW BACK PAIN, UNSPECIFIED: ICD-10-CM

## 2022-10-03 PROCEDURE — 72148 MRI LUMBAR SPINE W/O DYE: CPT | Mod: 26

## 2022-10-17 NOTE — ASSESSMENT
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 RA seropositive (CCP and RF)\par -Failed Actemra and side effect of palpitations to HCQ\par -Reviewed labs\par -Continue prednisone 5 mg daily he is trying to taper off\par -Continue  mg daily he decreased from BID\par -Continue Ruxience q 6 months due in January\par \par 2. ILD possible post covid\par -Recent CT scan reviewed\par -Follow up Pulm\par \par 3. GIST tumor\par -S/p resection and following with Oncology.\par \par 4. HTN/HLD\par -F/u with PCP, and Cardiology \par \par 5. Cervical spinal stenosis/lumbar disc herniation\par -Follow up with PCP and neurosurgery\par -Worsening low back pain. Check MRI lumbar, send to PT, start meloxicam 15 mg daily PRN and flexeril 5 mg TID PRN. \par \par 6. Bilateral shoulder pain\par -MRI right shoulder suggested partial rotator cuff tear, subacromial bursitis and has went for PT. \par -Stable\par \par 7. Multiple joint pain with pain and difficulty ambulating\par -He has hip pain from OA and enthesopathy Hip x-ray suggested enthesopathy and mild joint space narrowing with bilateral inflammatory sacroiliitis\par -He has foot pain from OA and tenosynovitis, sinus tarsi syndrome, neuropathy\par -He has knee pain from OA \par -He has low back pain from both osteoarthritic and inflammatory sacroiliitis, lumbar disc herniation impinging on nerve roots and spondylosis \par -NSAIDs for treatment\par \par \par F/u 3 months\par \par \par

## 2022-10-17 NOTE — HISTORY OF PRESENT ILLNESS
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 Tramadol doesn't help. He has radiculopathy. He also has foot pain when ambulating that is alleviated with ibuprofen. He's worried about taking too much NSAIDs. Joint pains are controlled no swelling or stiffness. \par \par 6/22/22:\par He didn't  or start Leflunomide 10 mg daily. He went to [facility] and saw a rheumatologist and started patient on  mg BID. Also started on Nintedanib. He was off prednisone. He feels joint pains are much improved and feels well today. Feels respiratory status is improved as well. He is taking prednisone 5 mg BID attempting to taper off. He has pain and difficulty with ambulation due to low back pain, hip pain, foot pain and knee pain.\par \par 3/7/22:\par He has hip pain that started 2-3 weeks ago its hard to walk and hard to sit. He gets pain anterior thigh pain down leg for 2-3 weeks worse with ambulation. He has face swelling, right 2nd, 3rd, 4th MCP finger swelling and pain, took prednisone 10 mg last week that helped. His palms are pruritic last 3 weeks uses benadry and hydrocortisone cream\par \par 11/29/21:\par Patient stopped prednisone for 1 week and had joint pain and swelling in his fingers return. He  takes prednisone 10 mg nightly, Ibuprofen and Tramadol PRN that help. Says gabapentin doesn't help. Denies AM stiffness or swelling. He is going to [facility] for 3 months starting in January\par \par \par \par Brief history:\par Patient reports his symptoms started 6/16 at work. 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 He was seen in the ED 6/28 where XR of the knee was normal. XR of the right shoulder suggested AC OA (mild), and x-ray of the pelvis suggested degenerative changes in his lumbar spine and bilateral hips. He was sent home with motrin.\par \par MRI right ankle 7/1:   "Mild tenosynovitis of the posterior tibialis, flexor digitorum longus, peroneus brevis, and peroneus longus. Mild Achilles tendinosis. Edema in the sinus tarsi (and small erosion at sinus tarsi roof) may be seen with sinus tarsi syndrome, correlate with physical exam for lateral hindfoot impingement/instability. Diffuse edema throughout the musculature, likely due to to neuropathy. Prior sprain of the anterior distal syndesmotic ligament, deep fibers of the deltoid ligament, and spring ligament."\par \par MRI cervical spine 7/2:  "Mild canal narrowing on a degenerative basis, more pronounced at C5-6. C5-6 mild foraminal narrowing. Possible T4-T5 left foraminal disc herniation. If there is concern consider a dedicated T-spine study."\par \par Repeat MRI C spine 9/16: "C5-6 moderate spinal stenosis due to disc bulging and ligamentum flavum infolding, stable since the prior exam.. Stable mild spinal cord abutment without cord deformity or signal abnormality. Stable moderate foraminal narrowing. Stable additional mild degenerative changes as described."\par \par MRI lumbar spine 7/6: "L5-S1 small disc herniation in the right lateral recess with impingement of the descending right S1 nerve root. L3-4 small disc bulge and facet hypertrophy with mild right foraminal narrowing and mild compression of the exiting right L3 nerve root.  Additional mild degenerative changes as described.Mass-like heterogeneous structure within the mid abdomen, incompletely imaged/evaluated on this study. Although this can reflect loops of small bowel, recommend a dedicated CT or MRI of the abdomen with contrast to exclude an underlying mass."\par \par Repeat MRI lumbar spine 9/16:  since prior study: L5-S1 trace disc herniation in the right lateral recess, slightly decreased in size since the prior study with decreased mass effect upon the descending right S1 nerve root. L3-4 stable degenerative change with mild right foraminal narrowing and mild compression of the exiting right L3 nerve root.\par \par MRI T spine 9/16: normal. Repeat XR shoulder 9/17: AC OA\par \par MRI right shoulder 9/18: "Partial-thickness tears of the supraspinatus and infraspinatus tendons as above. Synovitis in the axillary pouch and rotator interval indicative of adhesive capsulitis, correlate with patient range of motion. SLAP tear extending into the posterior superior labrum. Small subacromial/subdeltoid bursitis."\par \par Xray of hands and feet 9/19: suggested OA without erosions\par \par CT Abdomen pelvis 7/26: "A 7.0 x 6.2 x 7.7 cm heterogeneous mass in the mid abdomen contiguous with small bowel loops most likely represents a small bowel gastrointestinal stromal tumor (GIST)."\par \par CTA dissection 9/17:  "Normal caliber of the thoracoabdominal aorta without evidence for dissection or intramural hematoma.Interstitial lung disease. No honeycombing. Interval postsurgical changes within the small bowel."\par \par Pathology from OR 8/21 confirmed GIST\par Serologies include +CCP >250,  with negative lyme, SSA, SSB, ED, Scleroderma. \par Hepatitis B and C serologies negative\par ESR 12 CRP 6\par Patient was admitted 9/15-9/19at [facility] for intractable neck pain radiating to right shoulder/arm right arm and low back pain radiating to right leg. Neurosurgery advised steroids and no surgical intervention. Imaging and labs as above. Started on metformin inpatient due to elevated blood sugars from steroids\par \par \par \par Today's visit: \par Patient feels well today. He reports joint pains improved. Denies significant AM stiffness or swelling. He takes prednisone 10 mg daily, Celebrex PRN, and tramadol at PM. He is switching pulmonlogist to [facility]

## 2022-10-17 NOTE — PHYSICAL EXAM
[General Appearance - Alert] : alert [General Appearance - In No Acute Distress] : in no acute distress [Sclera] : the sclera and conjunctiva were normal [Extraocular Movements] : extraocular movements were intact [Outer Ear] : the ears and nose were normal in appearance [Hearing Threshold Finger Rub Not [place]] : hearing was normal [Neck Appearance] : the appearance of the neck was normal [Neck Cervical Mass (___cm)] : no neck mass was observed [Respiration, Rhythm And Depth] : normal respiratory rhythm and effort [Heart Rate And Rhythm] : heart rate was normal and rhythm regular [Heart Sounds] : normal S1 and S2 [Bowel Sounds] : normal bowel sounds [Abdomen Tenderness] : non-tender [Abnormal Walk] : normal gait [Nail Clubbing] : no clubbing  or cyanosis of the fingernails [Involuntary Movements] : no involuntary movements were seen [Musculoskeletal - Swelling] : no joint swelling seen [Motor Tone] : muscle strength and tone were normal [Skin Color & Pigmentation] : normal skin color and pigmentation [] : no rash [Sensation] : the sensory exam was normal to light touch and pinprick [Motor Exam] : the motor exam was normal [Affect] : the affect was normal [Mood] : the mood was normal [FreeTextEntry1] : No joint tenderness or swelling

## 2022-11-01 ENCOUNTER — APPOINTMENT (OUTPATIENT)
Dept: RHEUMATOLOGY | Facility: CLINIC | Age: 62
End: 2022-11-01

## 2022-11-01 VITALS
HEART RATE: 90 BPM | TEMPERATURE: 98 F | SYSTOLIC BLOOD PRESSURE: 124 MMHG | HEIGHT: 72 IN | OXYGEN SATURATION: 97 % | WEIGHT: 174 LBS | BODY MASS INDEX: 23.57 KG/M2 | DIASTOLIC BLOOD PRESSURE: 80 MMHG

## 2022-11-01 PROCEDURE — 99214 OFFICE O/P EST MOD 30 MIN: CPT

## 2022-11-01 NOTE — PHYSICAL EXAM
[General Appearance - Alert] : alert [General Appearance - In No Acute Distress] : in no acute distress [Sclera] : the sclera and conjunctiva were normal [Extraocular Movements] : extraocular movements were intact [Outer Ear] : the ears and nose were normal in appearance [Hearing Threshold Finger Rub Not [place]] : hearing was normal [Neck Appearance] : the appearance of the neck was normal [Neck Cervical Mass (___cm)] : no neck mass was observed [Respiration, Rhythm And Depth] : normal respiratory rhythm and effort [Heart Rate And Rhythm] : heart rate was normal and rhythm regular [Heart Sounds] : normal S1 and S2 [Bowel Sounds] : normal bowel sounds [Abdomen Tenderness] : non-tender [Abnormal Walk] : normal gait [Nail Clubbing] : no clubbing  or cyanosis of the fingernails [Involuntary Movements] : no involuntary movements were seen [Motor Tone] : muscle strength and tone were normal [FreeTextEntry1] : Right wrist, right 2nd and 3rd MCP, right dorsum of hand synovitis, right PIPs all tender. Left wrist synovitis less than right wrist, L 5th MCP,, 3rd DIP, thumb IP joint synovitis, L foot swollen and tender [Skin Color & Pigmentation] : normal skin color and pigmentation [] : no rash [Sensation] : the sensory exam was normal to light touch and pinprick [Motor Exam] : the motor exam was normal [Affect] : the affect was normal [Mood] : the mood was normal

## 2022-11-01 NOTE — HISTORY OF PRESENT ILLNESS
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 5 days ago he developed pain and swelling in his right wrist, right hand, left hand, left foot, and swelling in his face that is worse around 3-4 pm in the afternoon, and goes down at night time. He reports improvement in his back pain with physical therapy. He takes Motrin 800 mg PRN that gives partial relief of symptoms. Has not tried taking prednisone again. \par \par \par 9/21/22:\par Reports worsening low back pain for the past 2 weeks, worse after getting up from a seated position, using ibuprofen and heating pads that help. Tramadol doesn't help. He has radiculopathy. He also has foot pain when ambulating that is alleviated with ibuprofen. He's worried about taking too much NSAIDs. Joint pains are controlled no swelling or stiffness. \par \par 6/22/22:\par He didn't  or start Leflunomide 10 mg daily. He went to [facility] and saw a rheumatologist and started patient on  mg BID. Also started on Nintedanib. He was off prednisone. He feels joint pains are much improved and feels well today. Feels respiratory status is improved as well. He is taking prednisone 5 mg BID attempting to taper off. He has pain and difficulty with ambulation due to low back pain, hip pain, foot pain and knee pain.\par \par 3/7/22:\par He has hip pain that started 2-3 weeks ago its hard to walk and hard to sit. He gets pain anterior thigh pain down leg for 2-3 weeks worse with ambulation. He has face swelling, right 2nd, 3rd, 4th MCP finger swelling and pain, took prednisone 10 mg last week that helped. His palms are pruritic last 3 weeks uses benadry and hydrocortisone cream\par \par 11/29/21:\par Patient stopped prednisone for 1 week and had joint pain and swelling in his fingers return. He  takes prednisone 10 mg nightly, Ibuprofen and Tramadol PRN that help. Says gabapentin doesn't help. Denies AM stiffness or swelling. He is going to [facility] for 3 months starting in January\par \par \par \par Brief history:\par Patient reports his symptoms started 6/16 at work. 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 He was seen in the ED 6/28 where XR of the knee was normal. XR of the right shoulder suggested AC OA (mild), and x-ray of the pelvis suggested degenerative changes in his lumbar spine and bilateral hips. He was sent home with motrin.\par \par MRI right ankle 7/1:   "Mild tenosynovitis of the posterior tibialis, flexor digitorum longus, peroneus brevis, and peroneus longus. Mild Achilles tendinosis. Edema in the sinus tarsi (and small erosion at sinus tarsi roof) may be seen with sinus tarsi syndrome, correlate with physical exam for lateral hindfoot impingement/instability. Diffuse edema throughout the musculature, likely due to to neuropathy. Prior sprain of the anterior distal syndesmotic ligament, deep fibers of the deltoid ligament, and spring ligament."\par \par MRI cervical spine 7/2:  "Mild canal narrowing on a degenerative basis, more pronounced at C5-6. C5-6 mild foraminal narrowing. Possible T4-T5 left foraminal disc herniation. If there is concern consider a dedicated T-spine study."\par \par Repeat MRI C spine 9/16: "C5-6 moderate spinal stenosis due to disc bulging and ligamentum flavum infolding, stable since the prior exam.. Stable mild spinal cord abutment without cord deformity or signal abnormality. Stable moderate foraminal narrowing. Stable additional mild degenerative changes as described."\par \par MRI lumbar spine 7/6: "L5-S1 small disc herniation in the right lateral recess with impingement of the descending right S1 nerve root. L3-4 small disc bulge and facet hypertrophy with mild right foraminal narrowing and mild compression of the exiting right L3 nerve root.  Additional mild degenerative changes as described.Mass-like heterogeneous structure within the mid abdomen, incompletely imaged/evaluated on this study. Although this can reflect loops of small bowel, recommend a dedicated CT or MRI of the abdomen with contrast to exclude an underlying mass."\par \par Repeat MRI lumbar spine 9/16:  since prior study: L5-S1 trace disc herniation in the right lateral recess, slightly decreased in size since the prior study with decreased mass effect upon the descending right S1 nerve root. L3-4 stable degenerative change with mild right foraminal narrowing and mild compression of the exiting right L3 nerve root.\par \par MRI T spine 9/16: normal. Repeat XR shoulder 9/17: AC OA\par \par MRI right shoulder 9/18: "Partial-thickness tears of the supraspinatus and infraspinatus tendons as above. Synovitis in the axillary pouch and rotator interval indicative of adhesive capsulitis, correlate with patient range of motion. SLAP tear extending into the posterior superior labrum. Small subacromial/subdeltoid bursitis."\par \par Xray of hands and feet 9/19: suggested OA without erosions\par \par CT Abdomen pelvis 7/26: "A 7.0 x 6.2 x 7.7 cm heterogeneous mass in the mid abdomen contiguous with small bowel loops most likely represents a small bowel gastrointestinal stromal tumor (GIST)."\par \par CTA dissection 9/17:  "Normal caliber of the thoracoabdominal aorta without evidence for dissection or intramural hematoma.Interstitial lung disease. No honeycombing. Interval postsurgical changes within the small bowel."\par \par Pathology from OR 8/21 confirmed GIST\par Serologies include +CCP >250,  with negative lyme, SSA, SSB, ED, Scleroderma. \par Hepatitis B and C serologies negative\par ESR 12 CRP 6\par Patient was admitted 9/15-9/19at [facility] for intractable neck pain radiating to right shoulder/arm right arm and low back pain radiating to right leg. Neurosurgery advised steroids and no surgical intervention. Imaging and labs as above. Started on metformin inpatient due to elevated blood sugars from steroids\par \par \par \par Today's visit: \par Patient feels well today. He reports joint pains improved. Denies significant AM stiffness or swelling. He takes prednisone 10 mg daily, Celebrex PRN, and tramadol at PM. He is switching pulmonlogist to [facility]

## 2022-11-01 NOTE — ASSESSMENT
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 RA seropositive (CCP and RF)\par -Active flare\par -Failed Actemra and side effect of palpitations to HCQ\par -Reviewed labs\par -Continue  mg daily \par -Continue Ruxience q 6 months due in January\par -Re-start prednisone 5 mg daily if needed he may go up to 10 mg/day until symptoms resolve\par -Start leflunomide 10 mg daily. R/b/a discussed including worsening of ILD and elevated LFTs\par \par 2. ILD possible post covid\par -Recent CT scan reviewed\par -Follow up Pulm\par \par 3. GIST tumor\par -S/p resection and following with Oncology.\par \par 4. HTN/HLD\par -F/u with PCP, and Cardiology \par \par 5. Cervical spinal stenosis/lumbar disc herniation\par -Follow up with PCP and neurosurgery\par -Worsening low back pain. \par -MRI lumbar spine stable with L5-S1 trace disc herniation, L3-4 and L4-5 stable mild foraminal narrowing\par \par 6. Bilateral shoulder pain\par -MRI right shoulder suggested partial rotator cuff tear, subacromial bursitis and has went for PT. \par -Stable\par \par 7. Multiple joint pain with pain and difficulty ambulating\par -He has hip pain from OA and enthesopathy Hip x-ray suggested enthesopathy and mild joint space narrowing with bilateral inflammatory sacroiliitis\par -He has foot pain from OA and tenosynovitis, sinus tarsi syndrome, neuropathy\par -He has knee pain from OA \par -He has low back pain from both osteoarthritic and inflammatory sacroiliitis, lumbar disc herniation impinging on nerve roots and spondylosis \par -NSAIDs for treatment\par \par \par F/u 1 months\par \par \par

## 2022-11-08 NOTE — ED PROVIDER NOTE - NSICDXFAMILYHX_GEN_ALL_CORE_FT
FAMILY HISTORY:  Known health problems: none     Tranexamic Acid Counseling:  Patient advised of the small risk of bleeding problems with tranexamic acid. They were also instructed to call if they developed any nausea, vomiting or diarrhea. All of the patient's questions and concerns were addressed.

## 2022-11-23 RX ORDER — LEFLUNOMIDE 10 MG/1
10 TABLET, FILM COATED ORAL DAILY
Qty: 30 | Refills: 1 | Status: DISCONTINUED | COMMUNITY
Start: 2022-03-15 | End: 2022-11-23

## 2022-11-29 ENCOUNTER — EMERGENCY (EMERGENCY)
Facility: HOSPITAL | Age: 62
LOS: 0 days | Discharge: HOME | End: 2022-11-29

## 2022-11-29 ENCOUNTER — APPOINTMENT (OUTPATIENT)
Dept: RHEUMATOLOGY | Facility: CLINIC | Age: 62
End: 2022-11-29

## 2022-11-29 DIAGNOSIS — Z02.9 ENCOUNTER FOR ADMINISTRATIVE EXAMINATIONS, UNSPECIFIED: ICD-10-CM

## 2022-11-29 DIAGNOSIS — Z98.890 OTHER SPECIFIED POSTPROCEDURAL STATES: Chronic | ICD-10-CM

## 2022-11-29 PROCEDURE — 99214 OFFICE O/P EST MOD 30 MIN: CPT

## 2022-11-29 PROCEDURE — L9981: CPT

## 2022-11-29 RX ORDER — MELOXICAM 15 MG/1
15 TABLET ORAL
Qty: 30 | Refills: 1 | Status: DISCONTINUED | COMMUNITY
Start: 2022-09-21 | End: 2022-11-29

## 2022-11-29 NOTE — ASSESSMENT
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 RA seropositive (CCP and RF)\par -Failed Actemra and side effect of palpitations to HCQ\par -Leflunomide stopped due to possible pneumonitis\par -Refused MTX\par -Reviewed labs\par -Continue  mg daily \par -Continue Ruxience q 6 months due in January 2023\par -Continue sulfasalazine 500 mg BID\par -Prednisone 5 mg daily\par \par \par 2. ILD possible post covid\par -Recent CT scan reviewed\par -Worsened respiratory status from possible leflunomide induced pneumonitis\par -Start prednisone 40 mg daily \par -Start cholestyramine 8 mg TID x 11 days for leflunomide drug elimination\par -Follow up Pulm\par \par 3. GIST tumor\par -S/p resection and following with Oncology.\par \par 4. HTN/HLD\par -F/u with PCP, and Cardiology \par \par 5. Cervical spinal stenosis/lumbar disc herniation\par -Follow up with PCP and neurosurgery\par -Worsening low back pain\par -MRI lumbar spine stable with L5-S1 trace disc herniation, L3-4 and L4-5 stable mild foraminal narrowing\par -PT lower back helped symptoms\par -Cyclobenzaprine 5 mg TID for neck pain. Address at next visit\par \par 6. Bilateral shoulder pain\par -MRI right shoulder suggested partial rotator cuff tear, subacromial bursitis and has went for PT. \par -Stable\par \par 7. Multiple joint pain with pain and difficulty ambulating\par -He has hip pain from OA and enthesopathy Hip x-ray suggested enthesopathy and mild joint space narrowing with bilateral inflammatory sacroiliitis\par -He has foot pain from OA and tenosynovitis, sinus tarsi syndrome, neuropathy\par -He has knee pain from OA \par -He has low back pain from both osteoarthritic and inflammatory sacroiliitis, lumbar disc herniation impinging on nerve roots and spondylosis \par -NSAIDs for treatment\par \par F/u 1 month\par \par \par

## 2022-11-29 NOTE — PHYSICAL EXAM
[General Appearance - Alert] : alert [General Appearance - In No Acute Distress] : in no acute distress [Sclera] : the sclera and conjunctiva were normal [Extraocular Movements] : extraocular movements were intact [Outer Ear] : the ears and nose were normal in appearance [Hearing Threshold Finger Rub Not [place]] : hearing was normal [Neck Appearance] : the appearance of the neck was normal [Neck Cervical Mass (___cm)] : no neck mass was observed [Respiration, Rhythm And Depth] : normal respiratory rhythm and effort [Heart Rate And Rhythm] : heart rate was normal and rhythm regular [Heart Sounds] : normal S1 and S2 [Bowel Sounds] : normal bowel sounds [Abdomen Tenderness] : non-tender [Abnormal Walk] : normal gait [Nail Clubbing] : no clubbing  or cyanosis of the fingernails [Involuntary Movements] : no involuntary movements were seen [Motor Tone] : muscle strength and tone were normal [Skin Color & Pigmentation] : normal skin color and pigmentation [] : no rash [Sensation] : the sensory exam was normal to light touch and pinprick [Motor Exam] : the motor exam was normal [Affect] : the affect was normal [Mood] : the mood was normal [FreeTextEntry1] : Right ulnar wrist, right 2nd and 3rd MCP, right dorsum of hand synovitis, right PIPs all tender. Left wrist synovitis less than right wrist, L 5th MCP,, 3rd DIP, thumb IP joint synovitis, L foot swollen and tender

## 2022-11-29 NOTE — HISTORY OF PRESENT ILLNESS
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 Worse at night time he's unable to lay on his chest or on his left side. Symptoms have been ongoing for 2 weeks. Afternoon and evening pain is bad. Heating pad and Motrin give relief. Denies palpation\par -Reports left sided swelling from axilla down his side to his back for the 2 weeks with pain. Pain is constant. Motrin 600 mg TID makes better. Nothing makes the pain worse. He's unable to lay on his L side at night time.\par -Right hip and right low back pain with swelling for the past 2-3 days that comes and goes. Laying down makes it better and walking around makes pain worse. Motrin helps to decrease swelling\par +Right ulnar wrist and hand pain and swelling. + Right lateral foot pain and swelling\par -1 month history of shortness of breath with pulse ox 89-91%. He uses 2.5 L O2 when pulse ox < 90%. Denies coughing or wheezing. \par +neck pain for 1 month\par -back pain is stable and improved with physical therapy. \par \par 11/1/22:\par He reports 7 days ago tapering off prednisone. 5 days ago he developed pain and swelling in his right wrist, right hand, left hand, left foot, and swelling in his face that is worse around 3-4 pm in the afternoon, and goes down at night time. He reports improvement in his back pain with physical therapy. He takes Motrin 800 mg PRN that gives partial relief of symptoms. Has not tried taking prednisone again. \par \par \par 9/21/22:\par Reports worsening low back pain for the past 2 weeks, worse after getting up from a seated position, using ibuprofen and heating pads that help. Tramadol doesn't help. He has radiculopathy. He also has foot pain when ambulating that is alleviated with ibuprofen. He's worried about taking too much NSAIDs. Joint pains are controlled no swelling or stiffness. \par \par 6/22/22:\par He didn't  or start Leflunomide 10 mg daily. He went to [facility] and saw a rheumatologist and started patient on  mg BID. Also started on Nintedanib. He was off prednisone. He feels joint pains are much improved and feels well today. Feels respiratory status is improved as well. He is taking prednisone 5 mg BID attempting to taper off. He has pain and difficulty with ambulation due to low back pain, hip pain, foot pain and knee pain.\par \par 3/7/22:\par He has hip pain that started 2-3 weeks ago its hard to walk and hard to sit. He gets pain anterior thigh pain down leg for 2-3 weeks worse with ambulation. He has face swelling, right 2nd, 3rd, 4th MCP finger swelling and pain, took prednisone 10 mg last week that helped. His palms are pruritic last 3 weeks uses benadry and hydrocortisone cream\par \par 11/29/21:\par Patient stopped prednisone for 1 week and had joint pain and swelling in his fingers return. He  takes prednisone 10 mg nightly, Ibuprofen and Tramadol PRN that help. Says gabapentin doesn't help. Denies AM stiffness or swelling. He is going to [facility] for 3 months starting in January\par \par \par \par Brief history:\par Patient reports his symptoms started 6/16 at work. 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 He was seen in the ED 6/28 where XR of the knee was normal. XR of the right shoulder suggested AC OA (mild), and x-ray of the pelvis suggested degenerative changes in his lumbar spine and bilateral hips. He was sent home with motrin.\par \par MRI right ankle 7/1:   "Mild tenosynovitis of the posterior tibialis, flexor digitorum longus, peroneus brevis, and peroneus longus. Mild Achilles tendinosis. Edema in the sinus tarsi (and small erosion at sinus tarsi roof) may be seen with sinus tarsi syndrome, correlate with physical exam for lateral hindfoot impingement/instability. Diffuse edema throughout the musculature, likely due to to neuropathy. Prior sprain of the anterior distal syndesmotic ligament, deep fibers of the deltoid ligament, and spring ligament."\par \par MRI cervical spine 7/2:  "Mild canal narrowing on a degenerative basis, more pronounced at C5-6. C5-6 mild foraminal narrowing. Possible T4-T5 left foraminal disc herniation. If there is concern consider a dedicated T-spine study."\par \par Repeat MRI C spine 9/16: "C5-6 moderate spinal stenosis due to disc bulging and ligamentum flavum infolding, stable since the prior exam.. Stable mild spinal cord abutment without cord deformity or signal abnormality. Stable moderate foraminal narrowing. Stable additional mild degenerative changes as described."\par \par MRI lumbar spine 7/6: "L5-S1 small disc herniation in the right lateral recess with impingement of the descending right S1 nerve root. L3-4 small disc bulge and facet hypertrophy with mild right foraminal narrowing and mild compression of the exiting right L3 nerve root.  Additional mild degenerative changes as described.Mass-like heterogeneous structure within the mid abdomen, incompletely imaged/evaluated on this study. Although this can reflect loops of small bowel, recommend a dedicated CT or MRI of the abdomen with contrast to exclude an underlying mass."\par \par Repeat MRI lumbar spine 9/16:  since prior study: L5-S1 trace disc herniation in the right lateral recess, slightly decreased in size since the prior study with decreased mass effect upon the descending right S1 nerve root. L3-4 stable degenerative change with mild right foraminal narrowing and mild compression of the exiting right L3 nerve root.\par \par MRI T spine 9/16: normal. Repeat XR shoulder 9/17: AC OA\par \par MRI right shoulder 9/18: "Partial-thickness tears of the supraspinatus and infraspinatus tendons as above. Synovitis in the axillary pouch and rotator interval indicative of adhesive capsulitis, correlate with patient range of motion. SLAP tear extending into the posterior superior labrum. Small subacromial/subdeltoid bursitis."\par \par Xray of hands and feet 9/19: suggested OA without erosions\par \par CT Abdomen pelvis 7/26: "A 7.0 x 6.2 x 7.7 cm heterogeneous mass in the mid abdomen contiguous with small bowel loops most likely represents a small bowel gastrointestinal stromal tumor (GIST)."\par \par CTA dissection 9/17:  "Normal caliber of the thoracoabdominal aorta without evidence for dissection or intramural hematoma.Interstitial lung disease. No honeycombing. Interval postsurgical changes within the small bowel."\par \par Pathology from OR 8/21 confirmed GIST\par Serologies include +CCP >250,  with negative lyme, SSA, SSB, ED, Scleroderma. \par Hepatitis B and C serologies negative\par ESR 12 CRP 6\par Patient was admitted 9/15-9/19at [facility] for intractable neck pain radiating to right shoulder/arm right arm and low back pain radiating to right leg. Neurosurgery advised steroids and no surgical intervention. Imaging and labs as above. Started on metformin inpatient due to elevated blood sugars from steroids\par \par \par \par Today's visit: \par Patient feels well today. He reports joint pains improved. Denies significant AM stiffness or swelling. He takes prednisone 10 mg daily, Celebrex PRN, and tramadol at PM. He is switching pulmonlogist to [facility]

## 2022-12-13 ENCOUNTER — APPOINTMENT (OUTPATIENT)
Age: 62
End: 2022-12-13

## 2022-12-13 VITALS
RESPIRATION RATE: 12 BRPM | BODY MASS INDEX: 23.3 KG/M2 | HEIGHT: 72 IN | DIASTOLIC BLOOD PRESSURE: 80 MMHG | WEIGHT: 172 LBS | HEART RATE: 88 BPM | SYSTOLIC BLOOD PRESSURE: 140 MMHG | OXYGEN SATURATION: 95 %

## 2022-12-13 PROCEDURE — 99214 OFFICE O/P EST MOD 30 MIN: CPT

## 2022-12-13 NOTE — HISTORY OF PRESENT ILLNESS
[Follow-Up - Routine Clinic] : a routine clinic follow-up of [CPAP] : CPAP [Good Compliance] : good compliance with treatment [Good Tolerance] : good tolerance of treatment [Good Symptom Control] : good symptom control [Dyspnea on Exertion] : dyspnea on exertion [Dyspnea at Rest] : no dyspnea at rest [Dry Cough] : dry cough [Productive Cough] : no productive cough [Wheezing] : no wheezing [Hemoptysis] : no hemoptysis [None] : No associated symptoms are reported [Shortness of Breath] : Shortness of Breath [Follow-Up - From Hospitalization] : follow-up of a hospitalization for [Dyspnea] : dyspnea [Currently Experiencing] : The patient is currently experiencing symptoms.

## 2022-12-19 ENCOUNTER — RX RENEWAL (OUTPATIENT)
Age: 62
End: 2022-12-19

## 2022-12-20 ENCOUNTER — APPOINTMENT (OUTPATIENT)
Dept: RHEUMATOLOGY | Facility: CLINIC | Age: 62
End: 2022-12-20

## 2022-12-20 ENCOUNTER — APPOINTMENT (OUTPATIENT)
Dept: CARDIOLOGY | Facility: CLINIC | Age: 62
End: 2022-12-20

## 2022-12-20 VITALS
RESPIRATION RATE: 18 BRPM | HEIGHT: 72 IN | WEIGHT: 179 LBS | OXYGEN SATURATION: 96 % | SYSTOLIC BLOOD PRESSURE: 132 MMHG | BODY MASS INDEX: 24.24 KG/M2 | DIASTOLIC BLOOD PRESSURE: 86 MMHG | HEART RATE: 96 BPM

## 2022-12-20 DIAGNOSIS — I10 ESSENTIAL (PRIMARY) HYPERTENSION: ICD-10-CM

## 2022-12-20 PROCEDURE — 99214 OFFICE O/P EST MOD 30 MIN: CPT

## 2022-12-20 PROCEDURE — 93000 ELECTROCARDIOGRAM COMPLETE: CPT

## 2022-12-21 NOTE — DISCUSSION/SUMMARY
[EKG obtained to assist in diagnosis and management of assessed problem(s)] : EKG obtained to assist in diagnosis and management of assessed problem(s) [FreeTextEntry1] : EKG performed today was unremarkable.\par \par HTN: The impression is hypertension. Refills provided for HTN control as patient was recently started on Prednisone 40 mg and an increase in BP was noted secondary to steroid induction. Increase Metoprolol to 25 mg QD. Continue Lisinopril 40 mg. Other planned treatments include an exercise regimen, weight loss, low sodium diet, and alcohol moderation.\par \par HLD: The impression is hyperlipidemia. Currently, the condition is stable. There are no changes in medication management. Continue current medical therapy. Other planned treatment includes diet modification, exercise, and weight loss.\par \par DM: The impression is diabetes. There are no changes in medication management. Patient advised to continue taking medications as prescribed, closely monitor blood sugar at home, follow a diabetic diet, and follow up for continued monitoring.\par \par ILD: Follow up with ILD clinic. \par \par Instructed to follow up after testing is complete. \par Plan was discussed with the patient.

## 2022-12-21 NOTE — REVIEW OF SYSTEMS
[Negative] : Heme/Lymph [Chest Discomfort] : no chest discomfort [Lower Ext Edema] : no extremity edema [Leg Claudication] : no intermittent leg claudication [Palpitations] : no palpitations [Syncope] : no syncope [FreeTextEntry5] : (+) Chronic SOB [FreeTextEntry6] : (+) Chronic SOB

## 2022-12-21 NOTE — HISTORY OF PRESENT ILLNESS
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 Patient endorses chronic SOB, which is unchanged from prior.

## 2023-01-06 ENCOUNTER — OUTPATIENT (OUTPATIENT)
Dept: OUTPATIENT SERVICES | Facility: HOSPITAL | Age: 63
LOS: 1 days | Discharge: HOME | End: 2023-01-06

## 2023-01-06 ENCOUNTER — APPOINTMENT (OUTPATIENT)
Dept: INFUSION THERAPY | Facility: CLINIC | Age: 63
End: 2023-01-06

## 2023-01-06 DIAGNOSIS — Z98.890 OTHER SPECIFIED POSTPROCEDURAL STATES: Chronic | ICD-10-CM

## 2023-01-06 RX ORDER — ACETAMINOPHEN 500 MG
650 TABLET ORAL ONCE
Refills: 0 | Status: COMPLETED | OUTPATIENT
Start: 2023-01-06 | End: 2023-01-06

## 2023-01-06 RX ORDER — DIPHENHYDRAMINE HCL 50 MG
50 CAPSULE ORAL ONCE
Refills: 0 | Status: COMPLETED | OUTPATIENT
Start: 2023-01-06 | End: 2023-01-06

## 2023-01-06 RX ORDER — RITUXIMAB 10 MG/ML
1000 INJECTION, SOLUTION INTRAVENOUS ONCE
Refills: 0 | Status: COMPLETED | OUTPATIENT
Start: 2023-01-06 | End: 2023-01-06

## 2023-01-06 RX ORDER — DIPHENHYDRAMINE HCL 50 MG
50 CAPSULE ORAL ONCE
Refills: 0 | Status: DISCONTINUED | OUTPATIENT
Start: 2023-01-06 | End: 2023-01-06

## 2023-01-06 RX ADMIN — Medication 650 MILLIGRAM(S): at 11:14

## 2023-01-06 RX ADMIN — Medication 102 MILLIGRAM(S): at 11:14

## 2023-01-06 RX ADMIN — RITUXIMAB 1000 MILLIGRAM(S): 10 INJECTION, SOLUTION INTRAVENOUS at 11:18

## 2023-01-09 DIAGNOSIS — Z51.81 ENCOUNTER FOR THERAPEUTIC DRUG LEVEL MONITORING: ICD-10-CM

## 2023-01-09 DIAGNOSIS — M05.9 RHEUMATOID ARTHRITIS WITH RHEUMATOID FACTOR, UNSPECIFIED: ICD-10-CM

## 2023-01-20 ENCOUNTER — APPOINTMENT (OUTPATIENT)
Dept: INFUSION THERAPY | Facility: CLINIC | Age: 63
End: 2023-01-20

## 2023-01-25 ENCOUNTER — OUTPATIENT (OUTPATIENT)
Dept: OUTPATIENT SERVICES | Facility: HOSPITAL | Age: 63
LOS: 1 days | Discharge: HOME | End: 2023-01-25
Payer: MEDICARE

## 2023-01-25 DIAGNOSIS — M54.6 PAIN IN THORACIC SPINE: ICD-10-CM

## 2023-01-25 DIAGNOSIS — Z98.890 OTHER SPECIFIED POSTPROCEDURAL STATES: Chronic | ICD-10-CM

## 2023-01-25 DIAGNOSIS — M54.59 OTHER LOW BACK PAIN: ICD-10-CM

## 2023-01-25 DIAGNOSIS — M54.2 CERVICALGIA: ICD-10-CM

## 2023-01-25 PROCEDURE — 72200 X-RAY EXAM SI JOINTS: CPT | Mod: 26

## 2023-01-25 PROCEDURE — 72114 X-RAY EXAM L-S SPINE BENDING: CPT | Mod: 26

## 2023-01-25 PROCEDURE — 72072 X-RAY EXAM THORAC SPINE 3VWS: CPT | Mod: 26

## 2023-01-25 PROCEDURE — 72052 X-RAY EXAM NECK SPINE 6/>VWS: CPT | Mod: 26

## 2023-01-31 DIAGNOSIS — M19.90 UNSPECIFIED OSTEOARTHRITIS, UNSPECIFIED SITE: ICD-10-CM

## 2023-03-13 ENCOUNTER — INPATIENT (INPATIENT)
Facility: HOSPITAL | Age: 63
LOS: 0 days | Discharge: HOME | DRG: 149 | End: 2023-03-14
Attending: INTERNAL MEDICINE | Admitting: INTERNAL MEDICINE
Payer: MEDICARE

## 2023-03-13 VITALS
TEMPERATURE: 98 F | OXYGEN SATURATION: 100 % | SYSTOLIC BLOOD PRESSURE: 169 MMHG | HEART RATE: 110 BPM | DIASTOLIC BLOOD PRESSURE: 109 MMHG | RESPIRATION RATE: 19 BRPM

## 2023-03-13 DIAGNOSIS — R42 DIZZINESS AND GIDDINESS: ICD-10-CM

## 2023-03-13 DIAGNOSIS — Z98.890 OTHER SPECIFIED POSTPROCEDURAL STATES: Chronic | ICD-10-CM

## 2023-03-13 LAB
ALBUMIN SERPL ELPH-MCNC: 4.1 G/DL — SIGNIFICANT CHANGE UP (ref 3.5–5.2)
ALP SERPL-CCNC: 131 U/L — HIGH (ref 30–115)
ALT FLD-CCNC: 17 U/L — SIGNIFICANT CHANGE UP (ref 0–41)
ANION GAP SERPL CALC-SCNC: 10 MMOL/L — SIGNIFICANT CHANGE UP (ref 7–14)
AST SERPL-CCNC: 13 U/L — SIGNIFICANT CHANGE UP (ref 0–41)
BASOPHILS # BLD AUTO: 0.09 K/UL — SIGNIFICANT CHANGE UP (ref 0–0.2)
BASOPHILS NFR BLD AUTO: 0.7 % — SIGNIFICANT CHANGE UP (ref 0–1)
BILIRUB SERPL-MCNC: 0.4 MG/DL — SIGNIFICANT CHANGE UP (ref 0.2–1.2)
BUN SERPL-MCNC: 13 MG/DL — SIGNIFICANT CHANGE UP (ref 10–20)
CALCIUM SERPL-MCNC: 10 MG/DL — SIGNIFICANT CHANGE UP (ref 8.4–10.5)
CHLORIDE SERPL-SCNC: 102 MMOL/L — SIGNIFICANT CHANGE UP (ref 98–110)
CO2 SERPL-SCNC: 29 MMOL/L — SIGNIFICANT CHANGE UP (ref 17–32)
CREAT SERPL-MCNC: 0.9 MG/DL — SIGNIFICANT CHANGE UP (ref 0.7–1.5)
EGFR: 96 ML/MIN/1.73M2 — SIGNIFICANT CHANGE UP
EOSINOPHIL # BLD AUTO: 0.17 K/UL — SIGNIFICANT CHANGE UP (ref 0–0.7)
EOSINOPHIL NFR BLD AUTO: 1.3 % — SIGNIFICANT CHANGE UP (ref 0–8)
FLUAV AG NPH QL: SIGNIFICANT CHANGE UP
FLUBV AG NPH QL: SIGNIFICANT CHANGE UP
GLUCOSE SERPL-MCNC: 169 MG/DL — HIGH (ref 70–99)
HCT VFR BLD CALC: 48.5 % — SIGNIFICANT CHANGE UP (ref 42–52)
HGB BLD-MCNC: 15.6 G/DL — SIGNIFICANT CHANGE UP (ref 14–18)
IMM GRANULOCYTES NFR BLD AUTO: 1.1 % — HIGH (ref 0.1–0.3)
LYMPHOCYTES # BLD AUTO: 16.9 % — LOW (ref 20.5–51.1)
LYMPHOCYTES # BLD AUTO: 2.15 K/UL — SIGNIFICANT CHANGE UP (ref 1.2–3.4)
MAGNESIUM SERPL-MCNC: 1.8 MG/DL — SIGNIFICANT CHANGE UP (ref 1.8–2.4)
MCHC RBC-ENTMCNC: 29.5 PG — SIGNIFICANT CHANGE UP (ref 27–31)
MCHC RBC-ENTMCNC: 32.2 G/DL — SIGNIFICANT CHANGE UP (ref 32–37)
MCV RBC AUTO: 91.7 FL — SIGNIFICANT CHANGE UP (ref 80–94)
MONOCYTES # BLD AUTO: 1.4 K/UL — HIGH (ref 0.1–0.6)
MONOCYTES NFR BLD AUTO: 11 % — HIGH (ref 1.7–9.3)
NEUTROPHILS # BLD AUTO: 8.75 K/UL — HIGH (ref 1.4–6.5)
NEUTROPHILS NFR BLD AUTO: 69 % — SIGNIFICANT CHANGE UP (ref 42.2–75.2)
NRBC # BLD: 0 /100 WBCS — SIGNIFICANT CHANGE UP (ref 0–0)
PLATELET # BLD AUTO: 403 K/UL — HIGH (ref 130–400)
POTASSIUM SERPL-MCNC: 4.6 MMOL/L — SIGNIFICANT CHANGE UP (ref 3.5–5)
POTASSIUM SERPL-SCNC: 4.6 MMOL/L — SIGNIFICANT CHANGE UP (ref 3.5–5)
PROT SERPL-MCNC: 7.2 G/DL — SIGNIFICANT CHANGE UP (ref 6–8)
RBC # BLD: 5.29 M/UL — SIGNIFICANT CHANGE UP (ref 4.7–6.1)
RBC # FLD: 15.6 % — HIGH (ref 11.5–14.5)
RSV RNA NPH QL NAA+NON-PROBE: SIGNIFICANT CHANGE UP
SARS-COV-2 RNA SPEC QL NAA+PROBE: SIGNIFICANT CHANGE UP
SODIUM SERPL-SCNC: 141 MMOL/L — SIGNIFICANT CHANGE UP (ref 135–146)
TROPONIN T SERPL-MCNC: <0.01 NG/ML — SIGNIFICANT CHANGE UP
WBC # BLD: 12.7 K/UL — HIGH (ref 4.8–10.8)
WBC # FLD AUTO: 12.7 K/UL — HIGH (ref 4.8–10.8)

## 2023-03-13 PROCEDURE — 99285 EMERGENCY DEPT VISIT HI MDM: CPT | Mod: FS

## 2023-03-13 PROCEDURE — 87641 MR-STAPH DNA AMP PROBE: CPT

## 2023-03-13 PROCEDURE — 80053 COMPREHEN METABOLIC PANEL: CPT

## 2023-03-13 PROCEDURE — 70498 CT ANGIOGRAPHY NECK: CPT | Mod: 26,MA

## 2023-03-13 PROCEDURE — 36415 COLL VENOUS BLD VENIPUNCTURE: CPT

## 2023-03-13 PROCEDURE — 84145 PROCALCITONIN (PCT): CPT

## 2023-03-13 PROCEDURE — 83735 ASSAY OF MAGNESIUM: CPT

## 2023-03-13 PROCEDURE — 83036 HEMOGLOBIN GLYCOSYLATED A1C: CPT

## 2023-03-13 PROCEDURE — 71045 X-RAY EXAM CHEST 1 VIEW: CPT | Mod: 26

## 2023-03-13 PROCEDURE — 80061 LIPID PANEL: CPT

## 2023-03-13 PROCEDURE — 93010 ELECTROCARDIOGRAM REPORT: CPT

## 2023-03-13 PROCEDURE — 85025 COMPLETE CBC W/AUTO DIFF WBC: CPT

## 2023-03-13 PROCEDURE — 80180 DRUG SCRN QUAN MYCOPHENOLATE: CPT

## 2023-03-13 PROCEDURE — 70496 CT ANGIOGRAPHY HEAD: CPT | Mod: 26,MA

## 2023-03-13 PROCEDURE — 87640 STAPH A DNA AMP PROBE: CPT

## 2023-03-13 PROCEDURE — 84443 ASSAY THYROID STIM HORMONE: CPT

## 2023-03-13 RX ORDER — ONDANSETRON 8 MG/1
4 TABLET, FILM COATED ORAL ONCE
Refills: 0 | Status: COMPLETED | OUTPATIENT
Start: 2023-03-13 | End: 2023-03-13

## 2023-03-13 RX ORDER — ONDANSETRON 8 MG/1
4 TABLET, FILM COATED ORAL EVERY 8 HOURS
Refills: 0 | Status: DISCONTINUED | OUTPATIENT
Start: 2023-03-13 | End: 2023-03-14

## 2023-03-13 RX ORDER — DULOXETINE HYDROCHLORIDE 30 MG/1
1 CAPSULE, DELAYED RELEASE ORAL
Qty: 0 | Refills: 0 | DISCHARGE

## 2023-03-13 RX ORDER — DULOXETINE HYDROCHLORIDE 30 MG/1
30 CAPSULE, DELAYED RELEASE ORAL DAILY
Refills: 0 | Status: DISCONTINUED | OUTPATIENT
Start: 2023-03-13 | End: 2023-03-14

## 2023-03-13 RX ORDER — LISINOPRIL 2.5 MG/1
1 TABLET ORAL
Qty: 0 | Refills: 0 | DISCHARGE

## 2023-03-13 RX ORDER — GABAPENTIN 400 MG/1
1 CAPSULE ORAL
Qty: 0 | Refills: 0 | DISCHARGE

## 2023-03-13 RX ORDER — METFORMIN HYDROCHLORIDE 850 MG/1
1 TABLET ORAL
Qty: 0 | Refills: 0 | DISCHARGE

## 2023-03-13 RX ORDER — ENOXAPARIN SODIUM 100 MG/ML
40 INJECTION SUBCUTANEOUS EVERY 24 HOURS
Refills: 0 | Status: DISCONTINUED | OUTPATIENT
Start: 2023-03-13 | End: 2023-03-14

## 2023-03-13 RX ORDER — ROSUVASTATIN CALCIUM 5 MG/1
1 TABLET ORAL
Qty: 0 | Refills: 0 | DISCHARGE

## 2023-03-13 RX ORDER — METRONIDAZOLE 500 MG
500 TABLET ORAL EVERY 8 HOURS
Refills: 0 | Status: DISCONTINUED | OUTPATIENT
Start: 2023-03-13 | End: 2023-03-14

## 2023-03-13 RX ORDER — SODIUM CHLORIDE 9 MG/ML
1000 INJECTION, SOLUTION INTRAVENOUS
Refills: 0 | Status: DISCONTINUED | OUTPATIENT
Start: 2023-03-13 | End: 2023-03-13

## 2023-03-13 RX ORDER — LISINOPRIL 2.5 MG/1
40 TABLET ORAL DAILY
Refills: 0 | Status: DISCONTINUED | OUTPATIENT
Start: 2023-03-13 | End: 2023-03-14

## 2023-03-13 RX ORDER — SODIUM CHLORIDE 9 MG/ML
1000 INJECTION, SOLUTION INTRAVENOUS
Refills: 0 | Status: DISCONTINUED | OUTPATIENT
Start: 2023-03-13 | End: 2023-03-14

## 2023-03-13 RX ORDER — SODIUM CHLORIDE 9 MG/ML
1000 INJECTION INTRAMUSCULAR; INTRAVENOUS; SUBCUTANEOUS ONCE
Refills: 0 | Status: COMPLETED | OUTPATIENT
Start: 2023-03-13 | End: 2023-03-13

## 2023-03-13 RX ORDER — MECLIZINE HCL 12.5 MG
50 TABLET ORAL ONCE
Refills: 0 | Status: COMPLETED | OUTPATIENT
Start: 2023-03-13 | End: 2023-03-13

## 2023-03-13 RX ORDER — PANTOPRAZOLE SODIUM 20 MG/1
40 TABLET, DELAYED RELEASE ORAL
Refills: 0 | Status: DISCONTINUED | OUTPATIENT
Start: 2023-03-13 | End: 2023-03-14

## 2023-03-13 RX ORDER — ATORVASTATIN CALCIUM 80 MG/1
40 TABLET, FILM COATED ORAL AT BEDTIME
Refills: 0 | Status: DISCONTINUED | OUTPATIENT
Start: 2023-03-13 | End: 2023-03-14

## 2023-03-13 RX ADMIN — Medication 100 MILLIGRAM(S): at 21:24

## 2023-03-13 RX ADMIN — SODIUM CHLORIDE 1000 MILLILITER(S): 9 INJECTION INTRAMUSCULAR; INTRAVENOUS; SUBCUTANEOUS at 10:30

## 2023-03-13 RX ADMIN — ONDANSETRON 4 MILLIGRAM(S): 8 TABLET, FILM COATED ORAL at 10:30

## 2023-03-13 RX ADMIN — ATORVASTATIN CALCIUM 40 MILLIGRAM(S): 80 TABLET, FILM COATED ORAL at 21:03

## 2023-03-13 RX ADMIN — Medication 50 MILLIGRAM(S): at 12:01

## 2023-03-13 RX ADMIN — SODIUM CHLORIDE 125 MILLILITER(S): 9 INJECTION, SOLUTION INTRAVENOUS at 21:04

## 2023-03-13 RX ADMIN — ONDANSETRON 4 MILLIGRAM(S): 8 TABLET, FILM COATED ORAL at 21:03

## 2023-03-13 NOTE — ED PROVIDER NOTE - PHYSICAL EXAMINATION
Rosa Wilburn, Sister
Vital Signs: I have reviewed the initial vital signs.  Constitutional: NAD, well-nourished, appears stated age, actively vomiting  HEENT: Airway patent, moist MM, no erythema/swelling/deformity of oral structures. +nystagmus  CV: regular rate, regular rhythm, well-perfused extremities, 2+ b/l DP and radial pulses equal.  Lungs: BCTA, no increased WOB.  ABD: NTND, no guarding or rebound, no pulsatile mass, no hernias.   MSK: Neck supple, nontender, nl ROM, no stepoff. Chest nontender. Back nontender in TLS spine or to b/l bony structures or flanks. Ext nontender, nl rom, no deformity.   INTEG: Skin warm, dry, no rash.  NEURO: A&Ox3, normal strength, nl sensation throughout, normal speech. unable to ambulate  PSYCH: Calm, cooperative, normal affect and interaction.

## 2023-03-13 NOTE — ED PROVIDER NOTE - NS ED ATTENDING STATEMENT MOD
This was a shared visit with the ROBER. I reviewed and verified the documentation and independently performed the documented:

## 2023-03-13 NOTE — PATIENT PROFILE ADULT - DO YOU FEEL LIKE HURTING YOURSELF OR OTHERS?
Rest today.  No pushing, pulling, tugging,  heavy lifting, or strenuous activity.  No major decision making, driving, or drinking alcoholic beverages for 24 hours. ( due to the medications you have  received)  Always use good hand hygiene/washing techniques.  NO driving while taking pain medications.    * if you have an incision:  Check your incision area every day for signs of infection.   Check for:  * more redness, swelling, or pain  *more fluid or blood  *warmth  *pus or bad smell.    To assist you in voiding:  Drink plenty of fluids  Listen to running water while attempting to void.    If you are unable to urinate and you have an uncomfortable urge to void or it has been   6 hours since you were discharged, return to the Emergency Room.    May splint abdomen when moving, coughing, sneezing, laughing, or increasing activity as comfort measure.      Apply ice to incision sites as physician directed, remove, and reapply.  Do not use ice continuously         no

## 2023-03-13 NOTE — H&P ADULT - ATTENDING COMMENTS
Seen and examined  patient while in the ER/doing round. Agree with assessment and plan as outlined with editing done as necessary.

## 2023-03-13 NOTE — ED PROVIDER NOTE - ATTENDING APP SHARED VISIT CONTRIBUTION OF CARE
62-year-old male past med history of diabetes, hyperlipidemia, rheumatoid arthritis, 40% lung capacity post COVID infection 2020 presents with room spinning dizziness.  Patient states for last 3 to 4 days she has been having worsening dizziness that he describes as room spinning worse with movement.  Associated with nausea vomiting.  No headaches.  No numbness tingling or weakness.  No similar episodes in the past.  No chest pain or shortness of breath.  Patient does report that she worsening cough productive of white sputum.  No fevers.  No abdominal pain.    CONSTITUTIONAL: Well-developed; well-nourished; in no acute distress.   SKIN: warm, dry  HEAD: Normocephalic; atraumatic.  EYES: PERRL, EOMI, no conjunctival erythema.  Positive rightward nystagmus.  ENT: No nasal discharge; airway clear.  NECK: Supple; non tender.  CARD: S1, S2 normal;  Regular rate and rhythm.   RESP: No wheezes, rales or rhonchi.  ABD: soft non tender, non distended, no rebound or guarding  EXT: Normal ROM.  5/5 strength in all 4 extremities   LYMPH: No acute cervical adenopathy.  NEURO: A&Ox3, CN 2-11 intact, moving all extremities, 5/5 strength, equal sensation bilaterally  PSYCH: Cooperative, appropriate.

## 2023-03-13 NOTE — ED PROVIDER NOTE - NS ED ROS FT
Constitutional: (-) fever  Eyes/ENT: (-) blurry vision, (-) epistaxis  Cardiovascular: (-) chest pain, (-) syncope  Respiratory: (+) cough, (+) shortness of breath  Gastrointestinal:  (+)nausea, (+) vomiting, (-) diarrhea  Musculoskeletal: (-) neck pain, (-) back pain, (-) joint pain  Integumentary: (-) rash, (-) edema  Neurological: (-) headache, (-) altered mental status, (+)dizziness  Psychiatric: (-) hallucinations  Allergic/Immunologic: (-) pruritus

## 2023-03-13 NOTE — CONSULT NOTE ADULT - ASSESSMENT
62-year-old male with history of diabetes, high cholesterol, rheumatoid arthritis, 40% lung capacity status post COVID in 2020 presents to the ED complaining of dizziness, room spinning, nausea, vomiting, SOB coughing, and difficulty ambulating since Friday. Pt also stated that when he was trying to sit up from lying position he had dizziness and was vomiting continuously. Patient states had increase in his mycophenolate from 500 twice a day to 1000 twice a day. Patient also complaining of some shortness of breath and productive cough with white phlegm. No fever, chills, chest pain, headache, abdominal pain or weakness. Neurology was consulted for dizziness.  Patient endorsed that dizziness is much better since he came to the hospital. Physical exam for central vertigo was negative.     Impression:  - Dizziness likely due to Peripheral Vertigo   - r/o Mycophenolate toxicity (dose was recently increased and dizziness is one of the adverse effects)    Recommendations:  - CTH, CTA Head and Neck reviewed  - MRI Brain with and without contrast  - IV Zofran PRN for vomiting  - IV Benadryl 25mg PRN  - IV Hydration   - Avoid Meclizine for now    Neurology team will follow the MRI results. Case discussed with attending.  62-year-old male with history of diabetes, high cholesterol, rheumatoid arthritis, 40% lung capacity status post COVID in 2020 presents to the ED complaining of dizziness, room spinning, nausea, vomiting, SOB coughing, and difficulty ambulating since Friday. Pt also stated that when he was trying to sit up from lying position he had dizziness and was vomiting continuously. Patient states had increase in his mycophenolate from 500 twice a day to 1000 twice a day. Patient also complaining of some shortness of breath and productive cough with white phlegm. No fever, chills, chest pain, headache, abdominal pain or weakness. Neurology was consulted for dizziness.  Patient endorsed that dizziness is much better since he came to the hospital. Physical exam for central vertigo was negative.     Impression:  - Dizziness likely due to Peripheral Vertigo   - r/o Mycophenolate toxicity (dose was recently increased and dizziness is one of the adverse effects)    Recommendations:  - CTH, CTA Head and Neck reviewed  - MRI Brain with and without contrast  - IV Zofran PRN for vomiting  - IV Benadryl 25mg PRN  - IV Hydration   - Meclizine 25mg q8 for 1 week    Neurology team will follow the MRI results. Case discussed with attending.

## 2023-03-13 NOTE — PATIENT PROFILE ADULT - FALL HARM RISK - HARM RISK INTERVENTIONS
Assistance with ambulation/Assistance OOB with selected safe patient handling equipment/Communicate Risk of Fall with Harm to all staff/Discuss with provider need for PT consult/Monitor gait and stability/Provide patient with walking aids - walker, cane, crutches/Reinforce activity limits and safety measures with patient and family/Sit up slowly, dangle for a short time, stand at bedside before walking/Tailored Fall Risk Interventions/Visual Cue: Yellow wristband and red socks/Bed in lowest position, wheels locked, appropriate side rails in place/Call bell, personal items and telephone in reach/Instruct patient to call for assistance before getting out of bed or chair/Non-slip footwear when patient is out of bed/Jessie to call system/Physically safe environment - no spills, clutter or unnecessary equipment/Purposeful Proactive Rounding/Room/bathroom lighting operational, light cord in reach

## 2023-03-13 NOTE — ED ADULT NURSE NOTE - OBJECTIVE STATEMENT
pt states he has been vomiting and dizzy for the past 3-4 days. the room spins. standing makes it worse. home med was increased and pt thought it was that. has not taken it for 2 days but symptoms continued.

## 2023-03-13 NOTE — ED PROVIDER NOTE - OBJECTIVE STATEMENT
62-year-old male with history of diabetes, high cholesterol, rheumatoid arthritis, 40% lung capacity status post COVID in 2020 presents to the ED complaining of 971 dizziness, room spinning, nausea, vomiting and difficulty ambulating since Friday.  Patient states had increase in his mycopheate from 500 twice a day to 1000 twice a day.  Patient also complaining of some shortness of breath and productive cough with white phlegm.  No fever, chills, chest pain, headache, abdominal pain or weakness. 62-year-old male with history of diabetes, high cholesterol, rheumatoid arthritis, 40% lung capacity status post COVID in 2020 presents to the ED complaining of dizziness, room spinning, nausea, vomiting and difficulty ambulating since Friday.  Patient states had increase in his mycopheneate from 500 twice a day to 1000 twice a day.  Patient also complaining of some shortness of breath and productive cough with white phlegm.  No fever, chills, chest pain, headache, abdominal pain or weakness.

## 2023-03-13 NOTE — H&P ADULT - NSHPPHYSICALEXAM_GEN_ALL_CORE
GENERAL: NAD, lying in bed comfortably  HEAD:  Atraumatic, Normocephalic  EYES: EOMI, PERRLA, conjunctiva and sclera clear  ENT: Moist mucous membranes  NECK: Supple, No JVD  CHEST/LUNG: Clear to auscultation bilaterally; No rales, rhonchi, wheezing, or rubs. Unlabored respirations  HEART: Regular rate and rhythm; No murmurs, rubs, or gallops  ABDOMEN: BSx4; Soft, nontender, nondistended  EXTREMITIES:  2+ Peripheral Pulses, brisk capillary refill. No clubbing, cyanosis, or edema  NERVOUS SYSTEM:  A&Ox3, no focal deficits   SKIN: No rashes or lesions GENERAL: NAD, lying in bed comfortably  HEAD:  Atraumatic, Normocephalic  EYES: EOMI, PERRLA, conjunctiva and sclera clear  ENT: Moist mucous membranes, tongue with white residue  NECK: Supple, No JVD  CHEST/LUNG: bilat crackles  HEART: Regular rate and rhythm; No murmurs, rubs, or gallops  ABDOMEN: BSx4; Soft, nontender, nondistended  EXTREMITIES:  2+ Peripheral Pulses, brisk capillary refill. No clubbing, cyanosis, or edema  NERVOUS SYSTEM:  A&Ox3, no focal deficits

## 2023-03-13 NOTE — H&P ADULT - NSHPREVIEWOFSYSTEMS_GEN_ALL_CORE
CONSTITUTIONAL: No weakness, fevers or chills  EYES/ENT: No visual changes;  No vertigo or throat pain   RESPIRATORY: No cough, wheezing, hemoptysis; No shortness of breath  CARDIOVASCULAR: No chest pain or palpitations  GASTROINTESTINAL: No abdominal or epigastric pain. No nausea, vomiting, or hematemesis; No diarrhea or constipation. No melena or hematochezia.  GENITOURINARY: No dysuria, frequency or hematuria  NEUROLOGICAL: No numbness or weakness  SKIN: No itching, rashes CONSTITUTIONAL: No weakness, fevers or chills  EYES/ENT: dizziness, no vision changes  RESPIRATORY: Cough with SOB  CARDIOVASCULAR: No chest pain or palpitations  GASTROINTESTINAL: N/V, no diarrhea or abdominal pain  GENITOURINARY: No dysuria, frequency or hematuria  NEUROLOGICAL: No numbness or weakness

## 2023-03-13 NOTE — H&P ADULT - HISTORY OF PRESENT ILLNESS
62-year-old male with history of diabetes, high cholesterol, rheumatoid arthritis, 40% lung capacity status post COVID in 2020 presents to the ED complaining of dizziness, room spinning, nausea, vomiting, SOB coughing, and difficulty ambulating since Friday. Pt also stated that when he was trying to sit up from lying position he had dizziness and was vomiting continuously. Patient states had increase in his mycopheate from 500 twice a day to 1000 twice a day. Patient also complaining of some shortness of breath and productive cough with white phlegm. No fever, chills, chest pain, headache, abdominal pain or weakness.    ED vitals:   / 109, , RR 19, Temp 97.9, O2 sat 100% on RA    Sig labs:   WBC 12.7    EKG: NSR    CTA head/ neck:   1.  CTA HEAD/NECK: No significant vascular stenosis or large vessel   occlusion.  2.  Patchy bilateral lung opacities.    Pt was seen by neuro in the ED and was given levaquin, meclizine, zofran and 1L bolus in the ED     62-year-old male with history of HTN, diabetes, high cholesterol, rheumatoid arthritis, 40% lung capacity status post COVID in 2020 presents to the ED complaining of dizziness, room spinning, nausea, vomiting, SOB coughing with white colored sputum, and difficulty ambulating since Friday. Pt also stated its worse when lying down vomiting continuously. Patient states that since he started taking mycophenolate his symptoms started but when he increased from 500 twice a day to 1000 twice a day he started having dizziness and n/v since the last 4 days hence he decided to come to the ED. Patient also complaining of some shortness of breath and cough with white phlegm.    ED vitals:   / 109, , RR 19, Temp 97.9, O2 sat 100% on RA    Sig labs:   WBC 12.7    EKG: NSR    CTA head/ neck:   1.  CTA HEAD/NECK: No significant vascular stenosis or large vessel   occlusion.  2.  Patchy bilateral lung opacities.    Pt was seen by neuro in the ED and was given levaquin, meclizine, zofran and 1L bolus in the ED

## 2023-03-13 NOTE — H&P ADULT - ASSESSMENT
62-year-old male with history of diabetes, high cholesterol, rheumatoid arthritis, 40% lung capacity status post COVID in 2020 presents to the ED complaining of dizziness, room spinning, nausea, vomiting, SOB coughing, and difficulty ambulating since Friday.     #Dizziness    #Hx of RA, on chronic steroids  - Takes Prednisone 10 mg qd at home  - continue prednisone taper (pt received 30mg today)     #HTN - well controlled  - C/w Metoprolol 12.5 mg qd for tachycardia    #DMII  - HbA1c 7.55  - Insulin regiment: Lantus 20U, lispro 12U tid     DVT ppx: Lovenox 40 q 12  GI ppx: Pantoprazole  Diet: DASH  Activity: IAT  Full code   62-year-old male with history of diabetes, high cholesterol, rheumatoid arthritis, 40% lung capacity status post COVID in 2020 presents to the ED complaining of dizziness, room spinning, nausea, vomiting, SOB coughing, and difficulty ambulating since Friday.     #Dizziness, N/V  #R/o mycophenolate toxicity  - CTA HEAD/NECK: No significant vascular stenosis or large vessel occlusion.  - Neuro following: recommend MRI brain without cont, rule out mycophenolate toxicity  - IV Zofran PRN for vomiting  - IV Benadryl 25mg PRN  - Avoid meclizine per neuro  - fup mycophenolate levels    #Suspecting Asp pna  - CTA showing Patchy bilateral lung opacities.  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     #Dizziness, N/V  #R/o mycophenolate toxicity  - CTA HEAD/NECK: No significant vascular stenosis or large vessel occlusion.  - Neuro following: recommend MRI brain without cont, rule out mycophenolate toxicity  - IV Zofran PRN for vomiting  - IV Benadryl 25mg PRN  - Avoid meclizine per neuro  - fup mycophenolate levels  - gi pcr to rule out gastroenteritis    #Suspecting Asp pna vs viral etiology  - sirs +ve with WBC and HR  - CTA showing Patchy bilateral lung opacities.  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     #Dizziness, N/V  #R/o mycophenolate toxicity  - CTA HEAD/NECK: No significant vascular stenosis or large vessel occlusion.  - Neuro following: recommend MRI brain with and without cont, rule out mycophenolate toxicity  - IV Zofran PRN for vomiting  - Avoid meclizine per neuro  - fup mycophenolate levels  - gi pcr to rule out gastroenteritis    #Suspecting Asp pna vs viral etiology  - sirs +ve with WBC and HR  - CTA showing Patchy bilateral lung opacities.  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

## 2023-03-13 NOTE — ED PROVIDER NOTE - CLINICAL SUMMARY MEDICAL DECISION MAKING FREE TEXT BOX
Patient presents with room spinning dizziness.  Labs EKG chest x-ray CT done.  No acute findings.  Possible opacity on CTA.  Antibiotics given.  Patient seen by neurology who recommends MRI.  Patient admitted to medicine for further management.

## 2023-03-14 ENCOUNTER — TRANSCRIPTION ENCOUNTER (OUTPATIENT)
Age: 63
End: 2023-03-14

## 2023-03-14 VITALS
DIASTOLIC BLOOD PRESSURE: 83 MMHG | RESPIRATION RATE: 20 BRPM | SYSTOLIC BLOOD PRESSURE: 124 MMHG | TEMPERATURE: 98 F | OXYGEN SATURATION: 91 % | HEART RATE: 124 BPM

## 2023-03-14 LAB
A1C WITH ESTIMATED AVERAGE GLUCOSE RESULT: 8.9 % — HIGH (ref 4–5.6)
ALBUMIN SERPL ELPH-MCNC: 3.6 G/DL — SIGNIFICANT CHANGE UP (ref 3.5–5.2)
ALP SERPL-CCNC: 114 U/L — SIGNIFICANT CHANGE UP (ref 30–115)
ALT FLD-CCNC: 14 U/L — SIGNIFICANT CHANGE UP (ref 0–41)
ANION GAP SERPL CALC-SCNC: 16 MMOL/L — HIGH (ref 7–14)
AST SERPL-CCNC: 13 U/L — SIGNIFICANT CHANGE UP (ref 0–41)
BASOPHILS # BLD AUTO: 0.09 K/UL — SIGNIFICANT CHANGE UP (ref 0–0.2)
BASOPHILS NFR BLD AUTO: 0.9 % — SIGNIFICANT CHANGE UP (ref 0–1)
BILIRUB SERPL-MCNC: 0.6 MG/DL — SIGNIFICANT CHANGE UP (ref 0.2–1.2)
BUN SERPL-MCNC: 8 MG/DL — LOW (ref 10–20)
CALCIUM SERPL-MCNC: 9.2 MG/DL — SIGNIFICANT CHANGE UP (ref 8.4–10.5)
CHLORIDE SERPL-SCNC: 101 MMOL/L — SIGNIFICANT CHANGE UP (ref 98–110)
CHOLEST SERPL-MCNC: 120 MG/DL — SIGNIFICANT CHANGE UP
CO2 SERPL-SCNC: 26 MMOL/L — SIGNIFICANT CHANGE UP (ref 17–32)
CREAT SERPL-MCNC: 0.9 MG/DL — SIGNIFICANT CHANGE UP (ref 0.7–1.5)
EGFR: 96 ML/MIN/1.73M2 — SIGNIFICANT CHANGE UP
EOSINOPHIL # BLD AUTO: 0.19 K/UL — SIGNIFICANT CHANGE UP (ref 0–0.7)
EOSINOPHIL NFR BLD AUTO: 1.8 % — SIGNIFICANT CHANGE UP (ref 0–8)
ESTIMATED AVERAGE GLUCOSE: 209 MG/DL — HIGH (ref 68–114)
GLUCOSE SERPL-MCNC: 120 MG/DL — HIGH (ref 70–99)
HCT VFR BLD CALC: 45 % — SIGNIFICANT CHANGE UP (ref 42–52)
HDLC SERPL-MCNC: 40 MG/DL — LOW
HGB BLD-MCNC: 14.6 G/DL — SIGNIFICANT CHANGE UP (ref 14–18)
IMM GRANULOCYTES NFR BLD AUTO: 1.2 % — HIGH (ref 0.1–0.3)
LIPID PNL WITH DIRECT LDL SERPL: 50 MG/DL — SIGNIFICANT CHANGE UP
LYMPHOCYTES # BLD AUTO: 1.54 K/UL — SIGNIFICANT CHANGE UP (ref 1.2–3.4)
LYMPHOCYTES # BLD AUTO: 14.8 % — LOW (ref 20.5–51.1)
MAGNESIUM SERPL-MCNC: 1.7 MG/DL — LOW (ref 1.8–2.4)
MCHC RBC-ENTMCNC: 29.9 PG — SIGNIFICANT CHANGE UP (ref 27–31)
MCHC RBC-ENTMCNC: 32.4 G/DL — SIGNIFICANT CHANGE UP (ref 32–37)
MCV RBC AUTO: 92.2 FL — SIGNIFICANT CHANGE UP (ref 80–94)
MONOCYTES # BLD AUTO: 1.29 K/UL — HIGH (ref 0.1–0.6)
MONOCYTES NFR BLD AUTO: 12.4 % — HIGH (ref 1.7–9.3)
MRSA PCR RESULT.: NEGATIVE — SIGNIFICANT CHANGE UP
NEUTROPHILS # BLD AUTO: 7.17 K/UL — HIGH (ref 1.4–6.5)
NEUTROPHILS NFR BLD AUTO: 68.9 % — SIGNIFICANT CHANGE UP (ref 42.2–75.2)
NON HDL CHOLESTEROL: 80 MG/DL — SIGNIFICANT CHANGE UP
NRBC # BLD: 0 /100 WBCS — SIGNIFICANT CHANGE UP (ref 0–0)
PLATELET # BLD AUTO: 347 K/UL — SIGNIFICANT CHANGE UP (ref 130–400)
POTASSIUM SERPL-MCNC: 4.5 MMOL/L — SIGNIFICANT CHANGE UP (ref 3.5–5)
POTASSIUM SERPL-SCNC: 4.5 MMOL/L — SIGNIFICANT CHANGE UP (ref 3.5–5)
PROCALCITONIN SERPL-MCNC: 0.04 NG/ML — SIGNIFICANT CHANGE UP (ref 0.02–0.1)
PROT SERPL-MCNC: 6.3 G/DL — SIGNIFICANT CHANGE UP (ref 6–8)
RBC # BLD: 4.88 M/UL — SIGNIFICANT CHANGE UP (ref 4.7–6.1)
RBC # FLD: 15.6 % — HIGH (ref 11.5–14.5)
SODIUM SERPL-SCNC: 143 MMOL/L — SIGNIFICANT CHANGE UP (ref 135–146)
TRIGL SERPL-MCNC: 147 MG/DL — SIGNIFICANT CHANGE UP
TSH SERPL-MCNC: 4.69 UIU/ML — HIGH (ref 0.27–4.2)
WBC # BLD: 10.4 K/UL — SIGNIFICANT CHANGE UP (ref 4.8–10.8)
WBC # FLD AUTO: 10.4 K/UL — SIGNIFICANT CHANGE UP (ref 4.8–10.8)

## 2023-03-14 PROCEDURE — 99221 1ST HOSP IP/OBS SF/LOW 40: CPT

## 2023-03-14 PROCEDURE — 99222 1ST HOSP IP/OBS MODERATE 55: CPT

## 2023-03-14 RX ORDER — MYCOPHENOLATE MOFETIL 250 MG/1
2 CAPSULE ORAL
Qty: 0 | Refills: 0 | DISCHARGE

## 2023-03-14 RX ADMIN — SODIUM CHLORIDE 125 MILLILITER(S): 9 INJECTION, SOLUTION INTRAVENOUS at 05:59

## 2023-03-14 RX ADMIN — ONDANSETRON 4 MILLIGRAM(S): 8 TABLET, FILM COATED ORAL at 16:11

## 2023-03-14 RX ADMIN — ONDANSETRON 4 MILLIGRAM(S): 8 TABLET, FILM COATED ORAL at 05:57

## 2023-03-14 RX ADMIN — Medication 10 MILLIGRAM(S): at 05:57

## 2023-03-14 RX ADMIN — LISINOPRIL 40 MILLIGRAM(S): 2.5 TABLET ORAL at 05:57

## 2023-03-14 RX ADMIN — Medication 100 MILLIGRAM(S): at 05:59

## 2023-03-14 RX ADMIN — DULOXETINE HYDROCHLORIDE 30 MILLIGRAM(S): 30 CAPSULE, DELAYED RELEASE ORAL at 16:12

## 2023-03-14 RX ADMIN — PANTOPRAZOLE SODIUM 40 MILLIGRAM(S): 20 TABLET, DELAYED RELEASE ORAL at 05:57

## 2023-03-14 NOTE — CONSULT NOTE ADULT - ATTENDING COMMENTS
I have personally seen and examined this patient.  I have fully participated in the care of this patient.  I have reviewed all pertinent clinical information, including history, physical exam, plan and note. Patient with history of diabetes presented with room spinning sensation, imbalance and nausea. Likely vertigo. Given the comorbidities recommended to obtain MRI. No further work up if Brain MRI shows no stroke. Could continue Meclizine 25 mg TID for one week. I have reviewed all pertinent clinical information and reviewed all relevant imaging and diagnostic studies personally.  Recommendations as above.  Agree with above assessment except as noted.
62 year old male with a history of rheumatoid arthritis, interstitial lung disease who presented for dizziness. He was recently started on Cellcept by Dr. Sandra Sauceda initially 500 mg BID and developed cough with white sputum, then his dose was increased to 2000 mg daily but he only took 1500 mg daily, and he developed dizziness and vomiting. Since he has been admitted his dizziness and vomiting has stopped. His joints feel ok he has left thumb pain, no joint swelling and has some morning stiffness. He is taking prednisone 5 mg twice daily. He is supposed to be on hydroxychloroquine 200 mg daily, sulfasalazine 500 mg BID, and Ruxience 1000 mg (due for dose in January). It's unclear if he is taking these medications at this time or if they were switched from his rheumatologist visit. He is complaining of back pain and his breathing is stable.     Rheumatoid arthritis   -Does not appear to be in flare  -He can continue prednisone 5 mg BID for now   -Dizziness and vomiting possible from mycophenolate mofetil, better now, would stay off of this medication and continue work up per neurology recs  -He should be on  mg daily and sulfasalazine 500 mg BID unclear why he is not taking these  -Continue Ruxience as scheduled  -Check TPMT enzyme  -We can discuss alternative steroid sparing therapy as outpatient including escalation of sulfasalazine dose

## 2023-03-14 NOTE — DISCHARGE NOTE NURSING/CASE MANAGEMENT/SOCIAL WORK - PATIENT PORTAL LINK FT
You can access the FollowMyHealth Patient Portal offered by [facility] by registering at the following website: [email or web address]. By joining [employer]’s FollowMyHealth portal, you will also be able to view your health information using other applications (apps) compatible with our system.

## 2023-03-14 NOTE — DISCHARGE NOTE PROVIDER - NSDCCPCAREPLAN_GEN_ALL_CORE_FT
PRINCIPAL DISCHARGE DIAGNOSIS  Diagnosis: Adverse effect mycophenolate mofetil  Assessment and Plan of Treatment: You were admitted to the hospital due to dizziness, which was determined to likely be secondary to a medication you are taking for Rheumatoid Arthritis, mycophenolate mofetil. You were seen and evaluated by Rheumatology who recomended to discontinue this medication for now. Please continue to take your normal prednisone dose 5 mg twice daily. You have an appointment to with Dr. Kiran (rheumatologist) scheduled for 3/16/23.

## 2023-03-14 NOTE — DISCHARGE NOTE PROVIDER - NSDCFUSCHEDAPPT_GEN_ALL_CORE_FT
Eloina Kiran  [facility]  RHEUM [address]  Scheduled Appointment: 03/16/2023

## 2023-03-14 NOTE — CHART NOTE - NSCHARTNOTEFT_GEN_A_CORE
Seen by Dr Reynolds today. Spoke w/ PGY 1 from IM and senior resid from rheum.    cc: dizziness w/ N/V after incr in MMF (last dose on Fri 3/10); cough w/ thin, white sputum - no fever or chills; no SOB or O2 need    hx: RA (sees  in [place]); ILD (Pulm: Dr Stewart); DM, HTN, HLD (PMD: Dr Charlse Hassan)    labs reviewed: A1c 8.9    CXR: chr ILD; no PNA  RA sat good    # dizziness and vertigo 2/2 MMF side effect  d/c MMF per rheum (agree)  dc meclizine - won't help  give IVFs (can give remainder of current bag wide open)  check orthostatics - if +, could lower ACEI  pt walked completely fine on his own from bed to door and back  had a little h/a and lightheadedness upon return to bed - c/w dehydration     # cough w/ thin, white phlegm - minor acute bronchitis vs chr ILD w/ mild exac  CXR: chr changes; no opac or consolidation   no leg edema  WBC 12 -> 10: prob steroid effect  no fever  clinically does NOT look sick  no abx  oral hydration  annita DM for cough prn    # Uncontrolled DM w/ hyperglycemia - worse w/ steroids  incr metformin 850mg po q12    # RA; ILD  c/w Pred 5mg po q12 (not 10mg po q12)  f/u rheum and pulm as outpt    # HDL - statin    # HTN - ACEI    # updated wife and dtr at bedside    Dispo: d/c home today after finish IVFs

## 2023-03-14 NOTE — DISCHARGE NOTE PROVIDER - NSDCMRMEDTOKEN_GEN_ALL_CORE_FT
DULoxetine 30 mg oral delayed release capsule: 1 cap(s) orally once a day  lisinopril 40 mg oral tablet: 1 tab(s) orally once a day  metFORMIN 500 mg oral tablet: 1 tab(s) orally 2 times a day  pantoprazole 40 mg oral delayed release tablet: 1 tab(s) orally once a day (before a meal)  predniSONE 5 mg oral tablet: 1 tab(s) orally every 12 hours  rosuvastatin 10 mg oral tablet: 1 tab(s) orally once a day

## 2023-03-14 NOTE — DISCHARGE NOTE PROVIDER - HOSPITAL COURSE
62-year-old male with history of diabetes, high cholesterol, rheumatoid arthritis, 40% lung capacity status post COVID in 2020 presents to the ED complaining of dizziness, room spinning, nausea, vomiting, SOB coughing, and difficulty ambulating since Friday.     #Dizziness, N/V  #R/o mycophenolate toxicity  - CTA HEAD/NECK: No significant vascular stenosis or large vessel occlusion.  - Neuro following: recommend MRI brain with and without cont, rule out mycophenolate toxicity  - IV Zofran PRN for vomiting  - Avoid meclizine per neuro  - fup mycophenolate levels  - gi pcr to rule out gastroenteritis    #Suspecting Asp pna vs viral etiology  - sirs +ve with WBC and HR  - CTA showing Patchy bilateral lung opacities.  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

## 2023-03-14 NOTE — DISCHARGE NOTE NURSING/CASE MANAGEMENT/SOCIAL WORK - NSDCPEFALRISK_GEN_ALL_CORE
For information on Fall & Injury Prevention, visit: [email or web address] OR  [email or web address] OR  https://www.cdc.gov/steadi/patient.html

## 2023-03-14 NOTE — CONSULT NOTE ADULT - SUBJECTIVE AND OBJECTIVE BOX
HPI:  62-year-old male with history of HTN, diabetes, high cholesterol, rheumatoid arthritis, 40% lung capacity status post COVID in 2020 presents to the ED complaining of dizziness, room spinning, nausea, vomiting, SOB coughing with white colored sputum, and difficulty ambulating since Friday. Pt also stated its worse when lying down vomiting continuously. Patient states that since he started taking mycophenolate his symptoms started but when he increased from 500 twice a day to 1000 twice a day he started having dizziness and n/v since the last 4 days hence he decided to come to the ED. Patient also complaining of some shortness of breath and cough with white phlegm.    ED vitals:   / 109, , RR 19, Temp 97.9, O2 sat 100% on RA    Sig labs:   WBC 12.7    EKG: NSR    CTA head/ neck:   1.  CTA HEAD/NECK: No significant vascular stenosis or large vessel   occlusion.  2.  Patchy bilateral lung opacities.    Pt was seen by neuro in the ED and was given levaquin, meclizine, zofran and 1L bolus in the ED     (13 Mar 2023 15:40)      PAST MEDICAL & SURGICAL HISTORY  Hypertension    Lumbago    Hyperlipidemia    Pneumonia due to COVID-19 virus    H/O lymph node biopsy  Neck by Dr Case        FAMILY HISTORY:  FAMILY HISTORY:  Known health problems: none        SOCIAL HISTORY:  []smoker  []Alcohol  []Drug    ALLERGIES:  penicillin (Unknown)  shellfish (Pruritus)      MEDICATIONS:  MEDICATIONS  (STANDING):  atorvastatin 40 milliGRAM(s) Oral at bedtime  DULoxetine 30 milliGRAM(s) Oral daily  enoxaparin Injectable 40 milliGRAM(s) SubCutaneous every 24 hours  lactated ringers. 1000 milliLiter(s) (125 mL/Hr) IV Continuous <Continuous>  levoFLOXacin IVPB 750 milliGRAM(s) IV Intermittent every 24 hours  lisinopril 40 milliGRAM(s) Oral daily  metroNIDAZOLE  IVPB 500 milliGRAM(s) IV Intermittent every 8 hours  ondansetron Injectable 4 milliGRAM(s) IV Push every 8 hours  pantoprazole    Tablet 40 milliGRAM(s) Oral before breakfast  predniSONE   Tablet 10 milliGRAM(s) Oral two times a day    MEDICATIONS  (PRN):      HOME MEDICATIONS:  Home Medications:  DULoxetine 30 mg oral delayed release capsule: 1 cap(s) orally once a day (13 Mar 2023 16:34)  lisinopril 40 mg oral tablet: 1 tab(s) orally once a day (13 Mar 2023 16:34)  metFORMIN 500 mg oral tablet: 1 tab(s) orally 2 times a day (13 Mar 2023 16:34)  mycophenolate mofetil 500 mg oral tablet: 2 tab(s) orally 2 times a day (13 Mar 2023 16:34)  predniSONE 10 mg oral tablet: 1 tab(s) orally 2 times a day (13 Mar 2023 16:34)  rosuvastatin 10 mg oral tablet: 1 tab(s) orally once a day (13 Mar 2023 16:34)      VITALS:   T(F): 96.8 (03-14 @ 05:11), Max: 97.9 (03-13 @ 09:27)  HR: 74 (03-14 @ 05:11) (74 - 110)  BP: 126/82 (03-14 @ 05:11) (122/79 - 169/109)  BP(mean): --  RR: 20 (03-14 @ 05:11) (16 - 20)  SpO2: 98% (03-13 @ 17:59) (96% - 100%)    I&O's Summary    13 Mar 2023 07:01  -  14 Mar 2023 07:00  --------------------------------------------------------  IN: 1375 mL / OUT: 900 mL / NET: 475 mL        REVIEW OF SYSTEMS:  CONSTITUTIONAL: No weakness, fevers or chills  EYES: No visual changes  ENT: No vertigo or throat pain   NECK: No pain or stiffness  RESPIRATORY: No cough, wheezing, hemoptysis; No shortness of breath  CARDIOVASCULAR: No chest pain or palpitations  GASTROINTESTINAL: No abdominal or epigastric pain. No nausea, vomiting, or hematemesis; No diarrhea or constipation. No melena or hematochezia.  GENITOURINARY: No Polyuria  NEUROLOGICAL:  No tremors, no Weakness or numbness  SKIN: No itching, no rashes  MSK: no joint pain    PHYSICAL EXAM:  GENERAL: Patient is awake , alert and oriented,  not in acute distress  EYES: No proptosis, no lid lag  NECK: No thyroid enlargement, no palpable nodules , no bruit  LUNGS: Clear to auscultation bilaterally   CARDIOVASCULAR: S1/S2 present, RRR , no murmurs or rubs  ABD: Soft, non-tender, non-distended, +BS  EXT: No SONYA  SKIN: No abdominal striae  NEURO: No tremors, DTR 2+    LABS:                        14.6   10.40 )-----------( 347      ( 14 Mar 2023 06:01 )             45.0     03-13    141  |  102  |  13  ----------------------------<  169<H>  4.6   |  29  |  0.9    Ca    10.0      13 Mar 2023 10:22  Mg     1.8     03-13    TPro  7.2  /  Alb  4.1  /  TBili  0.4  /  DBili  x   /  AST  13  /  ALT  17  /  AlkPhos  131<H>  03-13      Troponin T, Serum: <0.01 ng/mL (03-13-23 @ 10:22)    CARDIAC MARKERS ( 13 Mar 2023 10:22 )  x     / <0.01 ng/mL / x     / x     / x                
Neurology Consult    62-year-old male with history of diabetes, high cholesterol, rheumatoid arthritis, 40% lung capacity status post COVID in 2020 presents to the ED complaining of dizziness, room spinning, nausea, vomiting, SOB coughing, and difficulty ambulating since Friday. Pt also stated that when he was trying to sit up from lying position he had dizziness and was vomiting continuously. Patient states had increase in his mycopheate from 500 twice a day to 1000 twice a day. Patient also complaining of some shortness of breath and productive cough with white phlegm. No fever, chills, chest pain, headache, abdominal pain or weakness.      PAST MEDICAL & SURGICAL HISTORY:  Hypertension      Lumbago      Hyperlipidemia      Pneumonia due to COVID-19 virus      H/O lymph node biopsy  Neck by Dr Case          FAMILY HISTORY:  Known health problems: none        Social History: (-) x 3    Allergies    penicillin (Unknown)  shellfish (Pruritus)    Intolerances        MEDICATIONS  (STANDING):    MEDICATIONS  (PRN):    Vital Signs Last 24 Hrs  T(C): 35.6 (13 Mar 2023 12:29), Max: 36.6 (13 Mar 2023 09:27)  T(F): 96 (13 Mar 2023 12:29), Max: 97.9 (13 Mar 2023 09:27)  HR: 82 (13 Mar 2023 12:29) (82 - 110)  BP: 139/92 (13 Mar 2023 12:29) (139/92 - 169/109)  BP(mean): --  RR: 18 (13 Mar 2023 12:29) (18 - 19)  SpO2: 96% (13 Mar 2023 12:29) (96% - 100%)    Parameters below as of 13 Mar 2023 12:29  Patient On (Oxygen Delivery Method): room air        Examination:  General:  Appearance is consistent with chronologic age.  No abnormal facies.  Gross skin survey within normal limits.    Cognitive/Language:  The patient is oriented to person, place, time and date.  Recent and remote memory intact.  Fund of knowledge is intact and normal.  Language with normal repetition, comprehension and naming.  Nondysarthric.    Eyes: intact VA, VFF.  EOMI w/ nystagmus, skew or reported double vision.  PERRL.  No ptosis/weakness of eyelid closure.    Face:  Facial sensation normal V1 - 3, no facial asymmetry.    Ears/Nose/Throat:  Hearing grossly intact b/l.  Palate elevates midline.  Tongue and uvula midline. [place] manuever didnt show nystagmus  Motor examination:   Normal tone, bulk and range of motion.  No tenderness, twitching, tremors or involuntary movements.  Formal Muscle Strength Testing: (MRC grade R/L) 5/5 UE; 5/5 LE.  No observable drift.  Reflexes:   2+ b/l pectoralis, biceps, brachioradialis, patella.    Sensory examination:   Intact to light touch and pinprick, pain, temperature and proprioception and vibration in all extremities.  Cerebellum:   FTN/HKS intact with normal SUZANNA in all limbs.  No dysmetria or dysdiadokinesia.  Gait narrow based and normal.      Labs:   CBC Full  -  ( 13 Mar 2023 10:22 )  WBC Count : 12.70 K/uL  RBC Count : 5.29 M/uL  Hemoglobin : 15.6 g/dL  Hematocrit : 48.5 %  Platelet Count - Automated : 403 K/uL  Mean Cell Volume : 91.7 fL  Mean Cell Hemoglobin : 29.5 pg  Mean Cell Hemoglobin Concentration : 32.2 g/dL  Auto Neutrophil # : 8.75 K/uL  Auto Lymphocyte # : 2.15 K/uL  Auto Monocyte # : 1.40 K/uL  Auto Eosinophil # : 0.17 K/uL  Auto Basophil # : 0.09 K/uL  Auto Neutrophil % : 69.0 %  Auto Lymphocyte % : 16.9 %  Auto Monocyte % : 11.0 %  Auto Eosinophil % : 1.3 %  Auto Basophil % : 0.7 %    03-13    141  |  102  |  13  ----------------------------<  169<H>  4.6   |  29  |  0.9    Ca    10.0      13 Mar 2023 10:22  Mg     1.8     03-13    TPro  7.2  /  Alb  4.1  /  TBili  0.4  /  DBili  x   /  AST  13  /  ALT  17  /  AlkPhos  131<H>  03-13    LIVER FUNCTIONS - ( 13 Mar 2023 10:22 )  Alb: 4.1 g/dL / Pro: 7.2 g/dL / ALK PHOS: 131 U/L / ALT: 17 U/L / AST: 13 U/L / GGT: x                   Neuroimaging:  NCHCT: CT Head No Cont:   ACC: [id number] EXAM:  CT BRAIN   ORDERED BY: AMERICA AGUILAR     PROCEDURE DATE:  03/13/2023          INTERPRETATION:  Clinical History / Reason for exam: Dizziness.    CT SCAN OF THE BRAIN WITHOUT CONTRAST    TECHNIQUE:    Multiple transaxial noncontrast CT images of the brain were obtained from   base to vertex. Sagittal and coronal reformatted images were obtained.    FINDINGS:    The third, fourth, and lateral ventricles are normal in size and position.    There is no shift of the midline structures or evidence of acute   intracranial hemorrhage or depressed skull fracture.    Bilateral basal ganglia calcifications.    In the retrocerebellar region greater to the right of midline there is a   [2.2] cm rounded area of diminished attenuation, which is isodense with   cerebral spinal fluid and would be consistent with a retrocerebellar   arachnoid cyst.    IMPRESSION:    Unremarkable non-contrast CT scan of the brain.    --- End of Report ---            JAMAAL BERMAN MD; Attending Radiologist  This document has been electronically signed. Mar 13 2023 11:22AM (03-13-23 @ 10:57)      03-13-23 @ 14:20

## 2023-03-14 NOTE — CONSULT NOTE ADULT - ASSESSMENT
Impression:   #dizziness  #Hx of RA and NSIP ILD  * follows Dr. Kiran for RA as o/p, however he does NOT prescribe this patient mycophenolate   * mycophenolate dose was recently increased and is generally a good option for RA-related ILD   * CT head/CT angio H/N: wnl   * Neuro: requesting MRI    Recommendations:   - f/u mycophenolate level   - f/u neuro w/u (MRI)   - f/u o/p Dr. Kiran (existing patient)     *incomplete note pending discussion with attending Impression:   #dizziness  #Hx of RA and NSIP ILD  * follows Dr. Kiran for RA as o/p, however he does NOT prescribe this patient mycophenolate; patient saw a second opinion from Dr. Ashely Sauceda who prescribed mycophenolate 500mg bid about a month ago as per patient which was then increased to 1000mg bid last week   * patient has dizziness when he sits up from bed   * CT head/CT angio H/N: wnl   * Neuro: requesting MRI    Recommendations:   - f/u mycophenolate level   - f/u MRI; needs neuro f/u      *incomplete note pending discussion with attending Impression:   #dizziness  #Hx of RA and NSIP ILD  * possibly secondary to adverse reaction of mycophenolate   * follows Dr. Kiran for RA as o/p, however he does NOT prescribe this patient mycophenolate; patient saw a second opinion from Dr. Ashely Sauceda who prescribed mycophenolate 500mg bid about a month ago as per patient which was then increased to 1000mg bid last week   * patient has dizziness when he sits up from bed   * CT head/CT angio H/N: wnl   * Neuro: requesting MRI    Recommendations:   - c/w home prednisone 5mg bid (as per patient)   - f/u mycophenolate level but can be followed up as outpatient  - can get TPMT level which can be followed up as outpatient   - get orthostatics   - f/u MRI; neuro f/u   - can follow up o/p with Dr. Ashely Sauceda or Dr. Kiran ideally within 1-2 wks (up to patient's preference  - will discuss starting sulfasalazine vs. dmard therapy as outpatient  Impression:   #dizziness  #Hx of RA and NSIP ILD  * possibly secondary to adverse reaction of mycophenolate   * follows Dr. Kiran for RA as o/p, however he does NOT prescribe this patient mycophenolate; patient saw a second opinion from Dr. Ashely Sauceda who prescribed mycophenolate 500mg bid about a month ago as per patient which was then increased to 1000mg bid last week   * patient has dizziness when he sits up from bed   * CT head/CT angio H/N: wnl   * Neuro: requesting MRI    Recommendations:   - c/w home prednisone 5mg bid (as per patient)   - f/u mycophenolate level but can be followed up as outpatient; can hold mycophenolate and won't start any new therapeutics at this time   - can get TPMT level which can be followed up as outpatient   - get orthostatics   - f/u MRI; neuro f/u   - can follow up o/p with Dr. Ashely Sauceda or Dr. Kiran ideally within 1-2 wks (up to patient's preference  - will discuss starting sulfasalazine vs. dmard therapy as outpatient  Impression:   #dizziness  #Hx of RA and NSIP ILD  * possibly secondary to adverse reaction of mycophenolate   * follows Dr. Kiran for RA as o/p, however he does NOT prescribe this patient mycophenolate; patient saw a second opinion from Dr. Ashely Sauceda who prescribed mycophenolate 500mg bid about a month ago as per patient which was then increased to 1000mg bid last week   * patient has dizziness when he sits up from bed   * CT head/CT angio H/N: wnl   * Neuro: requesting MRI    Recommendations:   - c/w home prednisone 5mg bid (as per patient)   - f/u mycophenolate level but can be followed up as outpatient; can hold mycophenolate and won't start any new therapeutics at this time   - get orthostatics   - f/u MRI; neuro f/u   - can follow up o/p with Dr. Ashely Sauceda or Dr. Kiran ideally within 1-2 wks (up to patient's preference  - will discuss starting sulfasalazine vs. dmard therapy as outpatient   - can get TPMT level which can be followed up as outpatient (needed to eval if azathioprine would be an option for the patient)

## 2023-03-16 ENCOUNTER — APPOINTMENT (OUTPATIENT)
Dept: RHEUMATOLOGY | Facility: CLINIC | Age: 63
End: 2023-03-16
Payer: MEDICARE

## 2023-03-16 VITALS
BODY MASS INDEX: 24.4 KG/M2 | WEIGHT: 174.31 LBS | SYSTOLIC BLOOD PRESSURE: 130 MMHG | OXYGEN SATURATION: 94 % | HEART RATE: 103 BPM | DIASTOLIC BLOOD PRESSURE: 80 MMHG | HEIGHT: 71 IN

## 2023-03-16 PROCEDURE — 99214 OFFICE O/P EST MOD 30 MIN: CPT

## 2023-03-16 RX ORDER — LISINOPRIL 40 MG/1
40 TABLET ORAL
Qty: 90 | Refills: 3 | Status: ACTIVE | COMMUNITY
Start: 1900-01-01 | End: 1900-01-01

## 2023-03-16 NOTE — PHYSICAL EXAM
[General Appearance - Alert] : alert [General Appearance - In No Acute Distress] : in no acute distress [Sclera] : the sclera and conjunctiva were normal [Extraocular Movements] : extraocular movements were intact [Outer Ear] : the ears and nose were normal in appearance [Hearing Threshold Finger Rub Not [place]] : hearing was normal [Neck Appearance] : the appearance of the neck was normal [Neck Cervical Mass (___cm)] : no neck mass was observed [Respiration, Rhythm And Depth] : normal respiratory rhythm and effort [Heart Rate And Rhythm] : heart rate was normal and rhythm regular [Heart Sounds] : normal S1 and S2 [Bowel Sounds] : normal bowel sounds [Abdomen Tenderness] : non-tender [Abnormal Walk] : normal gait [Nail Clubbing] : no clubbing  or cyanosis of the fingernails [Involuntary Movements] : no involuntary movements were seen [Motor Tone] : muscle strength and tone were normal [FreeTextEntry1] : Left thumb mild swelling. Left 2nd and 3rd dip mild swelling [Skin Color & Pigmentation] : normal skin color and pigmentation [] : no rash [Sensation] : the sensory exam was normal to light touch and pinprick [Motor Exam] : the motor exam was normal [Affect] : the affect was normal [Mood] : the mood was normal

## 2023-03-16 NOTE — ASSESSMENT
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 RA seropositive (CCP and RF)\par -Failed Actemra and side effect of palpitations to HCQ\par -Leflunomide stopped due to pneumonitis\par -Cellcept stopped due to dizziness and vomiting\par -Refused MTX\par -Reviewed labs\par -He can stay off hydroxychloroquine, this was restarted when he was in [place]\par -Continue Ruxience q 6 months last infusion was January 2023\par -Re-start sulfasalazine 500 mg BID for steroid sparing agent. Taper off prednisone. \par -Consider azathioprine if unable to taper prednisone or switch from rituximab. Options include abatacept, sarilumab can be considered but unclear if it'll be effective as he failed another IL-6 inhibitor tocilizumab. TNF inhibitors can be considered but caution given underlying lung disease\par \par \par 2. ILD possible post covid\par -Recent CT scan reviewed\par -Follow up Pulm\par \par 3. GIST tumor\par -S/p resection and following with Oncology.\par \par 4. HTN/HLD\par -F/u with PCP, and Cardiology \par \par 5. Cervical spinal stenosis/lumbar disc herniation\par -Follow up with PCP and neurosurgery\par -Worsening low back pain\par -MRI lumbar spine stable with L5-S1 trace disc herniation, L3-4 and L4-5 stable mild foraminal narrowing\par -PT lower back helped symptoms\par -He is deferring pain management evaluation\par \par 6. Bilateral shoulder pain\par -MRI right shoulder suggested partial rotator cuff tear, subacromial bursitis and has went for PT. \par -Stable\par \par 7. Multiple joint pain with pain and difficulty ambulating\par -He has hip pain from OA and enthesopathy Hip x-ray suggested enthesopathy and mild joint space narrowing with bilateral inflammatory sacroiliitis\par -He has foot pain from OA and tenosynovitis, sinus tarsi syndrome, neuropathy\par -He has knee pain from OA \par -He has low back pain from both osteoarthritic and inflammatory sacroiliitis, lumbar disc herniation impinging on nerve roots and spondylosis \par -NSAIDs for treatment\par \par F/u 3 months or PRN\par \par

## 2023-03-21 DIAGNOSIS — Z20.822 CONTACT WITH AND (SUSPECTED) EXPOSURE TO COVID-19: ICD-10-CM

## 2023-03-21 DIAGNOSIS — U09.9 POST COVID-19 CONDITION, UNSPECIFIED: ICD-10-CM

## 2023-03-21 DIAGNOSIS — E78.5 HYPERLIPIDEMIA, UNSPECIFIED: ICD-10-CM

## 2023-03-21 DIAGNOSIS — I10 ESSENTIAL (PRIMARY) HYPERTENSION: ICD-10-CM

## 2023-03-21 DIAGNOSIS — Z91.013 ALLERGY TO SEAFOOD: ICD-10-CM

## 2023-03-21 DIAGNOSIS — T45.1X5A ADVERSE EFFECT OF ANTINEOPLASTIC AND IMMUNOSUPPRESSIVE DRUGS, INITIAL ENCOUNTER: ICD-10-CM

## 2023-03-21 DIAGNOSIS — Z79.52 LONG TERM (CURRENT) USE OF SYSTEMIC STEROIDS: ICD-10-CM

## 2023-03-21 DIAGNOSIS — J20.9 ACUTE BRONCHITIS, UNSPECIFIED: ICD-10-CM

## 2023-03-21 DIAGNOSIS — M06.9 RHEUMATOID ARTHRITIS, UNSPECIFIED: ICD-10-CM

## 2023-03-21 DIAGNOSIS — Z88.0 ALLERGY STATUS TO PENICILLIN: ICD-10-CM

## 2023-03-21 DIAGNOSIS — J84.9 INTERSTITIAL PULMONARY DISEASE, UNSPECIFIED: ICD-10-CM

## 2023-03-21 DIAGNOSIS — Z79.01 LONG TERM (CURRENT) USE OF ANTICOAGULANTS: ICD-10-CM

## 2023-03-21 DIAGNOSIS — Z79.84 LONG TERM (CURRENT) USE OF ORAL HYPOGLYCEMIC DRUGS: ICD-10-CM

## 2023-03-21 DIAGNOSIS — E11.65 TYPE 2 DIABETES MELLITUS WITH HYPERGLYCEMIA: ICD-10-CM

## 2023-03-21 DIAGNOSIS — R42 DIZZINESS AND GIDDINESS: ICD-10-CM

## 2023-03-21 DIAGNOSIS — G93.0 CEREBRAL CYSTS: ICD-10-CM

## 2023-03-21 LAB
MYCOPHENOLATE SERPL-MCNC: <0.5 UG/ML — LOW (ref 1–3.5)
MYCOPHENOLIC ACID GLUCURONIDE: <5 UG/ML — LOW (ref 15–125)

## 2023-03-22 ENCOUNTER — APPOINTMENT (OUTPATIENT)
Dept: PULMONOLOGY | Facility: CLINIC | Age: 63
End: 2023-03-22
Payer: MEDICARE

## 2023-03-22 VITALS
WEIGHT: 174 LBS | HEIGHT: 71 IN | OXYGEN SATURATION: 95 % | DIASTOLIC BLOOD PRESSURE: 80 MMHG | SYSTOLIC BLOOD PRESSURE: 140 MMHG | BODY MASS INDEX: 24.36 KG/M2 | HEART RATE: 122 BPM | RESPIRATION RATE: 12 BRPM

## 2023-03-22 PROCEDURE — 99214 OFFICE O/P EST MOD 30 MIN: CPT

## 2023-03-22 NOTE — ASSESSMENT
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  \par Severe EDVIN on CPAP 15 cm H2O.

## 2023-03-22 NOTE — HISTORY OF PRESENT ILLNESS
[Follow-Up - Routine Clinic] : a routine clinic follow-up of [CPAP] : CPAP [Good Compliance] : good compliance with treatment [Good Tolerance] : good tolerance of treatment [Good Symptom Control] : good symptom control [Dyspnea on Exertion] : dyspnea on exertion [Dry Cough] : dry cough [None] : No associated symptoms are reported [Shortness of Breath] : Shortness of Breath [Follow-Up - From Hospitalization] : follow-up of a hospitalization for [Dyspnea] : dyspnea [Currently Experiencing] : The patient is currently experiencing symptoms. [Dyspnea at Rest] : no dyspnea at rest [Productive Cough] : no productive cough [Wheezing] : no wheezing [Hemoptysis] : no hemoptysis

## 2023-06-05 ENCOUNTER — RX RENEWAL (OUTPATIENT)
Age: 63
End: 2023-06-05

## 2023-06-15 ENCOUNTER — APPOINTMENT (OUTPATIENT)
Dept: RHEUMATOLOGY | Facility: CLINIC | Age: 63
End: 2023-06-15
Payer: MEDICARE

## 2023-06-15 VITALS
OXYGEN SATURATION: 93 % | HEIGHT: 72 IN | BODY MASS INDEX: 23.2 KG/M2 | SYSTOLIC BLOOD PRESSURE: 130 MMHG | DIASTOLIC BLOOD PRESSURE: 80 MMHG | WEIGHT: 171.31 LBS | HEART RATE: 82 BPM

## 2023-06-15 PROCEDURE — 99214 OFFICE O/P EST MOD 30 MIN: CPT

## 2023-06-15 RX ORDER — SULFASALAZINE 500 MG/1
500 TABLET, DELAYED RELEASE ORAL
Qty: 180 | Refills: 0 | Status: DISCONTINUED | COMMUNITY
Start: 2023-03-16 | End: 2023-06-15

## 2023-06-15 NOTE — HISTORY OF PRESENT ILLNESS
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 He saw rheumatology and received injection of L medial malleolus that help, and took prednisone 20 mg. He had CT chest, x-ray pelvis, hips, lumbar spine. He was prescribed mycophenolate 360 mg BID and rabeprazole. Rabeprazole started for abdominal pain and anti-inflammatory. He stopped sulfasalazine unclear why but has remain off. He takes prednisone 5 mg daily if severe pain he takes 10 mg\par Today he has joint pain in his right hand and low back pain. Foot and ankle pains are improvement\par Denies dyspnea or cough and o2 is > 94% \par \par \par \par \par 3/16/23:\par Patient was recently admitted at [facility] for dizziness and vomiting thought to be side effect of cellcept that resolved after cellcept was discontinued.  He was seen by Dr. Sauceda and started on this medication also trial duloxetine but could not tolerate due to side effects. He has stopped sulfasalazine and hydroxychloroquine. He is taking prednisone 5 mg twice daily. \par He reports joints are doing ok overall he has pains in his left thumb, 2nd DIP, left shoulder, left foot. He had swelling in his feet that resolved. His main complaint is his back pain. He is wearing a brace. His breathing is stable he went for CT scan, TTE and PFTs in the [facility]. He is going to [facility] for 3 months on 3/28 \par \par 11/29/22:\par -He reports chest pain feeling like it is being stretch open, coming and going. Worse at night time he's unable to lay on his chest or on his left side. Symptoms have been ongoing for 2 weeks. Afternoon and evening pain is bad. Heating pad and Motrin give relief. Denies palpation\par -Reports left sided swelling from axilla down his side to his back for the 2 weeks with pain. Pain is constant. Motrin 600 mg TID makes better. Nothing makes the pain worse. He's unable to lay on his L side at night time.\par -Right hip and right low back pain with swelling for the past 2-3 days that comes and goes. Laying down makes it better and walking around makes pain worse. Motrin helps to decrease swelling\par +Right ulnar wrist and hand pain and swelling. + Right lateral foot pain and swelling\par -1 month history of shortness of breath with pulse ox 89-91%. He uses 2.5 L O2 when pulse ox < 90%. Denies coughing or wheezing. \par +neck pain for 1 month\par -back pain is stable and improved with physical therapy. \par \par 11/1/22:\par He reports 7 days ago tapering off prednisone. 5 days ago he developed pain and swelling in his right wrist, right hand, left hand, left foot, and swelling in his face that is worse around 3-4 pm in the afternoon, and goes down at night time. He reports improvement in his back pain with physical therapy. He takes Motrin 800 mg PRN that gives partial relief of symptoms. Has not tried taking prednisone again. \par \par \par 9/21/22:\par Reports worsening low back pain for the past 2 weeks, worse after getting up from a seated position, using ibuprofen and heating pads that help. Tramadol doesn't help. He has radiculopathy. He also has foot pain when ambulating that is alleviated with ibuprofen. He's worried about taking too much NSAIDs. Joint pains are controlled no swelling or stiffness. \par \par 6/22/22:\par He didn't  or start Leflunomide 10 mg daily. He went to [facility] and saw a rheumatologist and started patient on  mg BID. Also started on Nintedanib. He was off prednisone. He feels joint pains are much improved and feels well today. Feels respiratory status is improved as well. He is taking prednisone 5 mg BID attempting to taper off. He has pain and difficulty with ambulation due to low back pain, hip pain, foot pain and knee pain.\par \par 3/7/22:\par He has hip pain that started 2-3 weeks ago its hard to walk and hard to sit. He gets pain anterior thigh pain down leg for 2-3 weeks worse with ambulation. He has face swelling, right 2nd, 3rd, 4th MCP finger swelling and pain, took prednisone 10 mg last week that helped. His palms are pruritic last 3 weeks uses benadry and hydrocortisone cream\par \par 11/29/21:\par Patient stopped prednisone for 1 week and had joint pain and swelling in his fingers return. He  takes prednisone 10 mg nightly, Ibuprofen and Tramadol PRN that help. Says gabapentin doesn't help. Denies AM stiffness or swelling. He is going to [place] for 3 months starting in January\par \par \par \par Brief history:\par Patient reports his symptoms started 6/16 at work. 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 He was seen in the ED 6/28 where XR of the knee was normal. XR of the right shoulder suggested AC OA (mild), and x-ray of the pelvis suggested degenerative changes in his lumbar spine and bilateral hips. He was sent home with motrin.\par \par MRI right ankle 7/1:   "Mild tenosynovitis of the posterior tibialis, flexor digitorum longus, peroneus brevis, and peroneus longus. Mild Achilles tendinosis. Edema in the sinus tarsi (and small erosion at sinus tarsi roof) may be seen with sinus tarsi syndrome, correlate with physical exam for lateral hindfoot impingement/instability. Diffuse edema throughout the musculature, likely due to to neuropathy. Prior sprain of the anterior distal syndesmotic ligament, deep fibers of the deltoid ligament, and spring ligament."\par \par MRI cervical spine 7/2:  "Mild canal narrowing on a degenerative basis, more pronounced at C5-6. C5-6 mild foraminal narrowing. Possible T4-T5 left foraminal disc herniation. If there is concern consider a dedicated T-spine study."\par \par Repeat MRI C spine 9/16: "C5-6 moderate spinal stenosis due to disc bulging and ligamentum flavum infolding, stable since the prior exam.. Stable mild spinal cord abutment without cord deformity or signal abnormality. Stable moderate foraminal narrowing. Stable additional mild degenerative changes as described."\par \par MRI lumbar spine 7/6: "L5-S1 small disc herniation in the right lateral recess with impingement of the descending right S1 nerve root. L3-4 small disc bulge and facet hypertrophy with mild right foraminal narrowing and mild compression of the exiting right L3 nerve root.  Additional mild degenerative changes as described.Mass-like heterogeneous structure within the mid abdomen, incompletely imaged/evaluated on this study. Although this can reflect loops of small bowel, recommend a dedicated CT or MRI of the abdomen with contrast to exclude an underlying mass."\par \par Repeat MRI lumbar spine 9/16:  since prior study: L5-S1 trace disc herniation in the right lateral recess, slightly decreased in size since the prior study with decreased mass effect upon the descending right S1 nerve root. L3-4 stable degenerative change with mild right foraminal narrowing and mild compression of the exiting right L3 nerve root.\par \par MRI T spine 9/16: normal. Repeat XR shoulder 9/17: AC OA\par \par MRI right shoulder 9/18: "Partial-thickness tears of the supraspinatus and infraspinatus tendons as above. Synovitis in the axillary pouch and rotator interval indicative of adhesive capsulitis, correlate with patient range of motion. SLAP tear extending into the posterior superior labrum. Small subacromial/subdeltoid bursitis."\par \par Xray of hands and feet 9/19: suggested OA without erosions\par \par CT Abdomen pelvis 7/26: "A 7.0 x 6.2 x 7.7 cm heterogeneous mass in the mid abdomen contiguous with small bowel loops most likely represents a small bowel gastrointestinal stromal tumor (GIST)."\par \par CTA dissection 9/17:  "Normal caliber of the thoracoabdominal aorta without evidence for dissection or intramural hematoma.Interstitial lung disease. No honeycombing. Interval postsurgical changes within the small bowel."\par \par Pathology from OR 8/21 confirmed GIST\par Serologies include +CCP >250,  with negative lyme, SSA, SSB, ED, Scleroderma. \par Hepatitis B and C serologies negative\par ESR 12 CRP 6\par Patient was admitted 9/15-9/19at [facility] for intractable neck pain radiating to right shoulder/arm right arm and low back pain radiating to right leg. Neurosurgery advised steroids and no surgical intervention. Imaging and labs as above. Started on metformin inpatient due to elevated blood sugars from steroids\par \par \par \par Today's visit: \par Patient feels well today. He reports joint pains improved. Denies significant AM stiffness or swelling. He takes prednisone 10 mg daily, Celebrex PRN, and tramadol at PM. He is switching pulmonlogist to [facility]

## 2023-06-15 NOTE — ASSESSMENT
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 RA seropositive (CCP and RF)\par -Failed Actemra and side effect of palpitations to HCQ\par -Leflunomide stopped due to pneumonitis\par -Cellcept stopped due to dizziness and vomiting\par -Sulfasalazine self d/c'd unclear if any side effects\par -Refused MTX\par -He can stay off hydroxychloroquine, this was restarted when he was in [place]\par -Check labs today CBC, CMP, ESR, CRP, hepatits B and C, TB quantiferon\par -Continue Ruxience 1000 mg twice 2 weeks apart q 6 months last infusion was January 2023 and due in July \par -Continue mycophenolate 360 mg BID. Consider increasing next visit if unable to taper off prednisone\par -Continue prednisone 5 mg he will attempt to taper\par \par 2. ILD possible post covid\par -Recent CT scan reviewed\par -Follow up Pulm\par \par 3. GIST tumor\par -S/p resection and following with Oncology.\par \par 4. HTN/HLD\par -F/u with PCP, and Cardiology \par \par 5. Cervical spinal stenosis/lumbar disc herniation\par -Follow up with PCP and neurosurgery\par -Worsening low back pain\par -MRI lumbar spine stable with L5-S1 trace disc herniation, L3-4 and L4-5 stable mild foraminal narrowing\par -PT lower back helped symptoms\par -He is deferring pain management evaluation\par -Tramadol 50 mg BID PRN\par -Advil PRN\par \par 6. Bilateral shoulder pain\par -MRI right shoulder suggested partial rotator cuff tear, subacromial bursitis and has went for PT. \par -Stable\par \par 7. Multiple joint pain with pain and difficulty ambulating\par -He has hip pain from OA and enthesopathy Hip x-ray suggested enthesopathy and mild joint space narrowing with bilateral inflammatory sacroiliitis\par -He has foot pain from OA and tenosynovitis, sinus tarsi syndrome, neuropathy\par -He has knee pain from OA \par -He has low back pain from both osteoarthritic and inflammatory sacroiliitis, lumbar disc herniation impinging on nerve roots and spondylosis \par -NSAIDs for treatment\par \par 8. GERD\par -Rabeprazole 10 mg daily\par \par F/u 3 months or PRN\par \par

## 2023-06-15 NOTE — PHYSICAL EXAM
[General Appearance - Alert] : alert [General Appearance - In No Acute Distress] : in no acute distress [Sclera] : the sclera and conjunctiva were normal [Extraocular Movements] : extraocular movements were intact [Outer Ear] : the ears and nose were normal in appearance [Hearing Threshold Finger Rub Not [place]] : hearing was normal [Neck Appearance] : the appearance of the neck was normal [Neck Cervical Mass (___cm)] : no neck mass was observed [Respiration, Rhythm And Depth] : normal respiratory rhythm and effort [Heart Rate And Rhythm] : heart rate was normal and rhythm regular [Heart Sounds] : normal S1 and S2 [Bowel Sounds] : normal bowel sounds [Abdomen Tenderness] : non-tender [Abnormal Walk] : normal gait [Nail Clubbing] : no clubbing  or cyanosis of the fingernails [Motor Tone] : muscle strength and tone were normal [Involuntary Movements] : no involuntary movements were seen [Skin Color & Pigmentation] : normal skin color and pigmentation [] : no rash [Sensation] : the sensory exam was normal to light touch and pinprick [Motor Exam] : the motor exam was normal [Affect] : the affect was normal [Mood] : the mood was normal [FreeTextEntry1] : Right wrist tender, right 3rd PIP tender, nodule over 3rd DIP. L 3rd PIP tender. Bilateral medial malleoli mild effusion and tender

## 2023-06-19 LAB
ALBUMIN SERPL ELPH-MCNC: 4 G/DL
ALP BLD-CCNC: 160 U/L
ALT SERPL-CCNC: 24 U/L
ANION GAP SERPL CALC-SCNC: 12 MMOL/L
AST SERPL-CCNC: 14 U/L
BILIRUB SERPL-MCNC: <0.2 MG/DL
BUN SERPL-MCNC: 13 MG/DL
CALCIUM SERPL-MCNC: 9.9 MG/DL
CHLORIDE SERPL-SCNC: 99 MMOL/L
CO2 SERPL-SCNC: 27 MMOL/L
CREAT SERPL-MCNC: 0.9 MG/DL
CRP SERPL-MCNC: 29.2 MG/L
EGFR: 96 ML/MIN/1.73M2
ERYTHROCYTE [SEDIMENTATION RATE] IN BLOOD BY WESTERGREN METHOD: 40 MM/HR
GLUCOSE SERPL-MCNC: 229 MG/DL
HBV CORE IGG+IGM SER QL: NONREACTIVE
HBV CORE IGM SER QL: NONREACTIVE
HBV SURFACE AB SER QL: NONREACTIVE
HBV SURFACE AG SER QL: NONREACTIVE
HCV AB SER QL: NONREACTIVE
HCV S/CO RATIO: 0.14 S/CO
POTASSIUM SERPL-SCNC: 4.4 MMOL/L
PROT SERPL-MCNC: 7 G/DL
SODIUM SERPL-SCNC: 138 MMOL/L

## 2023-06-20 ENCOUNTER — OUTPATIENT (OUTPATIENT)
Dept: OUTPATIENT SERVICES | Facility: HOSPITAL | Age: 63
LOS: 1 days | End: 2023-06-20
Payer: MEDICARE

## 2023-06-20 ENCOUNTER — APPOINTMENT (OUTPATIENT)
Dept: PULMONOLOGY | Facility: HOSPITAL | Age: 63
End: 2023-06-20
Payer: MEDICARE

## 2023-06-20 ENCOUNTER — APPOINTMENT (OUTPATIENT)
Dept: CARDIOLOGY | Facility: CLINIC | Age: 63
End: 2023-06-20
Payer: MEDICARE

## 2023-06-20 VITALS
OXYGEN SATURATION: 97 % | DIASTOLIC BLOOD PRESSURE: 86 MMHG | RESPIRATION RATE: 16 BRPM | HEART RATE: 94 BPM | SYSTOLIC BLOOD PRESSURE: 130 MMHG | WEIGHT: 168 LBS | BODY MASS INDEX: 22.75 KG/M2 | HEIGHT: 72 IN

## 2023-06-20 DIAGNOSIS — R06.02 SHORTNESS OF BREATH: ICD-10-CM

## 2023-06-20 DIAGNOSIS — Z98.890 OTHER SPECIFIED POSTPROCEDURAL STATES: Chronic | ICD-10-CM

## 2023-06-20 LAB
M TB IFN-G BLD-IMP: NEGATIVE
QUANTIFERON TB PLUS MITOGEN MINUS NIL: 9.79 IU/ML
QUANTIFERON TB PLUS NIL: 0.02 IU/ML
QUANTIFERON TB PLUS TB1 MINUS NIL: 0.03 IU/ML
QUANTIFERON TB PLUS TB2 MINUS NIL: 0.02 IU/ML

## 2023-06-20 PROCEDURE — 94729 DIFFUSING CAPACITY: CPT | Mod: 26

## 2023-06-20 PROCEDURE — 94726 PLETHYSMOGRAPHY LUNG VOLUMES: CPT

## 2023-06-20 PROCEDURE — 99214 OFFICE O/P EST MOD 30 MIN: CPT

## 2023-06-20 PROCEDURE — 94729 DIFFUSING CAPACITY: CPT

## 2023-06-20 PROCEDURE — 94727 GAS DIL/WSHOT DETER LNG VOL: CPT | Mod: 26

## 2023-06-20 PROCEDURE — 94060 EVALUATION OF WHEEZING: CPT | Mod: 26

## 2023-06-20 PROCEDURE — 93000 ELECTROCARDIOGRAM COMPLETE: CPT

## 2023-06-20 PROCEDURE — 94070 EVALUATION OF WHEEZING: CPT

## 2023-06-20 RX ORDER — RABEPRAZOLE SODIUM 10 MG/1
10 CAPSULE, DELAYED RELEASE ORAL
Qty: 90 | Refills: 1 | Status: DISCONTINUED | COMMUNITY
Start: 2023-06-15 | End: 2023-06-20

## 2023-06-20 RX ORDER — ONDANSETRON 8 MG/1
8 TABLET ORAL
Qty: 20 | Refills: 0 | Status: DISCONTINUED | COMMUNITY
Start: 2022-07-19 | End: 2023-06-20

## 2023-06-20 RX ORDER — CYCLOBENZAPRINE HYDROCHLORIDE 5 MG/1
5 TABLET, FILM COATED ORAL 3 TIMES DAILY
Qty: 60 | Refills: 0 | Status: DISCONTINUED | COMMUNITY
Start: 2022-09-21 | End: 2023-06-20

## 2023-06-20 RX ORDER — AMLODIPINE BESYLATE 2.5 MG/1
2.5 TABLET ORAL DAILY
Qty: 90 | Refills: 3 | Status: DISCONTINUED | COMMUNITY
Start: 2023-01-19 | End: 2023-06-20

## 2023-06-21 DIAGNOSIS — R06.02 SHORTNESS OF BREATH: ICD-10-CM

## 2023-06-21 NOTE — DISCUSSION/SUMMARY
[EKG obtained to assist in diagnosis and management of assessed problem(s)] : EKG obtained to assist in diagnosis and management of assessed problem(s) [FreeTextEntry1] : EKG performed today was unremarkable.\par \par HTN: The impression is hypertension. Currently, the condition is stable. There are no changes in medication management. Continue current medical therapy. Other planned treatments include an exercise regimen, weight loss, low sodium diet, and alcohol moderation.\par \par HLD: The impression is hyperlipidemia. Currently, the condition is stable. There are no changes in medication management. Continue current medical therapy. Other planned treatment includes diet modification, exercise, and weight loss.\par \par DM: The impression is diabetes mellitus. There are no changes in medication management. Patient advised to continue taking medications as prescribed, closely monitor blood sugar at home, follow a diabetic diet, and follow up for continued monitoring.\par \par Instructed to follow up in 6 months. \par Plan was discussed with the patient.

## 2023-06-21 NOTE — HISTORY OF PRESENT ILLNESS
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  Denies any new complaints. Reports he is feeling well. Otherwise: (-) chest pain, (-) SOB.

## 2023-07-03 ENCOUNTER — APPOINTMENT (OUTPATIENT)
Dept: PULMONOLOGY | Facility: CLINIC | Age: 63
End: 2023-07-03
Payer: MEDICARE

## 2023-07-03 VITALS
SYSTOLIC BLOOD PRESSURE: 134 MMHG | HEART RATE: 110 BPM | OXYGEN SATURATION: 95 % | WEIGHT: 166 LBS | BODY MASS INDEX: 22.48 KG/M2 | DIASTOLIC BLOOD PRESSURE: 80 MMHG | HEIGHT: 72 IN | RESPIRATION RATE: 14 BRPM

## 2023-07-03 PROCEDURE — 99214 OFFICE O/P EST MOD 30 MIN: CPT
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
## 2023-07-12 NOTE — PATIENT PROFILE ADULT - NSPROGENPREVTRANSF_GEN_A_NUR
Discontinue Zosyn and PICC line. Completed 33 days of Zosyn. Reviewed all lab work, appears infection has improved, and drain was removed per Dr. Quiles July on 6/29/2023. Plan to follow up with Dr. Quiles July on 7/17/2023 to determine future surgical intervention/ reversal of colostomy. Follow up with ID as needed.
no

## 2023-07-20 ENCOUNTER — APPOINTMENT (OUTPATIENT)
Dept: INFUSION THERAPY | Facility: CLINIC | Age: 63
End: 2023-07-20

## 2023-07-20 ENCOUNTER — OUTPATIENT (OUTPATIENT)
Dept: OUTPATIENT SERVICES | Facility: HOSPITAL | Age: 63
LOS: 1 days | End: 2023-07-20
Payer: MEDICARE

## 2023-07-20 DIAGNOSIS — M05.9 RHEUMATOID ARTHRITIS WITH RHEUMATOID FACTOR, UNSPECIFIED: ICD-10-CM

## 2023-07-20 PROCEDURE — 96367 TX/PROPH/DG ADDL SEQ IV INF: CPT

## 2023-07-20 PROCEDURE — 96413 CHEMO IV INFUSION 1 HR: CPT

## 2023-07-20 PROCEDURE — 96415 CHEMO IV INFUSION ADDL HR: CPT

## 2023-07-20 RX ORDER — DIPHENHYDRAMINE HCL 50 MG
50 CAPSULE ORAL ONCE
Refills: 0 | Status: COMPLETED | OUTPATIENT
Start: 2023-07-20 | End: 2023-07-20

## 2023-07-20 RX ORDER — ACETAMINOPHEN 500 MG
650 TABLET ORAL ONCE
Refills: 0 | Status: COMPLETED | OUTPATIENT
Start: 2023-07-20 | End: 2023-07-20

## 2023-07-20 RX ORDER — RITUXIMAB 10 MG/ML
1000 INJECTION, SOLUTION INTRAVENOUS ONCE
Refills: 0 | Status: COMPLETED | OUTPATIENT
Start: 2023-07-20 | End: 2023-07-20

## 2023-07-20 RX ADMIN — Medication 650 MILLIGRAM(S): at 10:41

## 2023-07-20 RX ADMIN — RITUXIMAB 1000 MILLIGRAM(S): 10 INJECTION, SOLUTION INTRAVENOUS at 11:04

## 2023-07-20 RX ADMIN — Medication 50 MILLIGRAM(S): at 11:04

## 2023-07-20 RX ADMIN — RITUXIMAB 1000 MILLIGRAM(S): 10 INJECTION, SOLUTION INTRAVENOUS at 14:25

## 2023-07-20 RX ADMIN — Medication 102 MILLIGRAM(S): at 10:41

## 2023-07-21 DIAGNOSIS — M05.9 RHEUMATOID ARTHRITIS WITH RHEUMATOID FACTOR, UNSPECIFIED: ICD-10-CM

## 2023-08-04 ENCOUNTER — OUTPATIENT (OUTPATIENT)
Dept: OUTPATIENT SERVICES | Facility: HOSPITAL | Age: 63
LOS: 1 days | End: 2023-08-04
Payer: MEDICARE

## 2023-08-04 ENCOUNTER — APPOINTMENT (OUTPATIENT)
Dept: INFUSION THERAPY | Facility: CLINIC | Age: 63
End: 2023-08-04

## 2023-08-04 DIAGNOSIS — M05.9 RHEUMATOID ARTHRITIS WITH RHEUMATOID FACTOR, UNSPECIFIED: ICD-10-CM

## 2023-08-04 DIAGNOSIS — Z98.890 OTHER SPECIFIED POSTPROCEDURAL STATES: Chronic | ICD-10-CM

## 2023-08-04 PROCEDURE — 96413 CHEMO IV INFUSION 1 HR: CPT

## 2023-08-04 PROCEDURE — 96415 CHEMO IV INFUSION ADDL HR: CPT

## 2023-08-04 PROCEDURE — 96367 TX/PROPH/DG ADDL SEQ IV INF: CPT

## 2023-08-04 RX ORDER — ACETAMINOPHEN 500 MG
650 TABLET ORAL ONCE
Refills: 0 | Status: COMPLETED | OUTPATIENT
Start: 2023-08-04 | End: 2023-08-04

## 2023-08-04 RX ORDER — DIPHENHYDRAMINE HCL 50 MG
50 CAPSULE ORAL ONCE
Refills: 0 | Status: COMPLETED | OUTPATIENT
Start: 2023-08-04 | End: 2023-08-04

## 2023-08-04 RX ORDER — RITUXIMAB 10 MG/ML
1000 INJECTION, SOLUTION INTRAVENOUS ONCE
Refills: 0 | Status: COMPLETED | OUTPATIENT
Start: 2023-08-04 | End: 2023-08-04

## 2023-08-04 RX ADMIN — RITUXIMAB 1000 MILLIGRAM(S): 10 INJECTION, SOLUTION INTRAVENOUS at 11:02

## 2023-08-04 RX ADMIN — Medication 650 MILLIGRAM(S): at 10:25

## 2023-08-04 RX ADMIN — Medication 650 MILLIGRAM(S): at 11:30

## 2023-08-04 RX ADMIN — Medication 50 MILLIGRAM(S): at 10:55

## 2023-08-04 RX ADMIN — Medication 102 MILLIGRAM(S): at 10:26

## 2023-08-04 RX ADMIN — RITUXIMAB 1000 MILLIGRAM(S): 10 INJECTION, SOLUTION INTRAVENOUS at 14:30

## 2023-08-05 DIAGNOSIS — M05.9 RHEUMATOID ARTHRITIS WITH RHEUMATOID FACTOR, UNSPECIFIED: ICD-10-CM

## 2023-08-10 ENCOUNTER — RESULT REVIEW (OUTPATIENT)
Age: 63
End: 2023-08-10

## 2023-08-10 ENCOUNTER — OUTPATIENT (OUTPATIENT)
Dept: OUTPATIENT SERVICES | Facility: HOSPITAL | Age: 63
LOS: 1 days | End: 2023-08-10
Payer: MEDICARE

## 2023-08-10 DIAGNOSIS — Z00.8 ENCOUNTER FOR OTHER GENERAL EXAMINATION: ICD-10-CM

## 2023-08-10 DIAGNOSIS — J84.9 INTERSTITIAL PULMONARY DISEASE, UNSPECIFIED: ICD-10-CM

## 2023-08-10 DIAGNOSIS — Z98.890 OTHER SPECIFIED POSTPROCEDURAL STATES: Chronic | ICD-10-CM

## 2023-08-10 PROCEDURE — 71250 CT THORAX DX C-: CPT | Mod: 26,MH

## 2023-08-10 PROCEDURE — 71250 CT THORAX DX C-: CPT

## 2023-08-11 DIAGNOSIS — J84.9 INTERSTITIAL PULMONARY DISEASE, UNSPECIFIED: ICD-10-CM

## 2023-08-16 ENCOUNTER — APPOINTMENT (OUTPATIENT)
Dept: RHEUMATOLOGY | Facility: CLINIC | Age: 63
End: 2023-08-16
Payer: MEDICARE

## 2023-08-16 VITALS
WEIGHT: 164 LBS | HEART RATE: 98 BPM | OXYGEN SATURATION: 96 % | DIASTOLIC BLOOD PRESSURE: 78 MMHG | SYSTOLIC BLOOD PRESSURE: 130 MMHG | HEIGHT: 71 IN | BODY MASS INDEX: 22.96 KG/M2

## 2023-08-16 PROCEDURE — 99214 OFFICE O/P EST MOD 30 MIN: CPT

## 2023-08-16 RX ORDER — MYCOPHENILIC ACID 360 MG/1
360 TABLET, DELAYED RELEASE ORAL
Qty: 270 | Refills: 1 | Status: ACTIVE | COMMUNITY
Start: 2023-06-15 | End: 1900-01-01

## 2023-08-16 NOTE — HISTORY OF PRESENT ILLNESS
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  8/16/23: Pt reports left 3rd PIP pain for the last few days. He has pain in his medial ankle up to his foot shooting pains that happen at night time. HIs breathing is stable he uses oxygen sparingly. Joints are otherwise stable. Denies any side effects. ON prednisone 10 mg daily   6/15/23: Pt went to [facility] and developed swelling and pain in his bilateral feet. He saw rheumatology and received injection of L medial malleolus that help, and took prednisone 20 mg. He had CT chest, x-ray pelvis, hips, lumbar spine. He was prescribed mycophenolate 360 mg BID and rabeprazole. Rabeprazole started for abdominal pain and anti-inflammatory. He stopped sulfasalazine unclear why but has remain off. He takes prednisone 5 mg daily if severe pain he takes 10 mg Today he has joint pain in his right hand and low back pain. Foot and ankle pains are improvement Denies dyspnea or cough and o2 is > 94%      3/16/23: Patient was recently admitted at [facility] for dizziness and vomiting thought to be side effect of cellcept that resolved after cellcept was discontinued.  He was seen by Dr. Sauceda and started on this medication also trial duloxetine but could not tolerate due to side effects. He has stopped sulfasalazine and hydroxychloroquine. He is taking prednisone 5 mg twice daily.  He reports joints are doing ok overall he has pains in his left thumb, 2nd DIP, left shoulder, left foot. He had swelling in his feet that resolved. His main complaint is his back pain. He is wearing a brace. His breathing is stable he went for CT scan, TTE and PFTs in the [facility]. He is going to [facility] for 3 months on 3/28   11/29/22: -He reports chest pain feeling like it is being stretch open, coming and going. Worse at night time he's unable to lay on his chest or on his left side. Symptoms have been ongoing for 2 weeks. Afternoon and evening pain is bad. Heating pad and Motrin give relief. Denies palpation -Reports left sided swelling from axilla down his side to his back for the 2 weeks with pain. Pain is constant. Motrin 600 mg TID makes better. Nothing makes the pain worse. He's unable to lay on his L side at night time. -Right hip and right low back pain with swelling for the past 2-3 days that comes and goes. Laying down makes it better and walking around makes pain worse. Motrin helps to decrease swelling +Right ulnar wrist and hand pain and swelling. + Right lateral foot pain and swelling -1 month history of shortness of breath with pulse ox 89-91%. He uses 2.5 L O2 when pulse ox < 90%. Denies coughing or wheezing.  +neck pain for 1 month -back pain is stable and improved with physical therapy.   11/1/22: He reports 7 days ago tapering off prednisone. 5 days ago he developed pain and swelling in his right wrist, right hand, left hand, left foot, and swelling in his face that is worse around 3-4 pm in the afternoon, and goes down at night time. He reports improvement in his back pain with physical therapy. He takes Motrin 800 mg PRN that gives partial relief of symptoms. Has not tried taking prednisone again.    9/21/22: Reports worsening low back pain for the past 2 weeks, worse after getting up from a seated position, using ibuprofen and heating pads that help. Tramadol doesn't help. He has radiculopathy. He also has foot pain when ambulating that is alleviated with ibuprofen. He's worried about taking too much NSAIDs. Joint pains are controlled no swelling or stiffness.   6/22/22: He didn't  or start Leflunomide 10 mg daily. He went to [place] and saw a rheumatologist and started patient on  mg BID. Also started on Nintedanib. He was off prednisone. He feels joint pains are much improved and feels well today. Feels respiratory status is improved as well. He is taking prednisone 5 mg BID attempting to taper off. He has pain and difficulty with ambulation due to low back pain, hip pain, foot pain and knee pain.  3/7/22: He has hip pain that started 2-3 weeks ago its hard to walk and hard to sit. He gets pain anterior thigh pain down leg for 2-3 weeks worse with ambulation. He has face swelling, right 2nd, 3rd, 4th MCP finger swelling and pain, took prednisone 10 mg last week that helped. His palms are pruritic last 3 weeks uses benadry and hydrocortisone cream  11/29/21: Patient stopped prednisone for 1 week and had joint pain and swelling in his fingers return. He  takes prednisone 10 mg nightly, Ibuprofen and Tramadol PRN that help. Says gabapentin doesn't help. Denies AM stiffness or swelling. He is going to [place] for 3 months starting in January    Brief history: Patient reports his symptoms started 6/16 at work. 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 He was seen in the ED 6/28 where XR of the knee was normal. XR of the right shoulder suggested AC OA (mild), and x-ray of the pelvis suggested degenerative changes in his lumbar spine and bilateral hips. He was sent home with motrin.  MRI right ankle 7/1:   "Mild tenosynovitis of the posterior tibialis, flexor digitorum longus, peroneus brevis, and peroneus longus. Mild Achilles tendinosis. Edema in the sinus tarsi (and small erosion at sinus tarsi roof) may be seen with sinus tarsi syndrome, correlate with physical exam for lateral hindfoot impingement/instability. Diffuse edema throughout the musculature, likely due to to neuropathy. Prior sprain of the anterior distal syndesmotic ligament, deep fibers of the deltoid ligament, and spring ligament."  MRI cervical spine 7/2:  "Mild canal narrowing on a degenerative basis, more pronounced at C5-6. C5-6 mild foraminal narrowing. Possible T4-T5 left foraminal disc herniation. If there is concern consider a dedicated T-spine study."  Repeat MRI C spine 9/16: "C5-6 moderate spinal stenosis due to disc bulging and ligamentum flavum infolding, stable since the prior exam.. Stable mild spinal cord abutment without cord deformity or signal abnormality. Stable moderate foraminal narrowing. Stable additional mild degenerative changes as described."  MRI lumbar spine 7/6: "L5-S1 small disc herniation in the right lateral recess with impingement of the descending right S1 nerve root. L3-4 small disc bulge and facet hypertrophy with mild right foraminal narrowing and mild compression of the exiting right L3 nerve root.  Additional mild degenerative changes as described.Mass-like heterogeneous structure within the mid abdomen, incompletely imaged/evaluated on this study. Although this can reflect loops of small bowel, recommend a dedicated CT or MRI of the abdomen with contrast to exclude an underlying mass."  Repeat MRI lumbar spine 9/16:  since prior study: L5-S1 trace disc herniation in the right lateral recess, slightly decreased in size since the prior study with decreased mass effect upon the descending right S1 nerve root. L3-4 stable degenerative change with mild right foraminal narrowing and mild compression of the exiting right L3 nerve root.  MRI T spine 9/16: normal. Repeat XR shoulder 9/17: AC OA  MRI right shoulder 9/18: "Partial-thickness tears of the supraspinatus and infraspinatus tendons as above. Synovitis in the axillary pouch and rotator interval indicative of adhesive capsulitis, correlate with patient range of motion. SLAP tear extending into the posterior superior labrum. Small subacromial/subdeltoid bursitis."  Xray of hands and feet 9/19: suggested OA without erosions  CT Abdomen pelvis 7/26: "A 7.0 x 6.2 x 7.7 cm heterogeneous mass in the mid abdomen contiguous with small bowel loops most likely represents a small bowel gastrointestinal stromal tumor (GIST)."  CTA dissection 9/17:  "Normal caliber of the thoracoabdominal aorta without evidence for dissection or intramural hematoma.Interstitial lung disease. No honeycombing. Interval postsurgical changes within the small bowel."  Pathology from OR 8/21 confirmed GIST Serologies include +CCP >250,  with negative lyme, SSA, SSB, ED, Scleroderma.  Hepatitis B and C serologies negative ESR 12 CRP 6 Patient was admitted 9/15-9/19at [facility] for intractable neck pain radiating to right shoulder/arm right arm and low back pain radiating to right leg. Neurosurgery advised steroids and no surgical intervention. Imaging and labs as above. Started on metformin inpatient due to elevated blood sugars from steroids    Today's visit:  Patient feels well today. He reports joint pains improved. Denies significant AM stiffness or swelling. He takes prednisone 10 mg daily, Celebrex PRN, and tramadol at PM. He is switching pulmonlogist to [facility]

## 2023-08-16 NOTE — PHYSICAL EXAM
[General Appearance - Alert] : alert [General Appearance - In No Acute Distress] : in no acute distress [Sclera] : the sclera and conjunctiva were normal [Extraocular Movements] : extraocular movements were intact [Neck Appearance] : the appearance of the neck was normal [Neck Cervical Mass (___cm)] : no neck mass was observed [Respiration, Rhythm And Depth] : normal respiratory rhythm and effort [Heart Rate And Rhythm] : heart rate was normal and rhythm regular [Heart Sounds] : normal S1 and S2 [Bowel Sounds] : normal bowel sounds [Abdomen Tenderness] : non-tender [Abnormal Walk] : normal gait [Nail Clubbing] : no clubbing  or cyanosis of the fingernails [Involuntary Movements] : no involuntary movements were seen [Motor Tone] : muscle strength and tone were normal [Skin Color & Pigmentation] : normal skin color and pigmentation [] : no rash [Sensation] : the sensory exam was normal to light touch and pinprick [Motor Exam] : the motor exam was normal [Affect] : the affect was normal [Mood] : the mood was normal [FreeTextEntry1] : Right wrist tender, right 3rd PIP tender, nodule over 3rd DIP. L 3rd PIP tender. Bilateral medial malleoli mild effusion and tender

## 2023-08-16 NOTE — ASSESSMENT
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 RA seropositive (CCP and RF) -Failed Actemra and side effect of palpitations to HCQ -Leflunomide stopped due to pneumonitis -Cellcept stopped due to dizziness and vomiting -Sulfasalazine self d/c'd unclear if any side effects -Refused MTX -He can stay off hydroxychloroquine, this was restarted when he was in [facility] -Continue Ruxience 1000 mg twice 2 weeks apart q 6 months last infusion due in January -Continue mycophenolate 360 mg 1 tab in AM and 2 tabs in PM -Continue prednisone 5 mg BID he will attempt to taper  2. ILD possible post covid -Recent CT scan reviewed -Follow up Pulm  3. GIST tumor -S/p resection and following with Oncology.  4. HTN/HLD -F/u with PCP, and Cardiology   5. Cervical spinal stenosis/lumbar disc herniation -Follow up with PCP and neurosurgery -Worsening low back pain -MRI lumbar spine stable with L5-S1 trace disc herniation, L3-4 and L4-5 stable mild foraminal narrowing -PT lower back helped symptoms -He is deferring pain management evaluation -Tramadol 50 mg BID PRN -Advil PRN  6. Bilateral shoulder pain -MRI right shoulder suggested partial rotator cuff tear, subacromial bursitis and has went for PT.  -Stable  7. Multiple joint pain with pain and difficulty ambulating -He has hip pain from OA and enthesopathy Hip x-ray suggested enthesopathy and mild joint space narrowing with bilateral inflammatory sacroiliitis -He has foot pain from OA and tenosynovitis, sinus tarsi syndrome, neuropathy -He has knee pain from OA  -He has low back pain from both osteoarthritic and inflammatory sacroiliitis, lumbar disc herniation impinging on nerve roots and spondylosis  -NSAIDs for treatment  8. GERD -Rabeprazole 10 mg daily  F/u 3 months or PRN

## 2023-10-09 ENCOUNTER — APPOINTMENT (OUTPATIENT)
Dept: PULMONOLOGY | Facility: CLINIC | Age: 63
End: 2023-10-09
Payer: MEDICARE

## 2023-10-09 VITALS
HEIGHT: 72 IN | DIASTOLIC BLOOD PRESSURE: 70 MMHG | SYSTOLIC BLOOD PRESSURE: 110 MMHG | OXYGEN SATURATION: 95 % | BODY MASS INDEX: 23.57 KG/M2 | WEIGHT: 174 LBS | HEART RATE: 110 BPM

## 2023-10-09 PROCEDURE — 99214 OFFICE O/P EST MOD 30 MIN: CPT

## 2023-10-11 ENCOUNTER — APPOINTMENT (OUTPATIENT)
Dept: PULMONOLOGY | Facility: HOSPITAL | Age: 63
End: 2023-10-11
Payer: MEDICARE

## 2023-10-11 ENCOUNTER — OUTPATIENT (OUTPATIENT)
Dept: OUTPATIENT SERVICES | Facility: HOSPITAL | Age: 63
LOS: 1 days | End: 2023-10-11
Payer: MEDICARE

## 2023-10-11 DIAGNOSIS — R06.02 SHORTNESS OF BREATH: ICD-10-CM

## 2023-10-11 DIAGNOSIS — Z98.890 OTHER SPECIFIED POSTPROCEDURAL STATES: Chronic | ICD-10-CM

## 2023-10-11 PROCEDURE — 94070 EVALUATION OF WHEEZING: CPT

## 2023-10-11 PROCEDURE — 94664 DEMO&/EVAL PT USE INHALER: CPT

## 2023-10-11 PROCEDURE — 94060 EVALUATION OF WHEEZING: CPT | Mod: 26

## 2023-10-11 PROCEDURE — 94727 GAS DIL/WSHOT DETER LNG VOL: CPT | Mod: 26

## 2023-10-11 PROCEDURE — 94729 DIFFUSING CAPACITY: CPT

## 2023-10-11 PROCEDURE — 94727 GAS DIL/WSHOT DETER LNG VOL: CPT

## 2023-10-11 PROCEDURE — 94729 DIFFUSING CAPACITY: CPT | Mod: 26

## 2023-10-12 DIAGNOSIS — R06.02 SHORTNESS OF BREATH: ICD-10-CM

## 2023-10-16 ENCOUNTER — OUTPATIENT (OUTPATIENT)
Dept: OUTPATIENT SERVICES | Facility: HOSPITAL | Age: 63
LOS: 1 days | End: 2023-10-16
Payer: MEDICARE

## 2023-10-16 ENCOUNTER — RESULT REVIEW (OUTPATIENT)
Age: 63
End: 2023-10-16

## 2023-10-16 ENCOUNTER — APPOINTMENT (OUTPATIENT)
Dept: RHEUMATOLOGY | Facility: CLINIC | Age: 63
End: 2023-10-16
Payer: MEDICARE

## 2023-10-16 VITALS
TEMPERATURE: 98.2 F | HEART RATE: 103 BPM | DIASTOLIC BLOOD PRESSURE: 88 MMHG | HEIGHT: 72 IN | BODY MASS INDEX: 23.03 KG/M2 | WEIGHT: 170 LBS | OXYGEN SATURATION: 96 % | SYSTOLIC BLOOD PRESSURE: 132 MMHG

## 2023-10-16 DIAGNOSIS — M79.641 PAIN IN RIGHT HAND: ICD-10-CM

## 2023-10-16 DIAGNOSIS — M25.531 PAIN IN RIGHT WRIST: ICD-10-CM

## 2023-10-16 DIAGNOSIS — M79.642 PAIN IN RIGHT HAND: ICD-10-CM

## 2023-10-16 DIAGNOSIS — G89.29 PAIN IN RIGHT WRIST: ICD-10-CM

## 2023-10-16 DIAGNOSIS — M79.671 PAIN IN RIGHT FOOT: ICD-10-CM

## 2023-10-16 DIAGNOSIS — M25.532 PAIN IN RIGHT WRIST: ICD-10-CM

## 2023-10-16 DIAGNOSIS — Z98.890 OTHER SPECIFIED POSTPROCEDURAL STATES: Chronic | ICD-10-CM

## 2023-10-16 DIAGNOSIS — M79.672 PAIN IN RIGHT FOOT: ICD-10-CM

## 2023-10-16 DIAGNOSIS — M54.50 LOW BACK PAIN, UNSPECIFIED: ICD-10-CM

## 2023-10-16 PROCEDURE — 73630 X-RAY EXAM OF FOOT: CPT | Mod: 50

## 2023-10-16 PROCEDURE — 73521 X-RAY EXAM HIPS BI 2 VIEWS: CPT | Mod: 26

## 2023-10-16 PROCEDURE — 73630 X-RAY EXAM OF FOOT: CPT | Mod: 26,50

## 2023-10-16 PROCEDURE — 99215 OFFICE O/P EST HI 40 MIN: CPT

## 2023-10-16 PROCEDURE — 73130 X-RAY EXAM OF HAND: CPT | Mod: 50

## 2023-10-16 PROCEDURE — 73110 X-RAY EXAM OF WRIST: CPT | Mod: 26,50

## 2023-10-16 PROCEDURE — 73521 X-RAY EXAM HIPS BI 2 VIEWS: CPT

## 2023-10-16 PROCEDURE — 73120 X-RAY EXAM OF HAND: CPT | Mod: 50

## 2023-10-16 PROCEDURE — 73110 X-RAY EXAM OF WRIST: CPT | Mod: 50

## 2023-10-16 PROCEDURE — 73130 X-RAY EXAM OF HAND: CPT | Mod: 26,50

## 2023-10-16 RX ORDER — EMPAGLIFLOZIN 10 MG/1
10 TABLET, FILM COATED ORAL
Refills: 0 | Status: ACTIVE | COMMUNITY

## 2023-10-17 DIAGNOSIS — M54.50 LOW BACK PAIN, UNSPECIFIED: ICD-10-CM

## 2023-11-06 ENCOUNTER — OUTPATIENT (OUTPATIENT)
Dept: OUTPATIENT SERVICES | Facility: HOSPITAL | Age: 63
LOS: 1 days | Discharge: HOME | End: 2023-11-06
Payer: MEDICARE

## 2023-11-06 ENCOUNTER — APPOINTMENT (OUTPATIENT)
Dept: SLEEP CENTER | Facility: HOSPITAL | Age: 63
End: 2023-11-06
Payer: MEDICARE

## 2023-11-06 DIAGNOSIS — G47.33 OBSTRUCTIVE SLEEP APNEA (ADULT) (PEDIATRIC): ICD-10-CM

## 2023-11-06 DIAGNOSIS — Z98.890 OTHER SPECIFIED POSTPROCEDURAL STATES: Chronic | ICD-10-CM

## 2023-11-06 PROCEDURE — 95810 POLYSOM 6/> YRS 4/> PARAM: CPT

## 2023-11-06 PROCEDURE — 95810 POLYSOM 6/> YRS 4/> PARAM: CPT | Mod: 26

## 2023-11-08 DIAGNOSIS — G47.33 OBSTRUCTIVE SLEEP APNEA (ADULT) (PEDIATRIC): ICD-10-CM

## 2023-12-14 NOTE — ED PROVIDER NOTE - PENDING LAB RAD OPT OUT
2 Exclude Pending Lab and Radiology orders from printing on the Patient's Discharge Instructions, due to Privacy Concerns.

## 2023-12-19 ENCOUNTER — APPOINTMENT (OUTPATIENT)
Dept: CARDIOLOGY | Facility: CLINIC | Age: 63
End: 2023-12-19
Payer: MEDICARE

## 2023-12-19 VITALS
HEART RATE: 97 BPM | SYSTOLIC BLOOD PRESSURE: 100 MMHG | OXYGEN SATURATION: 98 % | HEIGHT: 72 IN | WEIGHT: 172 LBS | DIASTOLIC BLOOD PRESSURE: 70 MMHG | BODY MASS INDEX: 23.3 KG/M2

## 2023-12-19 PROCEDURE — 99214 OFFICE O/P EST MOD 30 MIN: CPT

## 2023-12-21 NOTE — HISTORY OF PRESENT ILLNESS
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 Denies any new complaints. Reports he is feeling well. Otherwise: (-) chest pain, (-) SOB.

## 2023-12-21 NOTE — DISCUSSION/SUMMARY
[FreeTextEntry1] : HTN: The impression is hypertension. Currently, the condition is controlled. There are no changes in medication management. Continue current medical therapy. Other planned treatments include an exercise regimen, weight loss, low sodium diet, and alcohol moderation.  HLD: The impression is hyperlipidemia. Currently, the condition is stable. There are no changes in medication management. Continue current medical therapy. Other planned treatment includes diet modification, exercise, and weight loss.  DM: The impression is diabetes mellitus. There are no changes in medication management. Patient advised to continue taking medications as prescribed, closely monitor blood sugar at home, follow a diabetic diet, and follow up for continued monitoring.  Instructed to follow up in 6 months.  Plan was discussed with the patient.

## 2023-12-27 DIAGNOSIS — R20.0 ANESTHESIA OF SKIN: ICD-10-CM

## 2023-12-27 DIAGNOSIS — M79.672 PAIN IN RIGHT FOOT: ICD-10-CM

## 2023-12-27 DIAGNOSIS — M79.671 PAIN IN RIGHT FOOT: ICD-10-CM

## 2023-12-27 DIAGNOSIS — R20.2 ANESTHESIA OF SKIN: ICD-10-CM

## 2024-01-03 LAB
ALBUMIN SERPL ELPH-MCNC: 4.1 G/DL
ALP BLD-CCNC: 165 U/L
ALT SERPL-CCNC: 20 U/L
ANION GAP SERPL CALC-SCNC: 12 MMOL/L
AST SERPL-CCNC: 19 U/L
BASOPHILS # BLD AUTO: 0.09 K/UL
BASOPHILS NFR BLD AUTO: 1 %
BILIRUB SERPL-MCNC: 0.4 MG/DL
BUN SERPL-MCNC: 15 MG/DL
CALCIUM SERPL-MCNC: 9.7 MG/DL
CHLORIDE SERPL-SCNC: 103 MMOL/L
CO2 SERPL-SCNC: 26 MMOL/L
CREAT SERPL-MCNC: 0.9 MG/DL
CRP SERPL-MCNC: 22.5 MG/L
EGFR: 95 ML/MIN/1.73M2
EOSINOPHIL # BLD AUTO: 0.27 K/UL
EOSINOPHIL NFR BLD AUTO: 3 %
ERYTHROCYTE [SEDIMENTATION RATE] IN BLOOD BY WESTERGREN METHOD: 30 MM/HR
ESTIMATED AVERAGE GLUCOSE: 169 MG/DL
FOLATE SERPL-MCNC: >20 NG/ML
GLUCOSE SERPL-MCNC: 171 MG/DL
HBA1C MFR BLD HPLC: 7.5 %
HCT VFR BLD CALC: 49.3 %
HGB BLD-MCNC: 15.7 G/DL
IMM GRANULOCYTES NFR BLD AUTO: 0.5 %
LYMPHOCYTES # BLD AUTO: 1.9 K/UL
LYMPHOCYTES NFR BLD AUTO: 20.8 %
MAN DIFF?: NORMAL
MCHC RBC-ENTMCNC: 28.6 PG
MCHC RBC-ENTMCNC: 31.8 G/DL
MCV RBC AUTO: 90 FL
MONOCYTES # BLD AUTO: 0.92 K/UL
MONOCYTES NFR BLD AUTO: 10.1 %
NEUTROPHILS # BLD AUTO: 5.89 K/UL
NEUTROPHILS NFR BLD AUTO: 64.6 %
PLATELET # BLD AUTO: 363 K/UL
POTASSIUM SERPL-SCNC: 4 MMOL/L
PROT SERPL-MCNC: 7.2 G/DL
RBC # BLD: 5.48 M/UL
RBC # FLD: 15.4 %
SODIUM SERPL-SCNC: 141 MMOL/L
T4 FREE SERPL-MCNC: 1.2 NG/DL
TSH SERPL-ACNC: 4.53 UIU/ML
URATE SERPL-MCNC: 4.1 MG/DL
VIT B12 SERPL-MCNC: 838 PG/ML
WBC # FLD AUTO: 9.12 K/UL

## 2024-01-04 ENCOUNTER — OUTPATIENT (OUTPATIENT)
Dept: OUTPATIENT SERVICES | Facility: HOSPITAL | Age: 64
LOS: 1 days | End: 2024-01-04
Payer: MEDICARE

## 2024-01-04 ENCOUNTER — APPOINTMENT (OUTPATIENT)
Dept: INFUSION THERAPY | Facility: CLINIC | Age: 64
End: 2024-01-04

## 2024-01-04 VITALS
DIASTOLIC BLOOD PRESSURE: 76 MMHG | RESPIRATION RATE: 14 BRPM | TEMPERATURE: 97 F | HEART RATE: 84 BPM | SYSTOLIC BLOOD PRESSURE: 123 MMHG

## 2024-01-04 DIAGNOSIS — M05.9 RHEUMATOID ARTHRITIS WITH RHEUMATOID FACTOR, UNSPECIFIED: ICD-10-CM

## 2024-01-04 DIAGNOSIS — Z98.890 OTHER SPECIFIED POSTPROCEDURAL STATES: Chronic | ICD-10-CM

## 2024-01-04 PROCEDURE — 96367 TX/PROPH/DG ADDL SEQ IV INF: CPT

## 2024-01-04 PROCEDURE — 96415 CHEMO IV INFUSION ADDL HR: CPT

## 2024-01-04 PROCEDURE — 96413 CHEMO IV INFUSION 1 HR: CPT

## 2024-01-04 RX ORDER — DIPHENHYDRAMINE HCL 50 MG
50 CAPSULE ORAL ONCE
Refills: 0 | Status: COMPLETED | OUTPATIENT
Start: 2024-01-04 | End: 2024-01-04

## 2024-01-04 RX ORDER — RITUXIMAB 10 MG/ML
1000 INJECTION, SOLUTION INTRAVENOUS ONCE
Refills: 0 | Status: COMPLETED | OUTPATIENT
Start: 2024-01-04 | End: 2024-01-04

## 2024-01-04 RX ORDER — ACETAMINOPHEN 500 MG
650 TABLET ORAL ONCE
Refills: 0 | Status: COMPLETED | OUTPATIENT
Start: 2024-01-04 | End: 2024-01-04

## 2024-01-04 RX ADMIN — Medication 650 MILLIGRAM(S): at 09:04

## 2024-01-04 RX ADMIN — RITUXIMAB 1000 MILLIGRAM(S): 10 INJECTION, SOLUTION INTRAVENOUS at 11:40

## 2024-01-04 RX ADMIN — RITUXIMAB 1000 MILLIGRAM(S): 10 INJECTION, SOLUTION INTRAVENOUS at 09:08

## 2024-01-04 RX ADMIN — Medication 102 MILLIGRAM(S): at 09:04

## 2024-01-04 RX ADMIN — Medication 50 MILLIGRAM(S): at 09:25

## 2024-01-05 DIAGNOSIS — M05.9 RHEUMATOID ARTHRITIS WITH RHEUMATOID FACTOR, UNSPECIFIED: ICD-10-CM

## 2024-01-05 LAB
ALBUMIN MFR SERPL ELPH: 55.1 %
ALBUMIN SERPL-MCNC: 3.7 G/DL
ALBUMIN/GLOB SERPL: 1.2 RATIO
ALPHA1 GLOB MFR SERPL ELPH: 4.6 %
ALPHA1 GLOB SERPL ELPH-MCNC: 0.3 G/DL
ALPHA2 GLOB MFR SERPL ELPH: 11.3 %
ALPHA2 GLOB SERPL ELPH-MCNC: 0.8 G/DL
B-GLOBULIN MFR SERPL ELPH: 16.1 %
B-GLOBULIN SERPL ELPH-MCNC: 1.1 G/DL
GAMMA GLOB FLD ELPH-MCNC: 0.9 G/DL
GAMMA GLOB MFR SERPL ELPH: 12.9 %
INTERPRETATION SERPL IEP-IMP: NORMAL
M PROTEIN SPEC IFE-MCNC: NORMAL
PROT SERPL-MCNC: 6.8 G/DL
PROT SERPL-MCNC: 6.8 G/DL

## 2024-01-12 ENCOUNTER — APPOINTMENT (OUTPATIENT)
Dept: PULMONOLOGY | Facility: CLINIC | Age: 64
End: 2024-01-12
Payer: MEDICARE

## 2024-01-12 VITALS
SYSTOLIC BLOOD PRESSURE: 130 MMHG | RESPIRATION RATE: 14 BRPM | BODY MASS INDEX: 23.43 KG/M2 | HEIGHT: 72 IN | HEART RATE: 109 BPM | DIASTOLIC BLOOD PRESSURE: 90 MMHG | WEIGHT: 173 LBS | OXYGEN SATURATION: 93 %

## 2024-01-12 DIAGNOSIS — J12.9 VIRAL PNEUMONIA, UNSPECIFIED: ICD-10-CM

## 2024-01-12 DIAGNOSIS — J45.998 OTHER ASTHMA: ICD-10-CM

## 2024-01-12 DIAGNOSIS — G47.33 OBSTRUCTIVE SLEEP APNEA (ADULT) (PEDIATRIC): ICD-10-CM

## 2024-01-12 PROCEDURE — 99214 OFFICE O/P EST MOD 30 MIN: CPT

## 2024-01-19 ENCOUNTER — APPOINTMENT (OUTPATIENT)
Dept: INFUSION THERAPY | Facility: CLINIC | Age: 64
End: 2024-01-19

## 2024-01-19 ENCOUNTER — LABORATORY RESULT (OUTPATIENT)
Age: 64
End: 2024-01-19

## 2024-01-19 ENCOUNTER — OUTPATIENT (OUTPATIENT)
Dept: OUTPATIENT SERVICES | Facility: HOSPITAL | Age: 64
LOS: 1 days | End: 2024-01-19
Payer: MEDICARE

## 2024-01-19 DIAGNOSIS — Z98.890 OTHER SPECIFIED POSTPROCEDURAL STATES: Chronic | ICD-10-CM

## 2024-01-19 DIAGNOSIS — M05.9 RHEUMATOID ARTHRITIS WITH RHEUMATOID FACTOR, UNSPECIFIED: ICD-10-CM

## 2024-01-19 PROCEDURE — 96413 CHEMO IV INFUSION 1 HR: CPT

## 2024-01-19 PROCEDURE — 96375 TX/PRO/DX INJ NEW DRUG ADDON: CPT

## 2024-01-19 PROCEDURE — 85027 COMPLETE CBC AUTOMATED: CPT

## 2024-01-19 PROCEDURE — 36415 COLL VENOUS BLD VENIPUNCTURE: CPT

## 2024-01-19 PROCEDURE — 96415 CHEMO IV INFUSION ADDL HR: CPT

## 2024-01-19 RX ORDER — RITUXIMAB 10 MG/ML
1000 INJECTION, SOLUTION INTRAVENOUS ONCE
Refills: 0 | Status: COMPLETED | OUTPATIENT
Start: 2024-01-19 | End: 2024-01-19

## 2024-01-19 RX ORDER — DIPHENHYDRAMINE HCL 50 MG
50 CAPSULE ORAL ONCE
Refills: 0 | Status: COMPLETED | OUTPATIENT
Start: 2024-01-19 | End: 2024-01-19

## 2024-01-19 RX ORDER — ACETAMINOPHEN 500 MG
650 TABLET ORAL ONCE
Refills: 0 | Status: COMPLETED | OUTPATIENT
Start: 2024-01-19 | End: 2024-01-19

## 2024-01-19 RX ADMIN — Medication 102 MILLIGRAM(S): at 09:26

## 2024-01-19 RX ADMIN — Medication 650 MILLIGRAM(S): at 09:25

## 2024-01-19 RX ADMIN — RITUXIMAB 1000 MILLIGRAM(S): 10 INJECTION, SOLUTION INTRAVENOUS at 13:30

## 2024-01-19 RX ADMIN — RITUXIMAB 1000 MILLIGRAM(S): 10 INJECTION, SOLUTION INTRAVENOUS at 10:12

## 2024-01-19 RX ADMIN — Medication 50 MILLIGRAM(S): at 09:46

## 2024-01-20 DIAGNOSIS — M05.9 RHEUMATOID ARTHRITIS WITH RHEUMATOID FACTOR, UNSPECIFIED: ICD-10-CM

## 2024-01-29 ENCOUNTER — APPOINTMENT (OUTPATIENT)
Dept: RHEUMATOLOGY | Facility: CLINIC | Age: 64
End: 2024-01-29
Payer: MEDICARE

## 2024-01-29 VITALS
HEART RATE: 84 BPM | DIASTOLIC BLOOD PRESSURE: 85 MMHG | OXYGEN SATURATION: 95 % | SYSTOLIC BLOOD PRESSURE: 149 MMHG | TEMPERATURE: 97.8 F | WEIGHT: 172.25 LBS | HEIGHT: 72 IN | BODY MASS INDEX: 23.33 KG/M2

## 2024-01-29 PROCEDURE — 99214 OFFICE O/P EST MOD 30 MIN: CPT

## 2024-01-29 RX ORDER — METFORMIN HYDROCHLORIDE 500 MG/1
500 TABLET, COATED ORAL
Refills: 0 | Status: ACTIVE | COMMUNITY

## 2024-01-29 RX ORDER — PREDNISONE 5 MG/1
5 TABLET ORAL
Qty: 180 | Refills: 3 | Status: DISCONTINUED | COMMUNITY
Start: 2023-10-18 | End: 2024-01-29

## 2024-01-29 RX ORDER — CHOLESTYRAMINE 4 G/9G
4 POWDER, FOR SUSPENSION ORAL
Qty: 66 | Refills: 0 | Status: DISCONTINUED | COMMUNITY
Start: 2022-11-29 | End: 2024-01-29

## 2024-01-29 RX ORDER — PREDNISONE 5 MG/1
5 TABLET ORAL
Qty: 60 | Refills: 2 | Status: DISCONTINUED | COMMUNITY
Start: 2020-09-24 | End: 2024-01-29

## 2024-01-29 NOTE — ASSESSMENT
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 With improvement of joint symptoms on rituximab. Also taking mycophenolate for ILD. Pt has been experiencing some joint pain recently, no clear e/o inflammatory arthritis on exam today. Also + b/l foot burning at night - unclear if this could be related to his DM? His A1c was 8.4 on recent testing. The symptoms in the feet appear to have worsened after pt tapered off prednisone, and he has also developed synovitis in his R>L hands.  - 10/2023 foot, hand and wrist x-rays with OA, increased compared to 2020, no erosions. Also b/l hip x-rays with moderate OA. - Continue rituximab 1000 mg q 14 days x 2 doses every 6 months for now, would consider possibly increasing frequency if pt's symptoms improve after this most recent cycle in 1/2024? Another option for RA and RA-ILD would be abatacept if his symptoms do not respond to rituximab. Can also consider switching to a ARNOLD inhibitor or TNF inhibitor for the joints if pt's respiratory symptoms are stable on mycophenolate - Pt had negative hepatitis labs in 9/2023 - Continue mycophenolate 360 mg q AM and 720 mg q PM, normal CBC, CMP with slightly elevated stable alk phos in 12/2023 - Continue prn diclofenac. He would prefer not to take tramadol prn due to concern for adverse effects - Start gabapentin 100 mg qhs, can increase to 100 mg BID if no side effects - Discussed trying to restart prednisone again, pt declined as he is concerned about his blood glucose   Pt is leaving for a trip to [facility] for 2.5 months at the end of February 2024. I advised him to call me in 2 weeks to follow up on his symptoms after rituximab

## 2024-01-29 NOTE — HISTORY OF PRESENT ILLNESS
[FreeTextEntry1] : After his last visit, pt gradually tapered off prednisone. However, he subsequently developed severe burning pain in his R>L heels and MTPs at night, as well as R>L MCP and PIP swelling and pain. He has been taking prn diclofenac with some improvement. He received his rituximab, second dose was 1/19, has not noticed a difference in his symptoms so far after rituximab. He also felt generalized weakness after receiving the rituximab infusions, which is slowly improving.  Previous HPI: In 2020, pt was hospitalized for 72 days with Covid. At that time, he developed pain and swelling in his hands and wrists, and was found to have +RF and +CCP. He was diagnosed with RA, and also had lung imaging which demonstrated pulmonary fibrosis. He has been treated with multiple different medications, including:  - Actemra - didn't help - Hydroxychloroquine - developed palpitations - HCQ - palpitations - Leflunomide - pneumonitis - Mycophenolate mofetil - vomiting - Sulfasalazine - unclear adverse effect - Methotrexate - declined to take it - Currently receiving mycophenolate (Myfortic) for ILD - Currently receiving IV rituximab 1000 mg q 14 days x 2 doses every 6 months - Also taking prednisone 5 mg BID. He has had difficulty tapering down to 7.5 mg daily because of recurrence of joint symptoms.  Pt used to follow with Dr. Kiran, says that since his last appointment, he has been having mild pain in his R 3rd DIP, also having R wrist pain x 1 day. + Low back pain, wearing a belt helps. He was prescribed tramadol before but has not been taking it because he is concerned about side effects. + Burning in b/l soles of feet at night.  Physical exam: GEN: Pleasant, AAO man sitting on exam table in NAD PULM: + Crackles noted in b/l bases CV: Regular rate and rhythm, no murmurs MSK: Shoulders: Full ROM b/l Elbows: Full ROM b/l, no effusions Wrists: + Effusion on R with pain on ROM Hands: + Synovitis in the R 2nd-4th MCPs and R 3rd PIP, ? Heberden's node on R 3rd DIP Hips: Full ROM b/l Knees: no effusions, full ROM b/l Ankles: no effusions, full ROM b/ Feet: + TTP in R Achilles tendon insertion area, +?effusions in IP joints? EXT: No LE edema b/l

## 2024-02-02 ENCOUNTER — APPOINTMENT (OUTPATIENT)
Dept: INFUSION THERAPY | Facility: CLINIC | Age: 64
End: 2024-02-02

## 2024-04-04 ENCOUNTER — APPOINTMENT (OUTPATIENT)
Dept: PULMONOLOGY | Facility: HOSPITAL | Age: 64
End: 2024-04-04

## 2024-05-20 NOTE — ED ADULT NURSE NOTE - NSFALLRSKINDICATORS_ED_ALL_ED
Pt last seen 4/27/23    EMGALITY  mg/mL PnIj     Sig: INJECT 1 SYRINGE (120 MG) SUBCUTANEOUSLY ONCE EVERY 28 DAYS (MAINTENANCE DOSE)    Disp: 1 mL    Refills: 0    Start: 5/17/2024    Class: Normal    For: Migraine without status migrainosus, not intractable, unspecified migraine type    Last ordered: 1 year ago (4/27/2023) by Saulo Meza MD    Last refill: 4/24/2024    Rx #: [id number]       To be filled at: [id number] - [place], [place] - [address]         no O positive

## 2024-06-10 ENCOUNTER — APPOINTMENT (OUTPATIENT)
Dept: RHEUMATOLOGY | Facility: CLINIC | Age: 64
End: 2024-06-10
Payer: MEDICARE

## 2024-06-10 VITALS
BODY MASS INDEX: 23.57 KG/M2 | OXYGEN SATURATION: 96 % | DIASTOLIC BLOOD PRESSURE: 85 MMHG | HEART RATE: 100 BPM | WEIGHT: 174 LBS | SYSTOLIC BLOOD PRESSURE: 131 MMHG | TEMPERATURE: 98.1 F | HEIGHT: 72 IN

## 2024-06-10 DIAGNOSIS — M05.9 RHEUMATOID ARTHRITIS WITH RHEUMATOID FACTOR, UNSPECIFIED: ICD-10-CM

## 2024-06-10 DIAGNOSIS — Z00.00 ENCOUNTER FOR GENERAL ADULT MEDICAL EXAMINATION W/OUT ABNORMAL FINDINGS: ICD-10-CM

## 2024-06-10 DIAGNOSIS — J84.9 INTERSTITIAL PULMONARY DISEASE, UNSPECIFIED: ICD-10-CM

## 2024-06-10 PROCEDURE — 99214 OFFICE O/P EST MOD 30 MIN: CPT

## 2024-06-10 RX ORDER — GABAPENTIN 300 MG/1
300 CAPSULE ORAL TWICE DAILY
Qty: 180 | Refills: 1 | Status: ACTIVE | COMMUNITY
Start: 2024-06-10 | End: 1900-01-01

## 2024-06-10 RX ORDER — DICLOFENAC SODIUM 75 MG/1
75 TABLET, DELAYED RELEASE ORAL
Qty: 60 | Refills: 1 | Status: ACTIVE | COMMUNITY
Start: 2023-12-27 | End: 1900-01-01

## 2024-06-10 RX ORDER — GABAPENTIN 100 MG/1
100 CAPSULE ORAL
Qty: 180 | Refills: 1 | Status: DISCONTINUED | COMMUNITY
Start: 2024-01-29 | End: 2024-06-10

## 2024-06-10 RX ORDER — MYCOPHENILIC ACID 360 MG/1
360 TABLET, DELAYED RELEASE ORAL
Qty: 270 | Refills: 1 | Status: ACTIVE | COMMUNITY
Start: 2023-10-18 | End: 1900-01-01

## 2024-06-10 NOTE — ASSESSMENT
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 With improvement of joint symptoms on rituximab. Also taking mycophenolate for ILD. Pt notes improvement in his inflammatory arthritis recently, and he is not on prednisone. He does note b/l hip and low back pain, he had x-rays of b/l hips in 10/2023 which demonstrated moderate OA, also has symptoms suggestive of trochanteric bursitis.  - Offered pt b/l trochanteric bursa steroid injections but he declined as he does not wish to get injections - Increase gabapentin to 300 mg BID - Pt was referred for repeat CT chest by Dr. Stewart in January 2024, has not had it done yet - 10/2023 foot, hand and wrist x-rays with OA, increased compared to 2020, no erosions. Also b/l hip x-rays with moderate OA. - Continue rituximab 1000 mg q 14 days x 2 doses every 6 months - Pt had negative hepatitis labs in 9/2023, f/u repeat - Continue mycophenolate 360 mg q AM and 720 mg q PM, normal CBC, CMP with slightly elevated stable alk phos in 12/2023, f/u repeat - Continue prn diclofenac. He would prefer not to take tramadol prn due to concern for adverse effects   f/u in 3 months, will call pt with lab results

## 2024-06-10 NOTE — HISTORY OF PRESENT ILLNESS
[FreeTextEntry1] : Pt has been feeling better since his last visit. He notes improvement in his heel burning and hand pain and swelling since the last visit, and he has not been taking prednisone. His main symptom  currently is b/l lateral hip pain and low back pain at night, with pain when he turns in bed. He thinks the gabapentin is helping, he has been taking it BID. Denies side effects from gabapentin.  Previous HPI: In 2020, pt was hospitalized for 72 days with Covid. At that time, he developed pain and swelling in his hands and wrists, and was found to have +RF and +CCP. He was diagnosed with RA, and also had lung imaging which demonstrated pulmonary fibrosis. He has been treated with multiple different medications, including:  - Actemra - didn't help - Hydroxychloroquine - developed palpitations - HCQ - palpitations - Leflunomide - pneumonitis - Mycophenolate mofetil - vomiting - Sulfasalazine - unclear adverse effect - Methotrexate - declined to take it - Currently receiving mycophenolate (Myfortic) for ILD - Currently receiving IV rituximab 1000 mg q 14 days x 2 doses every 6 months - Also taking prednisone 5 mg BID. He has had difficulty tapering down to 7.5 mg daily because of recurrence of joint symptoms.  Pt used to follow with Dr. Kiran, says that since his last appointment, he has been having mild pain in his R 3rd DIP, also having R wrist pain x 1 day. + Low back pain, wearing a belt helps. He was prescribed tramadol before but has not been taking it because he is concerned about side effects. + Burning in b/l soles of feet at night.  Physical exam: GEN: Pleasant, AAO man sitting on exam table in NAD PULM: + Crackles noted in b/l bases CV: Regular rate and rhythm, no murmurs MSK: Shoulders: Full ROM b/l Elbows: Full ROM b/l, no effusions Wrists: + Small effusions b/l with full ROM, no pain with ROM Hands: Interval resolution in MCP and PIP synovitis, ? Heberden's node on R 3rd DIP Hips: Full ROM b/l Knees: no effusions, full ROM b/l Ankles: no effusions, full ROM b/ Feet: No effusions or TTP EXT: No LE edema b/l

## 2024-06-13 LAB
ALBUMIN SERPL ELPH-MCNC: 4.6 G/DL
ALP BLD-CCNC: 140 U/L
ALT SERPL-CCNC: 28 U/L
ANION GAP SERPL CALC-SCNC: 12 MMOL/L
AST SERPL-CCNC: 9 U/L
BASOPHILS # BLD AUTO: 0.09 K/UL
BASOPHILS NFR BLD AUTO: 0.8 %
BILIRUB SERPL-MCNC: 0.3 MG/DL
BUN SERPL-MCNC: 15 MG/DL
CALCIUM SERPL-MCNC: 9.4 MG/DL
CHLORIDE SERPL-SCNC: 103 MMOL/L
CHOLEST SERPL-MCNC: 235 MG/DL
CO2 SERPL-SCNC: 24 MMOL/L
CREAT SERPL-MCNC: 1.2 MG/DL
CRP SERPL-MCNC: 53 MG/L
EGFR: 68 ML/MIN/1.73M2
EOSINOPHIL # BLD AUTO: 0.64 K/UL
EOSINOPHIL NFR BLD AUTO: 5.3 %
ERYTHROCYTE [SEDIMENTATION RATE] IN BLOOD BY WESTERGREN METHOD: 41 MM/HR
ESTIMATED AVERAGE GLUCOSE: 143 MG/DL
GLUCOSE SERPL-MCNC: 98 MG/DL
HBA1C MFR BLD HPLC: 6.6 %
HBV CORE IGG+IGM SER QL: NONREACTIVE
HBV SURFACE AB SER QL: NONREACTIVE
HBV SURFACE AG SER QL: NONREACTIVE
HCT VFR BLD CALC: 53.1 %
HCV AB SER QL: NONREACTIVE
HCV S/CO RATIO: 0.15 S/CO
HDLC SERPL-MCNC: 33 MG/DL
HGB BLD-MCNC: 17 G/DL
IMM GRANULOCYTES NFR BLD AUTO: 0.5 %
LDLC SERPL CALC-MCNC: 162 MG/DL
LYMPHOCYTES # BLD AUTO: 1.83 K/UL
LYMPHOCYTES NFR BLD AUTO: 15.3 %
M TB IFN-G BLD-IMP: NEGATIVE
MAN DIFF?: NORMAL
MCHC RBC-ENTMCNC: 29 PG
MCHC RBC-ENTMCNC: 32 G/DL
MCV RBC AUTO: 90.5 FL
MONOCYTES # BLD AUTO: 1.38 K/UL
MONOCYTES NFR BLD AUTO: 11.5 %
NEUTROPHILS # BLD AUTO: 7.98 K/UL
NEUTROPHILS NFR BLD AUTO: 66.6 %
NONHDLC SERPL-MCNC: 202 MG/DL
PLATELET # BLD AUTO: 399 K/UL
PMV BLD AUTO: 0 /100 WBCS
POTASSIUM SERPL-SCNC: 4.6 MMOL/L
PROT SERPL-MCNC: 7.3 G/DL
QUANTIFERON TB PLUS MITOGEN MINUS NIL: 4.04 IU/ML
QUANTIFERON TB PLUS NIL: 0.02 IU/ML
QUANTIFERON TB PLUS TB1 MINUS NIL: 0 IU/ML
QUANTIFERON TB PLUS TB2 MINUS NIL: 0.01 IU/ML
RBC # BLD: 5.87 M/UL
RBC # FLD: 15.9 %
SODIUM SERPL-SCNC: 139 MMOL/L
TRIGL SERPL-MCNC: 202 MG/DL
WBC # FLD AUTO: 11.98 K/UL

## 2024-06-20 ENCOUNTER — OUTPATIENT (OUTPATIENT)
Dept: OUTPATIENT SERVICES | Facility: HOSPITAL | Age: 64
LOS: 1 days | End: 2024-06-20
Payer: MEDICARE

## 2024-06-20 ENCOUNTER — APPOINTMENT (OUTPATIENT)
Dept: INFUSION THERAPY | Facility: CLINIC | Age: 64
End: 2024-06-20

## 2024-06-20 VITALS — SYSTOLIC BLOOD PRESSURE: 141 MMHG | TEMPERATURE: 98 F | HEART RATE: 86 BPM | DIASTOLIC BLOOD PRESSURE: 84 MMHG

## 2024-06-20 DIAGNOSIS — Z98.890 OTHER SPECIFIED POSTPROCEDURAL STATES: Chronic | ICD-10-CM

## 2024-06-20 DIAGNOSIS — M05.9 RHEUMATOID ARTHRITIS WITH RHEUMATOID FACTOR, UNSPECIFIED: ICD-10-CM

## 2024-06-20 PROCEDURE — 96367 TX/PROPH/DG ADDL SEQ IV INF: CPT

## 2024-06-20 PROCEDURE — 96413 CHEMO IV INFUSION 1 HR: CPT

## 2024-06-20 PROCEDURE — 96415 CHEMO IV INFUSION ADDL HR: CPT

## 2024-06-20 RX ORDER — DIPHENHYDRAMINE HCL 12.5MG/5ML
50 ELIXIR ORAL ONCE
Refills: 0 | Status: COMPLETED | OUTPATIENT
Start: 2024-06-20 | End: 2024-06-20

## 2024-06-20 RX ORDER — RITUXIMAB 10 MG/ML
1000 INJECTION, SOLUTION INTRAVENOUS ONCE
Refills: 0 | Status: COMPLETED | OUTPATIENT
Start: 2024-06-20 | End: 2024-06-20

## 2024-06-20 RX ORDER — ACETAMINOPHEN 325 MG
650 TABLET ORAL ONCE
Refills: 0 | Status: COMPLETED | OUTPATIENT
Start: 2024-06-20 | End: 2024-06-20

## 2024-06-20 RX ADMIN — Medication 50 MILLIGRAM(S): at 09:15

## 2024-06-20 RX ADMIN — Medication 650 MILLIGRAM(S): at 08:55

## 2024-06-20 RX ADMIN — Medication 650 MILLIGRAM(S): at 09:26

## 2024-06-20 RX ADMIN — Medication 102 MILLIGRAM(S): at 08:55

## 2024-06-20 RX ADMIN — RITUXIMAB 1000 MILLIGRAM(S): 10 INJECTION, SOLUTION INTRAVENOUS at 13:20

## 2024-06-20 RX ADMIN — RITUXIMAB 1000 MILLIGRAM(S): 10 INJECTION, SOLUTION INTRAVENOUS at 09:43

## 2024-06-21 DIAGNOSIS — M05.9 RHEUMATOID ARTHRITIS WITH RHEUMATOID FACTOR, UNSPECIFIED: ICD-10-CM

## 2024-06-24 ENCOUNTER — APPOINTMENT (OUTPATIENT)
Dept: CARDIOLOGY | Facility: CLINIC | Age: 64
End: 2024-06-24
Payer: MEDICARE

## 2024-06-24 VITALS
OXYGEN SATURATION: 94 % | BODY MASS INDEX: 23.84 KG/M2 | HEIGHT: 72 IN | WEIGHT: 176 LBS | SYSTOLIC BLOOD PRESSURE: 118 MMHG | HEART RATE: 81 BPM | DIASTOLIC BLOOD PRESSURE: 76 MMHG

## 2024-06-24 DIAGNOSIS — I10 ESSENTIAL (PRIMARY) HYPERTENSION: ICD-10-CM

## 2024-06-24 DIAGNOSIS — E11.9 TYPE 2 DIABETES MELLITUS W/OUT COMPLICATIONS: ICD-10-CM

## 2024-06-24 DIAGNOSIS — E78.5 HYPERLIPIDEMIA, UNSPECIFIED: ICD-10-CM

## 2024-06-24 PROCEDURE — 99214 OFFICE O/P EST MOD 30 MIN: CPT

## 2024-06-24 PROCEDURE — 93000 ELECTROCARDIOGRAM COMPLETE: CPT

## 2024-06-24 RX ORDER — ROSUVASTATIN CALCIUM 20 MG/1
20 TABLET, FILM COATED ORAL
Qty: 90 | Refills: 3 | Status: ACTIVE | COMMUNITY
Start: 2024-06-24 | End: 1900-01-01

## 2024-06-24 RX ORDER — ROSUVASTATIN CALCIUM 10 MG/1
10 TABLET, FILM COATED ORAL DAILY
Qty: 90 | Refills: 3 | Status: DISCONTINUED | COMMUNITY
Start: 1900-01-01 | End: 2024-06-24

## 2024-07-03 ENCOUNTER — OUTPATIENT (OUTPATIENT)
Dept: OUTPATIENT SERVICES | Facility: HOSPITAL | Age: 64
LOS: 1 days | End: 2024-07-03
Payer: MEDICARE

## 2024-07-03 ENCOUNTER — APPOINTMENT (OUTPATIENT)
Dept: INFUSION THERAPY | Facility: CLINIC | Age: 64
End: 2024-07-03

## 2024-07-03 VITALS — DIASTOLIC BLOOD PRESSURE: 81 MMHG | TEMPERATURE: 98 F | SYSTOLIC BLOOD PRESSURE: 125 MMHG | HEART RATE: 92 BPM

## 2024-07-03 DIAGNOSIS — M05.9 RHEUMATOID ARTHRITIS WITH RHEUMATOID FACTOR, UNSPECIFIED: ICD-10-CM

## 2024-07-03 DIAGNOSIS — Z98.890 OTHER SPECIFIED POSTPROCEDURAL STATES: Chronic | ICD-10-CM

## 2024-07-03 PROCEDURE — 96415 CHEMO IV INFUSION ADDL HR: CPT

## 2024-07-03 PROCEDURE — 96375 TX/PRO/DX INJ NEW DRUG ADDON: CPT

## 2024-07-03 PROCEDURE — 96413 CHEMO IV INFUSION 1 HR: CPT

## 2024-07-03 RX ORDER — DIPHENHYDRAMINE HCL 12.5MG/5ML
50 ELIXIR ORAL ONCE
Refills: 0 | Status: COMPLETED | OUTPATIENT
Start: 2024-07-03 | End: 2024-07-03

## 2024-07-03 RX ORDER — ACETAMINOPHEN 325 MG
650 TABLET ORAL ONCE
Refills: 0 | Status: COMPLETED | OUTPATIENT
Start: 2024-07-03 | End: 2024-07-03

## 2024-07-03 RX ORDER — RITUXIMAB 10 MG/ML
1000 INJECTION, SOLUTION INTRAVENOUS ONCE
Refills: 0 | Status: COMPLETED | OUTPATIENT
Start: 2024-07-03 | End: 2024-07-03

## 2024-07-03 RX ADMIN — Medication 50 MILLIGRAM(S): at 09:50

## 2024-07-03 RX ADMIN — Medication 102 MILLIGRAM(S): at 09:35

## 2024-07-03 RX ADMIN — Medication 650 MILLIGRAM(S): at 09:35

## 2024-07-03 RX ADMIN — RITUXIMAB 1000 MILLIGRAM(S): 10 INJECTION, SOLUTION INTRAVENOUS at 13:40

## 2024-07-03 RX ADMIN — RITUXIMAB 1000 MILLIGRAM(S): 10 INJECTION, SOLUTION INTRAVENOUS at 10:12

## 2024-07-04 DIAGNOSIS — M05.9 RHEUMATOID ARTHRITIS WITH RHEUMATOID FACTOR, UNSPECIFIED: ICD-10-CM

## 2024-07-12 ENCOUNTER — APPOINTMENT (OUTPATIENT)
Dept: PULMONOLOGY | Facility: CLINIC | Age: 64
End: 2024-07-12
Payer: MEDICARE

## 2024-07-12 VITALS
DIASTOLIC BLOOD PRESSURE: 82 MMHG | HEIGHT: 72 IN | HEART RATE: 89 BPM | OXYGEN SATURATION: 96 % | WEIGHT: 173 LBS | BODY MASS INDEX: 23.43 KG/M2 | SYSTOLIC BLOOD PRESSURE: 122 MMHG | RESPIRATION RATE: 14 BRPM

## 2024-07-12 DIAGNOSIS — J84.9 INTERSTITIAL PULMONARY DISEASE, UNSPECIFIED: ICD-10-CM

## 2024-07-12 DIAGNOSIS — G47.33 OBSTRUCTIVE SLEEP APNEA (ADULT) (PEDIATRIC): ICD-10-CM

## 2024-07-12 DIAGNOSIS — J12.9 VIRAL PNEUMONIA, UNSPECIFIED: ICD-10-CM

## 2024-07-12 DIAGNOSIS — J45.998 OTHER ASTHMA: ICD-10-CM

## 2024-07-12 PROCEDURE — 99214 OFFICE O/P EST MOD 30 MIN: CPT

## 2024-07-23 ENCOUNTER — APPOINTMENT (OUTPATIENT)
Dept: PULMONOLOGY | Facility: HOSPITAL | Age: 64
End: 2024-07-23

## 2024-07-23 PROCEDURE — 94060 EVALUATION OF WHEEZING: CPT | Mod: 26

## 2024-07-23 PROCEDURE — 94727 GAS DIL/WSHOT DETER LNG VOL: CPT | Mod: 26

## 2024-07-23 PROCEDURE — 94729 DIFFUSING CAPACITY: CPT | Mod: 26

## 2024-08-17 ENCOUNTER — RESULT REVIEW (OUTPATIENT)
Age: 64
End: 2024-08-17

## 2024-09-19 ENCOUNTER — APPOINTMENT (OUTPATIENT)
Dept: RHEUMATOLOGY | Facility: CLINIC | Age: 64
End: 2024-09-19
Payer: MEDICARE

## 2024-09-19 VITALS
OXYGEN SATURATION: 94 % | SYSTOLIC BLOOD PRESSURE: 130 MMHG | HEIGHT: 71 IN | HEART RATE: 93 BPM | BODY MASS INDEX: 24.22 KG/M2 | TEMPERATURE: 97.7 F | WEIGHT: 173 LBS | DIASTOLIC BLOOD PRESSURE: 80 MMHG

## 2024-09-19 DIAGNOSIS — M05.9 RHEUMATOID ARTHRITIS WITH RHEUMATOID FACTOR, UNSPECIFIED: ICD-10-CM

## 2024-09-19 DIAGNOSIS — E11.9 TYPE 2 DIABETES MELLITUS W/OUT COMPLICATIONS: ICD-10-CM

## 2024-09-19 DIAGNOSIS — Z00.00 ENCOUNTER FOR GENERAL ADULT MEDICAL EXAMINATION W/OUT ABNORMAL FINDINGS: ICD-10-CM

## 2024-09-19 PROCEDURE — 99214 OFFICE O/P EST MOD 30 MIN: CPT

## 2024-09-19 RX ORDER — GABAPENTIN 300 MG/1
300 CAPSULE ORAL
Qty: 360 | Refills: 3 | Status: ACTIVE | COMMUNITY
Start: 2024-09-19 | End: 1900-01-01

## 2024-09-19 NOTE — ASSESSMENT
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 With improvement of joint symptoms on rituximab. Also taking mycophenolate for ILD. + Burning in b/l feet, unclear if this is arthritis related? Pt had a neuropathy lab workup which demonstrated slightly elevated TSH with normal T4, was otherwise negativet. He also notes b/l hip and low back pain, he had x-rays of b/l hips in 10/2023 which demonstrated moderate OA, also has symptoms suggestive of trochanteric bursitis.  - Increase gabapentin to 300 mg q AM and 600 mg q PM; if pt does not notice side effects with this dose, he can increase further to 600 mg BID - August 2024 chest CT with stable findings of groundglass densities, subpleural reticular change, bronchiectasis and bronchiolectasis concerning for fibrotic lung disease - 7/2024 PFTs with moderate restriction; previous PFTs in June 2023 demonstrated severe restriction with decreased DLCO - 10/2023 foot, hand and wrist x-rays with OA, increased compared to 2020, no erosions. Also b/l hip x-rays with moderate OA. - Continue rituximab 1000 mg q 14 days x 2 doses every 6 months - Negative hepatitis B and quantiferon in 6/2024 - Continue mycophenolate 360 mg q AM and 720 mg q PM, stable CBC and CMP in June 2024 - Continue prn diclofenac. He would prefer not to take tramadol prn due to concern for adverse effects   f/u in 3 months, f/u labs 2-3 weeks prior to next visit

## 2024-09-19 NOTE — HISTORY OF PRESENT ILLNESS
[FreeTextEntry1] : Pt continues to experience R>L foot burning; he does think that the gabapentin has helped with it. Denies side effects from gabapentin. His breathing has been okay; he says that sometimes when he is walking uphill he has to stop and rest for one minute because he becomes short of breath; this has been stable. + Sometimes has transient pain in PIPs for an hour and then it resolves. + Also sometimes hip and back pain but pt has been feeling okay generally. He did not notice a difference in the foot burning after he received his last rituximab infusions in June-July 2024.  Previous HPI: In 2020, pt was hospitalized for 72 days with Covid. At that time, he developed pain and swelling in his hands and wrists, and was found to have +RF and +CCP. He was diagnosed with RA, and also had lung imaging which demonstrated pulmonary fibrosis. He has been treated with multiple different medications, including:  - Actemra - didn't help - Hydroxychloroquine - developed palpitations - HCQ - palpitations - Leflunomide - pneumonitis - Mycophenolate mofetil - vomiting - Sulfasalazine - unclear adverse effect - Methotrexate - declined to take it - Currently receiving mycophenolate (Myfortic) for ILD - Currently receiving IV rituximab 1000 mg q 14 days x 2 doses every 6 months - Also taking prednisone 5 mg BID. He has had difficulty tapering down to 7.5 mg daily because of recurrence of joint symptoms.  Pt used to follow with Dr. Kiran, says that since his last appointment, he has been having mild pain in his R 3rd DIP, also having R wrist pain x 1 day. + Low back pain, wearing a belt helps. He was prescribed tramadol before but has not been taking it because he is concerned about side effects. + Burning in b/l soles of feet at night.  Physical exam: GEN: Pleasant, AAO man sitting on exam table in NAD PULM: + Crackles noted in b/l bases CV: Regular rate and rhythm, no murmurs MSK: Shoulders: Full ROM b/l Elbows: Full ROM b/l, no effusions Wrists: + Small effusions b/l with full ROM, no pain with ROM Hands: No synovitis, ? Heberden's node on R 3rd DIP Hips: Full ROM b/l Knees: no effusions, full ROM b/l Ankles: no effusions, full ROM b/ Feet: No effusions or TTP EXT: No LE edema b/l

## 2024-09-26 ENCOUNTER — APPOINTMENT (OUTPATIENT)
Dept: RHEUMATOLOGY | Facility: CLINIC | Age: 64
End: 2024-09-26

## 2024-10-08 ENCOUNTER — APPOINTMENT (OUTPATIENT)
Dept: UROLOGY | Facility: CLINIC | Age: 64
End: 2024-10-08
Payer: MEDICARE

## 2024-10-08 DIAGNOSIS — K63.89 OTHER SPECIFIED DISEASES OF INTESTINE: ICD-10-CM

## 2024-10-08 DIAGNOSIS — N47.6 BALANOPOSTHITIS: ICD-10-CM

## 2024-10-08 DIAGNOSIS — J84.9 INTERSTITIAL PULMONARY DISEASE, UNSPECIFIED: ICD-10-CM

## 2024-10-08 DIAGNOSIS — M05.9 RHEUMATOID ARTHRITIS WITH RHEUMATOID FACTOR, UNSPECIFIED: ICD-10-CM

## 2024-10-08 LAB
BILIRUB UR QL STRIP: NORMAL
COLLECTION METHOD: NORMAL
GLUCOSE UR-MCNC: NORMAL
HCG UR QL: 0.2 EU/DL
HGB UR QL STRIP.AUTO: NORMAL
KETONES UR-MCNC: NORMAL
LEUKOCYTE ESTERASE UR QL STRIP: NORMAL
NITRITE UR QL STRIP: NORMAL
PH UR STRIP: 5.5
PROT UR STRIP-MCNC: NORMAL
SP GR UR STRIP: 1.02

## 2024-10-08 PROCEDURE — 99204 OFFICE O/P NEW MOD 45 MIN: CPT

## 2024-10-19 ENCOUNTER — EMERGENCY (EMERGENCY)
Facility: HOSPITAL | Age: 64
LOS: 0 days | Discharge: HOME | End: 2024-10-19
Attending: EMERGENCY MEDICINE
Payer: MEDICARE

## 2024-10-19 VITALS
OXYGEN SATURATION: 97 % | DIASTOLIC BLOOD PRESSURE: 86 MMHG | SYSTOLIC BLOOD PRESSURE: 134 MMHG | TEMPERATURE: 98 F | WEIGHT: 173.06 LBS | RESPIRATION RATE: 18 BRPM | HEIGHT: 71 IN | HEART RATE: 95 BPM

## 2024-10-19 DIAGNOSIS — R51.9 HEADACHE, UNSPECIFIED: ICD-10-CM

## 2024-10-19 DIAGNOSIS — Z98.890 OTHER SPECIFIED POSTPROCEDURAL STATES: Chronic | ICD-10-CM

## 2024-10-19 DIAGNOSIS — Z88.0 ALLERGY STATUS TO PENICILLIN: ICD-10-CM

## 2024-10-19 DIAGNOSIS — E78.5 HYPERLIPIDEMIA, UNSPECIFIED: ICD-10-CM

## 2024-10-19 DIAGNOSIS — E11.9 TYPE 2 DIABETES MELLITUS WITHOUT COMPLICATIONS: ICD-10-CM

## 2024-10-19 DIAGNOSIS — I10 ESSENTIAL (PRIMARY) HYPERTENSION: ICD-10-CM

## 2024-10-19 PROCEDURE — 99283 EMERGENCY DEPT VISIT LOW MDM: CPT | Mod: 25

## 2024-10-19 PROCEDURE — 96372 THER/PROPH/DIAG INJ SC/IM: CPT

## 2024-10-19 PROCEDURE — 99284 EMERGENCY DEPT VISIT MOD MDM: CPT | Mod: FS

## 2024-10-19 RX ORDER — ACETAMINOPHEN 500 MG/5ML
975 LIQUID (ML) ORAL ONCE
Refills: 0 | Status: COMPLETED | OUTPATIENT
Start: 2024-10-19 | End: 2024-10-19

## 2024-10-19 RX ORDER — KETOROLAC TROMETHAMINE 30 MG/ML
60 INJECTION, SOLUTION INTRAMUSCULAR; INTRAVENOUS ONCE
Refills: 0 | Status: DISCONTINUED | OUTPATIENT
Start: 2024-10-19 | End: 2024-10-19

## 2024-10-19 RX ADMIN — KETOROLAC TROMETHAMINE 60 MILLIGRAM(S): 30 INJECTION, SOLUTION INTRAMUSCULAR; INTRAVENOUS at 10:55

## 2024-10-19 RX ADMIN — Medication 975 MILLIGRAM(S): at 10:55

## 2024-11-21 ENCOUNTER — NON-APPOINTMENT (OUTPATIENT)
Age: 64
End: 2024-11-21

## 2024-12-17 ENCOUNTER — NON-APPOINTMENT (OUTPATIENT)
Age: 64
End: 2024-12-17

## 2024-12-17 ENCOUNTER — APPOINTMENT (OUTPATIENT)
Dept: CARDIOLOGY | Facility: CLINIC | Age: 64
End: 2024-12-17
Payer: MEDICARE

## 2024-12-17 VITALS
SYSTOLIC BLOOD PRESSURE: 112 MMHG | HEART RATE: 93 BPM | OXYGEN SATURATION: 93 % | DIASTOLIC BLOOD PRESSURE: 80 MMHG | BODY MASS INDEX: 23.43 KG/M2 | HEIGHT: 72 IN | WEIGHT: 173 LBS

## 2024-12-17 DIAGNOSIS — R05.8 OTHER SPECIFIED COUGH: ICD-10-CM

## 2024-12-17 DIAGNOSIS — E78.5 HYPERLIPIDEMIA, UNSPECIFIED: ICD-10-CM

## 2024-12-17 DIAGNOSIS — E11.9 TYPE 2 DIABETES MELLITUS W/OUT COMPLICATIONS: ICD-10-CM

## 2024-12-17 DIAGNOSIS — M79.603 PAIN IN ARM, UNSPECIFIED: ICD-10-CM

## 2024-12-17 DIAGNOSIS — I10 ESSENTIAL (PRIMARY) HYPERTENSION: ICD-10-CM

## 2024-12-17 PROCEDURE — 99214 OFFICE O/P EST MOD 30 MIN: CPT

## 2024-12-17 PROCEDURE — 93000 ELECTROCARDIOGRAM COMPLETE: CPT

## 2024-12-17 PROCEDURE — G2211 COMPLEX E/M VISIT ADD ON: CPT

## 2024-12-17 RX ORDER — AMLODIPINE BESYLATE 5 MG/1
5 TABLET ORAL
Qty: 90 | Refills: 3 | Status: ACTIVE | COMMUNITY
Start: 2024-12-17 | End: 1900-01-01

## 2024-12-17 RX ORDER — OLMESARTAN MEDOXOMIL 20 MG/1
20 TABLET, FILM COATED ORAL
Qty: 90 | Refills: 3 | Status: ACTIVE | COMMUNITY
Start: 2024-12-17 | End: 1900-01-01

## 2024-12-18 ENCOUNTER — OUTPATIENT (OUTPATIENT)
Dept: OUTPATIENT SERVICES | Facility: HOSPITAL | Age: 64
LOS: 1 days | End: 2024-12-18
Payer: MEDICARE

## 2024-12-18 ENCOUNTER — APPOINTMENT (OUTPATIENT)
Age: 64
End: 2024-12-18

## 2024-12-18 VITALS
RESPIRATION RATE: 16 BRPM | HEART RATE: 67 BPM | DIASTOLIC BLOOD PRESSURE: 73 MMHG | SYSTOLIC BLOOD PRESSURE: 121 MMHG | TEMPERATURE: 98.2 F

## 2024-12-18 DIAGNOSIS — M05.9 RHEUMATOID ARTHRITIS WITH RHEUMATOID FACTOR, UNSPECIFIED: ICD-10-CM

## 2024-12-18 DIAGNOSIS — Z98.890 OTHER SPECIFIED POSTPROCEDURAL STATES: Chronic | ICD-10-CM

## 2024-12-18 PROCEDURE — 96413 CHEMO IV INFUSION 1 HR: CPT

## 2024-12-18 PROCEDURE — 96367 TX/PROPH/DG ADDL SEQ IV INF: CPT

## 2024-12-18 PROCEDURE — 96415 CHEMO IV INFUSION ADDL HR: CPT

## 2024-12-18 RX ORDER — ACETAMINOPHEN 500MG 500 MG/1
650 TABLET, COATED ORAL ONCE
Refills: 0 | Status: COMPLETED | OUTPATIENT
Start: 2024-12-18 | End: 2024-12-18

## 2024-12-18 RX ORDER — RITUXIMAB 10 MG/ML
1000 INJECTION, SOLUTION INTRAVENOUS ONCE
Refills: 0 | Status: COMPLETED | OUTPATIENT
Start: 2024-12-18 | End: 2024-12-18

## 2024-12-18 RX ORDER — DIPHENHYDRAMINE HCL 25 MG
50 CAPSULE ORAL ONCE
Refills: 0 | Status: COMPLETED | OUTPATIENT
Start: 2024-12-18 | End: 2024-12-18

## 2024-12-18 RX ADMIN — ACETAMINOPHEN 500MG 650 MILLIGRAM(S): 500 TABLET, COATED ORAL at 09:25

## 2024-12-18 RX ADMIN — RITUXIMAB 1000 MILLIGRAM(S): 10 INJECTION, SOLUTION INTRAVENOUS at 09:55

## 2024-12-18 RX ADMIN — Medication 100 MILLIGRAM(S): at 09:34

## 2024-12-18 RX ADMIN — Medication 50 MILLIGRAM(S): at 10:00

## 2024-12-18 RX ADMIN — ACETAMINOPHEN 500MG 650 MILLIGRAM(S): 500 TABLET, COATED ORAL at 10:04

## 2024-12-18 RX ADMIN — RITUXIMAB 1000 MILLIGRAM(S): 10 INJECTION, SOLUTION INTRAVENOUS at 13:15

## 2024-12-19 DIAGNOSIS — M05.9 RHEUMATOID ARTHRITIS WITH RHEUMATOID FACTOR, UNSPECIFIED: ICD-10-CM

## 2024-12-23 ENCOUNTER — APPOINTMENT (OUTPATIENT)
Dept: PULMONOLOGY | Facility: CLINIC | Age: 64
End: 2024-12-23
Payer: MEDICARE

## 2024-12-23 VITALS
OXYGEN SATURATION: 94 % | SYSTOLIC BLOOD PRESSURE: 130 MMHG | HEART RATE: 92 BPM | BODY MASS INDEX: 23.57 KG/M2 | DIASTOLIC BLOOD PRESSURE: 70 MMHG | HEIGHT: 72 IN | RESPIRATION RATE: 12 BRPM | WEIGHT: 174 LBS

## 2024-12-23 DIAGNOSIS — G47.33 OBSTRUCTIVE SLEEP APNEA (ADULT) (PEDIATRIC): ICD-10-CM

## 2024-12-23 DIAGNOSIS — J84.9 INTERSTITIAL PULMONARY DISEASE, UNSPECIFIED: ICD-10-CM

## 2024-12-23 PROCEDURE — 99214 OFFICE O/P EST MOD 30 MIN: CPT

## 2024-12-23 PROCEDURE — G2211 COMPLEX E/M VISIT ADD ON: CPT

## 2024-12-30 ENCOUNTER — APPOINTMENT (OUTPATIENT)
Dept: RHEUMATOLOGY | Facility: CLINIC | Age: 64
End: 2024-12-30
Payer: MEDICARE

## 2024-12-30 VITALS
HEART RATE: 78 BPM | DIASTOLIC BLOOD PRESSURE: 70 MMHG | HEIGHT: 72 IN | WEIGHT: 174.19 LBS | SYSTOLIC BLOOD PRESSURE: 134 MMHG | BODY MASS INDEX: 23.59 KG/M2 | TEMPERATURE: 98.1 F | OXYGEN SATURATION: 94 %

## 2024-12-30 PROCEDURE — 99214 OFFICE O/P EST MOD 30 MIN: CPT

## 2024-12-30 PROCEDURE — G2211 COMPLEX E/M VISIT ADD ON: CPT

## 2024-12-31 ENCOUNTER — OUTPATIENT (OUTPATIENT)
Dept: OUTPATIENT SERVICES | Facility: HOSPITAL | Age: 64
LOS: 1 days | End: 2024-12-31
Payer: MEDICARE

## 2024-12-31 ENCOUNTER — APPOINTMENT (OUTPATIENT)
Age: 64
End: 2024-12-31

## 2024-12-31 VITALS — HEART RATE: 82 BPM | DIASTOLIC BLOOD PRESSURE: 84 MMHG | TEMPERATURE: 97 F | SYSTOLIC BLOOD PRESSURE: 141 MMHG

## 2024-12-31 DIAGNOSIS — Z98.890 OTHER SPECIFIED POSTPROCEDURAL STATES: Chronic | ICD-10-CM

## 2024-12-31 DIAGNOSIS — M05.9 RHEUMATOID ARTHRITIS WITH RHEUMATOID FACTOR, UNSPECIFIED: ICD-10-CM

## 2024-12-31 PROCEDURE — 96375 TX/PRO/DX INJ NEW DRUG ADDON: CPT

## 2024-12-31 PROCEDURE — 96415 CHEMO IV INFUSION ADDL HR: CPT

## 2024-12-31 PROCEDURE — 96413 CHEMO IV INFUSION 1 HR: CPT

## 2024-12-31 RX ORDER — ACETAMINOPHEN 80 MG/.8ML
650 SOLUTION/ DROPS ORAL ONCE
Refills: 0 | Status: COMPLETED | OUTPATIENT
Start: 2024-12-31 | End: 2024-12-31

## 2024-12-31 RX ORDER — RITUXIMAB 10 MG/ML
1000 INJECTION, SOLUTION INTRAVENOUS ONCE
Refills: 0 | Status: COMPLETED | OUTPATIENT
Start: 2024-12-31 | End: 2024-12-31

## 2024-12-31 RX ORDER — DIPHENHYDRAMINE HCL 25 MG
50 TABLET ORAL ONCE
Refills: 0 | Status: COMPLETED | OUTPATIENT
Start: 2024-12-31 | End: 2024-12-31

## 2024-12-31 RX ADMIN — ACETAMINOPHEN 650 MILLIGRAM(S): 80 SOLUTION/ DROPS ORAL at 08:56

## 2024-12-31 RX ADMIN — Medication 50 MILLIGRAM(S): at 09:10

## 2024-12-31 RX ADMIN — RITUXIMAB 1000 MILLIGRAM(S): 10 INJECTION, SOLUTION INTRAVENOUS at 13:00

## 2024-12-31 RX ADMIN — Medication 100 MILLIGRAM(S): at 08:56

## 2024-12-31 RX ADMIN — RITUXIMAB 1000 MILLIGRAM(S): 10 INJECTION, SOLUTION INTRAVENOUS at 09:38

## 2025-01-01 DIAGNOSIS — M05.9 RHEUMATOID ARTHRITIS WITH RHEUMATOID FACTOR, UNSPECIFIED: ICD-10-CM

## 2025-01-03 RX ORDER — PRAVASTATIN SODIUM 20 MG/1
20 TABLET ORAL
Qty: 90 | Refills: 3 | Status: ACTIVE | COMMUNITY
Start: 2025-01-03 | End: 1900-01-01

## 2025-02-20 NOTE — ED PROVIDER NOTE - HIV OFFER
Previously Declined (within the last year) I have personally seen and examined the patient. I have collaborated with and supervised the

## 2025-03-22 ENCOUNTER — NON-APPOINTMENT (OUTPATIENT)
Age: 65
End: 2025-03-22

## 2025-03-26 ENCOUNTER — OUTPATIENT (OUTPATIENT)
Dept: OUTPATIENT SERVICES | Facility: HOSPITAL | Age: 65
LOS: 1 days | End: 2025-03-26
Payer: MEDICARE

## 2025-03-26 VITALS
HEIGHT: 72 IN | WEIGHT: 176.81 LBS | DIASTOLIC BLOOD PRESSURE: 88 MMHG | SYSTOLIC BLOOD PRESSURE: 146 MMHG | HEART RATE: 87 BPM | OXYGEN SATURATION: 96 % | RESPIRATION RATE: 16 BRPM | TEMPERATURE: 98 F

## 2025-03-26 DIAGNOSIS — N47.6 BALANOPOSTHITIS: ICD-10-CM

## 2025-03-26 DIAGNOSIS — Z98.890 OTHER SPECIFIED POSTPROCEDURAL STATES: Chronic | ICD-10-CM

## 2025-03-26 DIAGNOSIS — Z01.818 ENCOUNTER FOR OTHER PREPROCEDURAL EXAMINATION: ICD-10-CM

## 2025-03-26 LAB
A1C WITH ESTIMATED AVERAGE GLUCOSE RESULT: 7 % — HIGH (ref 4–5.6)
ALBUMIN SERPL ELPH-MCNC: 4.3 G/DL — SIGNIFICANT CHANGE UP (ref 3.5–5.2)
ALP SERPL-CCNC: 166 U/L — HIGH (ref 30–115)
ALT FLD-CCNC: 50 U/L — HIGH (ref 0–41)
ANION GAP SERPL CALC-SCNC: 13 MMOL/L — SIGNIFICANT CHANGE UP (ref 7–14)
APPEARANCE UR: CLEAR — SIGNIFICANT CHANGE UP
AST SERPL-CCNC: 25 U/L — SIGNIFICANT CHANGE UP (ref 0–41)
BILIRUB SERPL-MCNC: 0.3 MG/DL — SIGNIFICANT CHANGE UP (ref 0.2–1.2)
BILIRUB UR-MCNC: NEGATIVE — SIGNIFICANT CHANGE UP
BUN SERPL-MCNC: 21 MG/DL — HIGH (ref 10–20)
CALCIUM SERPL-MCNC: 9.5 MG/DL — SIGNIFICANT CHANGE UP (ref 8.4–10.5)
CHLORIDE SERPL-SCNC: 105 MMOL/L — SIGNIFICANT CHANGE UP (ref 98–110)
CO2 SERPL-SCNC: 23 MMOL/L — SIGNIFICANT CHANGE UP (ref 17–32)
COLOR SPEC: YELLOW — SIGNIFICANT CHANGE UP
CREAT SERPL-MCNC: 1 MG/DL — SIGNIFICANT CHANGE UP (ref 0.7–1.5)
DIFF PNL FLD: NEGATIVE — SIGNIFICANT CHANGE UP
EGFR: 84 ML/MIN/1.73M2 — SIGNIFICANT CHANGE UP
EGFR: 84 ML/MIN/1.73M2 — SIGNIFICANT CHANGE UP
ESTIMATED AVERAGE GLUCOSE: 154 MG/DL — HIGH (ref 68–114)
GLUCOSE SERPL-MCNC: 171 MG/DL — HIGH (ref 70–99)
GLUCOSE UR QL: >=1000 MG/DL
HCT VFR BLD CALC: 52.7 % — HIGH (ref 42–52)
HGB BLD-MCNC: 17.4 G/DL — SIGNIFICANT CHANGE UP (ref 14–18)
KETONES UR-MCNC: NEGATIVE MG/DL — SIGNIFICANT CHANGE UP
LEUKOCYTE ESTERASE UR-ACNC: NEGATIVE — SIGNIFICANT CHANGE UP
MCHC RBC-ENTMCNC: 28.7 PG — SIGNIFICANT CHANGE UP (ref 27–31)
MCHC RBC-ENTMCNC: 33 G/DL — SIGNIFICANT CHANGE UP (ref 32–37)
MCV RBC AUTO: 87 FL — SIGNIFICANT CHANGE UP (ref 80–94)
NITRITE UR-MCNC: NEGATIVE — SIGNIFICANT CHANGE UP
NRBC BLD AUTO-RTO: 0 /100 WBCS — SIGNIFICANT CHANGE UP (ref 0–0)
PH UR: 6 — SIGNIFICANT CHANGE UP (ref 5–8)
PLATELET # BLD AUTO: 359 K/UL — SIGNIFICANT CHANGE UP (ref 130–400)
PMV BLD: 10.4 FL — SIGNIFICANT CHANGE UP (ref 7.4–10.4)
POTASSIUM SERPL-MCNC: 4 MMOL/L — SIGNIFICANT CHANGE UP (ref 3.5–5)
POTASSIUM SERPL-SCNC: 4 MMOL/L — SIGNIFICANT CHANGE UP (ref 3.5–5)
PROT SERPL-MCNC: 6.9 G/DL — SIGNIFICANT CHANGE UP (ref 6–8)
PROT UR-MCNC: NEGATIVE MG/DL — SIGNIFICANT CHANGE UP
RBC # BLD: 6.06 M/UL — SIGNIFICANT CHANGE UP (ref 4.7–6.1)
RBC # FLD: 16.2 % — HIGH (ref 11.5–14.5)
SODIUM SERPL-SCNC: 141 MMOL/L — SIGNIFICANT CHANGE UP (ref 135–146)
SP GR SPEC: 1.03 — SIGNIFICANT CHANGE UP (ref 1–1.03)
UROBILINOGEN FLD QL: 0.2 MG/DL — SIGNIFICANT CHANGE UP (ref 0.2–1)
WBC # BLD: 12.75 K/UL — HIGH (ref 4.8–10.8)
WBC # FLD AUTO: 12.75 K/UL — HIGH (ref 4.8–10.8)

## 2025-03-26 PROCEDURE — 80053 COMPREHEN METABOLIC PANEL: CPT

## 2025-03-26 PROCEDURE — 99214 OFFICE O/P EST MOD 30 MIN: CPT | Mod: 25

## 2025-03-26 PROCEDURE — 93010 ELECTROCARDIOGRAM REPORT: CPT

## 2025-03-26 PROCEDURE — 93005 ELECTROCARDIOGRAM TRACING: CPT

## 2025-03-26 PROCEDURE — 36415 COLL VENOUS BLD VENIPUNCTURE: CPT

## 2025-03-26 PROCEDURE — 83036 HEMOGLOBIN GLYCOSYLATED A1C: CPT

## 2025-03-26 PROCEDURE — 85027 COMPLETE CBC AUTOMATED: CPT

## 2025-03-26 PROCEDURE — 81003 URINALYSIS AUTO W/O SCOPE: CPT

## 2025-03-26 NOTE — H&P PST ADULT - NSICDXPASTMEDICALHX_GEN_ALL_CORE_FT
PAST MEDICAL HISTORY:  Hyperlipidemia     Hypertension     Lumbago     Pneumonia due to COVID-19 virus     Rheumatoid arthritis

## 2025-03-26 NOTE — H&P PST ADULT - HISTORY OF PRESENT ILLNESS
64 year old male here for : CIRCUMCISION states has problems with urination,     fos=2  denies chest pain sob palp  denies recent  URI and was seen by Dr. Hong , chest x ray obtained shows pneumonia on AB presently , still hear rales when auscultate lungs    Anesthesia Alert  NO--Difficult Airway  NO--History of neck surgery or radiation  NO--Limited ROM of neck  NO--History of Malignant hyperthermia  NO--Personal or family history of Pseudocholinesterase deficiency  NO--Prior Anesthesia Complication  NO--Latex Allergy  NO--Loose teeth  YES--History of Rheumatoid Arthritis  NO--EDVIN  NO BLEEDING RISK  NO--Other_____    As per patient, this is their complete medical and surgical history, including medications both prescribed or over the counter.  Patient verbalized understanding of instructions and was given the opportunity to ask questions and have them answered.      28.7 points  The higher the score (maximum 58.2), the higher the functional status.  6.27 METs    0 points  RCRI Score  3.9 %  Risk of major cardiac even

## 2025-03-27 DIAGNOSIS — N47.6 BALANOPOSTHITIS: ICD-10-CM

## 2025-03-27 DIAGNOSIS — Z01.818 ENCOUNTER FOR OTHER PREPROCEDURAL EXAMINATION: ICD-10-CM

## 2025-04-02 ENCOUNTER — OUTPATIENT (OUTPATIENT)
Dept: OUTPATIENT SERVICES | Facility: HOSPITAL | Age: 65
LOS: 1 days | End: 2025-04-02
Payer: MEDICARE

## 2025-04-02 DIAGNOSIS — Z98.890 OTHER SPECIFIED POSTPROCEDURAL STATES: Chronic | ICD-10-CM

## 2025-04-02 DIAGNOSIS — Z02.9 ENCOUNTER FOR ADMINISTRATIVE EXAMINATIONS, UNSPECIFIED: ICD-10-CM

## 2025-04-02 PROCEDURE — 87086 URINE CULTURE/COLONY COUNT: CPT

## 2025-04-03 DIAGNOSIS — Z02.9 ENCOUNTER FOR ADMINISTRATIVE EXAMINATIONS, UNSPECIFIED: ICD-10-CM

## 2025-04-03 LAB
CULTURE RESULTS: SIGNIFICANT CHANGE UP
SPECIMEN SOURCE: SIGNIFICANT CHANGE UP

## 2025-04-07 ENCOUNTER — APPOINTMENT (OUTPATIENT)
Dept: PULMONOLOGY | Facility: CLINIC | Age: 65
End: 2025-04-07
Payer: MEDICARE

## 2025-04-07 VITALS
RESPIRATION RATE: 12 BRPM | BODY MASS INDEX: 23.57 KG/M2 | HEIGHT: 72 IN | WEIGHT: 174 LBS | OXYGEN SATURATION: 93 % | HEART RATE: 92 BPM | DIASTOLIC BLOOD PRESSURE: 70 MMHG | SYSTOLIC BLOOD PRESSURE: 110 MMHG

## 2025-04-07 DIAGNOSIS — G47.33 OBSTRUCTIVE SLEEP APNEA (ADULT) (PEDIATRIC): ICD-10-CM

## 2025-04-07 DIAGNOSIS — Z01.810 ENCOUNTER FOR PREPROCEDURAL CARDIOVASCULAR EXAMINATION: ICD-10-CM

## 2025-04-07 DIAGNOSIS — J84.9 INTERSTITIAL PULMONARY DISEASE, UNSPECIFIED: ICD-10-CM

## 2025-04-07 DIAGNOSIS — J45.998 OTHER ASTHMA: ICD-10-CM

## 2025-04-07 PROCEDURE — 99214 OFFICE O/P EST MOD 30 MIN: CPT

## 2025-04-07 PROCEDURE — G2211 COMPLEX E/M VISIT ADD ON: CPT

## 2025-04-08 ENCOUNTER — APPOINTMENT (OUTPATIENT)
Dept: CARDIOLOGY | Facility: CLINIC | Age: 65
End: 2025-04-08
Payer: MEDICARE

## 2025-04-08 ENCOUNTER — APPOINTMENT (OUTPATIENT)
Dept: CARDIOLOGY | Facility: CLINIC | Age: 65
End: 2025-04-08

## 2025-04-08 PROCEDURE — 93306 TTE W/DOPPLER COMPLETE: CPT

## 2025-04-09 ENCOUNTER — OUTPATIENT (OUTPATIENT)
Dept: OUTPATIENT SERVICES | Facility: HOSPITAL | Age: 65
LOS: 1 days | Discharge: HOME | End: 2025-04-09
Payer: MEDICARE

## 2025-04-09 ENCOUNTER — APPOINTMENT (OUTPATIENT)
Dept: UROLOGY | Facility: HOSPITAL | Age: 65
End: 2025-04-09

## 2025-04-09 ENCOUNTER — TRANSCRIPTION ENCOUNTER (OUTPATIENT)
Age: 65
End: 2025-04-09

## 2025-04-09 ENCOUNTER — RESULT REVIEW (OUTPATIENT)
Age: 65
End: 2025-04-09

## 2025-04-09 VITALS
WEIGHT: 174.83 LBS | OXYGEN SATURATION: 96 % | RESPIRATION RATE: 17 BRPM | HEART RATE: 78 BPM | TEMPERATURE: 98 F | HEIGHT: 72 IN | DIASTOLIC BLOOD PRESSURE: 88 MMHG | SYSTOLIC BLOOD PRESSURE: 143 MMHG

## 2025-04-09 VITALS — HEART RATE: 66 BPM | DIASTOLIC BLOOD PRESSURE: 72 MMHG | SYSTOLIC BLOOD PRESSURE: 117 MMHG | RESPIRATION RATE: 16 BRPM

## 2025-04-09 DIAGNOSIS — C49.A2 GASTROINTESTINAL STROMAL TUMOR OF STOMACH: Chronic | ICD-10-CM

## 2025-04-09 DIAGNOSIS — N47.6 BALANOPOSTHITIS: ICD-10-CM

## 2025-04-09 DIAGNOSIS — Z98.890 OTHER SPECIFIED POSTPROCEDURAL STATES: Chronic | ICD-10-CM

## 2025-04-09 LAB — GLUCOSE BLDC GLUCOMTR-MCNC: 107 MG/DL — HIGH (ref 70–99)

## 2025-04-09 PROCEDURE — 54161 CIRCUM 28 DAYS OR OLDER: CPT

## 2025-04-09 PROCEDURE — 88304 TISSUE EXAM BY PATHOLOGIST: CPT | Mod: 26

## 2025-04-09 PROCEDURE — 88304 TISSUE EXAM BY PATHOLOGIST: CPT

## 2025-04-09 PROCEDURE — 82962 GLUCOSE BLOOD TEST: CPT

## 2025-04-09 RX ORDER — HYDROMORPHONE/SOD CHLOR,ISO/PF 2 MG/10 ML
0.5 SYRINGE (ML) INJECTION
Refills: 0 | Status: DISCONTINUED | OUTPATIENT
Start: 2025-04-09 | End: 2025-04-09

## 2025-04-09 RX ORDER — MYCOPHENOLIC ACID 360 MG/1
2 TABLET, DELAYED RELEASE ORAL
Refills: 0 | DISCHARGE

## 2025-04-09 RX ORDER — EMPAGLIFLOZIN 25 MG/1
1 TABLET, FILM COATED ORAL
Refills: 0 | DISCHARGE

## 2025-04-09 RX ORDER — ONDANSETRON HCL/PF 4 MG/2 ML
4 VIAL (ML) INJECTION ONCE
Refills: 0 | Status: DISCONTINUED | OUTPATIENT
Start: 2025-04-09 | End: 2025-04-09

## 2025-04-09 RX ORDER — OLMESARTAN MEDOXOMIL 5 MG/1
1 TABLET, FILM COATED ORAL
Refills: 0 | DISCHARGE

## 2025-04-09 RX ORDER — RITUXIMAB AND HYALURONIDASE 120; 2000 MG/ML; U/ML
0 INJECTION, SOLUTION SUBCUTANEOUS
Refills: 0 | DISCHARGE

## 2025-04-09 RX ORDER — SODIUM CHLORIDE 9 G/1000ML
1000 INJECTION, SOLUTION INTRAVENOUS
Refills: 0 | Status: DISCONTINUED | OUTPATIENT
Start: 2025-04-09 | End: 2025-04-09

## 2025-04-09 RX ADMIN — SODIUM CHLORIDE 100 MILLILITER(S): 9 INJECTION, SOLUTION INTRAVENOUS at 14:22

## 2025-04-09 NOTE — ASU PATIENT PROFILE, ADULT - NS PRO ABUSE SCREEN SUSPICION NEGLECT YN
Vacuum Pressure: [address] Detail Level: Zone Consent: Written consent obtained, risks reviewed including but not limited to crusting, scabbing, blistering, scarring, darker or lighter pigmentary change, bruising, and/or incomplete response. Indication: acne Additional Vacuum Pressure (Won't Render If 0): 0 Price (Use Numbers Only, No Special Characters Or $): 150.00 Glycolic Acid %: 7.5% Treatment Number: 1 Post-Care Instructions: I reviewed with the patient in detail post-care instructions. Patient should stay away from the sun and wear sun protection until treated areas are fully healed. Number Of Passes: 2 no

## 2025-04-09 NOTE — ASU PATIENT PROFILE, ADULT - PAIN CHRONIC, PROFILE
Wanda Hansen is a 58 y.o. female accompanied by herself  CC:   Chief Complaint   Patient presents with    Cellulitis     Right leg and foot, had a fall/slid on steps on 9/28, swelling started this past weekend, bruising on foot and toes, not really painful-more uncomfortable       Cellulitis:  Patient fell and slid down the stairs on September 28.  At that point in time she started having more pain and swelling of her right leg.  No fevers  No chills  No streaks going up her leg  Just more pain and erythema.  Patient is here today to make sure that this is not something more significant.    Patient's past medical, surgical, social and/or family history reviewed, updated in chart, and are non-contributory (unless otherwise stated).  Medications and allergies also reviewed and updated in chart.      /88 (Site: Left Upper Arm, Position: Sitting, Cuff Size: Large Adult)   Pulse 88   Temp 98.4 °F (36.9 °C) (Temporal)   Resp 16   Ht 1.651 m (5' 5\")   Wt 81.1 kg (178 lb 12.8 oz)   LMP 05/28/2016 (LMP Unknown) Comment: menopause  SpO2 96%   BMI 29.75 kg/m²     Review of Systems   Constitutional:  Negative for chills, fatigue and fever.   HENT:  Negative for congestion, ear pain, facial swelling, rhinorrhea, sinus pressure and sinus pain.    Eyes:  Negative for photophobia and visual disturbance.   Respiratory:  Negative for apnea, cough, chest tightness and shortness of breath.    Cardiovascular:  Negative for chest pain and palpitations.   Gastrointestinal:  Negative for abdominal distention, blood in stool, constipation, diarrhea and vomiting.   Endocrine: Negative for cold intolerance, polydipsia, polyphagia and polyuria.   Genitourinary:  Negative for decreased urine volume, frequency and urgency.   Musculoskeletal:  Negative for arthralgias and gait problem.   Skin:  Negative for color change.   Allergic/Immunologic: Negative for environmental allergies and food allergies.   Neurological:   no

## 2025-04-09 NOTE — ASU PATIENT PROFILE, ADULT - FALL HARM RISK - CONCLUSION
Fall with Harm Risk
Normal vision: sees adequately in most situations; can see medication labels, newsprint

## 2025-04-09 NOTE — ASU DISCHARGE PLAN (ADULT/PEDIATRIC) - FINANCIAL ASSISTANCE
[facility] provides services at a reduced cost to those who are determined to be eligible through [facility]’s financial assistance program. Information regarding [facility]’s financial assistance program can be found by going to [email or web address] or by calling [phone].

## 2025-04-09 NOTE — ASU PATIENT PROFILE, ADULT - NSICDXPASTSURGICALHX_GEN_ALL_CORE_FT
PAST SURGICAL HISTORY:  H/O lymph node biopsy Neck by Dr Case    Malignant stromal tumor of stomach resected

## 2025-04-09 NOTE — CHART NOTE - NSCHARTNOTEFT_GEN_A_CORE
PACU ANESTHESIA ADMISSION NOTE      Procedure:circumcision    ____  Intubated  TV:______       Rate: ______      FiO2: ______    ___x_  Patent Airway    __x__  Full return of protective reflexes    __x__  Full recovery from anesthesia / back to baseline     Vitals:   T:     97.7      R:   16           BP:   109/64          Sat:        96         P: 72      Mental Status:  ___x_ Awake   _____ Alert   _____ Drowsy   _____ Sedated    Nausea/Vomiting:  __x__ NO  ______Yes,   See Post - Op Orders          Pain Scale (0-10):  _0____    Treatment: ____ None    ____ See Post - Op/PCA Orders    Post - Operative Fluids:   ____ Oral   ___x_ See Post - Op Orders    Plan: Discharge:   __x__Home       _____Floor     _____Critical Care    _____  Other:_________________    Comments:

## 2025-04-09 NOTE — ASU PREOP CHECKLIST - AS BP NONINV SITE
left upper arm

## 2025-04-09 NOTE — BRIEF OPERATIVE NOTE - NSICDXBRIEFPREOP_GEN_ALL_CORE_FT
How Severe Is Your Rash?: moderate Is This A New Presentation, Or A Follow-Up?: Rash PRE-OP DIAGNOSIS:  Balanoposthitis 09-Apr-2025 13:58:49  Marcelino Jo

## 2025-04-09 NOTE — ASU DISCHARGE PLAN (ADULT/PEDIATRIC) - ASU DC SPECIAL INSTRUCTIONSFT
Keep dressing on penis for 48 hours. Once dressing comes off, you can apply neosporin 2 times a day to the suture area. Keep area clean and dry. You may shower in 48 hours. You can take tylenol every 6 hours and advil every 8 hours as needed for pain. Please wait 3 hours between tylenol and advil. Dr. Jo's office will call you to schedule your follow up appointment.

## 2025-04-09 NOTE — ASU DISCHARGE PLAN (ADULT/PEDIATRIC) - CARE PROVIDER_API CALL
Marcelino Jo  Urology  [address] [place] [id number]  Phone: [phone]  Fax: [phone]  Follow Up Time: Routine

## 2025-04-10 ENCOUNTER — APPOINTMENT (OUTPATIENT)
Dept: UROLOGY | Facility: CLINIC | Age: 65
End: 2025-04-10
Payer: MEDICARE

## 2025-04-10 ENCOUNTER — NON-APPOINTMENT (OUTPATIENT)
Age: 65
End: 2025-04-10

## 2025-04-10 VITALS
SYSTOLIC BLOOD PRESSURE: 132 MMHG | DIASTOLIC BLOOD PRESSURE: 79 MMHG | TEMPERATURE: 97.7 F | HEIGHT: 72 IN | OXYGEN SATURATION: 94 % | RESPIRATION RATE: 15 BRPM | BODY MASS INDEX: 23.58 KG/M2 | HEART RATE: 92 BPM | WEIGHT: 174.13 LBS

## 2025-04-10 DIAGNOSIS — N47.6 BALANOPOSTHITIS: ICD-10-CM

## 2025-04-10 PROCEDURE — 99024 POSTOP FOLLOW-UP VISIT: CPT

## 2025-04-14 LAB — SURGICAL PATHOLOGY STUDY: SIGNIFICANT CHANGE UP

## 2025-04-15 ENCOUNTER — NON-APPOINTMENT (OUTPATIENT)
Age: 65
End: 2025-04-15

## 2025-04-17 DIAGNOSIS — I10 ESSENTIAL (PRIMARY) HYPERTENSION: ICD-10-CM

## 2025-04-17 DIAGNOSIS — Z79.84 LONG TERM (CURRENT) USE OF ORAL HYPOGLYCEMIC DRUGS: ICD-10-CM

## 2025-04-17 DIAGNOSIS — N47.1 PHIMOSIS: ICD-10-CM

## 2025-04-17 DIAGNOSIS — N47.6 BALANOPOSTHITIS: ICD-10-CM

## 2025-04-17 DIAGNOSIS — Z86.16 PERSONAL HISTORY OF COVID-19: ICD-10-CM

## 2025-04-17 DIAGNOSIS — E78.5 HYPERLIPIDEMIA, UNSPECIFIED: ICD-10-CM

## 2025-04-17 DIAGNOSIS — Z88.0 ALLERGY STATUS TO PENICILLIN: ICD-10-CM

## 2025-04-18 NOTE — HISTORY OF PRESENT ILLNESS
Attempted to reach patient, left message to call me back. 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  He was seen by Dr. Sauceda and started on this medication also trial duloxetine but could not tolerate due to side effects. He has stopped sulfasalazine and hydroxychloroquine. He is taking prednisone 5 mg twice daily. \par He reports joints are doing ok overall he has pains in his left thumb, 2nd DIP, left shoulder, left foot. He had swelling in his feet that resolved. His main complaint is his back pain. He is wearing a brace. His breathing is stable he went for CT scan, TTE and PFTs in the [facility]. He is going to [facility] for 3 months on 3/28 \par \par 11/29/22:\par -He reports chest pain feeling like it is being stretch open, coming and going. Worse at night time he's unable to lay on his chest or on his left side. Symptoms have been ongoing for 2 weeks. Afternoon and evening pain is bad. Heating pad and Motrin give relief. Denies palpation\par -Reports left sided swelling from axilla down his side to his back for the 2 weeks with pain. Pain is constant. Motrin 600 mg TID makes better. Nothing makes the pain worse. He's unable to lay on his L side at night time.\par -Right hip and right low back pain with swelling for the past 2-3 days that comes and goes. Laying down makes it better and walking around makes pain worse. Motrin helps to decrease swelling\par +Right ulnar wrist and hand pain and swelling. + Right lateral foot pain and swelling\par -1 month history of shortness of breath with pulse ox 89-91%. He uses 2.5 L O2 when pulse ox < 90%. Denies coughing or wheezing. \par +neck pain for 1 month\par -back pain is stable and improved with physical therapy. \par \par 11/1/22:\par He reports 7 days ago tapering off prednisone. 5 days ago he developed pain and swelling in his right wrist, right hand, left hand, left foot, and swelling in his face that is worse around 3-4 pm in the afternoon, and goes down at night time. He reports improvement in his back pain with physical therapy. He takes Motrin 800 mg PRN that gives partial relief of symptoms. Has not tried taking prednisone again. \par \par \par 9/21/22:\par Reports worsening low back pain for the past 2 weeks, worse after getting up from a seated position, using ibuprofen and heating pads that help. Tramadol doesn't help. He has radiculopathy. He also has foot pain when ambulating that is alleviated with ibuprofen. He's worried about taking too much NSAIDs. Joint pains are controlled no swelling or stiffness. \par \par 6/22/22:\par He didn't  or start Leflunomide 10 mg daily. He went to [facility] and saw a rheumatologist and started patient on  mg BID. Also started on Nintedanib. He was off prednisone. He feels joint pains are much improved and feels well today. Feels respiratory status is improved as well. He is taking prednisone 5 mg BID attempting to taper off. He has pain and difficulty with ambulation due to low back pain, hip pain, foot pain and knee pain.\par \par 3/7/22:\par He has hip pain that started 2-3 weeks ago its hard to walk and hard to sit. He gets pain anterior thigh pain down leg for 2-3 weeks worse with ambulation. He has face swelling, right 2nd, 3rd, 4th MCP finger swelling and pain, took prednisone 10 mg last week that helped. His palms are pruritic last 3 weeks uses benadry and hydrocortisone cream\par \par 11/29/21:\par Patient stopped prednisone for 1 week and had joint pain and swelling in his fingers return. He  takes prednisone 10 mg nightly, Ibuprofen and Tramadol PRN that help. Says gabapentin doesn't help. Denies AM stiffness or swelling. He is going to [facility] for 3 months starting in January\par \par \par \par Brief history:\par Patient reports his symptoms started 6/16 at work. 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 He was seen in the ED 6/28 where XR of the knee was normal. XR of the right shoulder suggested AC OA (mild), and x-ray of the pelvis suggested degenerative changes in his lumbar spine and bilateral hips. He was sent home with motrin.\par \par MRI right ankle 7/1:   "Mild tenosynovitis of the posterior tibialis, flexor digitorum longus, peroneus brevis, and peroneus longus. Mild Achilles tendinosis. Edema in the sinus tarsi (and small erosion at sinus tarsi roof) may be seen with sinus tarsi syndrome, correlate with physical exam for lateral hindfoot impingement/instability. Diffuse edema throughout the musculature, likely due to to neuropathy. Prior sprain of the anterior distal syndesmotic ligament, deep fibers of the deltoid ligament, and spring ligament."\par \par MRI cervical spine 7/2:  "Mild canal narrowing on a degenerative basis, more pronounced at C5-6. C5-6 mild foraminal narrowing. Possible T4-T5 left foraminal disc herniation. If there is concern consider a dedicated T-spine study."\par \par Repeat MRI C spine 9/16: "C5-6 moderate spinal stenosis due to disc bulging and ligamentum flavum infolding, stable since the prior exam.. Stable mild spinal cord abutment without cord deformity or signal abnormality. Stable moderate foraminal narrowing. Stable additional mild degenerative changes as described."\par \par MRI lumbar spine 7/6: "L5-S1 small disc herniation in the right lateral recess with impingement of the descending right S1 nerve root. L3-4 small disc bulge and facet hypertrophy with mild right foraminal narrowing and mild compression of the exiting right L3 nerve root.  Additional mild degenerative changes as described.Mass-like heterogeneous structure within the mid abdomen, incompletely imaged/evaluated on this study. Although this can reflect loops of small bowel, recommend a dedicated CT or MRI of the abdomen with contrast to exclude an underlying mass."\par \par Repeat MRI lumbar spine 9/16:  since prior study: L5-S1 trace disc herniation in the right lateral recess, slightly decreased in size since the prior study with decreased mass effect upon the descending right S1 nerve root. L3-4 stable degenerative change with mild right foraminal narrowing and mild compression of the exiting right L3 nerve root.\par \par MRI T spine 9/16: normal. Repeat XR shoulder 9/17: AC OA\par \par MRI right shoulder 9/18: "Partial-thickness tears of the supraspinatus and infraspinatus tendons as above. Synovitis in the axillary pouch and rotator interval indicative of adhesive capsulitis, correlate with patient range of motion. SLAP tear extending into the posterior superior labrum. Small subacromial/subdeltoid bursitis."\par \par Xray of hands and feet 9/19: suggested OA without erosions\par \par CT Abdomen pelvis 7/26: "A 7.0 x 6.2 x 7.7 cm heterogeneous mass in the mid abdomen contiguous with small bowel loops most likely represents a small bowel gastrointestinal stromal tumor (GIST)."\par \par CTA dissection 9/17:  "Normal caliber of the thoracoabdominal aorta without evidence for dissection or intramural hematoma.Interstitial lung disease. No honeycombing. Interval postsurgical changes within the small bowel."\par \par Pathology from OR 8/21 confirmed GIST\par Serologies include +CCP >250,  with negative lyme, SSA, SSB, ED, Scleroderma. \par Hepatitis B and C serologies negative\par ESR 12 CRP 6\par Patient was admitted 9/15-9/19at [facility] for intractable neck pain radiating to right shoulder/arm right arm and low back pain radiating to right leg. Neurosurgery advised steroids and no surgical intervention. Imaging and labs as above. Started on metformin inpatient due to elevated blood sugars from steroids\par \par \par \par Today's visit: \par Patient feels well today. He reports joint pains improved. Denies significant AM stiffness or swelling. He takes prednisone 10 mg daily, Celebrex PRN, and tramadol at PM. He is switching pulmonlogist to [facility]

## 2025-05-08 NOTE — PROGRESS NOTE ADULT - NSHPATTENDINGPLANDISCUSS_GEN_ALL_CORE
Nurse visit for lab draw. Pt tolerated well  
TEAM
House staff
House staff

## 2025-05-12 ENCOUNTER — APPOINTMENT (OUTPATIENT)
Dept: UROLOGY | Facility: CLINIC | Age: 65
End: 2025-05-12
Payer: MEDICARE

## 2025-05-12 VITALS
OXYGEN SATURATION: 95 % | HEART RATE: 77 BPM | BODY MASS INDEX: 23.57 KG/M2 | WEIGHT: 174 LBS | DIASTOLIC BLOOD PRESSURE: 82 MMHG | SYSTOLIC BLOOD PRESSURE: 128 MMHG | HEIGHT: 72 IN

## 2025-05-12 DIAGNOSIS — J84.9 INTERSTITIAL PULMONARY DISEASE, UNSPECIFIED: ICD-10-CM

## 2025-05-12 DIAGNOSIS — N47.6 BALANOPOSTHITIS: ICD-10-CM

## 2025-05-12 DIAGNOSIS — E11.9 TYPE 2 DIABETES MELLITUS W/OUT COMPLICATIONS: ICD-10-CM

## 2025-05-12 PROCEDURE — G2211 COMPLEX E/M VISIT ADD ON: CPT

## 2025-05-12 PROCEDURE — 99214 OFFICE O/P EST MOD 30 MIN: CPT

## 2025-05-14 ENCOUNTER — APPOINTMENT (OUTPATIENT)
Dept: RHEUMATOLOGY | Facility: CLINIC | Age: 65
End: 2025-05-14
Payer: MEDICARE

## 2025-05-14 VITALS
BODY MASS INDEX: 23.63 KG/M2 | HEIGHT: 72 IN | TEMPERATURE: 97.8 F | SYSTOLIC BLOOD PRESSURE: 137 MMHG | WEIGHT: 174.5 LBS | HEART RATE: 75 BPM | DIASTOLIC BLOOD PRESSURE: 86 MMHG | OXYGEN SATURATION: 95 %

## 2025-05-14 DIAGNOSIS — M05.9 RHEUMATOID ARTHRITIS WITH RHEUMATOID FACTOR, UNSPECIFIED: ICD-10-CM

## 2025-05-14 PROCEDURE — 99214 OFFICE O/P EST MOD 30 MIN: CPT

## 2025-05-14 RX ORDER — PREGABALIN 75 MG/1
75 CAPSULE ORAL
Qty: 30 | Refills: 3 | Status: ACTIVE | COMMUNITY
Start: 2025-05-14 | End: 1900-01-01

## 2025-05-14 RX ORDER — MYCOPHENILIC ACID 360 MG/1
360 TABLET, DELAYED RELEASE ORAL
Qty: 180 | Refills: 3 | Status: ACTIVE | COMMUNITY
Start: 2025-05-14 | End: 1900-01-01

## 2025-06-04 ENCOUNTER — APPOINTMENT (OUTPATIENT)
Dept: CARDIOLOGY | Facility: CLINIC | Age: 65
End: 2025-06-04
Payer: MEDICARE

## 2025-06-04 ENCOUNTER — NON-APPOINTMENT (OUTPATIENT)
Age: 65
End: 2025-06-04

## 2025-06-04 VITALS
WEIGHT: 177.13 LBS | RESPIRATION RATE: 16 BRPM | BODY MASS INDEX: 23.99 KG/M2 | OXYGEN SATURATION: 95 % | SYSTOLIC BLOOD PRESSURE: 118 MMHG | HEART RATE: 90 BPM | HEIGHT: 72 IN | DIASTOLIC BLOOD PRESSURE: 76 MMHG

## 2025-06-04 DIAGNOSIS — E78.5 HYPERLIPIDEMIA, UNSPECIFIED: ICD-10-CM

## 2025-06-04 DIAGNOSIS — E11.9 TYPE 2 DIABETES MELLITUS W/OUT COMPLICATIONS: ICD-10-CM

## 2025-06-04 DIAGNOSIS — I10 ESSENTIAL (PRIMARY) HYPERTENSION: ICD-10-CM

## 2025-06-04 PROCEDURE — G2211 COMPLEX E/M VISIT ADD ON: CPT

## 2025-06-04 PROCEDURE — 99214 OFFICE O/P EST MOD 30 MIN: CPT

## 2025-06-04 PROCEDURE — 93000 ELECTROCARDIOGRAM COMPLETE: CPT

## 2025-06-09 ENCOUNTER — EMERGENCY (EMERGENCY)
Facility: HOSPITAL | Age: 65
LOS: 0 days | Discharge: HOME | End: 2025-06-09
Attending: EMERGENCY MEDICINE
Payer: MEDICARE

## 2025-06-09 VITALS
TEMPERATURE: 98 F | HEIGHT: 72 IN | HEART RATE: 84 BPM | SYSTOLIC BLOOD PRESSURE: 142 MMHG | OXYGEN SATURATION: 96 % | RESPIRATION RATE: 18 BRPM | WEIGHT: 177.03 LBS | DIASTOLIC BLOOD PRESSURE: 90 MMHG

## 2025-06-09 DIAGNOSIS — R07.9 CHEST PAIN, UNSPECIFIED: ICD-10-CM

## 2025-06-09 DIAGNOSIS — I10 ESSENTIAL (PRIMARY) HYPERTENSION: ICD-10-CM

## 2025-06-09 DIAGNOSIS — E11.9 TYPE 2 DIABETES MELLITUS WITHOUT COMPLICATIONS: ICD-10-CM

## 2025-06-09 DIAGNOSIS — R05.1 ACUTE COUGH: ICD-10-CM

## 2025-06-09 DIAGNOSIS — Z88.0 ALLERGY STATUS TO PENICILLIN: ICD-10-CM

## 2025-06-09 DIAGNOSIS — C49.A2 GASTROINTESTINAL STROMAL TUMOR OF STOMACH: Chronic | ICD-10-CM

## 2025-06-09 DIAGNOSIS — Z98.890 OTHER SPECIFIED POSTPROCEDURAL STATES: Chronic | ICD-10-CM

## 2025-06-09 DIAGNOSIS — E78.00 PURE HYPERCHOLESTEROLEMIA, UNSPECIFIED: ICD-10-CM

## 2025-06-09 LAB
ALBUMIN SERPL ELPH-MCNC: 4 G/DL — SIGNIFICANT CHANGE UP (ref 3.5–5.2)
ALP SERPL-CCNC: 161 U/L — HIGH (ref 30–115)
ALT FLD-CCNC: 32 U/L — SIGNIFICANT CHANGE UP (ref 0–41)
ANION GAP SERPL CALC-SCNC: 9 MMOL/L — SIGNIFICANT CHANGE UP (ref 7–14)
AST SERPL-CCNC: 22 U/L — SIGNIFICANT CHANGE UP (ref 0–41)
BASOPHILS # BLD AUTO: 0.1 K/UL — SIGNIFICANT CHANGE UP (ref 0–0.2)
BASOPHILS NFR BLD AUTO: 0.9 % — SIGNIFICANT CHANGE UP (ref 0–1)
BILIRUB SERPL-MCNC: 0.4 MG/DL — SIGNIFICANT CHANGE UP (ref 0.2–1.2)
BUN SERPL-MCNC: 14 MG/DL — SIGNIFICANT CHANGE UP (ref 10–20)
CALCIUM SERPL-MCNC: 9.3 MG/DL — SIGNIFICANT CHANGE UP (ref 8.4–10.5)
CHLORIDE SERPL-SCNC: 103 MMOL/L — SIGNIFICANT CHANGE UP (ref 98–110)
CO2 SERPL-SCNC: 28 MMOL/L — SIGNIFICANT CHANGE UP (ref 17–32)
CREAT SERPL-MCNC: 0.9 MG/DL — SIGNIFICANT CHANGE UP (ref 0.7–1.5)
D DIMER BLD IA.RAPID-MCNC: <150 NG/ML DDU — SIGNIFICANT CHANGE UP
EGFR: 95 ML/MIN/1.73M2 — SIGNIFICANT CHANGE UP
EGFR: 95 ML/MIN/1.73M2 — SIGNIFICANT CHANGE UP
EOSINOPHIL # BLD AUTO: 0.36 K/UL — SIGNIFICANT CHANGE UP (ref 0–0.7)
EOSINOPHIL NFR BLD AUTO: 3.4 % — SIGNIFICANT CHANGE UP (ref 0–8)
GAS PNL BLDV: SIGNIFICANT CHANGE UP
GLUCOSE SERPL-MCNC: 82 MG/DL — SIGNIFICANT CHANGE UP (ref 70–99)
HCT VFR BLD CALC: 52.4 % — HIGH (ref 42–52)
HGB BLD-MCNC: 17.1 G/DL — SIGNIFICANT CHANGE UP (ref 14–18)
IMM GRANULOCYTES NFR BLD AUTO: 0.7 % — HIGH (ref 0.1–0.3)
LYMPHOCYTES # BLD AUTO: 19.7 % — LOW (ref 20.5–51.1)
LYMPHOCYTES # BLD AUTO: 2.09 K/UL — SIGNIFICANT CHANGE UP (ref 1.2–3.4)
MCHC RBC-ENTMCNC: 28.9 PG — SIGNIFICANT CHANGE UP (ref 27–31)
MCHC RBC-ENTMCNC: 32.6 G/DL — SIGNIFICANT CHANGE UP (ref 32–37)
MCV RBC AUTO: 88.5 FL — SIGNIFICANT CHANGE UP (ref 80–94)
MONOCYTES # BLD AUTO: 1.73 K/UL — HIGH (ref 0.1–0.6)
MONOCYTES NFR BLD AUTO: 16.3 % — HIGH (ref 1.7–9.3)
NEUTROPHILS # BLD AUTO: 6.24 K/UL — SIGNIFICANT CHANGE UP (ref 1.4–6.5)
NEUTROPHILS NFR BLD AUTO: 59 % — SIGNIFICANT CHANGE UP (ref 42.2–75.2)
NRBC BLD AUTO-RTO: 0 /100 WBCS — SIGNIFICANT CHANGE UP (ref 0–0)
NT-PROBNP SERPL-SCNC: <36 PG/ML — SIGNIFICANT CHANGE UP (ref 0–300)
PLATELET # BLD AUTO: 356 K/UL — SIGNIFICANT CHANGE UP (ref 130–400)
PMV BLD: 9.8 FL — SIGNIFICANT CHANGE UP (ref 7.4–10.4)
POTASSIUM SERPL-MCNC: 4.2 MMOL/L — SIGNIFICANT CHANGE UP (ref 3.5–5)
POTASSIUM SERPL-SCNC: 4.2 MMOL/L — SIGNIFICANT CHANGE UP (ref 3.5–5)
PROT SERPL-MCNC: 6.7 G/DL — SIGNIFICANT CHANGE UP (ref 6–8)
RBC # BLD: 5.92 M/UL — SIGNIFICANT CHANGE UP (ref 4.7–6.1)
RBC # FLD: 15.9 % — HIGH (ref 11.5–14.5)
SODIUM SERPL-SCNC: 140 MMOL/L — SIGNIFICANT CHANGE UP (ref 135–146)
TROPONIN T, HIGH SENSITIVITY RESULT: 9 NG/L — SIGNIFICANT CHANGE UP (ref 6–21)
TROPONIN T, HIGH SENSITIVITY RESULT: 9 NG/L — SIGNIFICANT CHANGE UP (ref 6–21)
WBC # BLD: 10.59 K/UL — SIGNIFICANT CHANGE UP (ref 4.8–10.8)
WBC # FLD AUTO: 10.59 K/UL — SIGNIFICANT CHANGE UP (ref 4.8–10.8)

## 2025-06-09 PROCEDURE — 84484 ASSAY OF TROPONIN QUANT: CPT

## 2025-06-09 PROCEDURE — 85025 COMPLETE CBC W/AUTO DIFF WBC: CPT

## 2025-06-09 PROCEDURE — 36415 COLL VENOUS BLD VENIPUNCTURE: CPT

## 2025-06-09 PROCEDURE — 84132 ASSAY OF SERUM POTASSIUM: CPT

## 2025-06-09 PROCEDURE — 93005 ELECTROCARDIOGRAM TRACING: CPT

## 2025-06-09 PROCEDURE — 93010 ELECTROCARDIOGRAM REPORT: CPT

## 2025-06-09 PROCEDURE — 83880 ASSAY OF NATRIURETIC PEPTIDE: CPT

## 2025-06-09 PROCEDURE — 36000 PLACE NEEDLE IN VEIN: CPT

## 2025-06-09 PROCEDURE — 82803 BLOOD GASES ANY COMBINATION: CPT

## 2025-06-09 PROCEDURE — 85018 HEMOGLOBIN: CPT

## 2025-06-09 PROCEDURE — 82330 ASSAY OF CALCIUM: CPT

## 2025-06-09 PROCEDURE — 83605 ASSAY OF LACTIC ACID: CPT

## 2025-06-09 PROCEDURE — 71046 X-RAY EXAM CHEST 2 VIEWS: CPT | Mod: 26

## 2025-06-09 PROCEDURE — 71250 CT THORAX DX C-: CPT | Mod: 26

## 2025-06-09 PROCEDURE — 71250 CT THORAX DX C-: CPT

## 2025-06-09 PROCEDURE — 80053 COMPREHEN METABOLIC PANEL: CPT

## 2025-06-09 PROCEDURE — 85014 HEMATOCRIT: CPT

## 2025-06-09 PROCEDURE — 71046 X-RAY EXAM CHEST 2 VIEWS: CPT

## 2025-06-09 PROCEDURE — 84295 ASSAY OF SERUM SODIUM: CPT

## 2025-06-09 PROCEDURE — 85379 FIBRIN DEGRADATION QUANT: CPT

## 2025-06-09 PROCEDURE — 99285 EMERGENCY DEPT VISIT HI MDM: CPT | Mod: 25

## 2025-06-09 PROCEDURE — 99285 EMERGENCY DEPT VISIT HI MDM: CPT

## 2025-06-09 RX ORDER — ACETAMINOPHEN 500 MG/5ML
650 LIQUID (ML) ORAL ONCE
Refills: 0 | Status: COMPLETED | OUTPATIENT
Start: 2025-06-09 | End: 2025-06-09

## 2025-06-09 RX ORDER — BENZONATATE 100 MG
1 CAPSULE ORAL
Qty: 21 | Refills: 0
Start: 2025-06-09 | End: 2025-06-15

## 2025-06-09 RX ADMIN — Medication 650 MILLIGRAM(S): at 17:31

## 2025-06-09 NOTE — ED PROVIDER NOTE - NSFOLLOWUPINSTRUCTIONS_ED_ALL_ED_FT
Cough- PLEASE TAKE THE MEDICATIONS SENT TO YOUR PHARMACY AS PRESCRIBED AND FOLLOW UP WITH YOUR PULMONOLOGIST    Coughing is a reflex that clears your throat and your airways. Coughing helps to heal and protect your lungs. It is normal to cough occasionally, but a cough that happens with other symptoms or lasts a long time may be a sign of a condition that needs treatment. Coughing may be caused by infections, asthma or COPD, smoking, postnasal drip, gastroesophageal reflux, as well as other medical conditions. Take medicines only as instructed by your health care provider. Avoid environments or triggers that causes you to cough at work or at home.    SEEK IMMEDIATE MEDICAL CARE IF YOU HAVE ANY OF THE FOLLOWING SYMPTOMS: coughing up blood, shortness of breath, rapid heart rate, chest pain, unexplained weight loss or night sweats.     Chest Pain    Chest pain can be caused by many different conditions which may or may not be dangerous. Causes include heartburn, lung infections, heart attack, blood clot in lungs, skin infections, strain or damage to muscle, cartilage, or bones, etc. In addition to a history and physical examination, an electrocardiogram (ECG) or other lab tests may have been performed to determine the cause of your chest pain. Follow up with your primary care provider or with a cardiologist as instructed.     SEEK IMMEDIATE MEDICAL CARE IF YOU HAVE ANY OF THE FOLLOWING SYMPTOMS: worsening chest pain, coughing up blood, unexplained back/neck/jaw pain, severe abdominal pain, dizziness or lightheadedness, fainting, shortness of breath, sweaty or clammy skin, vomiting, or racing heart beat. These symptoms may represent a serious problem that is an emergency. Do not wait to see if the symptoms will go away. Get medical help right away. Call 911 and do not drive yourself to the hospital.

## 2025-06-09 NOTE — ED PROVIDER NOTE - OBJECTIVE STATEMENT
64-year-old male with history of HTN, diabetes, high cholesterol, rheumatoid arthritis, 40% lung capacity status post COVID in 2020 (follows with Dr. Stewart, gets a dry CT scan every 6 months for follow up and is due for his next one soon) presents to the ED complaining of  A productive cough with yellow sputum over the past 2 weeks.  Patient stated that his symptoms began right after he returned from [place], and has began experiencing left-sided chest pain over the past 2 days anytime he takes a deep breath or coughs.  Patient denies any worsening/alleviating factors, dyspnea upon exertion, orthopnea, palpitations, diaphoresis, fever, nausea, vomiting, diarrhea, constipation, abdominal pain, urinary symptoms, headache, dizziness, lightheadedness, weakness, trauma, sick contacts, rash. 64-year-old male with history of HTN, diabetes, high cholesterol, rheumatoid arthritis, 40% lung capacity status post COVID in 2020 (follows with Dr. Stewart, gets a dry CT scan every 6 months for follow up and is due for his next one soon) presents to the ED complaining of  A productive cough with yellow sputum over the past 2 weeks, Patient stated that he was placed on a course of outpatient amoxicillin, doxycycline and prednisone without improvement. .  Patient stated that his symptoms began right after he returned from [place], and has began experiencing left-sided chest pain over the past 2 days anytime he takes a deep breath or coughs.  Patient denies any worsening/alleviating factors, dyspnea upon exertion, orthopnea, palpitations, diaphoresis, fever, nausea, vomiting, diarrhea, constipation, abdominal pain, urinary symptoms, headache, dizziness, lightheadedness, weakness, trauma, sick contacts, rash.

## 2025-06-09 NOTE — ED PROVIDER NOTE - ATTENDING CONTRIBUTION TO CARE
64-year-old male with history of HTN, diabetes, high cholesterol, rheumatoid arthritis, 40% lung capacity status post COVID in 2020 (follows with Dr. Stewart, gets a dry CT scan every 6 months for follow up and is due for his next one soon) presents to the ED complaining of  A productive cough with yellow sputum over the past 2 weeks. Pt has been on augmentin, doxy, and steroids. Pt also c/o of  2 days of left sided chest pain, worse with coughing. No fever, chills. agree with above exam    impression  chest pain, atypical appearing, pt due for repeat outpt ct, will do ct to see if developing pna, enzymes and re-eval

## 2025-06-09 NOTE — ED PROVIDER NOTE - PHYSICAL EXAMINATION
CONSTITUTIONAL: NAD  SKIN: Warm dry  HEAD: NCAT  EYES: NL inspection  ENT: MMM  NECK: Supple; non tender.  CARD: RRR  RESP: No respiratory distress, lung sounds rhonchi bilaterally  ABD: S/NT no R/G  EXT: no pedal edema, no calf tenderness  NEURO: Grossly unremarkable  PSYCH: Cooperative, appropriate.

## 2025-06-09 NOTE — ED PROVIDER NOTE - PROGRESS NOTE DETAILS
s/o Dr. Pedroza EA- All results reviewed with the patient, he states he is comfortable going home and following up with his pulmonologist outpatient.  Patient would like medications to suppress his cough, states there are no children around where he lives.  Strict return precautions given.

## 2025-06-09 NOTE — ED PROVIDER NOTE - CLINICAL SUMMARY MEDICAL DECISION MAKING FREE TEXT BOX
chest pain, atypical appearing, pt due for repeat outpt ct,here d-dimer negative, CT chest unchanged, no pneumonia. trops are reassuring. DC home pulm f/u

## 2025-06-09 NOTE — ED PROVIDER NOTE - DIFFERENTIAL DIAGNOSIS
pneumonia, bronchitis, Acute coronary syndrome, Stable angina , Pneumonia Viral pleuritis. GERD.Costochondritis.Anxiety or panic disorder, Aortic dissection, myocarditis, pericarditis, zoster Differential Diagnosis

## 2025-06-09 NOTE — ED PROVIDER NOTE - PATIENT PORTAL LINK FT
You can access the FollowMyHealth Patient Portal offered by [facility] by registering at the following website: [email or web address]. By joining [employer]’s FollowMyHealth portal, you will also be able to view your health information using other applications (apps) compatible with our system.

## 2025-06-17 ENCOUNTER — OUTPATIENT (OUTPATIENT)
Dept: OUTPATIENT SERVICES | Facility: HOSPITAL | Age: 65
LOS: 1 days | End: 2025-06-17
Payer: MEDICARE

## 2025-06-17 ENCOUNTER — APPOINTMENT (OUTPATIENT)
Age: 65
End: 2025-06-17

## 2025-06-17 VITALS — TEMPERATURE: 98 F | SYSTOLIC BLOOD PRESSURE: 119 MMHG | DIASTOLIC BLOOD PRESSURE: 79 MMHG | HEART RATE: 91 BPM

## 2025-06-17 DIAGNOSIS — C49.A2 GASTROINTESTINAL STROMAL TUMOR OF STOMACH: Chronic | ICD-10-CM

## 2025-06-17 DIAGNOSIS — J84.9 INTERSTITIAL PULMONARY DISEASE, UNSPECIFIED: ICD-10-CM

## 2025-06-17 PROCEDURE — 36415 COLL VENOUS BLD VENIPUNCTURE: CPT

## 2025-06-17 PROCEDURE — 96415 CHEMO IV INFUSION ADDL HR: CPT

## 2025-06-17 PROCEDURE — 85025 COMPLETE CBC W/AUTO DIFF WBC: CPT

## 2025-06-17 PROCEDURE — 80053 COMPREHEN METABOLIC PANEL: CPT

## 2025-06-17 PROCEDURE — 96367 TX/PROPH/DG ADDL SEQ IV INF: CPT

## 2025-06-17 PROCEDURE — 96413 CHEMO IV INFUSION 1 HR: CPT

## 2025-06-17 RX ORDER — ACETAMINOPHEN 500 MG/5ML
650 LIQUID (ML) ORAL ONCE
Refills: 0 | Status: COMPLETED | OUTPATIENT
Start: 2025-06-17 | End: 2025-06-17

## 2025-06-17 RX ORDER — DIPHENHYDRAMINE HCL 12.5MG/5ML
50 ELIXIR ORAL ONCE
Refills: 0 | Status: COMPLETED | OUTPATIENT
Start: 2025-06-17 | End: 2025-06-17

## 2025-06-17 RX ORDER — RITUXIMAB-PVVR 100 MG/10ML
1000 INJECTION, SOLUTION INTRAVENOUS ONCE
Refills: 0 | Status: COMPLETED | OUTPATIENT
Start: 2025-06-17 | End: 2025-06-17

## 2025-06-17 RX ADMIN — Medication 100 MILLIGRAM(S): at 09:07

## 2025-06-17 RX ADMIN — RITUXIMAB-PVVR 1000 MILLIGRAM(S): 100 INJECTION, SOLUTION INTRAVENOUS at 13:30

## 2025-06-17 RX ADMIN — Medication 50 MILLIGRAM(S): at 09:35

## 2025-06-17 RX ADMIN — RITUXIMAB-PVVR 1000 MILLIGRAM(S): 100 INJECTION, SOLUTION INTRAVENOUS at 09:36

## 2025-06-17 RX ADMIN — Medication 650 MILLIGRAM(S): at 09:03

## 2025-06-18 DIAGNOSIS — J84.9 INTERSTITIAL PULMONARY DISEASE, UNSPECIFIED: ICD-10-CM

## 2025-06-18 LAB
ALBUMIN SERPL ELPH-MCNC: 4.4 G/DL
ALP BLD-CCNC: 163 U/L
ALT SERPL-CCNC: 36 U/L
ANION GAP SERPL CALC-SCNC: 17 MMOL/L
AST SERPL-CCNC: 25 U/L
AUTO BASOPHILS #: 0.11 K/UL
AUTO BASOPHILS %: 0.9 %
AUTO EOSINOPHILS #: 0.18 K/UL
AUTO EOSINOPHILS %: 1.5 %
AUTO IMMATURE GRANULOCYTES #: 0.06 K/UL
AUTO LYMPHOCYTES #: 1.83 K/UL
AUTO LYMPHOCYTES %: 15.2 %
AUTO MONOCYTES #: 0.99 K/UL
AUTO MONOCYTES %: 8.2 %
AUTO NEUTROPHILS #: 8.86 K/UL
AUTO NEUTROPHILS %: 73.7 %
AUTO NRBC #: 0 K/UL
BILIRUB SERPL-MCNC: 0.3 MG/DL
BUN SERPL-MCNC: 18 MG/DL
CALCIUM SERPL-MCNC: 9.3 MG/DL
CHLORIDE SERPL-SCNC: 103 MMOL/L
CO2 SERPL-SCNC: 22 MMOL/L
CREAT SERPL-MCNC: 1 MG/DL
EGFRCR SERPLBLD CKD-EPI 2021: 84 ML/MIN/1.73M2
GLUCOSE SERPL-MCNC: 99 MG/DL
HCT VFR BLD CALC: 51.2 %
HGB BLD-MCNC: 17 G/DL
IMM GRANULOCYTES NFR BLD AUTO: 0.5 %
MAN DIFF?: NORMAL
MCHC RBC-ENTMCNC: 29 PG
MCHC RBC-ENTMCNC: 33.2 G/DL
MCV RBC AUTO: 87.4 FL
PLATELET # BLD AUTO: 338 K/UL
PMV BLD AUTO: 0 /100 WBCS
PMV BLD: 11.1 FL
POTASSIUM SERPL-SCNC: 4.5 MMOL/L
PROT SERPL-MCNC: 7.3 G/DL
RBC # BLD: 5.86 M/UL
RBC # FLD: 16.5 %
SODIUM SERPL-SCNC: 142 MMOL/L
WBC # FLD AUTO: 12.03 K/UL

## 2025-06-23 ENCOUNTER — APPOINTMENT (OUTPATIENT)
Dept: PULMONOLOGY | Facility: CLINIC | Age: 65
End: 2025-06-23
Payer: MEDICARE

## 2025-06-23 VITALS
OXYGEN SATURATION: 96 % | BODY MASS INDEX: 23.71 KG/M2 | WEIGHT: 174.8 LBS | HEART RATE: 121 BPM | SYSTOLIC BLOOD PRESSURE: 110 MMHG | DIASTOLIC BLOOD PRESSURE: 92 MMHG

## 2025-06-23 PROCEDURE — 99214 OFFICE O/P EST MOD 30 MIN: CPT

## 2025-06-28 DIAGNOSIS — M05.9 RHEUMATOID ARTHRITIS WITH RHEUMATOID FACTOR, UNSPECIFIED: ICD-10-CM

## 2025-07-01 ENCOUNTER — APPOINTMENT (OUTPATIENT)
Age: 65
End: 2025-07-01

## 2025-07-01 ENCOUNTER — OUTPATIENT (OUTPATIENT)
Dept: OUTPATIENT SERVICES | Facility: HOSPITAL | Age: 65
LOS: 1 days | End: 2025-07-01
Payer: MEDICARE

## 2025-07-01 VITALS
HEART RATE: 82 BPM | RESPIRATION RATE: 18 BRPM | DIASTOLIC BLOOD PRESSURE: 75 MMHG | TEMPERATURE: 98 F | SYSTOLIC BLOOD PRESSURE: 135 MMHG

## 2025-07-01 DIAGNOSIS — C49.A2 GASTROINTESTINAL STROMAL TUMOR OF STOMACH: Chronic | ICD-10-CM

## 2025-07-01 DIAGNOSIS — J84.9 INTERSTITIAL PULMONARY DISEASE, UNSPECIFIED: ICD-10-CM

## 2025-07-01 DIAGNOSIS — Z98.890 OTHER SPECIFIED POSTPROCEDURAL STATES: Chronic | ICD-10-CM

## 2025-07-01 PROCEDURE — 96413 CHEMO IV INFUSION 1 HR: CPT

## 2025-07-01 PROCEDURE — 96367 TX/PROPH/DG ADDL SEQ IV INF: CPT

## 2025-07-01 RX ORDER — ACETAMINOPHEN 500 MG/5ML
650 LIQUID (ML) ORAL ONCE
Refills: 0 | Status: COMPLETED | OUTPATIENT
Start: 2025-07-01 | End: 2025-07-01

## 2025-07-01 RX ORDER — DIPHENHYDRAMINE HCL 12.5MG/5ML
50 ELIXIR ORAL ONCE
Refills: 0 | Status: COMPLETED | OUTPATIENT
Start: 2025-07-01 | End: 2025-07-01

## 2025-07-01 RX ORDER — RITUXIMAB-PVVR 100 MG/10ML
1000 INJECTION, SOLUTION INTRAVENOUS ONCE
Refills: 0 | Status: COMPLETED | OUTPATIENT
Start: 2025-07-01 | End: 2025-07-01

## 2025-07-01 RX ADMIN — Medication 100 MILLIGRAM(S): at 08:55

## 2025-07-01 RX ADMIN — RITUXIMAB-PVVR 1000 MILLIGRAM(S): 100 INJECTION, SOLUTION INTRAVENOUS at 09:09

## 2025-07-01 RX ADMIN — RITUXIMAB-PVVR 1000 MILLIGRAM(S): 100 INJECTION, SOLUTION INTRAVENOUS at 12:50

## 2025-07-01 RX ADMIN — Medication 50 MILLIGRAM(S): at 09:15

## 2025-07-01 RX ADMIN — Medication 650 MILLIGRAM(S): at 08:54

## 2025-07-02 DIAGNOSIS — J84.9 INTERSTITIAL PULMONARY DISEASE, UNSPECIFIED: ICD-10-CM

## 2025-07-08 DIAGNOSIS — M05.9 RHEUMATOID ARTHRITIS WITH RHEUMATOID FACTOR, UNSPECIFIED: ICD-10-CM

## 2025-07-14 ENCOUNTER — APPOINTMENT (OUTPATIENT)
Dept: UROLOGY | Facility: CLINIC | Age: 65
End: 2025-07-14
Payer: MEDICARE

## 2025-07-14 VITALS
HEIGHT: 72 IN | RESPIRATION RATE: 15 BRPM | OXYGEN SATURATION: 94 % | BODY MASS INDEX: 23.9 KG/M2 | TEMPERATURE: 97.8 F | WEIGHT: 176.44 LBS | HEART RATE: 66 BPM | SYSTOLIC BLOOD PRESSURE: 122 MMHG | DIASTOLIC BLOOD PRESSURE: 82 MMHG

## 2025-07-14 PROCEDURE — G2211 COMPLEX E/M VISIT ADD ON: CPT

## 2025-07-14 PROCEDURE — 99214 OFFICE O/P EST MOD 30 MIN: CPT

## 2025-08-21 ENCOUNTER — APPOINTMENT (OUTPATIENT)
Dept: RHEUMATOLOGY | Facility: CLINIC | Age: 65
End: 2025-08-21
Payer: MEDICARE

## 2025-08-21 VITALS
BODY MASS INDEX: 24.52 KG/M2 | SYSTOLIC BLOOD PRESSURE: 122 MMHG | OXYGEN SATURATION: 95 % | HEIGHT: 72 IN | HEART RATE: 98 BPM | WEIGHT: 181 LBS | DIASTOLIC BLOOD PRESSURE: 80 MMHG | TEMPERATURE: 98.6 F

## 2025-08-21 DIAGNOSIS — Z00.00 ENCOUNTER FOR GENERAL ADULT MEDICAL EXAMINATION W/OUT ABNORMAL FINDINGS: ICD-10-CM

## 2025-08-21 DIAGNOSIS — M05.9 RHEUMATOID ARTHRITIS WITH RHEUMATOID FACTOR, UNSPECIFIED: ICD-10-CM

## 2025-08-21 PROCEDURE — 99214 OFFICE O/P EST MOD 30 MIN: CPT

## 2025-08-21 RX ORDER — FLUTICASONE PROPIONATE AND SALMETEROL 500; 50 UG/1; UG/1
500-50 POWDER RESPIRATORY (INHALATION)
Refills: 0 | Status: ACTIVE | COMMUNITY

## 2025-08-21 RX ORDER — PREGABALIN 75 MG/1
75 CAPSULE ORAL
Qty: 180 | Refills: 1 | Status: ACTIVE | COMMUNITY
Start: 2025-08-21 | End: 1900-01-01

## 2025-09-15 ENCOUNTER — APPOINTMENT (OUTPATIENT)
Dept: PULMONOLOGY | Facility: CLINIC | Age: 65
End: 2025-09-15
Payer: MEDICARE

## 2025-09-15 VITALS
SYSTOLIC BLOOD PRESSURE: 132 MMHG | HEART RATE: 81 BPM | HEIGHT: 72 IN | OXYGEN SATURATION: 96 % | BODY MASS INDEX: 24.38 KG/M2 | DIASTOLIC BLOOD PRESSURE: 82 MMHG | WEIGHT: 180 LBS | RESPIRATION RATE: 15 BRPM

## 2025-09-15 DIAGNOSIS — J45.998 OTHER ASTHMA: ICD-10-CM

## 2025-09-15 DIAGNOSIS — J12.9 VIRAL PNEUMONIA, UNSPECIFIED: ICD-10-CM

## 2025-09-15 DIAGNOSIS — J84.9 INTERSTITIAL PULMONARY DISEASE, UNSPECIFIED: ICD-10-CM

## 2025-09-15 DIAGNOSIS — G47.33 OBSTRUCTIVE SLEEP APNEA (ADULT) (PEDIATRIC): ICD-10-CM

## 2025-09-15 PROCEDURE — G2211 COMPLEX E/M VISIT ADD ON: CPT

## 2025-09-15 PROCEDURE — 99214 OFFICE O/P EST MOD 30 MIN: CPT

## 2025-09-15 RX ORDER — FLUTICASONE PROPIONATE AND SALMETEROL 500; 50 UG/1; UG/1
500-50 POWDER RESPIRATORY (INHALATION)
Qty: 60 | Refills: 5 | Status: ACTIVE | COMMUNITY
Start: 2025-09-15 | End: 1900-01-01